# Patient Record
Sex: FEMALE | Race: WHITE | NOT HISPANIC OR LATINO | Employment: UNEMPLOYED | URBAN - METROPOLITAN AREA
[De-identification: names, ages, dates, MRNs, and addresses within clinical notes are randomized per-mention and may not be internally consistent; named-entity substitution may affect disease eponyms.]

---

## 2016-09-30 LAB
EXTERNAL CHLAMYDIA SCREEN: POSITIVE
EXTERNAL GONORRHEA SCREEN: NEGATIVE

## 2017-01-06 ENCOUNTER — GENERIC CONVERSION - ENCOUNTER (OUTPATIENT)
Dept: OTHER | Facility: OTHER | Age: 22
End: 2017-01-06

## 2017-01-18 ENCOUNTER — ALLSCRIPTS OFFICE VISIT (OUTPATIENT)
Dept: OTHER | Facility: OTHER | Age: 22
End: 2017-01-18

## 2017-01-18 LAB
GLUCOSE (HISTORICAL): NORMAL
PROT UR STRIP-MCNC: NORMAL MG/DL

## 2017-01-30 ENCOUNTER — ALLSCRIPTS OFFICE VISIT (OUTPATIENT)
Dept: PERINATAL CARE | Facility: CLINIC | Age: 22
End: 2017-01-30
Payer: COMMERCIAL

## 2017-01-30 ENCOUNTER — GENERIC CONVERSION - ENCOUNTER (OUTPATIENT)
Dept: OTHER | Facility: OTHER | Age: 22
End: 2017-01-30

## 2017-01-30 PROCEDURE — 76811 OB US DETAILED SNGL FETUS: CPT | Performed by: OBSTETRICS & GYNECOLOGY

## 2017-01-31 ENCOUNTER — GENERIC CONVERSION - ENCOUNTER (OUTPATIENT)
Dept: OTHER | Facility: OTHER | Age: 22
End: 2017-01-31

## 2017-02-01 ENCOUNTER — ALLSCRIPTS OFFICE VISIT (OUTPATIENT)
Dept: OTHER | Facility: OTHER | Age: 22
End: 2017-02-01

## 2017-02-01 LAB
GLUCOSE (HISTORICAL): NORMAL
PROT UR STRIP-MCNC: NORMAL MG/DL

## 2017-02-08 LAB — EXTERNAL SYPHILIS RPR SCREEN: NORMAL

## 2017-02-09 ENCOUNTER — LAB CONVERSION - ENCOUNTER (OUTPATIENT)
Dept: OTHER | Facility: OTHER | Age: 22
End: 2017-02-09

## 2017-02-09 LAB
BASOPHILS # BLD AUTO: 0.3 %
BASOPHILS # BLD AUTO: 24 CELLS/UL (ref 0–200)
DEPRECATED RDW RBC AUTO: 13.1 % (ref 11–15)
EOSINOPHIL # BLD AUTO: 0.9 %
EOSINOPHIL # BLD AUTO: 72 CELLS/UL (ref 15–500)
GLUCOSE 1 HR 50 GM GLUC CHALLENGE-PREG PTS (HISTORICAL): 110 MG/DL
HCT VFR BLD AUTO: 33.3 % (ref 35–45)
HGB BLD-MCNC: 10.9 G/DL (ref 11.7–15.5)
LYMPHOCYTES # BLD AUTO: 2032 CELLS/UL (ref 850–3900)
LYMPHOCYTES # BLD AUTO: 25.4 %
MCH RBC QN AUTO: 29.8 PG (ref 27–33)
MCHC RBC AUTO-ENTMCNC: 32.8 G/DL (ref 32–36)
MCV RBC AUTO: 91 FL (ref 80–100)
MONOCYTES # BLD AUTO: 456 CELLS/UL (ref 200–950)
MONOCYTES (HISTORICAL): 5.7 %
NEUTROPHILS # BLD AUTO: 5416 CELLS/UL (ref 1500–7800)
NEUTROPHILS # BLD AUTO: 67.7 %
PLATELET # BLD AUTO: 206 THOUSAND/UL (ref 140–400)
PMV BLD AUTO: 7.4 FL (ref 7.5–12.5)
RBC # BLD AUTO: 3.66 MILLION/UL (ref 3.8–5.1)
RPR SCREEN (HISTORICAL): NORMAL
WBC # BLD AUTO: 8 THOUSAND/UL (ref 3.8–10.8)

## 2017-02-22 ENCOUNTER — GENERIC CONVERSION - ENCOUNTER (OUTPATIENT)
Dept: OTHER | Facility: OTHER | Age: 22
End: 2017-02-22

## 2017-02-22 ENCOUNTER — ALLSCRIPTS OFFICE VISIT (OUTPATIENT)
Dept: OTHER | Facility: OTHER | Age: 22
End: 2017-02-22

## 2017-03-06 ENCOUNTER — GENERIC CONVERSION - ENCOUNTER (OUTPATIENT)
Dept: OTHER | Facility: OTHER | Age: 22
End: 2017-03-06

## 2017-03-06 ENCOUNTER — ALLSCRIPTS OFFICE VISIT (OUTPATIENT)
Dept: PERINATAL CARE | Facility: CLINIC | Age: 22
End: 2017-03-06
Payer: COMMERCIAL

## 2017-03-06 PROCEDURE — 76816 OB US FOLLOW-UP PER FETUS: CPT | Performed by: OBSTETRICS & GYNECOLOGY

## 2017-03-10 ENCOUNTER — GENERIC CONVERSION - ENCOUNTER (OUTPATIENT)
Dept: OTHER | Facility: OTHER | Age: 22
End: 2017-03-10

## 2017-03-10 ENCOUNTER — ALLSCRIPTS OFFICE VISIT (OUTPATIENT)
Dept: OTHER | Facility: OTHER | Age: 22
End: 2017-03-10

## 2017-03-22 ENCOUNTER — GENERIC CONVERSION - ENCOUNTER (OUTPATIENT)
Dept: OTHER | Facility: OTHER | Age: 22
End: 2017-03-22

## 2017-03-22 ENCOUNTER — ALLSCRIPTS OFFICE VISIT (OUTPATIENT)
Dept: OTHER | Facility: OTHER | Age: 22
End: 2017-03-22

## 2017-03-30 ENCOUNTER — GENERIC CONVERSION - ENCOUNTER (OUTPATIENT)
Dept: OTHER | Facility: OTHER | Age: 22
End: 2017-03-30

## 2017-03-30 LAB — EXTERNAL GROUP B STREP ANTIGEN: NEGATIVE

## 2017-04-03 ENCOUNTER — GENERIC CONVERSION - ENCOUNTER (OUTPATIENT)
Dept: OTHER | Facility: OTHER | Age: 22
End: 2017-04-03

## 2017-04-03 ENCOUNTER — ALLSCRIPTS OFFICE VISIT (OUTPATIENT)
Dept: PERINATAL CARE | Facility: CLINIC | Age: 22
End: 2017-04-03
Payer: COMMERCIAL

## 2017-04-03 PROCEDURE — 76816 OB US FOLLOW-UP PER FETUS: CPT | Performed by: OBSTETRICS & GYNECOLOGY

## 2017-04-07 ENCOUNTER — HOSPITAL ENCOUNTER (OUTPATIENT)
Facility: HOSPITAL | Age: 22
Discharge: HOME | End: 2017-04-08
Attending: OBSTETRICS & GYNECOLOGY | Admitting: OBSTETRICS & GYNECOLOGY
Payer: COMMERCIAL

## 2017-04-07 ENCOUNTER — GENERIC CONVERSION - ENCOUNTER (OUTPATIENT)
Dept: OTHER | Facility: OTHER | Age: 22
End: 2017-04-07

## 2017-04-08 VITALS
HEIGHT: 64 IN | RESPIRATION RATE: 16 BRPM | HEART RATE: 84 BPM | OXYGEN SATURATION: 97 % | BODY MASS INDEX: 21 KG/M2 | TEMPERATURE: 97.8 F | DIASTOLIC BLOOD PRESSURE: 64 MMHG | WEIGHT: 123 LBS | SYSTOLIC BLOOD PRESSURE: 106 MMHG

## 2017-04-08 PROCEDURE — 99215 OFFICE O/P EST HI 40 MIN: CPT

## 2017-04-12 ENCOUNTER — GENERIC CONVERSION - ENCOUNTER (OUTPATIENT)
Dept: OTHER | Facility: OTHER | Age: 22
End: 2017-04-12

## 2017-04-15 ENCOUNTER — HOSPITAL ENCOUNTER (OUTPATIENT)
Facility: HOSPITAL | Age: 22
Discharge: HOME | End: 2017-04-15
Attending: OBSTETRICS & GYNECOLOGY | Admitting: OBSTETRICS & GYNECOLOGY
Payer: COMMERCIAL

## 2017-04-15 VITALS — RESPIRATION RATE: 18 BRPM | DIASTOLIC BLOOD PRESSURE: 71 MMHG | HEART RATE: 92 BPM | SYSTOLIC BLOOD PRESSURE: 114 MMHG

## 2017-04-15 PROCEDURE — 99213 OFFICE O/P EST LOW 20 MIN: CPT

## 2017-04-19 ENCOUNTER — GENERIC CONVERSION - ENCOUNTER (OUTPATIENT)
Dept: OTHER | Facility: OTHER | Age: 22
End: 2017-04-19

## 2017-04-28 ENCOUNTER — GENERIC CONVERSION - ENCOUNTER (OUTPATIENT)
Dept: OTHER | Facility: OTHER | Age: 22
End: 2017-04-28

## 2017-05-02 ENCOUNTER — GENERIC CONVERSION - ENCOUNTER (OUTPATIENT)
Dept: OTHER | Facility: OTHER | Age: 22
End: 2017-05-02

## 2017-05-04 ENCOUNTER — HOSPITAL ENCOUNTER (INPATIENT)
Facility: HOSPITAL | Age: 22
LOS: 2 days | Discharge: HOME | End: 2017-05-06
Attending: OBSTETRICS & GYNECOLOGY | Admitting: OBSTETRICS & GYNECOLOGY
Payer: COMMERCIAL

## 2017-05-04 ENCOUNTER — ANESTHESIA (INPATIENT)
Dept: LABOR AND DELIVERY | Facility: HOSPITAL | Age: 22
End: 2017-05-04
Payer: COMMERCIAL

## 2017-05-04 ENCOUNTER — ANESTHESIA EVENT (INPATIENT)
Dept: LABOR AND DELIVERY | Facility: HOSPITAL | Age: 22
End: 2017-05-04
Payer: COMMERCIAL

## 2017-05-04 DIAGNOSIS — Z3A.40 40 WEEKS GESTATION OF PREGNANCY: Primary | ICD-10-CM

## 2017-05-04 LAB
ABO GROUP BLD: NORMAL
AMPHETAMINES SERPL QL SCN: NEGATIVE
BARBITURATES UR QL: NEGATIVE
BASE EXCESS BLDCOA CALC-SCNC: -5.7 MMOL/L (ref 3–11)
BASE EXCESS BLDCOV CALC-SCNC: -7 MMOL/L (ref 1–9)
BASOPHILS # BLD AUTO: 0.01 THOUSANDS/ΜL (ref 0–0.1)
BASOPHILS NFR BLD AUTO: 0 % (ref 0–1)
BENZODIAZ UR QL: NEGATIVE
BLD GP AB SCN SERPL QL: NEGATIVE
COCAINE UR QL: NEGATIVE
EOSINOPHIL # BLD AUTO: 0.02 THOUSAND/ΜL (ref 0–0.61)
EOSINOPHIL NFR BLD AUTO: 0 % (ref 0–6)
ERYTHROCYTE [DISTWIDTH] IN BLOOD BY AUTOMATED COUNT: 15.6 % (ref 11.6–15.1)
HCO3 BLDCOA-SCNC: 23.3 MMOL/L (ref 17.3–27.3)
HCO3 BLDCOV-SCNC: 18 MMOL/L (ref 12.2–28.6)
HCT VFR BLD AUTO: 31.3 % (ref 34.8–46.1)
HGB BLD-MCNC: 10.2 G/DL (ref 11.5–15.4)
LYMPHOCYTES # BLD AUTO: 1.97 THOUSANDS/ΜL (ref 0.6–4.47)
LYMPHOCYTES NFR BLD AUTO: 14 % (ref 14–44)
MCH RBC QN AUTO: 27.1 PG (ref 26.8–34.3)
MCHC RBC AUTO-ENTMCNC: 32.6 G/DL (ref 31.4–37.4)
MCV RBC AUTO: 83 FL (ref 82–98)
METHADONE UR QL: NEGATIVE
MONOCYTES # BLD AUTO: 0.97 THOUSAND/ΜL (ref 0.17–1.22)
MONOCYTES NFR BLD AUTO: 7 % (ref 4–12)
NEUTROPHILS # BLD AUTO: 10.97 THOUSANDS/ΜL (ref 1.85–7.62)
NEUTS SEG NFR BLD AUTO: 79 % (ref 43–75)
NRBC BLD AUTO-RTO: 0 /100 WBCS
O2 CT VFR BLDCOA CALC: 5 ML/DL
OPIATES UR QL SCN: NEGATIVE
OXYHGB MFR BLDCOA: 22.2 %
OXYHGB MFR BLDCOV: 67.7 %
PCO2 BLDCOA: 59.7 MM[HG] (ref 30–60)
PCO2 BLDCOV: 35.1 MM HG (ref 27–43)
PCP UR QL: NEGATIVE
PH BLDCOA: 7.21 [PH] (ref 7.23–7.43)
PH BLDCOV: 7.33 [PH] (ref 7.19–7.49)
PLATELET # BLD AUTO: 213 THOUSANDS/UL (ref 149–390)
PMV BLD AUTO: 9.3 FL (ref 8.9–12.7)
PO2 BLDCOA: 14.6 MM HG (ref 5–25)
PO2 BLDCOV: 30.3 MM HG (ref 15–45)
RBC # BLD AUTO: 3.76 MILLION/UL (ref 3.81–5.12)
RH BLD: POSITIVE
SAO2 % BLDCOV: 16.2 ML/DL
SPECIMEN EXPIRATION DATE: NORMAL
THC UR QL: NEGATIVE
WBC # BLD AUTO: 13.99 THOUSAND/UL (ref 4.31–10.16)

## 2017-05-04 PROCEDURE — 86901 BLOOD TYPING SEROLOGIC RH(D): CPT | Performed by: OBSTETRICS & GYNECOLOGY

## 2017-05-04 PROCEDURE — 86900 BLOOD TYPING SEROLOGIC ABO: CPT | Performed by: OBSTETRICS & GYNECOLOGY

## 2017-05-04 PROCEDURE — 10907ZC DRAINAGE OF AMNIOTIC FLUID, THERAPEUTIC FROM PRODUCTS OF CONCEPTION, VIA NATURAL OR ARTIFICIAL OPENING: ICD-10-PCS | Performed by: OBSTETRICS & GYNECOLOGY

## 2017-05-04 PROCEDURE — 99214 OFFICE O/P EST MOD 30 MIN: CPT

## 2017-05-04 PROCEDURE — 80307 DRUG TEST PRSMV CHEM ANLYZR: CPT | Performed by: OBSTETRICS & GYNECOLOGY

## 2017-05-04 PROCEDURE — 4A1HXCZ MONITORING OF PRODUCTS OF CONCEPTION, CARDIAC RATE, EXTERNAL APPROACH: ICD-10-PCS | Performed by: OBSTETRICS & GYNECOLOGY

## 2017-05-04 PROCEDURE — 82805 BLOOD GASES W/O2 SATURATION: CPT | Performed by: OBSTETRICS & GYNECOLOGY

## 2017-05-04 PROCEDURE — 85025 COMPLETE CBC W/AUTO DIFF WBC: CPT | Performed by: OBSTETRICS & GYNECOLOGY

## 2017-05-04 PROCEDURE — 86592 SYPHILIS TEST NON-TREP QUAL: CPT | Performed by: OBSTETRICS & GYNECOLOGY

## 2017-05-04 PROCEDURE — 86850 RBC ANTIBODY SCREEN: CPT | Performed by: OBSTETRICS & GYNECOLOGY

## 2017-05-04 RX ORDER — BUPRENORPHINE AND NALOXONE 2; .5 MG/1; MG/1
1 FILM, SOLUBLE BUCCAL; SUBLINGUAL DAILY
Status: DISCONTINUED | OUTPATIENT
Start: 2017-05-05 | End: 2017-05-04

## 2017-05-04 RX ORDER — LIDOCAINE HYDROCHLORIDE AND EPINEPHRINE 15; 5 MG/ML; UG/ML
INJECTION, SOLUTION EPIDURAL AS NEEDED
Status: DISCONTINUED | OUTPATIENT
Start: 2017-05-04 | End: 2017-05-05 | Stop reason: SURG

## 2017-05-04 RX ORDER — OXYTOCIN/RINGER'S LACTATE 30/500 ML
PLASTIC BAG, INJECTION (ML) INTRAVENOUS
Status: COMPLETED
Start: 2017-05-04 | End: 2017-05-04

## 2017-05-04 RX ORDER — ROPIVACAINE HYDROCHLORIDE 2 MG/ML
INJECTION, SOLUTION EPIDURAL; INFILTRATION; PERINEURAL AS NEEDED
Status: DISCONTINUED | OUTPATIENT
Start: 2017-05-04 | End: 2017-05-05 | Stop reason: SURG

## 2017-05-04 RX ORDER — ONDANSETRON 2 MG/ML
4 INJECTION INTRAMUSCULAR; INTRAVENOUS ONCE
Status: COMPLETED | OUTPATIENT
Start: 2017-05-04 | End: 2017-05-04

## 2017-05-04 RX ORDER — BUPRENORPHINE HYDROCHLORIDE 8 MG/1
4 TABLET SUBLINGUAL DAILY
Status: DISCONTINUED | OUTPATIENT
Start: 2017-05-05 | End: 2017-05-06 | Stop reason: HOSPADM

## 2017-05-04 RX ORDER — QUETIAPINE FUMARATE 25 MG/1
25 TABLET, FILM COATED ORAL
Status: DISCONTINUED | OUTPATIENT
Start: 2017-05-04 | End: 2017-05-06 | Stop reason: HOSPADM

## 2017-05-04 RX ORDER — SODIUM CHLORIDE, SODIUM LACTATE, POTASSIUM CHLORIDE, CALCIUM CHLORIDE 600; 310; 30; 20 MG/100ML; MG/100ML; MG/100ML; MG/100ML
125 INJECTION, SOLUTION INTRAVENOUS CONTINUOUS
Status: DISCONTINUED | OUTPATIENT
Start: 2017-05-04 | End: 2017-05-05

## 2017-05-04 RX ORDER — ONDANSETRON 2 MG/ML
INJECTION INTRAMUSCULAR; INTRAVENOUS
Status: COMPLETED
Start: 2017-05-04 | End: 2017-05-04

## 2017-05-04 RX ADMIN — Medication 250 MILLI-UNITS/MIN: at 23:20

## 2017-05-04 RX ADMIN — LIDOCAINE HYDROCHLORIDE AND EPINEPHRINE 3 ML: 15; 5 INJECTION, SOLUTION EPIDURAL at 15:23

## 2017-05-04 RX ADMIN — ROPIVACAINE HYDROCHLORIDE 10 ML: 2 INJECTION, SOLUTION EPIDURAL; INFILTRATION at 15:27

## 2017-05-04 RX ADMIN — ONDANSETRON 4 MG: 2 INJECTION INTRAMUSCULAR; INTRAVENOUS at 19:24

## 2017-05-04 RX ADMIN — SODIUM CHLORIDE, SODIUM LACTATE, POTASSIUM CHLORIDE, AND CALCIUM CHLORIDE 125 ML/HR: .6; .31; .03; .02 INJECTION, SOLUTION INTRAVENOUS at 15:21

## 2017-05-04 RX ADMIN — SODIUM CHLORIDE, SODIUM LACTATE, POTASSIUM CHLORIDE, AND CALCIUM CHLORIDE 125 ML/HR: .6; .31; .03; .02 INJECTION, SOLUTION INTRAVENOUS at 14:27

## 2017-05-04 RX ADMIN — ROPIVACAINE HYDROCHLORIDE: 2 INJECTION, SOLUTION EPIDURAL; INFILTRATION at 15:28

## 2017-05-05 LAB
BASOPHILS # BLD AUTO: 0.01 THOUSANDS/ΜL (ref 0–0.1)
BASOPHILS NFR BLD AUTO: 0 % (ref 0–1)
EOSINOPHIL # BLD AUTO: 0 THOUSAND/ΜL (ref 0–0.61)
EOSINOPHIL NFR BLD AUTO: 0 % (ref 0–6)
ERYTHROCYTE [DISTWIDTH] IN BLOOD BY AUTOMATED COUNT: 15.8 % (ref 11.6–15.1)
HCT VFR BLD AUTO: 26.6 % (ref 34.8–46.1)
HGB BLD-MCNC: 8.6 G/DL (ref 11.5–15.4)
LYMPHOCYTES # BLD AUTO: 1.51 THOUSANDS/ΜL (ref 0.6–4.47)
LYMPHOCYTES NFR BLD AUTO: 8 % (ref 14–44)
MCH RBC QN AUTO: 27 PG (ref 26.8–34.3)
MCHC RBC AUTO-ENTMCNC: 32.3 G/DL (ref 31.4–37.4)
MCV RBC AUTO: 83 FL (ref 82–98)
MONOCYTES # BLD AUTO: 2.05 THOUSAND/ΜL (ref 0.17–1.22)
MONOCYTES NFR BLD AUTO: 11 % (ref 4–12)
NEUTROPHILS # BLD AUTO: 14.61 THOUSANDS/ΜL (ref 1.85–7.62)
NEUTS SEG NFR BLD AUTO: 81 % (ref 43–75)
NRBC BLD AUTO-RTO: 0 /100 WBCS
PLATELET # BLD AUTO: 176 THOUSANDS/UL (ref 149–390)
PMV BLD AUTO: 9.5 FL (ref 8.9–12.7)
RBC # BLD AUTO: 3.19 MILLION/UL (ref 3.81–5.12)
RPR SER QL: NORMAL
WBC # BLD AUTO: 18.23 THOUSAND/UL (ref 4.31–10.16)

## 2017-05-05 PROCEDURE — 85025 COMPLETE CBC W/AUTO DIFF WBC: CPT | Performed by: OBSTETRICS & GYNECOLOGY

## 2017-05-05 RX ORDER — ONDANSETRON 2 MG/ML
4 INJECTION INTRAMUSCULAR; INTRAVENOUS EVERY 8 HOURS PRN
Status: DISCONTINUED | OUTPATIENT
Start: 2017-05-05 | End: 2017-05-06 | Stop reason: HOSPADM

## 2017-05-05 RX ORDER — DOCUSATE SODIUM 100 MG/1
100 CAPSULE, LIQUID FILLED ORAL 2 TIMES DAILY
Status: DISCONTINUED | OUTPATIENT
Start: 2017-05-05 | End: 2017-05-06 | Stop reason: HOSPADM

## 2017-05-05 RX ORDER — CALCIUM CARBONATE 200(500)MG
1000 TABLET,CHEWABLE ORAL DAILY PRN
Status: DISCONTINUED | OUTPATIENT
Start: 2017-05-05 | End: 2017-05-06 | Stop reason: HOSPADM

## 2017-05-05 RX ORDER — OXYCODONE HYDROCHLORIDE AND ACETAMINOPHEN 5; 325 MG/1; MG/1
1 TABLET ORAL EVERY 4 HOURS PRN
Status: DISCONTINUED | OUTPATIENT
Start: 2017-05-05 | End: 2017-05-06 | Stop reason: HOSPADM

## 2017-05-05 RX ORDER — DIPHENHYDRAMINE HCL 25 MG
25 TABLET ORAL EVERY 6 HOURS PRN
Status: DISCONTINUED | OUTPATIENT
Start: 2017-05-05 | End: 2017-05-06 | Stop reason: HOSPADM

## 2017-05-05 RX ORDER — OXYTOCIN/RINGER'S LACTATE 30/500 ML
250 PLASTIC BAG, INJECTION (ML) INTRAVENOUS CONTINUOUS
Status: ACTIVE | OUTPATIENT
Start: 2017-05-05 | End: 2017-05-05

## 2017-05-05 RX ORDER — IBUPROFEN 600 MG/1
600 TABLET ORAL EVERY 6 HOURS PRN
Status: DISCONTINUED | OUTPATIENT
Start: 2017-05-05 | End: 2017-05-06 | Stop reason: HOSPADM

## 2017-05-05 RX ORDER — SIMETHICONE 80 MG
80 TABLET,CHEWABLE ORAL 4 TIMES DAILY PRN
Status: DISCONTINUED | OUTPATIENT
Start: 2017-05-05 | End: 2017-05-06 | Stop reason: HOSPADM

## 2017-05-05 RX ORDER — ACETAMINOPHEN 325 MG/1
650 TABLET ORAL EVERY 6 HOURS PRN
Status: DISCONTINUED | OUTPATIENT
Start: 2017-05-05 | End: 2017-05-06 | Stop reason: HOSPADM

## 2017-05-05 RX ORDER — DIAPER,BRIEF,INFANT-TODD,DISP
1 EACH MISCELLANEOUS AS NEEDED
Status: DISCONTINUED | OUTPATIENT
Start: 2017-05-05 | End: 2017-05-06 | Stop reason: HOSPADM

## 2017-05-05 RX ORDER — OXYCODONE HYDROCHLORIDE AND ACETAMINOPHEN 5; 325 MG/1; MG/1
2 TABLET ORAL EVERY 4 HOURS PRN
Status: DISCONTINUED | OUTPATIENT
Start: 2017-05-05 | End: 2017-05-06 | Stop reason: HOSPADM

## 2017-05-05 RX ADMIN — DOCUSATE SODIUM 100 MG: 100 CAPSULE, LIQUID FILLED ORAL at 08:52

## 2017-05-05 RX ADMIN — DOCUSATE SODIUM 100 MG: 100 CAPSULE, LIQUID FILLED ORAL at 16:37

## 2017-05-05 RX ADMIN — IBUPROFEN 600 MG: 600 TABLET, FILM COATED ORAL at 16:36

## 2017-05-05 RX ADMIN — BUPRENORPHINE HYDROCHLORIDE 4 MG: 8 TABLET SUBLINGUAL at 08:52

## 2017-05-05 RX ADMIN — IBUPROFEN 600 MG: 600 TABLET, FILM COATED ORAL at 10:45

## 2017-05-05 RX ADMIN — QUETIAPINE FUMARATE 25 MG: 25 TABLET, FILM COATED ORAL at 01:05

## 2017-05-06 VITALS
WEIGHT: 125 LBS | OXYGEN SATURATION: 100 % | HEART RATE: 79 BPM | HEIGHT: 64 IN | TEMPERATURE: 97.8 F | DIASTOLIC BLOOD PRESSURE: 68 MMHG | BODY MASS INDEX: 21.34 KG/M2 | RESPIRATION RATE: 20 BRPM | SYSTOLIC BLOOD PRESSURE: 102 MMHG

## 2017-05-06 RX ORDER — DOCUSATE SODIUM 100 MG/1
100 CAPSULE, LIQUID FILLED ORAL 2 TIMES DAILY
Qty: 60 CAPSULE | Refills: 0
Start: 2017-05-06 | End: 2017-06-05

## 2017-05-06 RX ORDER — DIAPER,BRIEF,INFANT-TODD,DISP
1 EACH MISCELLANEOUS AS NEEDED
Qty: 30 G | Refills: 0
Start: 2017-05-06 | End: 2017-06-05

## 2017-05-06 RX ORDER — IBUPROFEN 600 MG/1
600 TABLET ORAL EVERY 6 HOURS PRN
Qty: 30 TABLET | Refills: 0
Start: 2017-05-06 | End: 2017-06-05

## 2017-05-06 RX ORDER — ACETAMINOPHEN 325 MG/1
TABLET ORAL
Qty: 30 TABLET | Refills: 0
Start: 2017-05-06

## 2017-05-06 RX ADMIN — DOCUSATE SODIUM 100 MG: 100 CAPSULE, LIQUID FILLED ORAL at 09:03

## 2017-05-06 RX ADMIN — QUETIAPINE FUMARATE 25 MG: 25 TABLET, FILM COATED ORAL at 01:06

## 2017-05-06 RX ADMIN — BUPRENORPHINE HYDROCHLORIDE 4 MG: 8 TABLET SUBLINGUAL at 09:03

## 2017-05-06 RX ADMIN — IBUPROFEN 600 MG: 600 TABLET, FILM COATED ORAL at 01:06

## 2017-05-09 ENCOUNTER — TELEPHONE (OUTPATIENT)
Dept: LABOR AND DELIVERY | Facility: HOSPITAL | Age: 22
End: 2017-05-09

## 2017-05-15 LAB — PLACENTA IN STORAGE: NORMAL

## 2017-05-24 ENCOUNTER — ALLSCRIPTS OFFICE VISIT (OUTPATIENT)
Dept: OTHER | Facility: OTHER | Age: 22
End: 2017-05-24

## 2017-06-12 ENCOUNTER — GENERIC CONVERSION - ENCOUNTER (OUTPATIENT)
Dept: OTHER | Facility: OTHER | Age: 22
End: 2017-06-12

## 2017-09-11 ENCOUNTER — ALLSCRIPTS OFFICE VISIT (OUTPATIENT)
Dept: OTHER | Facility: OTHER | Age: 22
End: 2017-09-11

## 2017-10-26 NOTE — PROGRESS NOTES
Assessment  1  Contraception (V25 9) (Z30 9)    Plan  Contraception    · Zovia 35E (28) 1-35 MG-MCG Oral Tablet; TAKE 1 TABLET DAILY   Rx By: Jana Brar; Dispense: 84 Days ; #:84 Tablet; Refill: 2;For: Contraception; AIDA = N; Verified Transmission to [facility] Last Updated By: System, [employer]; 2017 11:52:35 AM    Discussion/Summary  Goals and Barriers: The patient has the current Goals: Goals met  The patent has the current Barriers: No barriers  Patient's Capacity to Self-Care: Patient is able to Self-Care  Chief Complaint  Chief Complaint Free Text Note Form: OC follow-up      History of Present Illness  HPI: This is a 71-year-old white female who presents today for birth control pill follow-up  She is doing well  Her cycles are becoming more regular  She is very good at taking the pill the same time a day  She is not sexually active  We'll follow the baby is no longer involved  Birth control pills authorized  She will return my office in 3 months  She'll continue to watch her cycles and see if there is an abnormal pattern developing  Active Problems  1  Chlamydial infection (079 98) (A74 9)   2  Contraception (V25 9) (Z30 9)   3  Endometriosis (617 9) (N80 9)   4  Episodic mood disorder (296 90) (F39)   5  Generalized anxiety disorder (300 02) (F41 1)   6  History of heroin use (V11 8) (Z86 59)    Past Medical History  1  History of Age At First Period 16 Years Old (Menarche)   2  History of Encounter for drug rehabilitation (V57 89) (Z71 51)   3  Endometriosis (617 9) (N80 9)   4  History of Heroin use (305 50) (F11 90)    Surgical History  1  History of Laparoscopy (Diagnostic)    Family History  Mother    1  No pertinent family history  Maternal Grandmother    2  Family history of    3  Family history of malignant neoplasm (V16 9) (Z80 9)  Maternal Grandfather    4  Family history of myocardial infarction (V17 3) (Z82 49)   5   Family history of skin cancer (V16 8) (Z80 8)  Family History    6  Family history of Heart Disease (V17 49)   7  Family history of Urinary Tract Infection    Social History   · Denied: History of Alcohol Use (History)   · Denied: History of Being A Social Drinker   · Caffeine Use   · Denied: History of Daily Coffee Consumption (___ Cups/Day)   · History of heroin use (V11 8) (Z86 59)    Current Meds   1  Subutex SUBL; DISSOLVE TABLET  per pt 8mg daily; Therapy: (Recorded:12Apr2017) to Recorded   2  Zovia 1/35E (28) 1-35 MG-MCG Oral Tablet; TAKE 1 TABLET DAILY; Therapy: 55SGZ2317 to (Evaluate:35Sbc4482)  Requested for: 12Jun2017; Last   Rx:12Jun2017 Ordered    Allergies  1  No Known Drug Allergies  2  No Known Environmental Allergies   3   No Known Food Allergies    Vitals  Vital Signs    Recorded: 55UAC4130 93:52MW   Systolic 90   Diastolic 62   Height 5 ft 3 5 in   Weight 94 lb 6 08 oz   BMI Calculated 16 46   BSA Calculated 1 41   LMP 11-Sep-2017     Signatures   Electronically signed by : DAVID Steele ; Sep 11 2017 11:53AM EST                       (Author)

## 2017-10-30 ENCOUNTER — ANESTHESIA (OUTPATIENT)
Dept: PERIOP | Facility: HOSPITAL | Age: 22
End: 2017-10-30

## 2017-10-30 ENCOUNTER — ANESTHESIA EVENT (OUTPATIENT)
Dept: PERIOP | Facility: HOSPITAL | Age: 22
End: 2017-10-30

## 2017-12-11 ENCOUNTER — ALLSCRIPTS OFFICE VISIT (OUTPATIENT)
Dept: OTHER | Facility: OTHER | Age: 22
End: 2017-12-11

## 2017-12-12 NOTE — PROGRESS NOTES
Assessment    1  Encounter for routine gynecological examination () (Z99 555)    Discussion/Summary    The patient was informed of a stable gyn examination  A Pap smear was performed  She is now interested in Depo-Provera, the to be a good method for her  Brochure given shortness make her decision  She should return my office in 1 year  She will continue to follow her drug treatment plan  The patient has the current Goals: To consider Depo-Provera for contraception  There are no barriers  Patient is able to Self-Care  Chief Complaint  Annual visit      History of Present Illness  HPI: This is a 36-year-old white female she is  1 para 1 1 vaginal delivery approximately 7 months ago  Her current method of contraception was birth control pill  She is now having problems with memory she is considering other options  She is interested in the Depo-Provera  She is on Suboxone for treatment of her addiction she is doing well  She is not sexually relation to the present time  She is to doing well since her delivery  Her infant was also thriving and doing well  She denies any other major gynecological  GI complaint  There are no new major family illnesses report  She denies any problem depression or anxiety  GYN HM, Adult Female [facility]:   General Health: The patient's health since the last visit is described as good  She has regular dental visits  -- She denies vision problems  -- She denies hearing loss  Lifestyle:  She consumes a diverse and healthy diet  -- She does not have any weight concerns  -- She does not exercise regularly  -- She uses tobacco  The patient is a former cigarette smoker  -- She denies alcohol use  -- She uses illicit drugs  She is in a drug treatment program   Reproductive health: the patient is premenopausal--   she reports no menstrual problems  -- she uses contraception  For contraception, she uses oral contraception pills  -- she is not sexually active     Screening:      Review of Systems   Constitutional: No fever, no chills, feels well, no tiredness, no recent weight gain or loss  ENT: no ear ache, no loss of hearing, no nosebleeds or nasal discharge, no sore throat or hoarseness  Cardiovascular: no complaints of slow or fast heart rate, no chest pain, no palpitations, no leg claudication or lower extremity edema  Respiratory: no complaints of shortness of breath, no wheezing, no dyspnea on exertion, no orthopnea or PND  Breasts: no complaints of breast pain, breast lump or nipple discharge  Gastrointestinal: no complaints of abdominal pain, no constipation, no nausea or diarrhea, no vomiting, no bloody stools  Genitourinary: no complaints of dysuria, no incontinence, no pelvic pain, no dysmenorrhea, no vaginal discharge or abnormal vaginal bleeding  Musculoskeletal: no complaints of arthralgia, no myalgia, no joint swelling or stiffness, no limb pain or swelling  Integumentary: no complaints of skin rash or lesion, no itching or dry skin, no skin wounds  Neurological: no complaints of headache, no confusion, no numbness or tingling, no dizziness or fainting  Over the past 2 weeks, how often have you been bothered by the following problems? 1 ) Little interest or pleasure in doing things? Not at all   2 ) Feeling down, depressed or hopeless? Not at all   3 ) Trouble falling asleep or sleeping too much? Not at all   4 ) Feeling tired or having little energy? Not at all   5 ) Poor appetite or overeating? Not at all   6 ) Feeling bad about yourself, or that you are a failure, or have let yourself or your family down? Not at all   7 ) Trouble concentrating on things, such as reading a newspaper or watching television? Not at all   8 ) Moving or speaking so slowly that other people could have noticed, or the opposite, moving or speaking faster than usual? Not at all    How difficult have these problems made it for you to do your work, take care of things at home, or get along with people? Not at all  Score       OB History  Pregnancy History (Brief):  Prior pregnancies: : 1  Para: 1 (full-term)  Delivery type: 1 vaginal       Active Problems  1  Chlamydial infection (079 98) (A74 9)   2  Contraception (V25 9) (Z30 9)   3  Encounter for routine gynecological examination (V72 31) (Z01 419)   4  Endometriosis (617 9) (N80 9)   5  Episodic mood disorder (296 90) (F39)   6  Generalized anxiety disorder (300 02) (F41 1)   7  History of heroin use (V11 8) (Z86 59)    Past Medical History     · History of Age At First Period 16 Years Old (Menarche)   · History of Drug use affecting pregnancy in second trimester (648 43,305 90) (O99 322)   · History of Encounter for drug rehabilitation (V57 89) (Z71 51)   · Endometriosis (617 9) (N80 9)   · History of Heroin use (305 50) (F11 90)   · History of pregnancy (V13 29)   · History of Pregnancy complicated by subutex maintenance, antepartum(648 33,304 00,V58 69) (O99 320,F11 20,Z79 891)    Surgical History     · History of Laparoscopy (Diagnostic)    Family History  Mother    · No pertinent family history  Maternal Grandmother    · Family history of    · Family history of malignant neoplasm (V16 9) (Z80 9)  Maternal Grandfather    · Family history of myocardial infarction (V17 3) (Z82 49)   · Family history of skin cancer (V16 8) (Z80 8)  Family History    · Family history of Heart Disease (V17 49)   · Family history of Urinary Tract Infection    Social History     · Denied: History of Alcohol Use (History)   · Denied: History of Being A Social Drinker   · Caffeine Use   · Denied: History of Daily Coffee Consumption (___ Cups/Day)   · Former smoker (V15 82) (L85 364)   · History of heroin use (V11 8) (Z86 59)    Current Meds   1  Subutex SUBL; DISSOLVE TABLET  per pt 8mg daily; Therapy: (Recorded:22Vbc6649) to Recorded   2  Zovia 1/35E (28) 1-35 MG-MCG Oral Tablet; TAKE 1 TABLET DAILY;  Therapy: 00ACO0954 to (Evaluate:96Tyv8844) Requested for: 54Smn3792; Last Rx:06Xot9294 Ordered    Allergies  1  No Known Drug Allergies  2  No Known Environmental Allergies   3  No Known Food Allergies    Vitals   Recorded: 90TRT6838 83:64OR   Systolic 164   Diastolic 70   Height 5 ft 3 5 in   Weight 101 lb 4 00 oz   BMI Calculated 17 65   BSA Calculated 1 45   LMP 30-Oct-2017       Physical Exam   Constitutional  General appearance: No acute distress, well appearing and well nourished  Neck  Neck: Normal, supple, trachea midline, no masses  Thyroid: Normal, no thyromegaly  Pulmonary  Respiratory effort: No increased work of breathing or signs of respiratory distress  Auscultation of lungs: Clear to auscultation  Cardiovascular  Auscultation of heart: Normal rate and rhythm, normal S1 and S2, no murmurs  Peripheral vascular exam: Normal pulses Throughout  Genitourinary  External genitalia: Normal and no lesions appreciated  Vagina: Normal, no lesions or dryness appreciated  Urethra: Normal    Urethral meatus: Normal    Bladder: Normal, soft, non-tender and no prolapse or masses appreciated  Cervix: Normal, no palpable masses  Uterus: Normal, non-tender, not enlarged, and no palpable masses  Adnexa/parametria: Normal, non-tender and no fullness or masses appreciated  Chest  Breasts: Normal and no dimpling or skin changes noted  Abdomen  Abdomen: Normal, non-tender, and no organomegaly noted  Liver and spleen: No hepatomegaly or splenomegaly  Examination for hernias: No hernias appreciated  Lymphatic  Palpation of lymph nodes in neck, axillae, groin and/or other locations: No lymphadenopathy or masses noted  Skin  Skin and subcutaneous tissue: Normal skin turgor and no rashes     Palpation of skin and subcutaneous tissue: Normal    Psychiatric  Orientation to person, place, and time: Normal    Mood and affect: Normal        Signatures   Electronically signed by : DAVID Macias ; Dec 11 2017 10:41AM EST (Author)

## 2017-12-15 ENCOUNTER — LAB CONVERSION - ENCOUNTER (OUTPATIENT)
Dept: OTHER | Facility: OTHER | Age: 22
End: 2017-12-15

## 2017-12-15 LAB
ADDITIONAL INFORMATION (HISTORICAL): NORMAL
ADEQUACY: (HISTORICAL): NORMAL
COMMENT (HISTORICAL): NORMAL
CYTOTECHNOLOGIST: (HISTORICAL): NORMAL
INTERPRETATION (HISTORICAL): NORMAL
LMP (HISTORICAL): NORMAL
PREV. BX: (HISTORICAL): NORMAL
PREV. PAP (HISTORICAL): NORMAL
SOURCE (HISTORICAL): NORMAL

## 2018-01-10 NOTE — PROGRESS NOTES
MAR 6 2017         RE: Bonifacio Lot                                  To: [facility]   MR#: [id number]                                   [address] [place] [address]   : [address] [place] [address] Road: [id number]                             Fax: [phone]   (Exam #: [id number])      The LMP of this 22year old,  G1, P0-0-0-0 patient was 2016, her   working SCOTT is MAY 1 2017 and the current gestational age is 26 weeks 0   days by [address] [place] [address]  A sonographic examination was performed on MAR   6 2017 using real time equipment  The ultrasound examination was performed   using abdominal technique  The patient has a BMI of 20 4  Her blood   pressure today was 97/68  Earliest ultrasound found in her record: 10/14/2016   11w4d  2017 SCOTT      Cardiac motion was observed at 136 bpm       INDICATIONS      history of drug dependence   Subutex use   missed anatomy follow up      Exam Types      Level I      RESULTS      Fetus # 1 of 1   Vertex presentation   Fetal growth appeared normal   Placenta Location = Anterior   No placenta previa   Placenta Grade = II      MEASUREMENTS (* Included In Average GA)      AC              29 8 cm        33 weeks 6 days* (78%)   BPD              7 6 cm        30 weeks 3 days* (10%)   HC              28 8 cm        31 weeks 2 days* (20%)   Femur            6 2 cm        32 weeks 0 days* (49%)      Cerebellum       4 3 cm        34 weeks 4 days      HC/AC           0 97   FL/AC           0 21   FL/BPD          0 82   EFW (Ac/Fl/Hc)  2062 grams - 4 lbs 9 oz                 (56%)      THE AVERAGE GESTATIONAL AGE is 31 weeks 6 days +/- 18 days        AMNIOTIC FLUID      Q1: 1 5      Q2: 5 6      Q3:          Q4: 2 1   SANAZ Total = 9 2 cm   Amniotic Fluid: Normal      ANATOMY DETAILS      Visualized Appearing Sonographically Normal:   HEAD: (Calvarium, BPD Level, Cavum, Lateral Ventricles, Cerebellum);      FACE/NECK: (Profile, Nose/Lips); HEART: (Four Chamber View, Proximal   Left Outflow, Proximal Right Outflow, 3VV, Cardiac Axis, Cardiac   Position);    STOMACH, RIGHT KIDNEY, LEFT KIDNEY, BLADDER, EXTREMS: (Rt   Hand, Lt Foot, Rt Foot);    GENITALIA, PLACENTA, PCI      Suboptimally Visualized:   FACE/NECK: (Palate)      ANATOMY COMMENTS            The prior fetal anatomic survey was limited the area of the 4ch heart,   gender, pci, palate, right hand, feet, profile, nb, n/l  Views of the 4ch   heart, gender, pci, right hand, feet, n/l, and profile/nasal bone  were   seen today as sonographically normal within the inherent limitations of   fetal ultrasound; however, views of the palate were suboptimal without   obvious abnormalities  IMPRESSION      Burk IUP   31 weeks and 6 days by this ultrasound  (SCOTT=MAY 2 2017)   32 weeks and 0 days by [facility] Tri Sono  (SCOTT=MAY 1 2017)   Vertex presentation   Fetal growth appeared normal   Regular fetal heart rate of 136 bpm   Anterior placenta   No placenta previa      GENERAL COMMENT      On exam today the patient appears well, in no acute distress, and denies   any complaints  Her abdomen is non-tender  Elda has been utilizing 12   mg of Subutex daily through a Dr  in [place]  She states that she has been   free from heroin use since coming out of rehabilitation earlier in the   pregnancy  The fetal anatomic survey is now complete except for suboptimal   visualization of the fetal palate  There is no sonographic evidence of   fetal abnormalities at this time  The remainder of the survey was   completed previously  There has been appropriate interval fetal growth  Good fetal movement and tone are seen  The amniotic fluid volume appears   normal   The placenta is anterior and it appears sonographically normal     The patient was informed of today's findings and all of her questions were   answered    The limitations of ultrasound were reviewed with the patient,   which she accepts  Precautions and fetal kick counts were reviewed  Antonio Lopez has a NICU consultation scheduled today immediately after the   ultrasound to discuss  abstinence syndrome  We discussed   follow-up in detail and I recommend Elda return in 4 weeks for a fetal   growth evaluation  Please note, in addition to the time spent discussing the results of the   ultrasound, approximately 10 minutes were spent in consultation with the   patient and coordination of care, with greater than 50% of the time spent   in direct face-to-face counseling  Thank you very much for allowing us to participate in the care of this   very nice patient  Should you have any questions, please do not hesitate   to contact our office  YOANA Castillo M D     Electronically signed 17 14:32

## 2018-01-10 NOTE — RESULT NOTES
Verified Results  (Q) OBSTETRIC PANEL [id number] 02:29PM Ana M Rogers     Test Name Result Flag Reference   WHITE BLOOD CELL COUNT 7 8 Thousand/uL  3 8-10 8   RED BLOOD CELL COUNT 4 26 Million/uL  3 80-5 10   HEMOGLOBIN 12 5 g/dL  11 7-15 5   HEMATOCRIT 38 5 %  35 0-45 0   MCV 90 5 fL  80 0-100 0   MCH 29 3 pg  27 0-33 0   MCHC 32 3 g/dL  32 0-36 0   RDW 15 0 %  11 0-15 0   PLATELET COUNT 370 Thousand/uL  140-400   MPV 8 1 fL  7 5-11 5   ABSOLUTE NEUTROPHILS 4360 cells/uL  4316-2441   ABSOLUTE LYMPHOCYTES 2839 cells/uL  850-3900   ABSOLUTE MONOCYTES 507 cells/uL  200-950   ABSOLUTE EOSINOPHILS 62 cells/uL     ABSOLUTE BASOPHILS 31 cells/uL  0-200   NEUTROPHILS 55 9 %     LYMPHOCYTES 36 4 %     MONOCYTES 6 5 %     EOSINOPHILS 0 8 %     BASOPHILS 0 4 %     RUBELLA ANTIBODY (IGG) 4 18     Value            Interpretation           -----            --------------             < or = 0 90      Not consistent with Immunity           0 91-1 09        Equivocal           > or = 1 10      Consistent with Immunity      The presence of rubella IgG antibody suggests   immunization or past or current infection with  rubella virus  HEPATITIS B SURFACE$ANTIGEN NON-REACTIVE  NON-REACTIVE   ANTIBODY SCREEN, RBC$W/REFL ID, TITER AND AG      NO ANTIBODIES DETECTED   Reference range                  No antibodies detected     This assay is a screening test for the detection of   red blood cell antibodies  The test is not to be used   for pretransfusion screening or for the medical   management of an alloimmunized pregnancy     ABO GROUP B     RH TYPE RH(D) POSITIVE     RPR (DX) W/REFL TITER AND$CONFIRMATORY TESTING NON-REACTIVE  NON-REACTIVE

## 2018-01-11 NOTE — PROGRESS NOTES
APR 3 2017         RE: Soco Bingham                                  To: [facility]   MR#: [id number]                                   [address]   : [address]: [id number]                             Fax: [phone]   (Exam #: [id number])      The LMP of this 22year old,  G1, P0-0-0-0 patient was 2016, her   working SCOTT is MAY 1 2017 and the current gestational age is 42 weeks 0   days by [address] [place] [address]  A sonographic examination was performed on APR   3 2017 using real time equipment  The ultrasound examination was performed   using abdominal technique  The patient has a BMI of 21 3  Her blood   pressure today was 103/66  Earliest ultrasound found in her record: 10/14/2016   11w4d  2017 SCOTT      Cardiac motion was observed at 132 bpm       INDICATIONS      history of drug dependence   Subutex use   missed anatomy follow up   fetal growth      Exam Types      Level I      RESULTS      Fetus # 1 of 1   Vertex presentation   Fetal growth appeared normal   Placenta Location = Anterior   No placenta previa   Placenta Grade = II      MEASUREMENTS (* Included In Average GA)      AC              33 4 cm        37 weeks 4 days* (77%)   BPD              8 3 cm        33 weeks 3 days* (6%)   HC              31 3 cm        34 weeks 4 days* (16%)   Femur            7 1 cm        36 weeks 0 days* (59%)      Cerebellum       4 9 cm        36 weeks 3 days      HC/AC           0 94   FL/AC           0 21   FL/BPD          0 86   Ceph Index      0 73   EFW (Ac/Fl/Hc)  2973 grams - 6 lbs 9 oz                 (56%)      THE AVERAGE GESTATIONAL AGE is 35 weeks 3 days +/- 21 days        AMNIOTIC FLUID      Q1: 3 9      Q2: 2 6      Q3: 3 2      Q4: 3 2   SANAZ Total = 12 9 cm   Amniotic Fluid: Normal      ANATOMY DETAILS      Visualized Appearing Sonographically Normal:   HEAD: (Calvarium, BPD Level, Lateral Ventricles, Choroid Plexus,   Cerebellum, Cisterna Magna);    FACE/NECK: (Palate);    TH  CAV  : (Lungs,   Diaphragm); HEART: (Four Chamber View, Proximal Left Outflow, Proximal   Right Outflow, 3VV, 3 Vessel Trachea, Cardiac Axis, Cardiac Position);      STOMACH, RIGHT KIDNEY, LEFT KIDNEY, BLADDER, PLACENTA      ANATOMY COMMENTS      The prior fetal anatomic survey was limited the area of the palate  These   anatomic views were seen today as sonographically normal within the   inherent limitations of fetal ultrasound  IMPRESSION      Burk IUP   35 weeks and 3 days by this ultrasound  (SCOTT=MAY 5 2017)   36 weeks and 0 days by 1st Tri Sono  (SCOTT=MAY 1 2017)   Vertex presentation   Fetal growth appeared normal   Regular fetal heart rate of 132 bpm   Anterior placenta   No placenta previa      GENERAL COMMENT      The patient was informed of the findings and counseled about the   limitations of the exam in detecting all forms of fetal congenital   abnormalities  She denies any vaginal bleeding or uterine cramping/contractions  She does   feel fetal movement  Exam shows she is comfortable and her abdomen is non tender  Follow up recommended:   1  She reports she is weaning off subutex upon instruction of Dr Cheyanne Duran from [place]  phone [phone]  She states she was on 12 mg daily   and is now down to 8mg in 1 week and will go down to 4 mg next week and   then off in 2 weeks  This seems fast  She was started on Subutex this past   October  She is anxious about weaning off and would rather wait till after   pregnancy is over to wean off of her medication  I agree with her   decision  If she is uncomfortable she may have withdraw signs that   precipitate her use of illicit drugs again  Weaning off too fast may also   increase the theoretic risk for stillbirth  2  She completed her NICU tour   She is aware that weaning off of her   Subutex now will not remove the requirement of a  observation of   5 days for signs of narcotic withdrawal             The face to face time, in addition to time spent discussing ultrasound   results, was approximately 15 minutes, greater than 50% of which was spent   during counseling and coordination of care  YOANA Mckay , DAVID Mendez     Maternal-Fetal Medicine   Electronically signed 04/04/17 20:21

## 2018-01-11 NOTE — MISCELLANEOUS
Message  No record pt completed part 2 of Stepwise testing  Active Problems    1  Chlamydial infection (079 98) (A74 9)   2  Drug use affecting pregnancy in first trimester (648 43,305 90) (O99 321)   3  Encounter for routine gynecological examination (V72 31) (Z01 419)   4  Endometriosis (617 9) (N80 9)   5  Episodic mood disorder (296 90) (F39)   6  Generalized anxiety disorder (300 02) (F41 1)   7  History of heroin use (V11 8) (Z86 59)   8  Low weight gain during pregnancy in first trimester (646 83) (O26 11)   9  History of Oral contraceptive prescribed (V25 01) (Z30 011)   10  Pregnancy (V22 2) (Z33 1)   11  Smokes tobacco daily (305 1) (Z72 0)   12  Underweight (783 22) (R63 6)    Current Meds   1  Ondansetron HCl - 4 MG Oral Tablet (Zofran); TAKE 1 TABLET Every 6 hours PRN   nausea; Therapy: 77Pge9159 to (Evaluate:02Qef7520)  Requested for: 91Fws1697; Last   Rx:83Bat5704 Ordered   2  Prenatal TABS; Therapy: (Recorded:51Aik2810) to Recorded   3  Sertraline HCl - 100 MG Oral Tablet; take one and 1/2 in the morning; Therapy: 41SZV2977 to (); Last Rx:73Qwm5983 Ordered    Allergies    1  No Known Drug Allergies    2  No Known Environmental Allergies   3   No Known Food Allergies    Signatures   Electronically signed by : Ruby Mcnamara RN; Jan 6 2017  2:38PM EST                       (Author)

## 2018-01-12 NOTE — RESULT NOTES
Verified Results  (Q) URINALYSIS MICROSCOPIC 50Dap0107 02:29PM Isha Flight     Test Name Result Flag Reference   WBC 0-5 /HPF  < OR = 5   RBC NONE SEEN /HPF  < OR = 2   SQUAMOUS EPITHELIAL CELLS 10-20 /HPF A < OR = 5   BACTERIA FEW /HPF A NONE SEEN   AMORPHOUS SEDIMENT MANY /HPF A NONE OR FEW   HYALINE CAST NONE SEEN /LPF  NONE SEEN

## 2018-01-12 NOTE — PROGRESS NOTES
2017         RE: Patrick Hickey                                  To: [facility]   MR#: [id number]                                   [address]   : [address] [place] [address] Drive,, [address]: [id number]                             Fax: [phone]   (Exam #: [id number])      The LMP of this 22year old,  G1, P0-0-0-0 patient was 2016, her   working SCOTT is MAY 1 2017 and the current gestational age is 32 weeks 0   days by [address] [place] [address]  A sonographic examination was performed on 2017 using real time equipment  The ultrasound examination was   performed using abdominal technique  The patient has a BMI of 19 5  Her   blood pressure today was 98/59  Earliest ultrasound found in her record: 10/14/2016   11w4d  2017 SCOTT      Elda has no complaints today  She reports regular fetal movement and   denies problems related to vaginal bleeding or  labor  She recently   was discharged from a drug rehabilitation center in [place], [place]   where she was evaluated and treated for heroin abuse  She is currently   treated with 8 mg of Subutex each morning and 4 mg at night and feels well   on that dosing regimen        Cardiac motion was observed at 135 bpm       INDICATIONS      history of drug dependence   Subutex use      Exam Types      LEVEL II   Transvaginal      RESULTS      Fetus # 1 of 1   Transverse presentation   Fetal growth appeared normal   Placenta Location = Anterior   No placenta previa   Placenta Grade = I      MEASUREMENTS (* Included In Average GA)      AC              23 2 cm        27 weeks 4 days* (54%)   BPD              6 3 cm        25 weeks 3 days* (9%)   HC              24 9 cm        26 weeks 6 days* (33%)   Femur            5 3 cm        28 weeks 2 days* (67%)      Humerus          4 8 cm        28 weeks 0 days  (64%)      Cerebellum       3 4 cm        30 weeks 1 day   Biorbit          4 3 cm 27 weeks 1 day      HC/AC           1 07   FL/AC           0 23   FL/BPD          0 85   EFW (Ac/Fl/Hc)  1115 grams - 2 lbs 7 oz                 (58%)      THE AVERAGE GESTATIONAL AGE is 27 weeks 0 days +/- 14 days  AMNIOTIC FLUID      Q1: 3 0      Q2: 1 7      Q3: 4 1      Q4: 4 1   SANAZ Total = 12 9 cm   Amniotic Fluid: Normal      ANATOMY      Head                                    Normal   Face/Neck                               See Details   Th  Cav  Normal   Heart                                   See Details   Abd  Cav  Normal   Stomach                                 Normal   Right Kidney                            Normal   Left Kidney                             Normal   Bladder                                 Normal   Abd  Wall                               Normal   Spine                                   Normal   Extrems                                 See Details   Genitalia                               Not Visualized   Placenta                                Normal   Umbl  Cord                              Normal   Uterus                                  Normal   PCI                                     Not Visualized      ANATOMY DETAILS      Visualized Appearing Sonographically Normal:   HEAD: (Calvarium, BPD Level, Cavum, Lateral Ventricles, Choroid Plexus,   Cerebellum, Cisterna Magna);    FACE/NECK: (Neck, Orbits);    TH  CAV  :   (Lungs, Diaphragm); HEART: (Proximal Left Outflow, Proximal Right   Outflow, 3VV, 3 Vessel Trachea, Short Axis of Greater Vessels, Ductal   Arch, Aortic Arch, Interventricular Septum, Interatrial Septum, IVC, SVC,   Cardiac Axis, Cardiac Position);    ABD  CAV : (Liver);    STOMACH, RIGHT   KIDNEY, LEFT KIDNEY, BLADDER, ABD   WALL, SPINE: (Cervical Spine, Thoracic   Spine, Lumbar Spine, Sacrum);    EXTREMS: (Lt Humerus, Rt Humerus, Lt   Forearm, Rt Forearm, Lt Hand, Lt Femur, Rt Femur, Lt Low Leg); PLACENTA, UMBL  CORD, UTERUS      Not Visualized:   FACE/NECK: (Profile, Nose/Lips, Palate, Face); HEART: (Four Chamber   View);    EXTREMS: (Rt Hand, Rt Low Leg, Lt Foot, Rt Foot);    GENITALIA,   PCI      ADNEXA      The left ovary was not visualized  The right ovary was not visualized  IMPRESSION      Burk IUP   27 weeks and 0 days by this ultrasound  (SCOTT=MAY 1 2017)   27 weeks and 0 days by 1st Tri Sono  (SCOTT=MAY 1 2017)   Transverse presentation   Fetal growth appeared normal   Regular fetal heart rate of 135 bpm   Anterior placenta   No placenta previa      GENERAL COMMENT      No fetal structural abnormality or ultrasound marker for aneuploidy is   identified on the Level II ultrasound study today  Multiple anatomic   targets are not imaged well secondary to the constraints related to   unfavorable fetal position  Fetal growth and amniotic fluid volume are   normal   The placenta is normal in appearance  Today's ultrasound findings and suggested follow-up were discussed in   detail with Elda  We discussed that prenatal ultrasound cannot rule out   all congenital abnormalities  Fiordaliza Landaverde will return to the [facility]    at 32 weeks gestation to assess interval growth and evaluate anatomic   targets not imaged well today  We have scheduled an appointment for Fiordaliza Landaverde   to meet with Neonatology to discuss matters related to evaluation and   management of her baby, given an increased risk for  abstinence   syndrome  The face to face time, in addition to time spent discussing ultrasound   results, was approximately 15 minutes, greater than 50% of which was spent   during counseling and coordination of care  YOANA Bradley M D     Maternal-Fetal Medicine   Electronically signed 17 09:22

## 2018-01-13 VITALS
DIASTOLIC BLOOD PRESSURE: 66 MMHG | HEIGHT: 64 IN | SYSTOLIC BLOOD PRESSURE: 103 MMHG | BODY MASS INDEX: 20.52 KG/M2 | WEIGHT: 120.2 LBS

## 2018-01-13 VITALS
BODY MASS INDEX: 18.88 KG/M2 | HEIGHT: 64 IN | WEIGHT: 110.6 LBS | DIASTOLIC BLOOD PRESSURE: 59 MMHG | SYSTOLIC BLOOD PRESSURE: 98 MMHG

## 2018-01-13 VITALS
BODY MASS INDEX: 16.11 KG/M2 | SYSTOLIC BLOOD PRESSURE: 90 MMHG | WEIGHT: 94.38 LBS | HEIGHT: 64 IN | DIASTOLIC BLOOD PRESSURE: 62 MMHG

## 2018-01-13 VITALS
DIASTOLIC BLOOD PRESSURE: 59 MMHG | SYSTOLIC BLOOD PRESSURE: 93 MMHG | WEIGHT: 118.4 LBS | HEIGHT: 64 IN | BODY MASS INDEX: 20.22 KG/M2

## 2018-01-14 VITALS
BODY MASS INDEX: 19.74 KG/M2 | HEIGHT: 64 IN | WEIGHT: 115.6 LBS | SYSTOLIC BLOOD PRESSURE: 97 MMHG | DIASTOLIC BLOOD PRESSURE: 68 MMHG

## 2018-01-14 NOTE — MISCELLANEOUS
Message  PERLA consult with NICU scheduled for 3/6 at 230 pm following 130 pm ultrasound  Message left on answering machine stating this  Active Problems    1  Chlamydial infection (079 98) (A74 9)   2  Drug use affecting pregnancy in first trimester (648 43,305 90) (O99 321)   3  Drug use affecting pregnancy in second trimester (648 43,305 90) (O36 033)   4  Encounter for routine gynecological examination (V72 31) (Z01 419)   5  Endometriosis (617 9) (N80 9)   6  Episodic mood disorder (296 90) (F39)   7  Generalized anxiety disorder (300 02) (F41 1)   8  History of heroin use (V11 8) (Z86 59)   9  Low weight gain during pregnancy in first trimester (646 83) (O26 11)   10  History of Oral contraceptive prescribed (V25 01) (Z30 011)   11  Pregnancy (V22 2) (Z33 1)   12  Pregnancy complicated by subutex maintenance, antepartum (648 33,304 00,V58 69)    (O99 320,F11 20,Z79 891)   13  Smokes tobacco daily (305 1) (Z72 0)   14  Underweight (783 22) (R63 6)    Current Meds   1  Prenatal TABS; Therapy: (Recorded:42Poh0392) to Recorded   2  SEROquel TABS (QUEtiapine Fumarate); Therapy: (Recorded:18Jan2017) to Recorded   3  Subutex SUBL (Buprenorphine HCl); DISSOLVE TABLET  per pt 12 mg daily; Therapy: (Recorded:30Jan2017) to Recorded    Allergies    1  No Known Drug Allergies    2  No Known Environmental Allergies   3   No Known Food Allergies    Signatures   Electronically signed by : Gerry Huerta OM; Jan 31 2017  3:16PM EST                       (Author)

## 2018-01-17 NOTE — MISCELLANEOUS
Message  Left message on pt cell phone with Stepwise part 1 results-wnl  Part 2 instruction given  TRf mailed pt to contact pnc if questions  Active Problems    1  Chlamydial infection (079 98) (A74 9)   2  Drug use affecting pregnancy in first trimester (648 43,305 90) (O99 321)   3  Encounter for routine gynecological examination (V72 31) (Z01 419)   4  Endometriosis (617 9) (N80 9)   5  Episodic mood disorder (296 90) (F39)   6  Generalized anxiety disorder (300 02) (F41 1)   7  History of heroin use (V11 8) (Z86 59)   8  Low weight gain during pregnancy in first trimester (646 83) (O26 11)   9  History of Oral contraceptive prescribed (V25 01) (Z30 011)   10  Pregnancy (V22 2) (Z33 1)   11  Smokes tobacco daily (305 1) (Z72 0)   12  Underweight (783 22) (R63 6)    Current Meds   1  Ondansetron HCl - 4 MG Oral Tablet (Zofran); TAKE 1 TABLET Every 6 hours PRN   nausea; Therapy: 91Dtd2898 to (Evaluate:70Aje1111)  Requested for: 92Mjm3449; Last   Rx:72Obh0953 Ordered   2  Prenatal TABS; Therapy: (Recorded:24Ygq9219) to Recorded   3  Sertraline HCl - 100 MG Oral Tablet; take one and 1/2 in the morning; Therapy: 86JGE1223 to ((47) 2522-3200); Last Rx:05Dyz1719 Ordered    Allergies    1  No Known Drug Allergies    2  No Known Environmental Allergies   3   No Known Food Allergies    Signatures   Electronically signed by : Nadir Arguelles RN; Oct 24 2016  4:01PM EST                       (Author)

## 2018-01-17 NOTE — RESULT NOTES
Message   Please call patient with reassuring results  Thank you  Verified Results  (Q) STEPWISE, PART 1 [id number] 09:38AM Cornel Cason   REPORT COMMENT:  FASTING:NO     Test Name Result Flag Reference   INTERPRETATION SEE NOTE     This patient's risk does not exceed the first trimester  cut-off for Down syndrome or trisomy 18  The integrated  screen calculation is awaiting the second trimester sample  NT WAS USED IN THE RISK CALCULATIONS  Thank you for submitting this patient's Part 1 specimen  These first trimester values will be incorporated with the  second trimester values as part of the integrated testing  process  Please submit the Part 2 specimen between   11/08/2016-01/02/2017 (15 0 and 22 9 weeks gestation) with   11/08/2016-11/21/2016 (15 0 - 16 9 weeks gestation) being  optimal  When submitting Part 2, please include the  following Specimen # from Part 1:  [id number]   AGE RISK DOWN SYNDROME 1:840     [place] DOWN SYNDROME RISK <1:5000  <1:50   RISK FOR TRISOMY 18 <1:5000  <1:100   CALCULATED GESTATIONAL$AGE 12 0     Crown rump length (CRL) was used to calculate gestational  age  SCOTT, if provided, was not used for gestational age  dating  CRISTOPHER-A 475 3 ng/mL     This test was performed using a kit that has not been  cleared or approved by the FDA  The analytical performance  characteristics of this test have been determined by [facility]  This test  should not be used for diagnosis without confirmation by  other medically established means  CRISTOPHER-A MOM 0 57     HCG 72 3 IU/mL     HCG MOM 0 62     NT MOM 0 97     The maternal serum screening results indicate a lower risk  of trisomy 21 in this pregnancy  The nasal bone was assessed  via ultrasound and was present  The combined risk is  therefore likely to be less than the calculated risk  Other  findings later in the pregnancy may change the risk    Nasal bone assessment is best accomplished through a fetal  ultrasound performed between 11 weeks 0 days through 13  weeks 6 days  In assessing the risk for aneuploidy, the  evaluation of the maternal serum markers plus the nuchal  thickness measurement is calculated first  Any potential  change to the patient's risk for aneuploidy depends on the  nuchal thickness, crown-rump length, and the ethnic origin,  and therefore the values generated by the algorithm itself  will not change  Additional information about the assessment  of the fetal nasal bone may be found on the [facility] website at  http://www  fetalmediHalt Medical com/fmf/training-certification/cert  xhwwrsqc-vz-qkeku  tence/11-13-week-scan/assessment-of-the-nasal-bone/  Please note that the Sequential Integrated maternal serum  screen for Down syndrome risk assessment was designed by Dr lOeg Sosa ([address], et al J Med Screen 2003 v10 p56-104)  to provide a high detection rate and low false positive rate  when a cutoff of 1:50 is used to identify high risk  pregnancies during the first trimester  Use of any other  cutoff for determination of risk in the first trimester will  result in a higher false positive rate for the two-part  screen  All patients whose risk is lower than 1:50 should  have a second sample submitted to complete the screen  This is a screening test, not a diagnostic test      This risk assessment is based on demographic data provided  by the ordering physician  Please notify the laboratory  promptly if any data are incorrect  If you have questions concerning this report: For clinical consultation, call [phone]; For technical questions, call [phone] ext [phone]; For recalculations, fax to [phone]  For additional information, please refer to  http://trend.ly/faq/FAQ85  (This link is provided for informational/educational  purposes only )   REFERRING PHYSICIAN NAME Alma Astorga PHONE [phone]     REFERRING PHYSICIAN RUI [phone]     DATE OF BIRTH 1995     COLLECTION DATE 10/18/2016     MATERNAL WEIGHT 99 lbs     EST'D DATE OF DELIVERY 05/01/2017     SCOTT DETERMINED BY LMP     MOTHER'S ETHNIC ORIGIN      NUMBER OF FETUSES 1     INSULIN DEPEND DIABETIC NO     REPEAT SPECIMEN NOT GIVEN     HX OF NEURAL TUBE DEFECTS NO     PREV PREG DOWN SYND NO     DONOR EGG NO     DONOR EGG; EGG RETRIEVAL NOT GIVEN     ULTRASOUND DATE 10/14/2016     ULTRASONOGRAPHER'S NAME MARVIN CASTANEDA     NTQR ULTRASONOGRAPHER ID# [id number]     NTQR LOCATION ID# NOT GIVEN     NTQR READING PHYS ID# [id number]     [facility] ULTRASONOGRAPHER ID# NOT GIVEN     CROWN RUMP LENGTH 50 mm     NUCHAL TRANSLUCENCY 1 2 mm     Nasal Bone PRESENT     IF TWINS NOT GIVEN     TWIN B CRL NOT GIVEN mm     TWIN B NT NOT GIVEN mm     Twin B Nasal Bone NOT GIVEN

## 2018-01-17 NOTE — PROGRESS NOTES
OCT 14 2016         RE: Mary Farmer                                  To: [facility]   MR#: [id number]                                   [address]   : [address]: [phone]                             Fax: [phone]   (Exam #: [id number])      The LMP of this 22year old,  G1, P0-0-0-0 patient was 2016, her   working SCOTT is MAY 1 2017 and the current gestational age is 5 weeks 4   days by [address] [place] [address]  A sonographic examination was performed on OCT   14 2016 using real time equipment  The ultrasound examination was   performed using abdominal technique  The patient has a BMI of 17 5  Her   blood pressure today was 92/70  Earliest ultrasound found in her record: 10/14/2016   11w4d  2017 SCOTT   Multiple longitudinal and transverse sections revealed a zheng   intrauterine pregnancy with the fetus in variable presentation  The   placenta is anterior in implantation, grade 0 in appearance  Cardiac motion was observed at 159 bpm       INDICATIONS      first trimester genetic screening   heroin abuse   history of drug dependence   history of depression   low maternal BMI   maternal anxiety   smoker      Exam Types      Level I   sequential screen      RESULTS      Fetus # 1 of 1   Variable presentation      MEASUREMENTS (* Included In Average GA)      CRL              5 0 cm        11 weeks 4 days*   Nuchal Trans    1 20 mm      THE AVERAGE GESTATIONAL AGE is 11 weeks 4 days +/- 7 days  UTERINE ARTERIES                                  S/D   PI    RI    NOTCH       Left Uterine Artery        4 81  1 83  0 79       Right Uterine Artery       1 92  0 70  0 48      ANATOMY COMMENTS      Anatomic detail is limited at this gestational age  The yolk sac was not   noted  The fetal cranium appeared normal in shape and the nuchal   translucency was normal in size (1 2mm)    The nasal bone appears to be present  The intracranial anatomy was unremarkable  Anatomy of the fetal   thorax appeared within normal limits  The cardiac rhythm was regular  Within the abdomen, stomach was visualized and the abdominal wall appeared   intact  A three vessel cord appears to be present  Active movement of the   fetal body & extremities was seen  There is no suspicion of a   subchorionic bleed  The placental cord insertion was not seen well  The   uterine artery Dopplers are normal for this gestational age  There is no   suspicion of a uterine myoma  Free fluid is not seen in the posterior   cul-de-sac  ADNEXA      The left ovary was not visualized  The right ovary was not visualized  AMNIOTIC FLUID         Largest Vertical Pocket = 3 6 cm   Amniotic Fluid: Normal      IMPRESSION      Burk IUP   11 weeks and 4 days by this ultrasound  (SCOTT=MAY 1 2017)   11 weeks and 4 days by 1st Tri Sono  (SCOTT=MAY 1 2017)   Variable presentation   Regular fetal heart rate of 159 bpm   Anterior placenta      Gil Myers      Dear Dr Kishore Valentine,      Thank very much for requesting any consultations very nice patient for the   indication of sequential screening and heroin use as well as tobacco use   and low maternal BMI  Since the patient's first pregnancy  This early   pregnancy has been complicated by continued opiate abuse utilizing heroin  The patient has been in and out of rehabilitation 7 times in her young   adult life and has continued to struggle with heroin use  Her father   accompanies her to the appointment today  The patient informs me that she   last used heroin by injection 3 days ago  She states that she has no   withdrawal symptoms at this time however she was questioning whether or   not she could utilize various medications for drugs of abuse such as   buprenorphine, methadone, or even naltrexone  The patient denies the use of alcohol    She currently smokes cigarettes   and again last used heroin 3 days ago  She was recently discharged from   rehabilitation  Her medications currently include Zofran as needed for   nausea and Zoloft  She was recently treated for chlamydia  The patient's   family history is significant for a distant relative with a congenital   heart defect not a first-degree relative of the fetus  The review of   systems is otherwise negative at this time  The patient states she   appears well although she does look a little uncomfortable and I   repeatedly questioned her about any symptoms related to opiate withdrawal   or opiate dependence  The patient's pupils appear to be somewhat dilated   and I do have concerns that she is having symptoms that she is not   admitted to with regard to opiate withdrawal       Please note, the patient states that her last menstrual period was known   however her cycles were very irregular and therefore  recommend changing   the patient's SCOTT to reflect ultrasound dating  I had a long discussion with the patient and her father regarding heroin   use during pregnancy  Unfortunately, opiate abuse is becoming a regional   and nationwide epidemic and we see it affecting pregnancy almost on a   daily basis  The 2 main stays of treatment during pregnancy include   detoxification with avoidance of opiate abuse or replacement therapy  Studies have shown that the risk of relapse during pregnancy is   significantly higher than outside of pregnancy  This patient is at   significant risk for relapse as she has relapsed approximately 7 times in   the past   She most recently used just 3 days ago  She has struggled with   being in and out of rehabilitation and is at very high risk for return to   opiate abuse lifestyle which increases her risk for more   sexually-transmitted diseases including hepatitis and HIV since the   patient is an injection drug user  Avoidance of opiates is ideal although   it is known that the risk of relapse is high  The patient states that she   has no symptoms of withdrawal and therefore continued avoidance certainly   acceptable and is ideal   The other option is replacement therapy with   either buprenorphine in the form of Suboxone or methadone maintenance   therapy  Both of these medications are compatible with use during   pregnancy and have actually demonstrated better outcomes then   detoxification due to the less chance of relapse  Replacement therapy   seeks to stabilize the patient's so she has no symptoms of withdrawal and   gets her away from the drug lifestyle and the inherent risks that that   lifestyle incurs  In order to begin either Suboxone or methadone   maintenance therapy, the patient has to be withdrawing somewhat from   opiates  Although I am not certain this is the case, the patient informs   me that she has no symptoms of withdrawal   I did inform her father and   her that replacement therapy is a viable and actually recommended option   for opiate abuse disorder in pregnancy  I provided her with some   information on these medications and encouraged both of them to seek help   if the patient has another relapse  The patient is currently seeing a   drug addiction counselor or at least has information about a drug addict   addiction counselor and I encouraged that as the best such therapy for   this patient at this time  The patient appears to be malnourished, she is very underweight, and that   places the pregnancy at increased risk for adverse outcomes regardless of   drug use  It appears that the patient probably needs to get her opiate   weight disorder in better control and I am sure she will gain more weight  The patient should attempt to gain 35-40 pounds during the pregnancy  I   informed her that we have a nutritionist was available to help should the   patient have any questions or concerns about proper nutrition during   pregnancy        The patient's tobacco use while not ideal, is probably a lower priority on   the patient's health status however if the patient does continue to work   hard to get healthier, smoking cessation is obviously recommended  We discussed the options for genetic screening, including but not limited   to first trimester screening, second trimester screening, combined first   and second trimester screening, noninvasive prenatal testing (NIPT) for   patients at high risk and diagnostic screening through the use of CVS and   amniocentesis  We discussed the risks and benefits of each approach   including the sensitivities and false positive rates as well as the   difference between a screening test and a diagnostic test   At the   conclusion of our discussion the patient elected Stepwise Sequential   Screening to delineate her risk for fetal aneuploidy  She was given a   requisition to go to [facility] to have the first trimester bloodwork drawn  The first trimester portion of the screening results, encompassing age,   nuchal translucency, and biochemistry should be available within one week   of testing and will be reported from [facility]   The second stage of   sequential screening should be completed between the 15th and 21st week of   pregnancy (ideally between 16-18 weeks)  Recommend an anatomy ultrasound be scheduled for 20 weeks gestation  Please note, in addition to the time spent discussing the results of the   ultrasound, I spent approximately 30 minutes of face-to-face time with the   patient, greater than 50% of which was spent in counseling and the   coordination of care for this patient  Thank you very much for allowing us to participate in the care of this   very nice patient  Should you have any questions, please do not hesitate   to contact our office  YOANA Phelps M D     Electronically signed 10/16/16 07:48

## 2018-01-22 VITALS
WEIGHT: 121 LBS | DIASTOLIC BLOOD PRESSURE: 60 MMHG | SYSTOLIC BLOOD PRESSURE: 90 MMHG | BODY MASS INDEX: 20.66 KG/M2 | HEIGHT: 64 IN

## 2018-01-22 VITALS
SYSTOLIC BLOOD PRESSURE: 90 MMHG | WEIGHT: 101 LBS | DIASTOLIC BLOOD PRESSURE: 66 MMHG | HEIGHT: 64 IN | BODY MASS INDEX: 17.24 KG/M2

## 2018-01-22 VITALS
DIASTOLIC BLOOD PRESSURE: 66 MMHG | HEIGHT: 64 IN | SYSTOLIC BLOOD PRESSURE: 100 MMHG | BODY MASS INDEX: 21.34 KG/M2 | WEIGHT: 125 LBS

## 2018-01-22 VITALS
WEIGHT: 113 LBS | SYSTOLIC BLOOD PRESSURE: 94 MMHG | HEIGHT: 64 IN | BODY MASS INDEX: 19.29 KG/M2 | DIASTOLIC BLOOD PRESSURE: 60 MMHG

## 2018-01-22 VITALS
SYSTOLIC BLOOD PRESSURE: 80 MMHG | BODY MASS INDEX: 20.14 KG/M2 | WEIGHT: 118 LBS | HEIGHT: 64 IN | DIASTOLIC BLOOD PRESSURE: 60 MMHG

## 2018-01-22 VITALS
DIASTOLIC BLOOD PRESSURE: 62 MMHG | HEIGHT: 64 IN | WEIGHT: 115.5 LBS | BODY MASS INDEX: 19.72 KG/M2 | SYSTOLIC BLOOD PRESSURE: 86 MMHG

## 2018-01-22 VITALS
DIASTOLIC BLOOD PRESSURE: 70 MMHG | SYSTOLIC BLOOD PRESSURE: 100 MMHG | BODY MASS INDEX: 21.34 KG/M2 | WEIGHT: 125 LBS | HEIGHT: 64 IN

## 2018-01-22 VITALS
BODY MASS INDEX: 20.92 KG/M2 | WEIGHT: 122.5 LBS | SYSTOLIC BLOOD PRESSURE: 100 MMHG | DIASTOLIC BLOOD PRESSURE: 68 MMHG | HEIGHT: 64 IN

## 2018-01-22 VITALS
BODY MASS INDEX: 17.29 KG/M2 | SYSTOLIC BLOOD PRESSURE: 110 MMHG | HEIGHT: 64 IN | DIASTOLIC BLOOD PRESSURE: 70 MMHG | WEIGHT: 101.25 LBS

## 2018-01-22 VITALS
BODY MASS INDEX: 21.17 KG/M2 | DIASTOLIC BLOOD PRESSURE: 66 MMHG | SYSTOLIC BLOOD PRESSURE: 98 MMHG | WEIGHT: 124 LBS | HEIGHT: 64 IN

## 2018-01-22 VITALS
WEIGHT: 123 LBS | DIASTOLIC BLOOD PRESSURE: 62 MMHG | BODY MASS INDEX: 21 KG/M2 | SYSTOLIC BLOOD PRESSURE: 90 MMHG | HEIGHT: 64 IN

## 2018-01-22 VITALS
WEIGHT: 97.13 LBS | HEIGHT: 64 IN | BODY MASS INDEX: 16.58 KG/M2 | DIASTOLIC BLOOD PRESSURE: 70 MMHG | SYSTOLIC BLOOD PRESSURE: 98 MMHG

## 2018-01-22 VITALS
HEIGHT: 64 IN | WEIGHT: 112 LBS | SYSTOLIC BLOOD PRESSURE: 92 MMHG | BODY MASS INDEX: 19.12 KG/M2 | DIASTOLIC BLOOD PRESSURE: 50 MMHG

## 2018-01-22 VITALS
BODY MASS INDEX: 19.12 KG/M2 | SYSTOLIC BLOOD PRESSURE: 88 MMHG | DIASTOLIC BLOOD PRESSURE: 58 MMHG | HEIGHT: 64 IN | WEIGHT: 112 LBS

## 2018-03-07 NOTE — PROGRESS NOTES
[facility] 2nd Trimester:   Second Trimester Education provided:   benefits of breastfeeding, importance of exclusive breastfeeding, early initiation of breastfeeding, exclusive breastfeeding for the first 6 months, non-pharmacologic pain relief methods for labor, rooming-in on 24 hour basis and 28 week packet given  Mother has not registered for prenatal class  Thought has not been given to selecting a pediatrician  The mother has not discussed personal preferences with her provider     Prenatal education provided by: Florence Kaufman      Signatures   Electronically signed by : Danny Clayton MA; Feb 22 2017  2:53PM EST                       (Author)

## 2018-03-07 NOTE — PROGRESS NOTES
Education  natue Education 3rd Trimester: Third Trimester Education provided:   benefits of breastfeeding, importance of exclusive breastfeeding, early initiation of breastfeeding, exclusive breastfeeding for the first 6 months, frequent feedings for optimal milk production, feeding on demand/baby-led feedings, effective positioning and attachment, importance of breastfeeding after 6 months following introduction of other foods, non-pharmacologic pain relief methods for labor, importance of early skin-to-skin contact and 28 week packet given  rooming-in on 24 hour basis   The patient is planning on breastfeeding  The patient is planning on exclusively breastfeeding until the baby is 10months of age  Mother has not registered for prenatal class  Thought has not been given to selecting a pediatrician  The mother has discussed personal preferences with her provider  Contraindication to breastfeeding identified: N/A  Individualized education given: yes     Prenatal education provided by: Nury Preciado 93   Electronically signed by : Frank Pulliam, ; Mar 10 2017 10:41AM EST                       (Author)

## 2021-04-13 DIAGNOSIS — Z23 ENCOUNTER FOR IMMUNIZATION: ICD-10-CM

## 2023-03-21 ENCOUNTER — HOSPITAL ENCOUNTER (EMERGENCY)
Facility: HOSPITAL | Age: 28
Discharge: HOME | End: 2023-03-21
Attending: EMERGENCY MEDICINE | Admitting: EMERGENCY MEDICINE

## 2023-03-21 VITALS
SYSTOLIC BLOOD PRESSURE: 96 MMHG | RESPIRATION RATE: 18 BRPM | TEMPERATURE: 97.8 F | DIASTOLIC BLOOD PRESSURE: 64 MMHG | HEART RATE: 82 BPM | OXYGEN SATURATION: 100 %

## 2023-03-21 DIAGNOSIS — F11.90 METHADONE USE: ICD-10-CM

## 2023-03-21 DIAGNOSIS — R53.83 FATIGUE: Primary | ICD-10-CM

## 2023-03-21 LAB
ATRIAL RATE: 85 BPM
P AXIS: 57 DEGREES
PR INTERVAL: 130 MS
QRS AXIS: 69 DEGREES
QRSD INTERVAL: 90 MS
QT INTERVAL: 340 MS
QTC INTERVAL: 404 MS
T WAVE AXIS: 42 DEGREES
VENTRICULAR RATE: 85 BPM
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
## 2023-03-21 NOTE — DISCHARGE INSTRUCTIONS
You have been evaluated in the Emergency Department today for fatigue  Your evaluation, including ECG, suggests that your symptoms are due to a non emergent cause  Please follow up with your primary care physician within two days  Return to the Emergency Department if you experience worsening symptoms or any other concerning symptoms  Thank you for choosing us for your care

## 2023-06-30 ENCOUNTER — APPOINTMENT (INPATIENT)
Dept: CT IMAGING | Facility: HOSPITAL | Age: 28
DRG: 770 | End: 2023-06-30
Payer: COMMERCIAL

## 2023-06-30 ENCOUNTER — HOSPITAL ENCOUNTER (INPATIENT)
Facility: HOSPITAL | Age: 28
LOS: 3 days | Discharge: LEFT BEFORE DISCHARGE | DRG: 770 | End: 2023-07-03
Attending: EMERGENCY MEDICINE | Admitting: EMERGENCY MEDICINE
Payer: COMMERCIAL

## 2023-06-30 DIAGNOSIS — F11.90 METHADONE USE: ICD-10-CM

## 2023-06-30 DIAGNOSIS — F13.20 SEVERE BENZODIAZEPINE USE DISORDER (HCC): ICD-10-CM

## 2023-06-30 DIAGNOSIS — F13.10 BENZODIAZEPINE ABUSE (HCC): Primary | ICD-10-CM

## 2023-06-30 PROBLEM — L03.114 CELLULITIS OF LEFT UPPER EXTREMITY: Status: ACTIVE | Noted: 2023-06-30

## 2023-06-30 PROBLEM — D72.829 LEUKOCYTOSIS: Status: ACTIVE | Noted: 2023-06-30

## 2023-06-30 PROBLEM — R94.31 PROLONGED Q-T INTERVAL ON ECG: Status: ACTIVE | Noted: 2023-06-30

## 2023-06-30 PROBLEM — F13.939 BENZODIAZEPINE WITHDRAWAL (HCC): Status: ACTIVE | Noted: 2023-06-30

## 2023-06-30 PROBLEM — F11.20 OPIOID USE DISORDER, SEVERE, DEPENDENCE (HCC): Status: ACTIVE | Noted: 2023-06-30

## 2023-06-30 PROBLEM — L03.90 CELLULITIS: Status: ACTIVE | Noted: 2023-06-30

## 2023-06-30 LAB
ALBUMIN SERPL BCP-MCNC: 4.3 G/DL (ref 3.5–5)
ALP SERPL-CCNC: 103 U/L (ref 34–104)
ALT SERPL W P-5'-P-CCNC: 12 U/L (ref 7–52)
AMPHETAMINES SERPL QL SCN: NEGATIVE
ANION GAP SERPL CALCULATED.3IONS-SCNC: 12 MMOL/L
APAP SERPL-MCNC: <10 UG/ML (ref 10–20)
AST SERPL W P-5'-P-CCNC: 17 U/L (ref 13–39)
ATRIAL RATE: 85 BPM
BARBITURATES UR QL: NEGATIVE
BASOPHILS # BLD AUTO: 0.03 THOUSANDS/ÂΜL (ref 0–0.1)
BASOPHILS NFR BLD AUTO: 0 % (ref 0–1)
BENZODIAZ UR QL: NEGATIVE
BILIRUB SERPL-MCNC: 0.3 MG/DL (ref 0.2–1)
BUN SERPL-MCNC: 8 MG/DL (ref 5–25)
CALCIUM SERPL-MCNC: 10.3 MG/DL (ref 8.4–10.2)
CHLORIDE SERPL-SCNC: 100 MMOL/L (ref 96–108)
CO2 SERPL-SCNC: 29 MMOL/L (ref 21–32)
COCAINE UR QL: NEGATIVE
CREAT SERPL-MCNC: 0.67 MG/DL (ref 0.6–1.3)
EOSINOPHIL # BLD AUTO: 0.02 THOUSAND/ÂΜL (ref 0–0.61)
EOSINOPHIL NFR BLD AUTO: 0 % (ref 0–6)
ERYTHROCYTE [DISTWIDTH] IN BLOOD BY AUTOMATED COUNT: 14 % (ref 11.6–15.1)
ETHANOL SERPL-MCNC: <10 MG/DL
EXT PREGNANCY TEST URINE: NEGATIVE
EXT. CONTROL: NORMAL
GFR SERPL CREATININE-BSD FRML MDRD: 121 ML/MIN/1.73SQ M
GLUCOSE SERPL-MCNC: 88 MG/DL (ref 65–140)
HCT VFR BLD AUTO: 35.5 % (ref 34.8–46.1)
HGB BLD-MCNC: 11.2 G/DL (ref 11.5–15.4)
IMM GRANULOCYTES # BLD AUTO: 0.06 THOUSAND/UL (ref 0–0.2)
IMM GRANULOCYTES NFR BLD AUTO: 0 % (ref 0–2)
LYMPHOCYTES # BLD AUTO: 2.45 THOUSANDS/ÂΜL (ref 0.6–4.47)
LYMPHOCYTES NFR BLD AUTO: 15 % (ref 14–44)
MAGNESIUM SERPL-MCNC: 1.7 MG/DL (ref 1.9–2.7)
MCH RBC QN AUTO: 28 PG (ref 26.8–34.3)
MCHC RBC AUTO-ENTMCNC: 31.5 G/DL (ref 31.4–37.4)
MCV RBC AUTO: 89 FL (ref 82–98)
METHADONE UR QL: POSITIVE
MONOCYTES # BLD AUTO: 0.67 THOUSAND/ÂΜL (ref 0.17–1.22)
MONOCYTES NFR BLD AUTO: 4 % (ref 4–12)
NEUTROPHILS # BLD AUTO: 12.74 THOUSANDS/ÂΜL (ref 1.85–7.62)
NEUTS SEG NFR BLD AUTO: 81 % (ref 43–75)
NRBC BLD AUTO-RTO: 0 /100 WBCS
OPIATES UR QL SCN: NEGATIVE
OXYCODONE+OXYMORPHONE UR QL SCN: NEGATIVE
P AXIS: 52 DEGREES
PCP UR QL: NEGATIVE
PLATELET # BLD AUTO: 392 THOUSANDS/UL (ref 149–390)
PMV BLD AUTO: 8.7 FL (ref 8.9–12.7)
POTASSIUM SERPL-SCNC: 4 MMOL/L (ref 3.5–5.3)
PR INTERVAL: 98 MS
PROT SERPL-MCNC: 7.9 G/DL (ref 6.4–8.4)
QRS AXIS: 32 DEGREES
QRSD INTERVAL: 80 MS
QT INTERVAL: 394 MS
QTC INTERVAL: 468 MS
RBC # BLD AUTO: 4 MILLION/UL (ref 3.81–5.12)
SALICYLATES SERPL-MCNC: <5 MG/DL (ref 3–20)
SODIUM SERPL-SCNC: 141 MMOL/L (ref 135–147)
T WAVE AXIS: 30 DEGREES
THC UR QL: NEGATIVE
VENTRICULAR RATE: 85 BPM
WBC # BLD AUTO: 15.97 THOUSAND/UL (ref 4.31–10.16)

## 2023-06-30 PROCEDURE — 96360 HYDRATION IV INFUSION INIT: CPT

## 2023-06-30 PROCEDURE — 73200 CT UPPER EXTREMITY W/O DYE: CPT

## 2023-06-30 PROCEDURE — HZ2ZZZZ DETOXIFICATION SERVICES FOR SUBSTANCE ABUSE TREATMENT: ICD-10-PCS | Performed by: EMERGENCY MEDICINE

## 2023-06-30 PROCEDURE — 93010 ELECTROCARDIOGRAM REPORT: CPT | Performed by: INTERNAL MEDICINE

## 2023-06-30 PROCEDURE — G1004 CDSM NDSC: HCPCS

## 2023-06-30 PROCEDURE — 93005 ELECTROCARDIOGRAM TRACING: CPT

## 2023-06-30 PROCEDURE — 81025 URINE PREGNANCY TEST: CPT

## 2023-06-30 PROCEDURE — 99223 1ST HOSP IP/OBS HIGH 75: CPT

## 2023-06-30 PROCEDURE — 82077 ASSAY SPEC XCP UR&BREATH IA: CPT

## 2023-06-30 PROCEDURE — 80307 DRUG TEST PRSMV CHEM ANLYZR: CPT

## 2023-06-30 PROCEDURE — 80143 DRUG ASSAY ACETAMINOPHEN: CPT

## 2023-06-30 PROCEDURE — 80179 DRUG ASSAY SALICYLATE: CPT

## 2023-06-30 PROCEDURE — 85025 COMPLETE CBC W/AUTO DIFF WBC: CPT

## 2023-06-30 PROCEDURE — 99284 EMERGENCY DEPT VISIT MOD MDM: CPT

## 2023-06-30 PROCEDURE — 36415 COLL VENOUS BLD VENIPUNCTURE: CPT

## 2023-06-30 PROCEDURE — 80053 COMPREHEN METABOLIC PANEL: CPT

## 2023-06-30 PROCEDURE — 83735 ASSAY OF MAGNESIUM: CPT

## 2023-06-30 RX ORDER — SULFAMETHOXAZOLE AND TRIMETHOPRIM 800; 160 MG/1; MG/1
1 TABLET ORAL EVERY 12 HOURS SCHEDULED
Status: DISCONTINUED | OUTPATIENT
Start: 2023-06-30 | End: 2023-07-02

## 2023-06-30 RX ORDER — ONDANSETRON 2 MG/ML
4 INJECTION INTRAMUSCULAR; INTRAVENOUS EVERY 6 HOURS PRN
Status: DISCONTINUED | OUTPATIENT
Start: 2023-06-30 | End: 2023-07-04 | Stop reason: HOSPADM

## 2023-06-30 RX ORDER — ENOXAPARIN SODIUM 100 MG/ML
40 INJECTION SUBCUTANEOUS DAILY
Status: DISCONTINUED | OUTPATIENT
Start: 2023-07-01 | End: 2023-07-04 | Stop reason: HOSPADM

## 2023-06-30 RX ORDER — PHENOBARBITAL SODIUM 130 MG/ML
260 INJECTION INTRAMUSCULAR ONCE
Status: COMPLETED | OUTPATIENT
Start: 2023-06-30 | End: 2023-06-30

## 2023-06-30 RX ORDER — QUETIAPINE FUMARATE 50 MG/1
50 TABLET, FILM COATED ORAL
COMMUNITY

## 2023-06-30 RX ORDER — CEPHALEXIN 500 MG/1
500 CAPSULE ORAL EVERY 12 HOURS SCHEDULED
Status: DISCONTINUED | OUTPATIENT
Start: 2023-06-30 | End: 2023-07-02

## 2023-06-30 RX ORDER — METHADONE HYDROCHLORIDE 10 MG/ML
119 CONCENTRATE ORAL DAILY
Status: DISCONTINUED | OUTPATIENT
Start: 2023-07-01 | End: 2023-07-01

## 2023-06-30 RX ORDER — METHADONE HYDROCHLORIDE 10 MG/5ML
119 SOLUTION ORAL EVERY 6 HOURS PRN
COMMUNITY

## 2023-06-30 RX ORDER — ACETAMINOPHEN 325 MG/1
650 TABLET ORAL EVERY 6 HOURS PRN
Status: DISCONTINUED | OUTPATIENT
Start: 2023-06-30 | End: 2023-07-04 | Stop reason: HOSPADM

## 2023-06-30 RX ORDER — GABAPENTIN 300 MG/1
300 CAPSULE ORAL EVERY 8 HOURS PRN
Status: DISCONTINUED | OUTPATIENT
Start: 2023-06-30 | End: 2023-07-04 | Stop reason: HOSPADM

## 2023-06-30 RX ORDER — PHENOBARBITAL SODIUM 130 MG/ML
130 INJECTION INTRAMUSCULAR ONCE
Status: DISCONTINUED | OUTPATIENT
Start: 2023-06-30 | End: 2023-06-30

## 2023-06-30 RX ADMIN — PHENOBARBITAL SODIUM 260 MG: 130 INJECTION INTRAMUSCULAR; INTRAVENOUS at 22:12

## 2023-06-30 RX ADMIN — CEPHALEXIN 500 MG: 500 CAPSULE ORAL at 20:03

## 2023-06-30 RX ADMIN — PHENOBARBITAL SODIUM 260 MG: 130 INJECTION INTRAMUSCULAR at 17:50

## 2023-06-30 RX ADMIN — ONDANSETRON 4 MG: 2 INJECTION INTRAMUSCULAR; INTRAVENOUS at 23:28

## 2023-06-30 RX ADMIN — GABAPENTIN 300 MG: 300 CAPSULE ORAL at 20:03

## 2023-06-30 RX ADMIN — SODIUM CHLORIDE 1000 ML: 0.9 INJECTION, SOLUTION INTRAVENOUS at 14:13

## 2023-06-30 RX ADMIN — ONDANSETRON 4 MG: 2 INJECTION INTRAMUSCULAR; INTRAVENOUS at 17:50

## 2023-06-30 RX ADMIN — SULFAMETHOXAZOLE AND TRIMETHOPRIM 1 TABLET: 800; 160 TABLET ORAL at 20:03

## 2023-06-30 RX ADMIN — ACETAMINOPHEN 650 MG: 325 TABLET ORAL at 20:10

## 2023-06-30 NOTE — ASSESSMENT & PLAN NOTE
· Track marks present on bilateral arms with 1cm lesions present with eschar and multiple purulent excoriations with surrounding erythema. + induration, no fluctuance. · Patient reports that she continues to injection primarily into LUE.   · Obtain CT LUE to further evaluate  · Will initiate PO Keflex and Bactrim

## 2023-06-30 NOTE — ASSESSMENT & PLAN NOTE
· History of leukocytosis on previous laboratory studies  · Patient remains afebrile, vital signs stable  · Suspected cellulitis bilateral UE -start PO abx  · Continue to monitor

## 2023-06-30 NOTE — ED NOTES
Gave the patient the option if she wanted to send her wallet and cards to security the Patient said "no I will keep everything with me".       Barrington Be  06/30/23 [id number]

## 2023-06-30 NOTE — ASSESSMENT & PLAN NOTE
· Patient reports a history of polysubstance abuse with history of withdrawal seizures. She reports that she is currently using Fentanyl daily in addition to receiving Methadone. Patient is concerned that her fentanyl is mixed with Xylazine and benzodiazepines as she reports to testing positive for benzodiazepines at her methadone clinic. · Reports that she is experiencing sweats, tremors, anxiety, and nausea. · Suspect that patient is primarily experiencing more anxiety related to her Xylazine use.  Regardless will [facility] for blaire-agonist withdrawal.   · Telemetry monitoring

## 2023-06-30 NOTE — H&P
HISTORY & PHYSICAL EXAM  DEPARTMENT OF MEDICAL TOXICOLOGY  LEVEL 4 MEDICAL DETOX UNIT  Senthil Orr 33 y.o. female MRN: [id number]  Unit/Bed#:  DETOX 511-01 Encounter: [id number]      Reason for Admission/Principal Problem: Benzodiazepine withdrawal   Admitting Provider: Yaritza De Oliveira PA-C  Attending Provider: Susan Brady DO   6/30/2023 12:18 PM        * Benzodiazepine withdrawal [facility])  Assessment & Plan  · Patient reports a history of polysubstance abuse with history of withdrawal seizures. She reports that she is currently using Fentanyl daily in addition to receiving Methadone. Patient is concerned that her fentanyl is mixed with Xylazine and benzodiazepines as she reports to testing positive for benzodiazepines at her methadone clinic. · Reports that she is experiencing sweats, tremors, anxiety, and nausea. · Suspect that patient is primarily experiencing more anxiety related to her Xylazine use. Regardless [facility] blaire-agonist [facility].   · Telemetry monitoring     Opioid use disorder, severe, dependence ([address])  Assessment & Plan  · Patient with a history of chronic fentanyl use  · Uses 1-2 bags of fentanyl daily intravenously last use 2 am on 6/30/23  · Currently follows with [address] [place] [address] [address] for Methadone. Receives daily dose of 119 mg.    · Patient reports that she has been testing positive for benzodiazepines at methadone clinic. · Management of opioid withdrawal under MAT protocol as above  · Will Screen for hepatitis/HIV with h/o IVDU  · Case management consulted for assistance with aftercare resources - pt expresses interest in continued outpatient MAT after d/c      Cellulitis of left upper extremity  Assessment & Plan  · Track marks present on bilateral arms with 1cm lesions present with eschar and multiple purulent excoriations with surrounding erythema. + induration, no fluctuance.    · Patient reports that she continues to injection primarily into LUE. · Obtain CT LUE to further evaluate  · Will initiate PO Keflex and Bactrim     Leukocytosis  Assessment & Plan  · History of leukocytosis on previous laboratory studies  · Patient remains afebrile, vital signs stable  · Suspected cellulitis bilateral UE -start PO abx  · Continue to monitor    Prolonged Q-T interval on ECG  Assessment & Plan  · Qtc 468 ms upon admission  · Currently on methadone   · Will continue to monitor              VTE Prophylaxis: Enoxaparin (Lovenox)  / sequential compression device   Code Status: Full code per patient       Anticipated Length of Stay:  Patient will be admitted on an Inpatient basis with an anticipated length of stay of  2  midnights. Justification for Hospital Stay: benzodiazepine withdrawal     For any questions or concerns, please Tiger Text the advanced practitioner in the role of [facility]-DETOX-AP On Call      This patient qualifies for Level IV medically managed intensive inpatient services under the criteria set by the American Society of Addiction Medicine, including dimensions 1-3. The patient is in withdrawal (or is intoxicated with high risk of withdrawal), with severe and unstable medical and/or psychiatric (dual diagnosis) problems, requiring requires 24-hour medical and nursing care and the full resources of a licensed hospital.          [facility] patient to medical detox unit and continue supportive care and stabilization of acute ethanol withdrawal per medical toxicology/detox treatment pathway. Monitor ethanol withdrawal severity via the Severity of Ethanol Withdrawal Scale (SEWS) Q4 hours and then hourly if/when SEWS > 6. Treat withdrawal per pathway and reassess Q30-60 minutes.            Mild SEWS Score 1-6  Administer medications* (IV or PO; PO preferred):  • If initial SEWS score: diazepam 10mg PO/IV x 1 AND phenobarbital 65 mg PO/IV x 1  • If repeat SEWS score 1-6: phenobarbital 65 mg PO/IV q1 hour x 5 doses maximum   Reassessment:   • SEWS q1 hour after each dose until SEWS 0 x 2 hours  • VS q1 hours (until SEWS 0, then q4 hours)  • Notify provider for bedside evaluation if 5-dose maximum is reached, RASS of -3 to -5, or SEWS score escalates to moderate or severe. Moderate SEWS Score 7-12  Administer medications* (IV):  • If initial SEWS score: diazepam 10mg IV x 1 AND phenobarbital 260 mg IV x 1  • If repeat SEWS score 7-12 or score escalated from mild: phenobarbital 130 mg IV q30 minutes x 5 doses maximum   Reassessment:  • SEWS q30 minutes after each dose until SEWS < 7 (then hourly until SEWS 0 x 2 hours)  • VS q30 minutes until SEWS < 7 (then hourly until SEWS 0, then q4 hours)  • Notify provider for bedside evaluation if 5-dose maximum is reached, RASS of -3 to -5, or SEWS score escalates to severe. Severe SEWS Score ? 13  Administer medications* (IV):  • If initial SEWS score: Diazepam 10 mg IV x 1 AND phenobarbital 650 mg IV piggyback x 1 over 15-30 minutes  • If repeat SEWS score ? 13 or score escalated from mild or moderate: phenobarbital 130 mg IV q30 minutes x 5 doses maximum   Reassessment:  • SEWS q30 minutes after each dose until SEWS < 7 (then hourly until SEWS 0 x 2 hours)   • VS q30 minutes until SEWS < 7 (then hourly until SEWS 0, then q4 hours)  • Notify provider for bedside evaluation if 5-dose maximum is reached or RASS of -3 to -5   *Hold medications and notify provider if CNS depression, respirations < 10/min, or RASS of -3 to -5.          Medications to be administered adjunctively if more than 2 grams of phenobarbital is needed for stabilization of withdrawal; require attending approval.   • Dexmedetomidine infusion 0.1-1mcg/kg/hr IV infusion, titratable to reduced agitation (Goal: RASS -2)  • Ketamine   o Acute agitated delirium: 1-2 mg/kg IV or 4-5 mg/kg IM  o Refractory withdrawal: 0.1-1mg/kg/hr IV infusion, titratable to reduced agitation (Goal: RASS -2)    Further evaluation, screening and treatment:  Evaluate complete metabolic panel, transaminases, INR, and lipase. Assess hepatic ultrasound for any sign of alcoholic liver disease or cirrhosis, and ultimately refer for further hepatic evaluation and care as/if indicated. Additional medications for ethanol associated malnutrition: Thiamine 100 mg IV daily, increase to 500 mg TID for signs/symptoms of Wernicke's Encephalopathy or Wernicke Korsakoff Syndrome   Folic acid 1 mg IV daily   Multivitamin PO daily      Will offer first monthly injection of Naltrexone 380 mg IM, once patient is stabilized, as it has been shown to assist in decreasing cravings for ethanol. Evaluate and treat for coexisting substance use, such as opioids and nicotine. Discuss risk factors for infectious disease, such as history of intravenous drug abuse, and offer hepatitis and HIV screening if indicated. Case management consultation to assist with coordination of subsequent treatment after discharge. Hx and PE limited by: none     HPI: Dinh Healy is a 33y.o. year old female who presents with acute suspected benzodiazepine withdrawal. Patient has a past medical history significant for opioid use disorder currently on methadone maintenance, anxiety, depression, and tobacco use disorder. Patient reports that she has been following daily with Hakeem Baires who referred patient to [facility] medical detox unit. Patient reports that she has been testing positive for benzodiazepines at the clinic and believes that the fentanyl that she has been injecting in addition to taking her daily methadone has been laced with Xylazine and benzodiazepines. Reports prior history of withdrawal seizures and states that she is experiencing withdrawal symptoms of anxiety, sweats, and tremors. Patient does endorse that she uses 1-2 bags of fentanyl daily and injects into bilateral upper extremities, primarily her left forearm. SEWS Total Score: 11 (6/30/2023  5:27 PM)    Methadone & Buprenorphine History  History of prior treatment for opioid dependence? yes  Currently on Methadone Maintenance? yes  History of prior treatment with Suboxone? no  Currently taking Suboxone? No-  History of using Suboxone without having a prescription? no  History of IVDA? yes  Co-existing substance use? yes- benzodiazepines     Review of PDMP: yes     Social History     Substance and Sexual Activity   Alcohol Use No     Social History     Substance and Sexual Activity   Drug Use Yes   • Types: Heroin, Benzodiazepines    Comment: hx of heroin; currently reports benzo use     Social History     Tobacco Use   Smoking Status Former   • Types: Cigarettes   Smokeless Tobacco Never       Review of Systems   Constitutional: Negative for chills and diaphoresis. Cardiovascular: Negative for chest pain. Gastrointestinal: Negative for constipation, diarrhea, nausea and vomiting. Skin: Positive for wound. Neurological: Positive for tremors and headaches. Psychiatric/Behavioral: Negative for suicidal ideas. The patient is nervous/anxious.         Historical Information   Past Medical History:   Diagnosis Date   • Anxiety    • Chlamydia    • Depression    • Endometriosis    • IV drug user     IV heroin use, rehab 7 times    • Panic attacks    • Psychiatric disorder     anxiety   • PTSD (post-traumatic stress disorder)    • Varicella     childhood   • Visual impairment      Past Surgical History:   Procedure Laterality Date   • LAPAROTOMY       Family History   Problem Relation Age of Onset   • Depression Mother    • Drug abuse Brother    • Drug abuse Maternal Aunt    • Drug abuse Paternal Uncle    • Cancer Maternal Grandmother    • Cancer Paternal Grandfather      Social History   Marital Status: Single   Occupation: Unemployed  Patient Pre-hospital Living Situation: Lives with mother and child- child is in her mother's custody   Patient Pre-hospital Level of Mobility: fully independent  Patient Pre-hospital Diet Restrictions: none     No Known Allergies    Prior to Admission medications    Medication Sig Start Date End Date Taking?  Authorizing Provider   methadone (DOLOPHINE) 10 MG/5ML solution Take 119 mg by mouth every 6 (six) hours as needed Take by mouth daily   Yes Historical Provider, MD   QUEtiapine (SEROquel) 50 mg tablet Take 50 mg by mouth daily at bedtime    Historical Provider, MD   sertraline (ZOLOFT) 50 mg tablet Take 50 mg by mouth daily    Historical Provider, MD   acetaminophen (TYLENOL) 325 mg tablet 1 tab every 4-6hr as needed for pain 5/6/17 6/30/23  Rodolfo Coatse, DO   benzocaine-menthol-lanolin-aloe (DERMOPLAST) 20-0.5 % topical spray Apply 1 application topically 4 (four) times a day as needed for irritation for up to 30 days 5/6/17 6/30/23  Rodolfo Coatse, DO   buprenorphine (SUBUTEX) 2 mg Place 4 mg under the tongue every morning    6/30/23  Historical Provider, MD   docusate sodium (COLACE) 100 mg capsule Take 1 capsule by mouth 2 (two) times a day for 30 days 5/6/17 6/30/23  Rodolfo Coatse, DO   hydrocortisone 1 % cream Apply 1 application topically as needed for irritation or rash for up to 30 days 5/6/17 6/30/23  Rodolfo Coatse, DO   ibuprofen (MOTRIN) 600 mg tablet Take 1 tablet by mouth every 6 (six) hours as needed for mild pain for up to 30 days 5/6/17 6/30/23  Ledyard Ken Coatse, DO   Prenatal Vit-Fe Fumarate-FA (PRENATAL VITAMIN PO) Take 1 tablet by mouth daily  6/30/23  Historical Provider, MD   QUEtiapine (SEROquel) 200 mg tablet Take 25 mg by mouth daily at bedtime    6/30/23  Historical Provider, MD   witch hazel-glycerin (TUCKS) topical pad Apply 1 pad topically as needed for irritation or hemorrhoids for up to 30 days 5/6/17 6/30/23  Rodolfo Chang, DO       Current Facility-Administered Medications   Medication Dose Route Frequency   • acetaminophen (TYLENOL) tablet 650 mg  650 mg Oral Q6H PRN • cephalexin (KEFLEX) capsule 500 mg  500 mg Oral Q12H [address]   • [START ON 7/1/2023] enoxaparin (LOVENOX) subcutaneous injection 40 mg  40 mg Subcutaneous Daily   • gabapentin (NEURONTIN) capsule 300 mg  300 mg Oral Q8H PRN   • [START ON 7/1/2023] methadone (DOLOPHINE) oral concentrated solution 119 mg  119 mg Oral Daily   • [START ON 7/1/2023] multivitamin-minerals (CENTRUM) tablet 1 tablet  1 tablet Oral Daily   • ondansetron (ZOFRAN) injection 4 mg  4 mg Intravenous Q6H PRN   • sulfamethoxazole-trimethoprim (BACTRIM DS) 800-160 mg per tablet 1 tablet  1 tablet Oral Q12H [address]       Continuous Infusions:          Objective     No intake or output data in the 24 hours ending 06/30/23 1759    Invasive Devices:   Peripheral IV 06/30/23 Distal;Right;Upper;Ventral (anterior) Arm (Active)   Site Assessment WDL; Clean;Dry; Intact 06/30/23 1413   Dressing Type Transparent;Securing device 06/30/23 1413   Line Status Blood return noted 06/30/23 1413   Dressing Status Clean;Dry; Intact 06/30/23 1413       Vitals   Vitals:    06/30/23 1223 06/30/23 1557 06/30/23 1650 06/30/23 1755   BP: 112/84 112/74 118/74    TempSrc: Oral  Temporal    Pulse: (!) 113 92 97 97   Resp: 20 20 20    Patient Position - Orthostatic VS: Sitting Lying Sitting    Temp: 98.2 °F (36.8 °C)  98.5 °F (36.9 °C)        Physical Exam  Cardiovascular:      Rate and Rhythm: Normal rate and regular rhythm. Heart sounds: No murmur heard. Pulmonary:      Breath sounds: Normal breath sounds. Skin:         Neurological:      Mental Status: She is oriented to person, place, and time. Psychiatric:         Attention and Perception: She does not perceive auditory hallucinations. Mood and Affect: Mood is anxious. Mood is not depressed. Thought Content: Thought content does not include suicidal ideation. Data:    EKG, Pathology, and Other Studies: I have personally reviewed pertinent reports.     EKG: NSR, prolonged QTC     Lab Results:  CBC ETOH     Lab Results   Component Value Date    WBC 15.97 (H) 06/30/2023    WBC 8.0 02/08/2017    RBC 4.00 06/30/2023    RBC 3.66 (L) 02/08/2017    HGB 11.2 (L) 06/30/2023    HGB 10.9 (L) 02/08/2017    HCT 35.5 06/30/2023    HCT 33.3 (L) 02/08/2017    MCV 89 06/30/2023    MCV 91.0 02/08/2017    MCH 28.0 06/30/2023    MCH 29.8 02/08/2017    MCHC 31.5 06/30/2023    MCHC 32.8 02/08/2017    RDW 14.0 06/30/2023    RDW 13.1 02/08/2017     (H) 06/30/2023     02/08/2017    MPV 8.7 (L) 06/30/2023    MPV 7.4 (L) 02/08/2017      No results found for: "LACTICACID"   CMP UA         Component Value Date/Time    K 4.0 06/30/2023 1412     06/30/2023 1412    CO2 29 06/30/2023 1412    BUN 8 06/30/2023 1412    CREATININE 0.67 06/30/2023 1412         Component Value Date/Time    CALCIUM 10.3 (H) 06/30/2023 1412    ALKPHOS 103 06/30/2023 1412    AST 17 06/30/2023 1412    ALT 12 06/30/2023 1412      Lab Results   Component Value Date    CLARITYU Clear 11/30/2016    COLORU Yellow 11/30/2016    SPECGRAV >=1.030 11/30/2016    PHUR 6.0 11/30/2016    GLUCOSEU Negative 11/30/2016    KETONESU >=80 (3+) (A) 11/30/2016    BLOODU Trace-Intact (A) 11/30/2016    PROTEIN UA Negative 11/30/2016    NITRITE Negative 11/30/2016    BILIRUBINUR Negative 11/30/2016    UROBILINOGEN 0.2 11/30/2016    LEUKOCYTESUR Negative 11/30/2016    WBCUA 4-10 (A) 11/30/2016    RBCUA 0-1 (A) 11/30/2016    HYALINE NONE SEEN 09/14/2016    HYALINE NONE SEEN 09/14/2016    BACTERIA Moderate (A) 11/30/2016    BACTERIA FEW (A) 09/14/2016    BACTERIA FEW (A) 09/14/2016    EPIS Occasional 11/30/2016        Liver Function Test: ASA     Lab Results   Component Value Date    TBILI 0.30 06/30/2023    ALKPHOS 103 06/30/2023    AST 17 06/30/2023    ALT 12 06/30/2023    TP 7.9 06/30/2023    ALB 4.3 06/30/2023      Lab Results   Component Value Date    SALICYLATE <5 22/91/2436      Troponin APAP     No results found for: "TROPONINI"   Lab Results   Component Value Date ACTMNPHEN <10 (L) 06/30/2023      VBG HCG     No results found for: "PHVEN", "RQU9MJC", "PO2VEN", "DLZ7XWA", "Wilner Newton", "H4KBWSHAB", "I3GAQRT"   No results found for: "HCGQUANT"   ABG Urine Drug Screen     No results found for: "PHART", "RAM3WIK", "PO2ART", "EOY8BQM", "BEART", "K2FTHWQLM", "O2HGB", "SOURC", "RAYMOND", "VTAC", "Jose Rafael Krill", "INSPIREDAIR", "PEEP"   Lab Results   Component Value Date    AMPMETHUR Negative 06/30/2023    BARBTUR Negative 06/30/2023    BDZUR Negative 06/30/2023    COCAINEUR Negative 06/30/2023    METHADONEUR Positive (A) 06/30/2023    OPIATEUR Negative 06/30/2023    PCPUR Negative 06/30/2023    THCUR Negative 06/30/2023    OXYCODONEUR Negative 06/30/2023      Lactate INR     No results found for: "LACTICACID"   No results found for: "INR"   PTT Protime     No results found for: "PTT"     No results found for: "PROTIME"           Imaging Studies: I have personally reviewed pertinent reports. Counseling / Coordination of Care  Total floor / unit time spent today 45 minutes. Greater than 50% of total time was spent with the patient and / or family counseling and / or coordination of care. ** Please Note: This note has been constructed using a voice recognition system.  **

## 2023-06-30 NOTE — ASSESSMENT & PLAN NOTE
· Patient with a history of chronic fentanyl use  · Uses 1-2 bags of fentanyl daily intravenously last use 2 am on 6/30/23  · Currently follows with [address] [place] [address] for Methadone. Receives daily dose of 119 mg.    · Patient reports that she has been testing positive for benzodiazepines at methadone clinic.    · Management of opioid withdrawal under MAT protocol as above  · Will Screen for hepatitis/HIV with h/o IVDU  · Case management consulted for assistance with aftercare resources - pt expresses interest in continued outpatient MAT after d/c

## 2023-06-30 NOTE — ED PROVIDER NOTES
History  Chief Complaint   Patient presents with   • Detox Evaluation     Arrives requesting detox eval for benzo use. States she has been injecting benzos IV; last use around 2am. States she is also on methadone which she wishes to continue but needs detox from the benzos. Patient is a 21-year-old female coming in requesting detox from benzos. Patient reports that she is currently on methadone, 119 mg, is compliant with that, but is still using opiates, IV at this time. Reports she has been testing positive for benzos in her urine at her methadone clinic, and she was recommended come to the emergency department today for detox from benzodiazepines. Patient reports that she does get withdrawal symptoms, becoming shaky, nauseous, foggy headed, and a headache if she does not use enough per day. Patient has no other complaints at this time. Does not want to get off methadone at this time. Last use 11 hours prior to arrival      Detox Evaluation  Similar prior episodes: yes    Suspected agents:  Heroin  Associated symptoms: no abdominal pain, no headaches, no loss of consciousness, no nausea, no seizures, no shortness of breath and no vomiting        Prior to Admission Medications   Prescriptions Last Dose Informant Patient Reported? Taking?    QUEtiapine (SEROquel) 200 mg tablet   Yes No   Sig: Take 25 mg by mouth daily at bedtime        Facility-Administered Medications: None       Past Medical History:   Diagnosis Date   • Anxiety    • Chlamydia    • Depression    • Endometriosis    • IV drug user     IV heroin use, rehab 7 times    • Panic attacks    • Psychiatric disorder     anxiety   • PTSD (post-traumatic stress disorder)    • Varicella     childhood   • Visual impairment        Past Surgical History:   Procedure Laterality Date   • LAPAROTOMY         Family History   Problem Relation Age of Onset   • Depression Mother    • Drug abuse Brother    • Drug abuse Maternal Aunt    • Drug abuse Paternal Uncle • Cancer Maternal Grandmother    • Cancer Paternal Grandfather      I have reviewed and agree with the history as documented. E-Cigarette/Vaping     E-Cigarette/Vaping Substances     Social History     Tobacco Use   • Smoking status: Former     Types: Cigarettes   • Smokeless tobacco: Never   Substance Use Topics   • Alcohol use: No   • Drug use: Yes     Types: Heroin, Benzodiazepines     Comment: hx of heroin; currently reports benzo use       Review of Systems   Constitutional: Negative for chills, fatigue and fever. Respiratory: Negative for chest tightness and shortness of breath. Gastrointestinal: Negative for abdominal pain, nausea and vomiting. Neurological: Negative for dizziness, seizures, loss of consciousness and headaches. Physical Exam  Physical Exam  Vitals reviewed. Constitutional:       Appearance: Normal appearance. She is normal weight. HENT:      Head: Normocephalic and atraumatic. Right Ear: External ear normal.      Left Ear: External ear normal.      Nose: Nose normal.   Eyes:      Conjunctiva/sclera: Conjunctivae normal.   Cardiovascular:      Rate and Rhythm: Tachycardia present. Pulmonary:      Effort: Pulmonary effort is normal.   Musculoskeletal:         General: Normal range of motion. Cervical back: Normal range of motion. Skin:     General: Skin is warm and dry. Neurological:      Mental Status: She is alert.          Vital Signs  ED Triage Vitals [06/30/23 1223]   Temperature Pulse Respirations Blood Pressure SpO2   98.2 °F (36.8 °C) (!) 113 20 112/84 95 %      Temp Source Heart Rate Source Patient Position - Orthostatic VS BP Location FiO2 (%)   Oral Monitor Sitting Right arm --      Pain Score       --           Vitals:    06/30/23 1223   BP: 112/84   Pulse: (!) 113   Patient Position - Orthostatic VS: Sitting         Visual Acuity  Visual Acuity    Flowsheet Row Most Recent Value   L Pupil Size (mm) 3   R Pupil Size (mm) 3          ED Medications  Medications   sodium chloride 0.9 % bolus 1,000 mL (0 mL Intravenous Stopped 6/30/23 1525)       Diagnostic Studies  Results Reviewed     Procedure Component Value Units Date/Time    Magnesium [[id number]]  (Abnormal) Collected: 06/30/23 1412    Lab Status: Final result Specimen: Blood from Arm, Right Updated: 06/30/23 1456     Magnesium 1.7 mg/dL     Comprehensive metabolic panel [[id number]]  (Abnormal) Collected: 06/30/23 1412    Lab Status: Final result Specimen: Blood from Arm, Right Updated: 06/30/23 1456     Sodium 141 mmol/L      Potassium 4.0 mmol/L      Chloride 100 mmol/L      CO2 29 mmol/L      ANION GAP 12 mmol/L      BUN 8 mg/dL      Creatinine 0.67 mg/dL      Glucose 88 mg/dL      Calcium 10.3 mg/dL      AST 17 U/L      ALT 12 U/L      Alkaline Phosphatase 103 U/L      Total Protein 7.9 g/dL      Albumin 4.3 g/dL      Total Bilirubin 0.30 mg/dL      eGFR 121 ml/min/1.73sq m     Narrative:      [place] guidelines for Chronic Kidney Disease (CKD):   •  Stage 1 with normal or high GFR (GFR > 90 mL/min/1.73 square meters)  •  Stage 2 Mild CKD (GFR = 60-89 mL/min/1.73 square meters)  •  Stage 3A Moderate CKD (GFR = 45-59 mL/min/1.73 square meters)  •  Stage 3B Moderate CKD (GFR = 30-44 mL/min/1.73 square meters)  •  Stage 4 Severe CKD (GFR = 15-29 mL/min/1.73 square meters)  •  Stage 5 End Stage CKD (GFR <15 mL/min/1.73 square meters)  Note: GFR calculation is accurate only with a steady state creatinine    Salicylate level [[id number]]  (Normal) Collected: 06/30/23 1412    Lab Status: Final result Specimen: Blood from Arm, Right Updated: 90/58/35 9851     Salicylate Lvl <5 mg/dL     Acetaminophen level-If concentration is detectable, please discuss with medical  on call.  [[id number]]  (Abnormal) Collected: 06/30/23 1412    Lab Status: Final result Specimen: Blood from Arm, Right Updated: 06/30/23 1456     Acetaminophen Level <10 ug/mL     Ethanol [[id number]] (Normal) Collected: 06/30/23 1412    Lab Status: Final result Specimen: Blood from Arm, Right Updated: 06/30/23 1453     Ethanol Lvl <10 mg/dL     CBC and differential [[id number]]  (Abnormal) Collected: 06/30/23 1412    Lab Status: Final result Specimen: Blood from Arm, Right Updated: 06/30/23 1437     WBC 15.97 Thousand/uL      RBC 4.00 Million/uL      Hemoglobin 11.2 g/dL      Hematocrit 35.5 %      MCV 89 fL      MCH 28.0 pg      MCHC 31.5 g/dL      RDW 14.0 %      MPV 8.7 fL      Platelets 958 Thousands/uL      nRBC 0 /100 WBCs      Neutrophils Relative 81 %      Immat GRANS % 0 %      Lymphocytes Relative 15 %      Monocytes Relative 4 %      Eosinophils Relative 0 %      Basophils Relative 0 %      Neutrophils Absolute 12.74 Thousands/µL      Immature Grans Absolute 0.06 Thousand/uL      Lymphocytes Absolute 2.45 Thousands/µL      Monocytes Absolute 0.67 Thousand/µL      Eosinophils Absolute 0.02 Thousand/µL      Basophils Absolute 0.03 Thousands/µL     Rapid drug screen, urine [[id number]]  (Abnormal) Collected: 06/30/23 1255    Lab Status: Final result Specimen: Urine, Clean Catch Updated: 06/30/23 1331     Amph/Meth UR Negative     Barbiturate Ur Negative     Benzodiazepine Urine Negative     Cocaine Urine Negative     Methadone Urine Positive     Opiate Urine Negative     PCP Ur Negative     THC Urine Negative     Oxycodone Urine Negative    Narrative:      Presumptive report. If requested, specimen will be sent to reference lab for confirmation. FOR MEDICAL PURPOSES ONLY. IF CONFIRMATION NEEDED PLEASE CONTACT THE LAB WITHIN 5 DAYS.     Drug Screen Cutoff Levels:  AMPHETAMINE/METHAMPHETAMINES  1000 ng/mL  BARBITURATES     200 ng/mL  BENZODIAZEPINES     200 ng/mL  COCAINE      300 ng/mL  METHADONE      300 ng/mL  OPIATES      300 ng/mL  PHENCYCLIDINE     25 ng/mL  THC       50 ng/mL  OXYCODONE      100 ng/mL    POCT pregnancy, urine [[id number]]  (Normal) Resulted: 06/30/23 1259    Lab Status: Final result Updated: 06/30/23 1259     EXT Preg Test, Ur Negative     Control Valid                 No orders to display              Procedures  Procedures         ED Course  ED Course as of 06/30/23 1553   Fri Jun 30, 2023   1331 METHADONE URINE(!): Positive   1438 WBC(!): 15.97  History of elevated leukocytosis                                SBIRT 20yo+    Flowsheet Row Most Recent Value   Initial Alcohol Screen: US AUDIT-C     1. How often do you have a drink containing alcohol? 0 Filed at: 06/30/2023 1226   2. How many drinks containing alcohol do you have on a typical day you are drinking? 0 Filed at: 06/30/2023 1226   3b. FEMALE Any Age, or MALE 65+: How often do you have 4 or more drinks on one occassion? 0 Filed at: 06/30/2023 1226   Audit-C Score 0 Filed at: 06/30/2023 1226   LUDA: How many times in the past year have you. .. Used an illegal drug or used a prescription medication for non-medical reasons? Daily or Almost Daily Filed at: 06/30/2023 1226   DAST-10: In the past 12 months. ..    1. Have you used drugs other than those required for medical reasons? 1 Filed at: 06/30/2023 1226   2. Do you use more than one drug at a time? 0 Filed at: 06/30/2023 1226   3. Have you had medical problems as a result of your drug use (e.g., memory loss, hepatitis, convulsions, bleeding, etc.)? 1 Filed at: 06/30/2023 1226   4. Have you had "blackouts" or "flashbacks" as a result of drug use? YesNo 0 Filed at: 06/30/2023 1226   5. Do you ever feel bad or guilty about your drug use? 1 Filed at: 06/30/2023 1226   6. Does your spouse (or parent) ever complain about your involvement with drugs? 1 Filed at: 06/30/2023 1226   7. Have you neglected your family because of your use of drugs? 1 Filed at: 06/30/2023 1226   8. Have you engaged in illegal activities in order to obtain drugs? 1 Filed at: 06/30/2023 1226   9. Have you ever experienced withdrawal symptoms (felt sick) when you stopped taking drugs?  1 Filed at: 06/30/2023 2612 10. Are you always able to stop using drugs when you want to? 1 Filed at: 06/30/2023 1226   DAST-10 Score 8 Filed at: 06/30/2023 1226                    Medical Decision Making  Patient is a 67-year-old female coming to emergency department for evaluation of IV benzo abuse. Patient is in no acute distress at this time, was ultimately admitted to detox for further treatment    Amount and/or Complexity of Data Reviewed  Labs: ordered. Decision-making details documented in ED Course. Risk  Decision regarding hospitalization. Disposition  Final diagnoses:   Benzodiazepine abuse ([address])   Methadone use     Time reflects when diagnosis was documented in both MDM as applicable and the Disposition within this note     Time User Action Codes Description Comment    6/30/2023  3:16 PM Dali Grew Add [F13.10] Benzodiazepine abuse ([address])     6/30/2023  3:16 PM Dali Grew Add [F11.90] Methadone use       ED Disposition     ED Disposition   Admit    Condition   Stable    Date/Time   Fri Jun 30, 2023  3:15 PM    Comment   Case was discussed with Shivani Donis PA-C and the patient's admission status was agreed to be Admission Status: inpatient status to the service of Dr. Sydnee Brand . Follow-up Information    None         Patient's Medications   Discharge Prescriptions    No medications on file       No discharge procedures on file.     PDMP Review     None          ED Provider  Electronically Signed by           Goldie Almeida PA-C  06/30/23 7586

## 2023-07-01 LAB
ALBUMIN SERPL BCP-MCNC: 4 G/DL (ref 3.5–5)
ALP SERPL-CCNC: 96 U/L (ref 34–104)
ALT SERPL W P-5'-P-CCNC: 7 U/L (ref 7–52)
ANION GAP SERPL CALCULATED.3IONS-SCNC: 15 MMOL/L
AST SERPL W P-5'-P-CCNC: 16 U/L (ref 13–39)
BILIRUB SERPL-MCNC: 0.36 MG/DL (ref 0.2–1)
BUN SERPL-MCNC: 7 MG/DL (ref 5–25)
CALCIUM SERPL-MCNC: 9.6 MG/DL (ref 8.4–10.2)
CHLORIDE SERPL-SCNC: 100 MMOL/L (ref 96–108)
CO2 SERPL-SCNC: 22 MMOL/L (ref 21–32)
CREAT SERPL-MCNC: 0.55 MG/DL (ref 0.6–1.3)
ERYTHROCYTE [DISTWIDTH] IN BLOOD BY AUTOMATED COUNT: 14.2 % (ref 11.6–15.1)
GFR SERPL CREATININE-BSD FRML MDRD: 129 ML/MIN/1.73SQ M
GLUCOSE SERPL-MCNC: 85 MG/DL (ref 65–140)
HCT VFR BLD AUTO: 37.7 % (ref 34.8–46.1)
HGB BLD-MCNC: 11.7 G/DL (ref 11.5–15.4)
MAGNESIUM SERPL-MCNC: 1.9 MG/DL (ref 1.9–2.7)
MCH RBC QN AUTO: 27.9 PG (ref 26.8–34.3)
MCHC RBC AUTO-ENTMCNC: 31 G/DL (ref 31.4–37.4)
MCV RBC AUTO: 90 FL (ref 82–98)
PLATELET # BLD AUTO: 350 THOUSANDS/UL (ref 149–390)
PMV BLD AUTO: 9.4 FL (ref 8.9–12.7)
POTASSIUM SERPL-SCNC: 3.9 MMOL/L (ref 3.5–5.3)
PROT SERPL-MCNC: 8 G/DL (ref 6.4–8.4)
RBC # BLD AUTO: 4.2 MILLION/UL (ref 3.81–5.12)
SODIUM SERPL-SCNC: 137 MMOL/L (ref 135–147)
WBC # BLD AUTO: 14.81 THOUSAND/UL (ref 4.31–10.16)

## 2023-07-01 PROCEDURE — 80053 COMPREHEN METABOLIC PANEL: CPT

## 2023-07-01 PROCEDURE — 99233 SBSQ HOSP IP/OBS HIGH 50: CPT | Performed by: EMERGENCY MEDICINE

## 2023-07-01 PROCEDURE — 85027 COMPLETE CBC AUTOMATED: CPT

## 2023-07-01 PROCEDURE — 83735 ASSAY OF MAGNESIUM: CPT

## 2023-07-01 RX ORDER — QUETIAPINE FUMARATE 50 MG/1
50 TABLET, FILM COATED ORAL
Status: DISCONTINUED | OUTPATIENT
Start: 2023-07-01 | End: 2023-07-04 | Stop reason: HOSPADM

## 2023-07-01 RX ORDER — PHENOBARBITAL SODIUM 130 MG/ML
130 INJECTION INTRAMUSCULAR ONCE
Status: COMPLETED | OUTPATIENT
Start: 2023-07-01 | End: 2023-07-01

## 2023-07-01 RX ORDER — PHENOBARBITAL 64.8 MG/1
64.8 TABLET ORAL ONCE
Status: COMPLETED | OUTPATIENT
Start: 2023-07-01 | End: 2023-07-01

## 2023-07-01 RX ORDER — PHENOBARBITAL SODIUM 130 MG/ML
260 INJECTION INTRAMUSCULAR ONCE
Status: COMPLETED | OUTPATIENT
Start: 2023-07-01 | End: 2023-07-01

## 2023-07-01 RX ORDER — METHADONE HYDROCHLORIDE 10 MG/ML
119 CONCENTRATE ORAL EVERY 24 HOURS
Status: DISCONTINUED | OUTPATIENT
Start: 2023-07-01 | End: 2023-07-03

## 2023-07-01 RX ORDER — HYDROXYZINE HYDROCHLORIDE 25 MG/1
25 TABLET, FILM COATED ORAL EVERY 6 HOURS PRN
Status: DISCONTINUED | OUTPATIENT
Start: 2023-07-01 | End: 2023-07-04 | Stop reason: HOSPADM

## 2023-07-01 RX ADMIN — PHENOBARBITAL SODIUM 130 MG: 130 INJECTION INTRAMUSCULAR at 11:32

## 2023-07-01 RX ADMIN — ONDANSETRON 4 MG: 2 INJECTION INTRAMUSCULAR; INTRAVENOUS at 08:15

## 2023-07-01 RX ADMIN — MULTIPLE VITAMINS W/ MINERALS TAB 1 TABLET: TAB ORAL at 08:19

## 2023-07-01 RX ADMIN — PHENOBARBITAL SODIUM 260 MG: 130 INJECTION INTRAMUSCULAR at 02:08

## 2023-07-01 RX ADMIN — METHADONE HYDROCHLORIDE 119 MG: 10 CONCENTRATE ORAL at 08:14

## 2023-07-01 RX ADMIN — SULFAMETHOXAZOLE AND TRIMETHOPRIM 1 TABLET: 800; 160 TABLET ORAL at 20:00

## 2023-07-01 RX ADMIN — CEPHALEXIN 500 MG: 500 CAPSULE ORAL at 08:19

## 2023-07-01 RX ADMIN — HYDROXYZINE HYDROCHLORIDE 25 MG: 25 TABLET ORAL at 19:57

## 2023-07-01 RX ADMIN — PHENOBARBITAL SODIUM 130 MG: 130 INJECTION INTRAMUSCULAR; INTRAVENOUS at 08:15

## 2023-07-01 RX ADMIN — SULFAMETHOXAZOLE AND TRIMETHOPRIM 1 TABLET: 800; 160 TABLET ORAL at 08:20

## 2023-07-01 RX ADMIN — GABAPENTIN 300 MG: 300 CAPSULE ORAL at 11:31

## 2023-07-01 RX ADMIN — SERTRALINE HYDROCHLORIDE 50 MG: 50 TABLET ORAL at 08:19

## 2023-07-01 RX ADMIN — ACETAMINOPHEN 650 MG: 325 TABLET ORAL at 19:56

## 2023-07-01 RX ADMIN — PHENOBARBITAL 64.8 MG: 64.8 TABLET ORAL at 04:11

## 2023-07-01 RX ADMIN — PHENOBARBITAL 64.8 MG: 64.8 TABLET ORAL at 15:14

## 2023-07-01 RX ADMIN — ONDANSETRON 4 MG: 2 INJECTION INTRAMUSCULAR; INTRAVENOUS at 15:11

## 2023-07-01 RX ADMIN — CEPHALEXIN 500 MG: 500 CAPSULE ORAL at 20:00

## 2023-07-01 RX ADMIN — PHENOBARBITAL SODIUM 130 MG: 130 INJECTION INTRAMUSCULAR; INTRAVENOUS at 22:20

## 2023-07-01 RX ADMIN — QUETIAPINE FUMARATE 50 MG: 50 TABLET ORAL at 22:20

## 2023-07-01 RX ADMIN — PHENOBARBITAL SODIUM 130 MG: 130 INJECTION INTRAMUSCULAR; INTRAVENOUS at 18:17

## 2023-07-01 NOTE — ASSESSMENT & PLAN NOTE
· Patient with a history of chronic fentanyl use  · Uses 1-2 bags of fentanyl daily intravenously last use 2 am on 6/30/23  · Currently follows with [address] [place] [address] for Methadone. Receives daily dose of 119 mg.    · Patient reports that she has been testing positive for benzodiazepines at methadone clinic.    · Management of opioid withdrawal under MAT protocol as above  · Will Screen for hepatitis/HIV with h/o IVDU  · Case management consulted for assistance with aftercare resources - pt expresses interest in continued outpatient MAT after d/c

## 2023-07-01 NOTE — ASSESSMENT & PLAN NOTE
· Patient reports a history of polysubstance abuse with history of withdrawal seizures. She reports that she is currently using Fentanyl daily in addition to receiving Methadone. Patient is concerned that her fentanyl is mixed with Xylazine and benzodiazepines as she reports to testing positive for benzodiazepines at her methadone clinic. · Reports that she is experiencing poor appetite, sleeps legs, sweats, tremors, anxiety, and nausea. · Suspect that patient is primarily experiencing more anxiety related to her Xylazine use.  Regardless will [facility] for blaire-agonist withdrawal.   · Telemetry monitoring

## 2023-07-01 NOTE — ASSESSMENT & PLAN NOTE
· Track marks present on bilateral arms with 1cm lesions present with eschar and multiple purulent excoriations with surrounding erythema. + induration, no fluctuance. · Patient reports that she continues to injection primarily into LUE. · CT LUE: Few subcutaneous edema in forearm consistent with cellulitis, without subcutaneous abscess. No evidence of intramuscular abscess, possible low likelihood of infectious myositis due to gas-forming organism.   · Continue PO Keflex and Bactrim

## 2023-07-01 NOTE — PLAN OF CARE
Problem: SUBSTANCE USE/ABUSE  Goal: By discharge, will develop insight into their chemical dependency and sustain motivation to continue in recovery  Description: INTERVENTIONS:  - Attends all daily group sessions and scheduled AA groups  - Actively practices coping skills through participation in the therapeutic community and adherence to program rules  - Reviews and completes assignments from individual treatment plan  - Assist patient development of understanding of their personal cycle of addiction and relapse triggers  Outcome: Progressing     Problem: SUBSTANCE USE/ABUSE  Goal: By discharge, patient will have ongoing treatment plan addressing chemical dependency  Description: INTERVENTIONS:  - Assist patient with resources and/or appointments for ongoing recovery based living  Outcome: Progressing

## 2023-07-01 NOTE — PROGRESS NOTES
PROGRESS NOTE  DEPARTMENT OF MEDICAL TOXICOLOGY  LEVEL 4 MEDICAL DETOX UNIT  Ayana Turner 33 y.o. female MRN: [id number]  Unit/Bed#: 5T [facility] 511-01 Encounter: [id number]      Reason for Admission/Principal Problem: benzodiazepine withdrawal  Rounding Provider: Paula Beltran MD  Attending Provider: Elizabeth Gabriel MD   6/30/2023 12:18 PM           Leukocytosis  Assessment & Plan  · History of leukocytosis on previous laboratory studies  · Patient remains afebrile, vital signs stable  · Suspected cellulitis bilateral UE -start PO abx  · Continue to monitor    Prolonged Q-T interval on ECG  Assessment & Plan  · Qtc 468 ms upon admission  · Currently on methadone   · Will continue to monitor     Cellulitis of left upper extremity  Assessment & Plan  · Track marks present on bilateral arms with 1cm lesions present with eschar and multiple purulent excoriations with surrounding erythema. + induration, no fluctuance. · Patient reports that she continues to injection primarily into LUE. · CT LUE: Few subcutaneous edema in forearm consistent with cellulitis, without subcutaneous abscess. No evidence of intramuscular abscess, possible low likelihood of infectious myositis due to gas-forming organism. · Continue PO Keflex and Bactrim     Opioid use disorder, severe, dependence ([address])  Assessment & Plan  · Patient with a history of chronic fentanyl use  · Uses 1-2 bags of fentanyl daily intravenously last use 2 am on 6/30/23  · Currently follows with [address] [place] [address] [facility] for Methadone. Receives daily dose of 119 mg.    · Patient reports that she has been testing positive for benzodiazepines at methadone clinic.    · Management of opioid withdrawal under MAT protocol as above  · Will Screen for hepatitis/HIV with h/o IVDU  · Case management consulted for assistance with aftercare resources - pt expresses interest in continued outpatient MAT after d/c      * Benzodiazepine withdrawal ([address])  Assessment & Plan  · Patient reports a history of polysubstance abuse with history of withdrawal seizures. She reports that she is currently using Fentanyl daily in addition to receiving Methadone. Patient is concerned that her fentanyl is mixed with Xylazine and benzodiazepines as she reports to testing positive for benzodiazepines at her methadone clinic. · Reports that she is experiencing poor appetite, sleeps legs, sweats, tremors, anxiety, and nausea. · Suspect that patient is primarily experiencing more anxiety related to her Xylazine use. Regardless will monitor her on [facility] protocol for blaire-agonist withdrawal.   · Telemetry monitoring             VTE Pharmacologic Prophylaxis:   Pharmacologic: Enoxaparin (Lovenox)  Mechanical VTE Prophylaxis in Place: no    Code Status: Level 1 - Full Code    Patient Centered Rounds: I have performed bedside rounds with nursing staff today. Discussions with Specialists or Other Care Team Provider: no     Education and Discussions with Family / Patient: no    Time Spent for Care: 20 minutes. More than 50% of total time spent on counseling and coordination of care as described above. Current Length of Stay: 1 day(s)    Current Patient Status: Inpatient     Certification Statement: The patient will continue to require additional inpatient hospital stay due to benzo withdrawal, cellulitis     Discharge Plan: Home with outpatient resources after medical stabilization, anticipated within 48-72 hours         Subjective: This morning Elda complains of nausea, restlessness, leg pains, sweatiness, poor appetite, palpitations. Denies lightheadedness, visual changes, auditory hallucinations, chest pain, shortness of breath, vomiting, abdominal pain, urinary symptoms, changes in stool, rash. Unable to tolerate breakfast this morning.     Objective:     Clinical Opiate Withdrawal Scale  Pulse: 97  Resting Pulse Rate: Measured After Patient is Sitting or Lying for One Minute: Pulse rate 101-120  GI Upset: Over Last Half Hour: Nausea or loose stool  Sweating: Over Past Half Hour Not Accounted for by Room Temperature of Patient Activity: Flushed or observable moistness on face  Tremor: Observation of Outstretched Hands: Slight tremor observable  Restlessness: Observation During Assessment: Able to sit still  Yawning: Observation During Assessment: No yawning  Pupil Size: Pupils possibly larger than normal for room light  Anxiety and Irritability: None  Bone or Joint Aches: If Patient was Having Pain Previously, Only the Additional Component Attributed to Opiate Withdrawal is Scored: Patient reports severe diffuse aching of joints/muscle  Gooseflesh Skin: Skin is smooth  Runny Nose or Tearing: Not Accounted for by Cold Symptoms or Allergies: Not present  Clinical Opiate Withdrawal Scale Total Score: 11    SEWS Total Score: 8 (2023 11:27 AM)        Last 24 Hours Medication List:   Current Facility-Administered Medications   Medication Dose Route Frequency Provider Last Rate   • acetaminophen  650 mg Oral Q6H PRN PurnimaZEB Kapoor-RONNIE     • cephalexin  500 mg Oral Q12H [address] [facility] Purnima CROW Onofre     • enoxaparin  40 mg Subcutaneous Daily  CROW Onofre     • gabapentin  300 mg Oral Q8H PRN Purnima CROW Onofre     • methadone  119 mg Oral Q24H FRANK Ulrich     • multivitamin-minerals  1 tablet Oral Daily Williemae Slate RebeccasideCROW     • ondansetron  4 mg Intravenous Q6H PRN Purnima ZEB Onofre-RONNIE     • QUEtiapine  50 mg Oral HS Nimco Song PA-C     • sertraline  50 mg Oral Daily Nimco Ole CROW Song     • sulfamethoxazole-trimethoprim  1 tablet Oral Q12H [address] [facility] FlyCROW augustine           Vitals:   Temp (24hrs), Av.8 °F (37.1 °C), Min:98.5 °F (36.9 °C), Max:99 °F (37.2 °C)    Temp:  [98.5 °F (36.9 °C)-99 °F (37.2 °C)] 99 °F (37.2 °C)  HR:  [] 97  Resp:  [16-20] 16  BP: (104-130)/(67-83) 130/82  SpO2:  [97 %-100 %] 100 %  Body mass index is 16.24 kg/m². Input and Output Summary (last 24 hours):No intake or output data in the 24 hours ending 07/01/23 1257    Physical Exam:   Physical Exam  Vitals and nursing note reviewed. Constitutional:       General: She is in acute distress (anxious, comfortable, holding emesis bag). Appearance: Normal appearance. She is well-developed. She is ill-appearing. She is not toxic-appearing or diaphoretic. HENT:      Head: Normocephalic and atraumatic. Right Ear: External ear normal.      Left Ear: External ear normal.      Nose: Congestion present. Mouth/Throat:      Mouth: Mucous membranes are moist.   Eyes:      General:         Right eye: No discharge. Left eye: No discharge. Extraocular Movements: Extraocular movements intact. Conjunctiva/sclera: Conjunctivae normal.      Pupils: Pupils are equal, round, and reactive to light. Comments: Pulls dilated 5 to 6 mm, equal and reactive to light   Cardiovascular:      Rate and Rhythm: Regular rhythm. Tachycardia present. Heart sounds: Normal heart sounds. No murmur heard. No friction rub. No gallop. Pulmonary:      Effort: Pulmonary effort is normal. No respiratory distress. Breath sounds: Normal breath sounds. No stridor. No wheezing or rales. Chest:      Chest wall: No tenderness. Abdominal:      General: Bowel sounds are normal. There is no distension. Palpations: Abdomen is soft. There is no mass. Tenderness: There is abdominal tenderness (Generalized). There is no guarding or rebound. Hernia: No hernia is present. Musculoskeletal:         General: No tenderness or deformity. Normal range of motion. Cervical back: Normal range of motion and neck supple. Right lower leg: No edema. Left lower leg: No edema. Skin:     General: Skin is warm and dry. Capillary Refill: Capillary refill takes less than 2 seconds.       Comments: excoriations with surrounding erythema bilateral arms - injection sites, no purulence   Neurological:      Mental Status: She is alert and oriented to person, place, and time. Psychiatric:         Mood and Affect: Mood is anxious. Additional Data:     Labs:   Results from last 7 days   Lab Units 07/01/23  0550 06/30/23  1412   WBC Thousand/uL 14.81* 15.97*   HEMOGLOBIN g/dL 11.7 11.2*   HEMATOCRIT % 37.7 35.5   PLATELETS Thousands/uL 350 392*   NEUTROS PCT %  --  81*   LYMPHS PCT %  --  15   MONOS PCT %  --  4   EOS PCT %  --  0      Results from last 7 days   Lab Units 07/01/23  0550   SODIUM mmol/L 137   POTASSIUM mmol/L 3.9   CHLORIDE mmol/L 100   CO2 mmol/L 22   BUN mg/dL 7   CREATININE mg/dL 0.55*   ANION GAP mmol/L 15   CALCIUM mg/dL 9.6   ALBUMIN g/dL 4.0   TOTAL BILIRUBIN mg/dL 0.36   ALK PHOS U/L 96   ALT U/L 7   AST U/L 16   GLUCOSE RANDOM mg/dL 85                              * I Have Reviewed All Lab Data Listed Above. * Additional Pertinent Lab Tests Reviewed: [facility] Admission Reviewed      Imaging Studies: I have personally reviewed pertinent reports. Recent Cultures (last 7 days): Today, Patient Was Seen By: Laveda Boas, MD    ** Please Note: Dictation voice to text software may have been used in the creation of this document.  **

## 2023-07-01 NOTE — UTILIZATION REVIEW
Initial Clinical Review    Pt presented to [facility] for its Level IV medically managed intensive inpatient detox unit. Admission: Date/Time/Statement:   Admission Orders (From admission, onward)     Ordered        06/30/23 1536  INPATIENT ADMISSION  Once                      Orders Placed This Encounter   Procedures   • INPATIENT ADMISSION     Standing Status:   Standing     Number of Occurrences:   1     Order Specific Question:   Level of Care     Answer:   Med Surg [16]     Order Specific Question:   Estimated length of stay     Answer:   More than 2 Midnights     Order Specific Question:   Certification     Answer:   I certify that inpatient services are medically necessary for this patient for a duration of greater than two midnights. See H&P and MD Progress Notes for additional information about the patient's course of treatment. ED Arrival Information     Expected   -    Arrival   6/30/2023 11:59    Acuity   Emergent            Means of arrival   Walk-In    Escorted by   [facility]    Admission type   Emergency            Arrival complaint   detox           Chief Complaint   Patient presents with   • Detox Evaluation     Arrives requesting detox eval for benzo use. States she has been injecting benzos IV; last use around 2am. States she is also on methadone which she wishes to continue but needs detox from the benzos. Initial Presentation: 33 y.o. female who presented to medical detox. Inpatient admission for evaluation and treatment of acute polysubstance benzodaizepine and  fentanyl  withdrawal. Presented w/ request for detox from fentanyl daily in addition to receiving methadone  Uses fentanyl 1-2 bags daily, fentanyl is mixed with xylazine and benzodiazepines. She reports testing positive for benzodiazepine at Methadone clinic.  last use 6/30 @ 0200.  On exam, + tremors, nervous/anxious mood, + headache,  track marks present bilateral arms with 1cm lesions with eschar and multiple purulent excoriations with surrounding erythema, no fluctuance. . UDS positive for methadone COWS 11 Plan: Start oral antibiotics for arm cellulitis, Monitor SEWS for blaire-agonist withdrawal, w/ symptomatic supportive care,  IVF, telemetry, continuous pulse ox, Trend labs, replete electrolytes as needed. Continue PTA meds, Check CT L arm       Date: 7/1 Day 2: Pt reports feeling nausea, restlessness, leg pain, sweatiness, poor appetite. palpitations, Unable to tolerate breakfast  On exam, tachycardia, generalized abdominal tenderness, pupils dilated 5-6 mm, ill appearing, + nasal congestion, excoriations, surrounding w/erythema bilateral arms injection sites  holding emesis bag COWS 11, SEWS 8 . Plan: Continue  PO antibiotics for cellulitis both arms continue SEWS monitoring w/ symptomatic supportive care, telemetry, continuous pulse ox, Trend labs, replete electrolytes as needed. Continue PTA meds.  Continue to monitor QT     ED Triage Vitals   Temperature Pulse Respirations Blood Pressure SpO2   06/30/23 1223 06/30/23 1223 06/30/23 1223 06/30/23 1223 06/30/23 1223   98.2 °F (36.8 °C) (!) 113 20 112/84 95 %      Temp Source Heart Rate Source Patient Position - Orthostatic VS BP Location FiO2 (%)   06/30/23 1223 06/30/23 1223 06/30/23 1223 06/30/23 1223 --   Oral Monitor Sitting Right arm       Pain Score       06/30/23 1757       7          Wt Readings from Last 1 Encounters:   06/30/23 42.9 kg (94 lb 9.6 oz)     Additional Vital Signs:      Date/Time Temp Pulse Resp BP MAP (mmHg) SpO2 O2 Device Patient Position - Orthostatic VS   07/01/23 1106 -- 97 16 130/82 -- 100 % None (Room air) Lying   07/01/23 0732 99 °F (37.2 °C) 107 Abnormal  16 126/77 -- 99 % None (Room air) Lying   07/01/23 0522 98.6 °F (37 °C) 96 18 115/83 93 -- -- Lying   07/01/23 0358 -- 114 Abnormal  -- -- -- -- -- --   07/01/23 0157 -- 118 Abnormal  18 119/78 -- -- -- --   06/30/23 9989 -- -- -- 120/73 88 -- -- Lying 06/30/23 2025 99 °F (37.2 °C) 85 18 104/67 -- 98 % None (Room air) Lying   06/30/23 1755 -- 97 -- -- -- -- -- --   06/30/23 1650 98.5 °F (36.9 °C) 97 20 118/74 88 100 % None (Room air) Sitting   06/30/23 1557 -- 92 20 112/74 -- 97 % None (Room air) Lying            SEWS    Row Name 07/01/23 1127 07/01/23 0753 07/01/23 0500 07/01/23 0358 07/01/23 0154   Severity of Ethanol Withdrawal Scale (SEWS)   ANXIETY: Do you feel that something bad is about to happen to you right now? 3  -LB 3  -LB 0  -LS 0  -LS 3  -LS   NAUSEA and DRY HEAVES or VOMITING? 3  -LB 3  -LB 0  -LS 0  -LS 0  -LS   SWEATING: (includes moist palms, sweating now)? Score 0 or 2 0  -LB 0  -LB 0  -LS 0  -LS 0  -LS   TREMOR: with arms extended eyes closed? 2  -LB 2  -LB 0  -LS 0  -LS 2  -LS   AGITATION: Fidgety, restless, pacing? 0  -LB 0  -LB 0  -LS 3  -LS 3  -LS   DISORIENTATION: 0  -LB 0  -LB 0  -LS 0  -LS 0  -LS   HALLUCINATIONS: 0  -LB 0  -LB 0  -LS 0  -LS 0  -LS   VITAL SIGNS: ANY (Pulse >192, Diastolic BP >44, Temp >72.0) 0  -LB 0  -LB 0  -LS 3  -LS 3  -LS   SEWS Total Score 8  -LB 8  -LB 0  -LS 6  -LS 11  -LS   Anglin Agitation Sedation Scale (RASS)   Anglin Agitation Sedation Scale (RASS) 0  -LB 0  -LB 0  -LS 0  -LS 0  -LS   Row Name 06/30/23 2245 06/30/23 2151 06/30/23 1727     Severity of Ethanol Withdrawal Scale (SEWS)   ANXIETY: Do you feel that something bad is about to happen to you right now? 0  -LS 0  -LS 3  -BETH     NAUSEA and DRY HEAVES or VOMITING? 0  -LS 3  -LS 3  -BETH     SWEATING: (includes moist palms, sweating now)? Score 0 or 2 0  -LS 0  -LS 0  -BETH     TREMOR: with arms extended eyes closed? 0  -LS 2  -LS 2  -BETH     AGITATION: Fidgety, restless, pacing?  0  -LS 3  -LS 3  -BETH     DISORIENTATION: 0  -LS 0  -LS 0  -BETH     HALLUCINATIONS: 0  -LS 0  -LS 0  -BETH     VITAL SIGNS: ANY (Pulse >688, Diastolic BP >23, Temp >18.2) 0  -LS 0  -LS 0  -BETH     SEWS Total Score 0  -LS 8  -LS 11  -BETH     Anglin Agitation Sedation Scale (RASS) Anglin Agitation Sedation Scale (RASS) 0  -LS 0  -LS 0  -BETH             Pertinent Labs/Diagnostic Test Results:     EKG 6/30    Sinus rhythm with short IL  Otherwise normal ECG  When compared with ECG of 21-MAR-2023 13:27,  QT has lengthened      CT upper extremity wo contrast left   Final Result by Sidra Hines MD (06/30 2015)      Diffuse subcutaneous edema in the forearm consistent with cellulitis, without subcutaneous abscess. There is no evidence of intramuscular abscess, although there is a tiny jenae of intramuscular air in the dorsal mid forearm (series 3 image 56.) This single jenae is probably introduced externally, but less likely could indicate infectious myositis due    to gas-forming organism.          Workstation performed: [id number]               Results from last 7 days   Lab Units 07/01/23  0550 06/30/23  1412   WBC Thousand/uL 14.81* 15.97*   HEMOGLOBIN g/dL 11.7 11.2*   HEMATOCRIT % 37.7 35.5   PLATELETS Thousands/uL 350 392*   NEUTROS ABS Thousands/µL  --  12.74*         Results from last 7 days   Lab Units 07/01/23  0550 06/30/23  1412   SODIUM mmol/L 137 141   POTASSIUM mmol/L 3.9 4.0   CHLORIDE mmol/L 100 100   CO2 mmol/L 22 29   ANION GAP mmol/L 15 12   BUN mg/dL 7 8   CREATININE mg/dL 0.55* 0.67   EGFR ml/min/1.73sq m 129 121   CALCIUM mg/dL 9.6 10.3*   MAGNESIUM mg/dL 1.9 1.7*     Results from last 7 days   Lab Units 07/01/23  0550 06/30/23  1412   AST U/L 16 17   ALT U/L 7 12   ALK PHOS U/L 96 103   TOTAL PROTEIN g/dL 8.0 7.9   ALBUMIN g/dL 4.0 4.3   TOTAL BILIRUBIN mg/dL 0.36 0.30         Results from last 7 days   Lab Units 07/01/23  0550 06/30/23  1412   GLUCOSE RANDOM mg/dL 85 88             Results from last 7 days   Lab Units 06/30/23  1255   AMPH/METH  Negative   BARBITURATE UR  Negative   BENZODIAZEPINE UR  Negative   COCAINE UR  Negative   METHADONE URINE  Positive*   OPIATE UR  Negative   PCP UR  Negative   THC UR  Negative     Results from last 7 days   Lab Units 06/30/23  1412   ETHANOL LVL mg/dL <10   ACETAMINOPHEN LVL ug/mL <61*   SALICYLATE LVL mg/dL <5                                   ED Treatment:   Medication Administration from 06/30/2023 1159 to 06/30/2023 1650       Date/Time Order Dose Route Action Comments     06/30/2023 1413 EDT sodium chloride 0.9 % bolus 1,000 mL 1,000 mL Intravenous New Bag --        Past Medical History:   Diagnosis Date   • Anxiety    • Chlamydia    • Depression    • Endometriosis    • IV drug user     IV heroin use, rehab 7 times    • Panic attacks    • Psychiatric disorder     anxiety   • PTSD (post-traumatic stress disorder)    • Varicella     childhood   • Visual impairment      Present on Admission:  **None**      Admitting Diagnosis: Drug abuse ([facility]) [F19.10]  Methadone use [F11.90]  Benzodiazepine abuse ([facility]) [F13.10]  Severe benzodiazepine use disorder ([facility]) [F13.20]  Age/Sex: 33 y.o. female  Admission Orders:   REG  Diet. SCDs. SEWS  monitoring. Telemetry & Continuous Pulse Ox. Scheduled Medications:  cephalexin, 500 mg, Oral, Q12H JERI  enoxaparin, 40 mg, Subcutaneous, Daily  methadone, 119 mg, Oral, Q24H  multivitamin-minerals, 1 tablet, Oral, Daily  QUEtiapine, 50 mg, Oral, HS  sertraline, 50 mg, Oral, Daily  sulfamethoxazole-trimethoprim, 1 tablet, Oral, Q12H JERI      Continuous IV Infusions:     PRN Meds:  acetaminophen, 650 mg, Oral, Q6H PRN  gabapentin, 300 mg, Oral, Q8H PRN  PO x 2   hydrOXYzine HCL, 25 mg, Oral, Q6H PRN  ondansetron, 4 mg, Intravenous, Q6H PRN IV x 3        Phenobarbital 130 mg IV x 2   Phenobarbital 260 mg IV x 3   Phenobarbital 64.8 mg IV x 1       IP CONSULT TO CASE MANAGEMENT    Network Utilization Review Department  ATTENTION: Please call with any questions or concerns to [phone] and carefully listen to the prompts so that you are directed to the right person.  All voicemails are confidential.  Seville McClure all requests for admission clinical reviews, approved or denied determinations and any other requests to dedicated fax number below belonging to the campus where the patient is receiving treatment.  List of dedicated fax numbers for the Facilities:  [place] [facility] (Administrative/Medical Necessity) [phone] [address] (Maternity/NICU/Pediatrics) [phone]   [address] [place] [address] [phone]   [facility] [address] [facility] [phone]   [facility] [address] [place] [address] [phone]   [address] [place] [address] Nn [phone]
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
## 2023-07-02 LAB
ALBUMIN SERPL BCP-MCNC: 4.3 G/DL (ref 3.5–5)
ALP SERPL-CCNC: 93 U/L (ref 34–104)
ALT SERPL W P-5'-P-CCNC: 10 U/L (ref 7–52)
ANION GAP SERPL CALCULATED.3IONS-SCNC: 12 MMOL/L
AST SERPL W P-5'-P-CCNC: 17 U/L (ref 13–39)
ATRIAL RATE: 90 BPM
BILIRUB SERPL-MCNC: 0.29 MG/DL (ref 0.2–1)
BUN SERPL-MCNC: 6 MG/DL (ref 5–25)
CALCIUM SERPL-MCNC: 9.9 MG/DL (ref 8.4–10.2)
CHLORIDE SERPL-SCNC: 99 MMOL/L (ref 96–108)
CO2 SERPL-SCNC: 27 MMOL/L (ref 21–32)
CREAT SERPL-MCNC: 0.67 MG/DL (ref 0.6–1.3)
ERYTHROCYTE [DISTWIDTH] IN BLOOD BY AUTOMATED COUNT: 14.4 % (ref 11.6–15.1)
GFR SERPL CREATININE-BSD FRML MDRD: 121 ML/MIN/1.73SQ M
GLUCOSE SERPL-MCNC: 93 MG/DL (ref 65–140)
HCT VFR BLD AUTO: 37.4 % (ref 34.8–46.1)
HGB BLD-MCNC: 12.2 G/DL (ref 11.5–15.4)
MAGNESIUM SERPL-MCNC: 1.9 MG/DL (ref 1.9–2.7)
MCH RBC QN AUTO: 28.8 PG (ref 26.8–34.3)
MCHC RBC AUTO-ENTMCNC: 32.6 G/DL (ref 31.4–37.4)
MCV RBC AUTO: 88 FL (ref 82–98)
P AXIS: 80 DEGREES
PLATELET # BLD AUTO: 380 THOUSANDS/UL (ref 149–390)
PMV BLD AUTO: 9.4 FL (ref 8.9–12.7)
POTASSIUM SERPL-SCNC: 3.9 MMOL/L (ref 3.5–5.3)
PR INTERVAL: 98 MS
PROCALCITONIN SERPL-MCNC: <0.05 NG/ML
PROT SERPL-MCNC: 8.2 G/DL (ref 6.4–8.4)
QRS AXIS: 33 DEGREES
QRSD INTERVAL: 78 MS
QT INTERVAL: 336 MS
QTC INTERVAL: 411 MS
RBC # BLD AUTO: 4.23 MILLION/UL (ref 3.81–5.12)
SODIUM SERPL-SCNC: 138 MMOL/L (ref 135–147)
T WAVE AXIS: 53 DEGREES
VENTRICULAR RATE: 90 BPM
WBC # BLD AUTO: 14.61 THOUSAND/UL (ref 4.31–10.16)

## 2023-07-02 PROCEDURE — 85027 COMPLETE CBC AUTOMATED: CPT | Performed by: NURSE PRACTITIONER

## 2023-07-02 PROCEDURE — NC001 PR NO CHARGE

## 2023-07-02 PROCEDURE — 99233 SBSQ HOSP IP/OBS HIGH 50: CPT | Performed by: EMERGENCY MEDICINE

## 2023-07-02 PROCEDURE — 80053 COMPREHEN METABOLIC PANEL: CPT | Performed by: NURSE PRACTITIONER

## 2023-07-02 PROCEDURE — 93010 ELECTROCARDIOGRAM REPORT: CPT | Performed by: STUDENT IN AN ORGANIZED HEALTH CARE EDUCATION/TRAINING PROGRAM

## 2023-07-02 PROCEDURE — 93005 ELECTROCARDIOGRAM TRACING: CPT

## 2023-07-02 PROCEDURE — 84145 PROCALCITONIN (PCT): CPT

## 2023-07-02 PROCEDURE — 83735 ASSAY OF MAGNESIUM: CPT | Performed by: NURSE PRACTITIONER

## 2023-07-02 RX ORDER — PHENOBARBITAL SODIUM 130 MG/ML
130 INJECTION INTRAMUSCULAR ONCE
Status: COMPLETED | OUTPATIENT
Start: 2023-07-02 | End: 2023-07-02

## 2023-07-02 RX ORDER — SODIUM CHLORIDE, SODIUM LACTATE, POTASSIUM CHLORIDE, CALCIUM CHLORIDE 600; 310; 30; 20 MG/100ML; MG/100ML; MG/100ML; MG/100ML
1000 INJECTION, SOLUTION INTRAVENOUS ONCE
Status: COMPLETED | OUTPATIENT
Start: 2023-07-02 | End: 2023-07-02

## 2023-07-02 RX ORDER — CEFEPIME HYDROCHLORIDE 2 G/50ML
2000 INJECTION, SOLUTION INTRAVENOUS EVERY 12 HOURS
Status: DISCONTINUED | OUTPATIENT
Start: 2023-07-02 | End: 2023-07-04 | Stop reason: HOSPADM

## 2023-07-02 RX ORDER — PHENOBARBITAL 64.8 MG/1
64.8 TABLET ORAL ONCE
Status: COMPLETED | OUTPATIENT
Start: 2023-07-02 | End: 2023-07-02

## 2023-07-02 RX ORDER — METOCLOPRAMIDE HYDROCHLORIDE 5 MG/ML
10 INJECTION INTRAMUSCULAR; INTRAVENOUS ONCE
Status: COMPLETED | OUTPATIENT
Start: 2023-07-02 | End: 2023-07-02

## 2023-07-02 RX ADMIN — SULFAMETHOXAZOLE AND TRIMETHOPRIM 1 TABLET: 800; 160 TABLET ORAL at 08:25

## 2023-07-02 RX ADMIN — PHENOBARBITAL SODIUM 130 MG: 130 INJECTION INTRAMUSCULAR; INTRAVENOUS at 03:54

## 2023-07-02 RX ADMIN — PHENOBARBITAL 64.8 MG: 64.8 TABLET ORAL at 05:53

## 2023-07-02 RX ADMIN — QUETIAPINE FUMARATE 50 MG: 50 TABLET ORAL at 21:16

## 2023-07-02 RX ADMIN — GABAPENTIN 300 MG: 300 CAPSULE ORAL at 05:54

## 2023-07-02 RX ADMIN — CEPHALEXIN 500 MG: 500 CAPSULE ORAL at 08:24

## 2023-07-02 RX ADMIN — VANCOMYCIN HYDROCHLORIDE 750 MG: 750 INJECTION, SOLUTION INTRAVENOUS at 13:16

## 2023-07-02 RX ADMIN — PHENOBARBITAL 64.8 MG: 64.8 TABLET ORAL at 20:43

## 2023-07-02 RX ADMIN — SERTRALINE HYDROCHLORIDE 50 MG: 50 TABLET ORAL at 08:24

## 2023-07-02 RX ADMIN — PHENOBARBITAL SODIUM 130 MG: 130 INJECTION INTRAMUSCULAR; INTRAVENOUS at 13:12

## 2023-07-02 RX ADMIN — PHENOBARBITAL SODIUM 130 MG: 130 INJECTION INTRAMUSCULAR; INTRAVENOUS at 15:46

## 2023-07-02 RX ADMIN — CEFEPIME HYDROCHLORIDE 2000 MG: 2 INJECTION, SOLUTION INTRAVENOUS at 12:42

## 2023-07-02 RX ADMIN — HYDROXYZINE HYDROCHLORIDE 25 MG: 25 TABLET ORAL at 03:54

## 2023-07-02 RX ADMIN — CEFEPIME HYDROCHLORIDE 2000 MG: 2 INJECTION, SOLUTION INTRAVENOUS at 23:24

## 2023-07-02 RX ADMIN — METHADONE HYDROCHLORIDE 119 MG: 10 CONCENTRATE ORAL at 08:23

## 2023-07-02 RX ADMIN — SODIUM CHLORIDE, SODIUM LACTATE, POTASSIUM CHLORIDE, AND CALCIUM CHLORIDE 1000 ML: .6; .31; .03; .02 INJECTION, SOLUTION INTRAVENOUS at 21:50

## 2023-07-02 RX ADMIN — ONDANSETRON 4 MG: 2 INJECTION INTRAMUSCULAR; INTRAVENOUS at 01:41

## 2023-07-02 RX ADMIN — SODIUM CHLORIDE, SODIUM LACTATE, POTASSIUM CHLORIDE, AND CALCIUM CHLORIDE 1000 ML/HR: .6; .31; .03; .02 INJECTION, SOLUTION INTRAVENOUS at 12:37

## 2023-07-02 RX ADMIN — METOCLOPRAMIDE 10 MG: 5 INJECTION, SOLUTION INTRAMUSCULAR; INTRAVENOUS at 12:34

## 2023-07-02 RX ADMIN — MULTIPLE VITAMINS W/ MINERALS TAB 1 TABLET: TAB ORAL at 08:24

## 2023-07-02 RX ADMIN — PHENOBARBITAL 64.8 MG: 64.8 TABLET ORAL at 02:54

## 2023-07-02 RX ADMIN — ONDANSETRON 4 MG: 2 INJECTION INTRAMUSCULAR; INTRAVENOUS at 07:42

## 2023-07-02 NOTE — PROGRESS NOTES
PROGRESS NOTE  DEPARTMENT OF MEDICAL TOXICOLOGY  LEVEL 4 MEDICAL DETOX UNIT  Jayde Frye 33 y.o. female MRN: [id number]  Unit/Bed#: 5T DETOX 511-01 Encounter: [id number]      Reason for Admission/Principal Problem: benzodiazepine withdrawal  Rounding Provider: Faith Coates MD  Attending Provider: Gabriela Glass MD   6/30/2023 12:18 PM           Leukocytosis  Assessment & Plan  · History of leukocytosis on previous laboratory studies  · Patient remains afebrile but has been receiving acetaminophen daily, continues to have tachycardia  · Suspected cellulitis bilateral UE -continue IV antibiotics  · Continue to monitor    Prolonged Q-T interval on ECG  Assessment & Plan  · Qtc 468 ms upon admission  · Currently on methadone   · Will continue to monitor     Cellulitis of left upper extremity  Assessment & Plan  · Track marks present on bilateral arms with 1cm lesions present with eschar and multiple purulent excoriations with surrounding erythema. + induration, no fluctuance. · Patient reports that she continues to injection primarily into LUE. · CT LUE: Few subcutaneous edema in forearm consistent with cellulitis, without subcutaneous abscess. No evidence of intramuscular abscess, possible low likelihood of infectious myositis due to gas-forming organism. · White count continues to be elevated, patient remains tachycardic. Sites appear erythematous but unchanged from yesterday. Patient appears tired/ill. IV antibiotics were started for sepsis coverage. Opioid use disorder, severe, dependence ([address])  Assessment & Plan  · Patient with a history of chronic fentanyl use  · Uses 1-2 bags of fentanyl daily intravenously last use 2 am on 6/30/23  · Currently follows with [address] [place] [address] for Methadone. Receives daily dose of 119 mg.    · Patient reports that she has been testing positive for benzodiazepines at methadone clinic.    · Management of opioid withdrawal under MAT protocol as above  · Will Screen for hepatitis/HIV with h/o IVDU  · Case management consulted for assistance with aftercare resources - pt expresses interest in continued outpatient MAT after d/c      * Benzodiazepine withdrawal ([address])  Assessment & Plan  · Patient reports a history of polysubstance abuse with history of withdrawal seizures. She reports that she is currently using Fentanyl daily in addition to receiving Methadone. Patient is concerned that her fentanyl is mixed with Xylazine and benzodiazepines as she reports to testing positive for benzodiazepines at her methadone clinic. · Reports that she is experiencing poor appetite, sleeps legs, sweats, tremors, anxiety, and nausea. · Suspect that patient is primarily experiencing more anxiety related to her Xylazine use. Regardless will monitor her on SEWS protocol for blaire-agonist withdrawal.   · Telemetry monitoring             VTE Pharmacologic Prophylaxis:   Pharmacologic: Enoxaparin (Lovenox)  Mechanical VTE Prophylaxis in Place: no    Code Status: Level 1 - Full Code    Patient Centered Rounds: I have performed bedside rounds with nursing staff today. Discussions with Specialists or Other Care Team Provider: no     Education and Discussions with Family / Patient: no    Time Spent for Care: 30 minutes. More than 50% of total time spent on counseling and coordination of care as described above. Current Length of Stay: 2 day(s)    Current Patient Status: Inpatient     Certification Statement: The patient will continue to require additional inpatient hospital stay due to BLAIRE agonist withdrawal, cellulitis, sepsis     Discharge Plan: Patient following medical stabilization      Subjective:   Elda complains of nausea this morning though overall states that she feels somewhat improved. Denies headache, chest pain, shortness of breath, vomiting, abdominal pain, changes in stool.     Objective:     Clinical Opiate Withdrawal Scale  Pulse: (!) 114  Resting Pulse Rate: Measured After Patient is Sitting or Lying for One Minute: Pulse rate 101-120  GI Upset: Over Last Half Hour: Nausea or loose stool  Sweating: Over Past Half Hour Not Accounted for by Room Temperature of Patient Activity: Flushed or observable moistness on face  Tremor: Observation of Outstretched Hands: Slight tremor observable  Restlessness: Observation During Assessment: Able to sit still  Yawning: Observation During Assessment: No yawning  Pupil Size: Pupils possibly larger than normal for room light  Anxiety and Irritability: None  Bone or Joint Aches: If Patient was Having Pain Previously, Only the Additional Component Attributed to Opiate Withdrawal is Scored: Patient reports severe diffuse aching of joints/muscle  Gooseflesh Skin: Skin is smooth  Runny Nose or Tearing: Not Accounted for by Cold Symptoms or Allergies: Not present  Clinical Opiate Withdrawal Scale Total Score: 11    SEWS Total Score: 0 (2023 10:57 AM)        Last 24 Hours Medication List:   Current Facility-Administered Medications   Medication Dose Route Frequency Provider Last Rate   • acetaminophen  650 mg Oral Q6H PRN Brennan Gutierrez PA-C     • cefepime  2,000 mg Intravenous Q12H Marion Webb MD     • enoxaparin  40 mg Subcutaneous Daily Brennan Gutierrez PA-C     • gabapentin  300 mg Oral Q8H PRN Brennan Gutierrez PA-C     • hydrOXYzine HCL  25 mg Oral Q6H PRN Marion Webb MD     • lactated ringers  1,000 mL/hr Intravenous Once Marion Webb MD     • methadone  119 mg Oral Q24H FRANK Johnson     • metoclopramide  10 mg Intravenous Once Marion Webb MD     • multivitamin-minerals  1 tablet Oral Daily Brennan Gutierrez PA-C     • ondansetron  4 mg Intravenous Q6H PRN Brennan Gutierrez PA-C     • QUEtiapine  50 mg Oral HS Nimco Paz PA-C     • sertraline  50 mg Oral Daily Nicmo Paz PA-C     • vancomycin  20 mg/kg Intravenous Q12H Marion Webb MD           Vitals:   Temp (24hrs), Av.4 °F (36.9 °C), Min:98.1 °F (36.7 °C), Max:99 °F (37.2 °C)    Temp:  [98.1 °F (36.7 °C)-99 °F (37.2 °C)] 98.3 °F (36.8 °C)  HR:  [] 114  Resp:  [16-18] 17  BP: (115-131)/(72-87) 121/79  SpO2:  [96 %] 96 %  Body mass index is 16.24 kg/m². Input and Output Summary (last 24 hours):No intake or output data in the 24 hours ending 07/02/23 1206    Physical Exam:   Physical Exam  Vitals and nursing note reviewed. Constitutional:       General: She is not in acute distress. Appearance: She is well-developed. She is ill-appearing. She is not diaphoretic. Comments: Patient appears frail, thin while holding an emesis bag   HENT:      Head: Normocephalic and atraumatic. Right Ear: External ear normal.      Left Ear: External ear normal.      Nose: Nose normal.      Mouth/Throat:      Mouth: Mucous membranes are moist.   Eyes:      General:         Right eye: No discharge. Left eye: No discharge. Extraocular Movements: Extraocular movements intact. Conjunctiva/sclera: Conjunctivae normal.      Pupils: Pupils are equal, round, and reactive to light. Comments: Pupils dilated bilaterally   Cardiovascular:      Rate and Rhythm: Regular rhythm. Tachycardia present. Heart sounds: Normal heart sounds. No murmur heard. No friction rub. No gallop. Pulmonary:      Effort: Pulmonary effort is normal. No respiratory distress. Breath sounds: Normal breath sounds. No stridor. No wheezing or rales. Chest:      Chest wall: No tenderness. Abdominal:      General: Bowel sounds are normal. There is no distension. Palpations: Abdomen is soft. There is no mass. Tenderness: There is no abdominal tenderness. There is no guarding or rebound. Hernia: No hernia is present. Musculoskeletal:         General: No tenderness or deformity. Normal range of motion. Cervical back: Normal range of motion and neck supple. Right lower leg: No edema. Left lower leg: No edema. Comments: Excoriations over both upper extremities from a IV drug use with some areas of erythema and slight tenderness, no purulent drainage at this time, no swelling   Skin:     General: Skin is warm and dry. Capillary Refill: Capillary refill takes less than 2 seconds. Comments: Clammy   Neurological:      Mental Status: She is alert and oriented to person, place, and time. Motor: No tremor. Psychiatric:         Mood and Affect: Mood normal.         Additional Data:     Labs:   Results from last 7 days   Lab Units 07/02/23  0754 07/01/23  0550 06/30/23  1412   WBC Thousand/uL 14.61*   < > 15.97*   HEMOGLOBIN g/dL 12.2   < > 11.2*   HEMATOCRIT % 37.4   < > 35.5   PLATELETS Thousands/uL 380   < > 392*   NEUTROS PCT %  --   --  81*   LYMPHS PCT %  --   --  15   MONOS PCT %  --   --  4   EOS PCT %  --   --  0    < > = values in this interval not displayed. Results from last 7 days   Lab Units 07/02/23  0705   SODIUM mmol/L 138   POTASSIUM mmol/L 3.9   CHLORIDE mmol/L 99   CO2 mmol/L 27   BUN mg/dL 6   CREATININE mg/dL 0.67   ANION GAP mmol/L 12   CALCIUM mg/dL 9.9   ALBUMIN g/dL 4.3   TOTAL BILIRUBIN mg/dL 0.29   ALK PHOS U/L 93   ALT U/L 10   AST U/L 17   GLUCOSE RANDOM mg/dL 93                              * I Have Reviewed All Lab Data Listed Above. * Additional Pertinent Lab Tests Reviewed: [facility] Admission Reviewed      Imaging Studies: I have personally reviewed pertinent reports. Recent Cultures (last 7 days): Today, Patient Was Seen By: Alberto Morelos MD    ** Please Note: Dictation voice to text software may have been used in the creation of this document.  **

## 2023-07-02 NOTE — ASSESSMENT & PLAN NOTE
· History of leukocytosis on previous laboratory studies  · Patient remains afebrile but has been receiving acetaminophen daily, continues to have tachycardia  · Suspected cellulitis bilateral UE -continue IV antibiotics  · Continue to monitor

## 2023-07-02 NOTE — ASSESSMENT & PLAN NOTE
· Patient reports a history of polysubstance abuse with history of withdrawal seizures. She reports that she is currently using Fentanyl daily in addition to receiving Methadone. Patient is concerned that her fentanyl is mixed with Xylazine and benzodiazepines as she reports to testing positive for benzodiazepines at her methadone clinic. · Reports that she is experiencing poor appetite, sleeps legs, sweats, tremors, anxiety, and nausea. · Suspect that patient is primarily experiencing more anxiety related to her Xylazine use.  Regardless will monitor her on [facility] protocol for blaire-agonist withdrawal.   · Telemetry monitoring

## 2023-07-02 NOTE — ASSESSMENT & PLAN NOTE
· Track marks present on bilateral arms with 1cm lesions present with eschar and multiple purulent excoriations with surrounding erythema. + induration, no fluctuance. · Patient reports that she continues to injection primarily into LUE. · CT LUE: Few subcutaneous edema in forearm consistent with cellulitis, without subcutaneous abscess. No evidence of intramuscular abscess, possible low likelihood of infectious myositis due to gas-forming organism. · White count continues to be elevated, patient remains tachycardic. Sites appear erythematous but unchanged from yesterday. Patient appears tired/ill. IV antibiotics were started for sepsis coverage.

## 2023-07-02 NOTE — PROCEDURES
EKG: sinus arrhythmia with short ME interval, no acute ST segment/T wave changes, normal axis, ME 98 ms, QRS 78 ms, QTc 411 ms. No QTc prolongation present on this EKG.

## 2023-07-02 NOTE — ASSESSMENT & PLAN NOTE
· Patient with a history of chronic fentanyl use  · Uses 1-2 bags of fentanyl daily intravenously last use 2 am on 6/30/23  · Currently follows with [address] [place] [address] for Methadone. Receives daily dose of 119 mg.    · Patient reports that she has been testing positive for benzodiazepines at methadone clinic.    · Management of opioid withdrawal under MAT protocol as above  · Will Screen for hepatitis/HIV with h/o IVDU  · Case management consulted for assistance with aftercare resources - pt expresses interest in continued outpatient MAT after d/c

## 2023-07-03 ENCOUNTER — APPOINTMENT (INPATIENT)
Dept: CT IMAGING | Facility: HOSPITAL | Age: 28
DRG: 770 | End: 2023-07-03
Payer: COMMERCIAL

## 2023-07-03 ENCOUNTER — APPOINTMENT (INPATIENT)
Dept: NON INVASIVE DIAGNOSTICS | Facility: HOSPITAL | Age: 28
DRG: 770 | End: 2023-07-03
Payer: COMMERCIAL

## 2023-07-03 VITALS
HEART RATE: 104 BPM | DIASTOLIC BLOOD PRESSURE: 80 MMHG | HEIGHT: 64 IN | WEIGHT: 94.6 LBS | RESPIRATION RATE: 16 BRPM | OXYGEN SATURATION: 98 % | SYSTOLIC BLOOD PRESSURE: 121 MMHG | TEMPERATURE: 98.5 F | BODY MASS INDEX: 16.15 KG/M2

## 2023-07-03 PROBLEM — M79.662 BILATERAL CALF PAIN: Status: ACTIVE | Noted: 2023-07-03

## 2023-07-03 PROBLEM — M79.661 BILATERAL CALF PAIN: Status: ACTIVE | Noted: 2023-07-03

## 2023-07-03 PROBLEM — R41.0 CONFUSION: Status: ACTIVE | Noted: 2023-07-03

## 2023-07-03 LAB
ANION GAP SERPL CALCULATED.3IONS-SCNC: 10 MMOL/L
BUN SERPL-MCNC: 6 MG/DL (ref 5–25)
CALCIUM SERPL-MCNC: 9.6 MG/DL (ref 8.4–10.2)
CHLORIDE SERPL-SCNC: 101 MMOL/L (ref 96–108)
CO2 SERPL-SCNC: 27 MMOL/L (ref 21–32)
CREAT SERPL-MCNC: 0.68 MG/DL (ref 0.6–1.3)
D DIMER PPP FEU-MCNC: 1.04 UG/ML FEU
ERYTHROCYTE [DISTWIDTH] IN BLOOD BY AUTOMATED COUNT: 14.6 % (ref 11.6–15.1)
GFR SERPL CREATININE-BSD FRML MDRD: 121 ML/MIN/1.73SQ M
GLUCOSE SERPL-MCNC: 81 MG/DL (ref 65–140)
HAV IGM SER QL: NORMAL
HBV CORE IGM SER QL: NORMAL
HBV SURFACE AG SER QL: NORMAL
HCT VFR BLD AUTO: 40.1 % (ref 34.8–46.1)
HCV AB SER QL: NORMAL
HGB BLD-MCNC: 12.6 G/DL (ref 11.5–15.4)
HIV 1+2 AB+HIV1 P24 AG SERPL QL IA: NORMAL
HIV1 P24 AG SER QL: NORMAL
LACTATE SERPL-SCNC: 0.8 MMOL/L (ref 0.5–2)
MAGNESIUM SERPL-MCNC: 2.1 MG/DL (ref 1.9–2.7)
MCH RBC QN AUTO: 28.1 PG (ref 26.8–34.3)
MCHC RBC AUTO-ENTMCNC: 31.4 G/DL (ref 31.4–37.4)
MCV RBC AUTO: 89 FL (ref 82–98)
PLATELET # BLD AUTO: 423 THOUSANDS/UL (ref 149–390)
PMV BLD AUTO: 8.7 FL (ref 8.9–12.7)
POTASSIUM SERPL-SCNC: 4.3 MMOL/L (ref 3.5–5.3)
RBC # BLD AUTO: 4.49 MILLION/UL (ref 3.81–5.12)
SODIUM SERPL-SCNC: 138 MMOL/L (ref 135–147)
WBC # BLD AUTO: 11.59 THOUSAND/UL (ref 4.31–10.16)

## 2023-07-03 PROCEDURE — 80048 BASIC METABOLIC PNL TOTAL CA: CPT

## 2023-07-03 PROCEDURE — 83735 ASSAY OF MAGNESIUM: CPT

## 2023-07-03 PROCEDURE — 83605 ASSAY OF LACTIC ACID: CPT

## 2023-07-03 PROCEDURE — 99239 HOSP IP/OBS DSCHRG MGMT >30: CPT | Performed by: NURSE PRACTITIONER

## 2023-07-03 PROCEDURE — 70450 CT HEAD/BRAIN W/O DYE: CPT

## 2023-07-03 PROCEDURE — 99233 SBSQ HOSP IP/OBS HIGH 50: CPT | Performed by: EMERGENCY MEDICINE

## 2023-07-03 PROCEDURE — 87806 HIV AG W/HIV1&2 ANTB W/OPTIC: CPT

## 2023-07-03 PROCEDURE — 80074 ACUTE HEPATITIS PANEL: CPT

## 2023-07-03 PROCEDURE — 93970 EXTREMITY STUDY: CPT

## 2023-07-03 PROCEDURE — 85027 COMPLETE CBC AUTOMATED: CPT

## 2023-07-03 PROCEDURE — G1004 CDSM NDSC: HCPCS

## 2023-07-03 PROCEDURE — 85379 FIBRIN DEGRADATION QUANT: CPT

## 2023-07-03 RX ORDER — METHADONE HYDROCHLORIDE 10 MG/ML
119 CONCENTRATE ORAL EVERY 24 HOURS
Status: DISCONTINUED | OUTPATIENT
Start: 2023-07-04 | End: 2023-07-04 | Stop reason: HOSPADM

## 2023-07-03 RX ORDER — PHENOBARBITAL SODIUM 130 MG/ML
260 INJECTION INTRAMUSCULAR ONCE
Status: COMPLETED | OUTPATIENT
Start: 2023-07-03 | End: 2023-07-03

## 2023-07-03 RX ORDER — OLANZAPINE 10 MG/1
10 TABLET, ORALLY DISINTEGRATING ORAL ONCE AS NEEDED
Status: DISCONTINUED | OUTPATIENT
Start: 2023-07-03 | End: 2023-07-04 | Stop reason: HOSPADM

## 2023-07-03 RX ORDER — OLANZAPINE 10 MG/1
10 INJECTION, POWDER, LYOPHILIZED, FOR SOLUTION INTRAMUSCULAR ONCE
Status: DISCONTINUED | OUTPATIENT
Start: 2023-07-03 | End: 2023-07-04 | Stop reason: HOSPADM

## 2023-07-03 RX ORDER — PHENOBARBITAL SODIUM 130 MG/ML
130 INJECTION INTRAMUSCULAR ONCE
Status: COMPLETED | OUTPATIENT
Start: 2023-07-03 | End: 2023-07-03

## 2023-07-03 RX ORDER — SODIUM CHLORIDE 9 MG/ML
50 INJECTION, SOLUTION INTRAVENOUS CONTINUOUS
Status: DISCONTINUED | OUTPATIENT
Start: 2023-07-03 | End: 2023-07-04 | Stop reason: HOSPADM

## 2023-07-03 RX ORDER — DIAZEPAM 5 MG/ML
10 INJECTION, SOLUTION INTRAMUSCULAR; INTRAVENOUS ONCE AS NEEDED
Status: COMPLETED | OUTPATIENT
Start: 2023-07-03 | End: 2023-07-03

## 2023-07-03 RX ORDER — DEXMEDETOMIDINE 100 UG/ML
.1-.7 INJECTION, SOLUTION, CONCENTRATE INTRAVENOUS
Status: DISCONTINUED | OUTPATIENT
Start: 2023-07-03 | End: 2023-07-04 | Stop reason: HOSPADM

## 2023-07-03 RX ADMIN — SODIUM CHLORIDE 50 ML/HR: 0.9 INJECTION, SOLUTION INTRAVENOUS at 09:21

## 2023-07-03 RX ADMIN — PHENOBARBITAL SODIUM 130 MG: 130 INJECTION INTRAMUSCULAR; INTRAVENOUS at 02:56

## 2023-07-03 RX ADMIN — HYDROXYZINE HYDROCHLORIDE 25 MG: 25 TABLET ORAL at 19:20

## 2023-07-03 RX ADMIN — DIAZEPAM 10 MG: 10 INJECTION, SOLUTION INTRAMUSCULAR; INTRAVENOUS at 05:02

## 2023-07-03 RX ADMIN — Medication 0.2 MCG/KG/HR: at 08:44

## 2023-07-03 RX ADMIN — METHADONE HYDROCHLORIDE 119 MG: 10 CONCENTRATE ORAL at 08:10

## 2023-07-03 RX ADMIN — SERTRALINE HYDROCHLORIDE 50 MG: 50 TABLET ORAL at 08:10

## 2023-07-03 RX ADMIN — ONDANSETRON 4 MG: 2 INJECTION INTRAMUSCULAR; INTRAVENOUS at 01:23

## 2023-07-03 RX ADMIN — GABAPENTIN 300 MG: 300 CAPSULE ORAL at 19:16

## 2023-07-03 RX ADMIN — VANCOMYCIN HYDROCHLORIDE 750 MG: 750 INJECTION, SOLUTION INTRAVENOUS at 00:05

## 2023-07-03 RX ADMIN — MULTIPLE VITAMINS W/ MINERALS TAB 1 TABLET: TAB ORAL at 08:10

## 2023-07-03 RX ADMIN — VANCOMYCIN HYDROCHLORIDE 750 MG: 750 INJECTION, SOLUTION INTRAVENOUS at 14:05

## 2023-07-03 RX ADMIN — CEFEPIME HYDROCHLORIDE 2000 MG: 2 INJECTION, SOLUTION INTRAVENOUS at 13:24

## 2023-07-03 RX ADMIN — PHENOBARBITAL SODIUM 260 MG: 130 INJECTION INTRAMUSCULAR; INTRAVENOUS at 04:24

## 2023-07-03 NOTE — ASSESSMENT & PLAN NOTE
· Confusion started overnight  · Patient disoriented to year, kept saying 2013 even after being told it was 2023  · Will obtain CT head  · Will continue to monitor

## 2023-07-03 NOTE — PLAN OF CARE
Problem: SUBSTANCE USE/ABUSE  Goal: By discharge, will develop insight into their chemical dependency and sustain motivation to continue in recovery  Description: INTERVENTIONS:  - Attends all daily group sessions and scheduled AA groups  - Actively practices coping skills through participation in the therapeutic community and adherence to program rules  - Reviews and completes assignments from individual treatment plan  - Assist patient development of understanding of their personal cycle of addiction and relapse triggers  Outcome: Progressing  Goal: By discharge, patient will have ongoing treatment plan addressing chemical dependency  Description: INTERVENTIONS:  - Assist patient with resources and/or appointments for ongoing recovery based living  Outcome: Progressing     Problem: Prexisting or High Potential for Compromised Skin Integrity  Goal: Skin integrity is maintained or improved  Description: INTERVENTIONS:  - Identify patients at risk for skin breakdown  - Assess and monitor skin integrity  - Assess and monitor nutrition and hydration status  - Monitor labs   - Assess for incontinence   - Turn and reposition patient  - Assist with mobility/ambulation  - Relieve pressure over bony prominences  - Avoid friction and shearing  - Provide appropriate hygiene as needed including keeping skin clean and dry  - Evaluate need for skin moisturizer/barrier cream  - Collaborate with interdisciplinary team   - Patient/family teaching  - Consider wound care consult   Outcome: Progressing

## 2023-07-03 NOTE — PROGRESS NOTES
PROGRESS NOTE  DEPARTMENT OF MEDICAL TOXICOLOGY  LEVEL 4 MEDICAL DETOX UNIT  Kathleen Elliott 33 y.o. female MRN: [id number]  Unit/Bed#: [address] [place] [address] 511-01 Encounter: [id number]      Reason for Admission/Principal Problem: benzodiazepine withdrawal  Rounding Provider: Mikhail Will DO  Attending Provider: Greyson Bell MD   6/30/2023 12:18 PM           Confusion  Assessment & Plan  · Confusion started overnight  · Patient disoriented to year, kept saying 2013 even after being told it was 2023  · Will obtain CT head  · Will continue to monitor    Bilateral calf pain  Assessment & Plan  · Bilateral calf and hip pain. No tenderness on examination  · Ordered venous duplex of BLE    Leukocytosis  Assessment & Plan  • History of leukocytosis on previous laboratory studies  • Patient remains afebrile but has been receiving acetaminophen daily, continues to have tachycardia  • Suspected cellulitis bilateral UE -continue IV antibiotics  • Continue to monitor    Prolonged Q-T interval on ECG  Assessment & Plan  • Qtc 468 ms upon admission  • Currently on methadone   • Will continue to monitor     Cellulitis of left upper extremity  Assessment & Plan  • Track marks present on bilateral arms with 1cm lesions present with eschar and multiple purulent excoriations with surrounding erythema. + induration, no fluctuance. • Patient reports that she continues to injection primarily into LUE. • CT LUE: Few subcutaneous edema in forearm consistent with cellulitis, without subcutaneous abscess. No evidence of intramuscular abscess, possible low likelihood of infectious myositis due to gas-forming organism. • White count continues to be elevated, patient remains tachycardic. Sites appear erythematous but unchanged from yesterday. Patient appears tired/ill. IV antibiotics were started for sepsis coverage.     Opioid use disorder, severe, dependence ([address])  Assessment & Plan  • Patient with a history of chronic fentanyl use  • Uses 1-2 bags of fentanyl daily intravenously last use 2 am on 6/30/23  • Currently follows with [facility] for [facility]adone. Receives daily dose of 119 mg at 0600hrs  ? Patient reports that she has been testing positive for benzodiazepines at methadone clinic. • Management of opioid withdrawal under MAT protocol as above  • Will Screen for hepatitis/HIV with h/o IVDU  • Case management consulted for assistance with aftercare resources - pt expresses interest in continued outpatient MAT after d/c    * Benzodiazepine withdrawal ([address])  Assessment & Plan  • Patient reports a history of polysubstance abuse with history of withdrawal seizures. She reports that she is currently using Fentanyl daily in addition to receiving Methadone. Patient is concerned that her fentanyl is mixed with Xylazine and benzodiazepines as she reports to testing positive for benzodiazepines at her methadone clinic. • Reports that she is experiencing poor appetite, sleeps legs, sweats, tremors, anxiety, and nausea. • Suspect that patient is primarily experiencing more anxiety related to her Xylazine use. Regardless will monitor her on SEWS protocol for blaire-agonist withdrawal.   • Telemetry monitoring   • Precedex infusion 7/3 for agitation. Patient requesting to go home however patient is confused and cannot walk. VTE Pharmacologic Prophylaxis:   Pharmacologic: Enoxaparin (Lovenox)  Mechanical VTE Prophylaxis in Place: yes    Code Status: Level 1 - Full Code    Patient Centered Rounds: I have performed bedside rounds with nursing staff today. Discussions with Specialists or Other Care Team Provider: No     Education and Discussions with Family / Patient: No    Time Spent for Care: 20 minutes. More than 50% of total time spent on counseling and coordination of care as described above.     Current Length of Stay: 3 day(s)    Current Patient Status: Inpatient     Certification Statement: The patient will continue to require additional inpatient hospital stay due to Ongoing benzodiazepine and opiate withdrawal.     Discharge Plan: After resolution of COVID-19 withdrawal had opiate withdrawal will be ready for discharge. We will continue to manage. Subjective:   Elda is tearful on my examination. Patient continually asking to go home however when discussing need to stay in the hospital given benzo withdrawal patient is agreeable to stay. When asked why she wants to go home patient states she would like to take care of her dog and cat. Patient states however there is someone who is taking care of him at this time for her while she is here in the hospital.  When assessing patient's orientation patient is disoriented to time. Describing the year as 2013 even after being told that it was 2023. Patient complains of bilateral hip and calf pain. Denies chest pain, shortness of breath, abdominal pain.     Objective:     Clinical Opiate Withdrawal Scale  Pulse: (!) 109  Resting Pulse Rate: Measured After Patient is Sitting or Lying for One Minute: Pulse rate 101-120  GI Upset: Over Last Half Hour: Nausea or loose stool  Sweating: Over Past Half Hour Not Accounted for by Room Temperature of Patient Activity: No report of chills or flushing  Tremor: Observation of Outstretched Hands: Slight tremor observable  Restlessness: Observation During Assessment: Frequent shifting or extraneous movements of legs/arms  Yawning: Observation During Assessment: No yawning  Pupil Size: Pupils moderately dilated  Anxiety and Irritability: Patient obviously irritable or anxious  Bone or Joint Aches: If Patient was Having Pain Previously, Only the Additional Component Attributed to Opiate Withdrawal is Scored: Mild diffuse discomfort  Gooseflesh Skin: Skin is smooth  Runny Nose or Tearing: Not Accounted for by Cold Symptoms or Allergies: Not present  Clinical Opiate Withdrawal Scale Total Score: 14    SEWS Total Score: 3 (7/3/2023 10:41 AM)        Last 24 Hours Medication List:   Current Facility-Administered Medications   Medication Dose Route Frequency Provider Last Rate   • acetaminophen  650 mg Oral Q6H PRN Sandro Jurado PA-C     • cefepime  2,000 mg Intravenous Q12H Elizabeth Gabriel MD 2,000 mg (23 2324)   • dexmedetomidine  0.1-0.7 mcg/kg/hr Intravenous Titrated Sandro Jurado PA-C 0.2 mcg/kg/hr (23 0844)   • enoxaparin  40 mg Subcutaneous Daily Sandro Jurado PA-C     • gabapentin  300 mg Oral Q8H PRN Sandro Jurado PA-C     • hydrOXYzine HCL  25 mg Oral Q6H PRN Elizabeth Gabriel MD     • [START ON 2023] methadone  119 mg Oral Q24H Yahaira Anne DO     • multivitamin-minerals  1 tablet Oral Daily Beto Valarie [place], [place]     • ondansetron  4 mg Intravenous Q6H PRN Sandro Jurado PA-C     • QUEtiapine  50 mg Oral HS Meghan Bethanne Ormond, PA-C     • sertraline  50 mg Oral Daily Meghan Bethanne Ormond, PA-C     • sodium chloride  50 mL/hr Intravenous Continuous Yahaira Anne DO 50 mL/hr (23 3551)   • vancomycin  20 mg/kg Intravenous Q12H Elizabeth Gabriel  mg (23 0005)         Vitals:   Temp (24hrs), Av °F (36.7 °C), Min:97.3 °F (36.3 °C), Max:98.5 °F (36.9 °C)    Temp:  [97.3 °F (36.3 °C)-98.5 °F (36.9 °C)] 97.3 °F (36.3 °C)  HR:  [109-132] 109  Resp:  [14-18] 15  BP: (102-137)/(61-95) 108/68  SpO2:  [92 %-100 %] 96 %  Body mass index is 16.24 kg/m². Input and Output Summary (last 24 hours): Intake/Output Summary (Last 24 hours) at 7/3/2023 1155  Last data filed at 2023 2150  Gross per 24 hour   Intake 950 ml   Output --   Net 950 ml       Physical Exam:   Physical Exam  Vitals and nursing note reviewed. Constitutional:       General: She is in acute distress (Crying). Appearance: She is well-developed and underweight. HENT:      Head: Normocephalic and atraumatic.    Eyes:      Conjunctiva/sclera: Conjunctivae normal.      Pupils: Pupils are equal, round, and reactive to light. Right eye: Pupil is sluggish. Left eye: Pupil is sluggish. Cardiovascular:      Rate and Rhythm: Regular rhythm. Tachycardia present. Pulses: Normal pulses. Heart sounds: No murmur heard. Pulmonary:      Effort: Pulmonary effort is normal. No respiratory distress. Breath sounds: Normal breath sounds. Abdominal:      Palpations: Abdomen is soft. Tenderness: There is no abdominal tenderness. Musculoskeletal:         General: No swelling. Cervical back: Neck supple. Right lower leg: No edema. Left lower leg: No edema. Comments: No tenderness to bilateral lower extremities. Skin:     General: Skin is warm and dry. Capillary Refill: Capillary refill takes less than 2 seconds. Neurological:      General: No focal deficit present. Mental Status: She is alert and easily aroused. She is disoriented and confused. GCS: GCS eye subscore is 3. GCS verbal subscore is 4. GCS motor subscore is 6. Comments: Appears globally weak   Psychiatric:         Mood and Affect: Mood is depressed. Additional Data:     Labs:   Results from last 7 days   Lab Units 07/02/23  0754 07/01/23  0550 06/30/23  1412   WBC Thousand/uL 14.61*   < > 15.97*   HEMOGLOBIN g/dL 12.2   < > 11.2*   HEMATOCRIT % 37.4   < > 35.5   PLATELETS Thousands/uL 380   < > 392*   NEUTROS PCT %  --   --  81*   LYMPHS PCT %  --   --  15   MONOS PCT %  --   --  4   EOS PCT %  --   --  0    < > = values in this interval not displayed.       Results from last 7 days   Lab Units 07/02/23  0705   SODIUM mmol/L 138   POTASSIUM mmol/L 3.9   CHLORIDE mmol/L 99   CO2 mmol/L 27   BUN mg/dL 6   CREATININE mg/dL 0.67   ANION GAP mmol/L 12   CALCIUM mg/dL 9.9   ALBUMIN g/dL 4.3   TOTAL BILIRUBIN mg/dL 0.29   ALK PHOS U/L 93   ALT U/L 10   AST U/L 17   GLUCOSE RANDOM mg/dL 93                     Results from last 7 days   Lab Units 07/02/23  0705   PROCALCITONIN ng/ml <0.05          * I Have Reviewed All Lab Data Listed Above. * Additional Pertinent Lab Tests Reviewed: [address] Admission Reviewed      Imaging Studies: I have personally reviewed pertinent reports. Recent Cultures (last 7 days): Today, Patient Was Seen By: Jovani Rockwell DO    ** Please Note: Dictation voice to text software may have been used in the creation of this document.  **

## 2023-07-03 NOTE — ASSESSMENT & PLAN NOTE
• Patient with a history of chronic fentanyl use  • Uses 1-2 bags of fentanyl daily intravenously last use 2 am on 6/30/23  • [facility]. Receives daily dose of 119 mg at 0600hrs  ? Patient reports that she has been testing positive for benzodiazepines at methadone clinic.    • Management of opioid withdrawal under MAT protocol as above  • Will Screen for hepatitis/HIV with h/o IVDU  • Case management consulted for assistance with aftercare resources - pt expresses interest in continued outpatient MAT after d/c

## 2023-07-03 NOTE — ASSESSMENT & PLAN NOTE
• Patient reports a history of polysubstance abuse with history of withdrawal seizures. She reports that she is currently using Fentanyl daily in addition to receiving Methadone. Patient is concerned that her fentanyl is mixed with Xylazine and benzodiazepines as she reports to testing positive for benzodiazepines at her methadone clinic. • Reports that she is experiencing poor appetite, sleeps legs, sweats, tremors, anxiety, and nausea. • Suspect that patient is primarily experiencing more anxiety related to her Xylazine use. Regardless will [facility] for blaire-agonist withdrawal.   • Telemetry monitoring   • Precedex infusion 7/3 for agitation. Patient requesting to go home however patient is confused and cannot walk.

## 2023-07-03 NOTE — DISCHARGE SUMMARY
[facility]  Discharge- Fifi Weston 1995, 33 y.o. female MRN: [id number]  Unit/Bed#: 5T DETOX 300-58 Encounter: [id number]  Primary Care Provider: Kee Matthews DO   Date and time admitted to hospital: 6/30/2023 12:18 PM  [address] [place] [address] LEVEL 4  Department of Medical Toxicology  Reason for Admission/Principal Problem: benzodiazepine withdrawal, benzodiazepine use disorder   Admitting provider: FRANK Garcia  No att. providers found   6/30/2023 12:18 PM       Discharging Physician / Practitioner: Carl Ford MD   PCP: Kee Matthews DO  Admission Date:   Admission Orders (From admission, onward)       Ordered        06/30/23 1536  INPATIENT ADMISSION  Once                          Discharge Date: 07/03/23    Medical Problems       Resolved Problems  Date Reviewed: 7/3/2023   None       Consultations During Hospital Stay:  Case management     Procedures Performed:   None    Significant Findings / Test Results: Tachycardia, persistent  Leukocytosis - downtrending  Mild QTc prolongation - resolved  CT LUE 6/30/23: "Diffuse subcutaneous edema in the forearm consistent with cellulitis, without subcutaneous abscess. There is no evidence of intramuscular abscess, although there is a tiny jenae of intramuscular air in the dorsal mid forearm (series 3 image 56.) This single jenae is probably introduced externally, but less likely could indicate infectious myositis due   to gas-forming organism."    Incidental Findings:   None     Test Results Pending at Discharge (will require follow up):    None     Outpatient Tests / Follow Up Requested:  Recommend f/u with PCP within 1-2 weeks of discharge   Continue OP f/u with [facility] for ongoing MAT    Complications:  None    Reason for Admission: benzodiazepine withdrawal, benzodiazepine use disorder    Hospital Course:     Fifi Weston is a 33 y.o. female patient who originally presented to the hospital on 6/30/2023 due to benzodiazepine withdrawal. Patient initially presented to the [facility] ED requesting detoxification from benzodiazepines. Patient was admitted to the [facility] medical detox unit under [facility] protocol for medically assisted benzodiazepine withdrawal and received a total of approximately 2185 mg phenobarbital, and she received her last dose of phenobarbital on 7/3 at 0424. Patient was also treated for cellulitis of BUEs initially with PO Keflex and Bactrim, which was switched to IV vancomycin and cefepime on 7/2 due to persistent tachycardia and clinical deterioration. Patient's methadone maintenance dosing for OUD was confirmed with her outpatient provider and continued during admission. On 7/3/23 patient exhibited acute agitation and delirium, for which dexmedetomidine infusion was started. Patient tolerated infusion. That evening patient requested discharge against medical device. It was explained leaving the unit was not recommended due to side effects of infusion including disorientation, lack of coordination, and injury secondary to fall. She verbalized understanding and left, coordinating her own transportation. Case management was previously consulted for assistance with rehab resources. Please see above list of diagnoses and related plan for additional information. Condition at Discharge: fair     Discharge Day Visit / Exam:     Discussion with Family: patient     Discharge instructions/Information to patient and family:   See after visit summary for information provided to patient and family. Provisions for Follow-Up Care:  See after visit summary for information related to follow-up care and any pertinent home health orders. Disposition:     Home    For Discharges to [facility]:   Not Applicable to this Patient - Not Applicable to this Patient    Planned Readmission: NA     Discharge Statement:  I spent 36 minutes discharging the patient.  This time was spent on the day of discharge. I had direct contact with the patient on the day of discharge. Greater than 50% of the total time was spent examining patient, answering all patient questions, arranging and discussing plan of care with patient as well as directly providing post-discharge instructions. Additional time then spent on discharge activities. Discharge Medications:  See after visit summary for reconciled discharge medications provided to patient and family.       ** Please Note: This note has been constructed using a voice recognition system **

## 2023-07-03 NOTE — ASSESSMENT & PLAN NOTE
• Track marks present on bilateral arms with 1cm lesions present with eschar and multiple purulent excoriations with surrounding erythema. + induration, no fluctuance. • Patient reports that she continues to injection primarily into LUE. • CT LUE: Few subcutaneous edema in forearm consistent with cellulitis, without subcutaneous abscess. No evidence of intramuscular abscess, possible low likelihood of infectious myositis due to gas-forming organism. • White count continues to be elevated, patient remains tachycardic. Sites appear erythematous but unchanged from yesterday. Patient appears tired/ill. IV antibiotics were started for sepsis coverage.

## 2023-07-03 NOTE — MALNUTRITION/BMI
This medical record reflects one or more clinical indicators suggestive of malnutrition and/or morbid obesity. Malnutrition Findings:   Adult Malnutrition type: Chronic illness  Adult Degree of Malnutrition: Malnutrition of moderate degree  Malnutrition Characteristics: Fat loss, Muscle loss                  360 Statement: Moderate malnutrition r/t severe opioid dependence/inadequate intake as evidenced bymild  fat/muscle depletion of orbital/temple areas, BMI 16.2. Treated with Regular diet    BMI Findings:  Adult BMI Classifications: Underweight < 18.5        Body mass index is 16.24 kg/m². See Nutrition note dated 7/3/23 for additional details. Completed nutrition assessment is viewable in the nutrition documentation.

## 2023-07-03 NOTE — UTILIZATION REVIEW
NOTIFICATION OF INPATIENT ADMISSION   AUTHORIZATION REQUEST   SERVICING FACILITY:   [address] [place] [address]  Tax ID: [id number]  NPI: [id number] ATTENDING PROVIDER:  Attending Name and NPI#: Isadora Vivar Md [[id number]]  Address: [address] [place] [address]  Phone: [phone]     ADMISSION INFORMATION:  [facility]   Place of Service Code: 21  Inpatient Admission Date/Time: 6/30/23  3:20 PM  Discharge Date/Time: No discharge date for patient encounter. Admitting Diagnosis Code/Description:  Drug abuse ([facility]) [F19.10]  Methadone use [F11.90]  Benzodiazepine abuse ([facility]) [F13.10]  Severe benzodiazepine use disorder ([facility]) [F13.20]     UTILIZATION REVIEW CONTACT:  Te Villarreal Utilization   Network Utilization Review Department  Phone: [phone]  Fax [phone]  Email: [email or web address]  Contact for approvals/pending authorizations, clinical reviews, and discharge. PHYSICIAN ADVISORY SERVICES:  Medical Necessity Denial & [employer] Review  Phone: [phone]  Fax: [phone]  Email: [email or web address] Monalisa Durant RN  Registered Nurse  Specialty:  Perioperative  Utilization Review     Signed  Date of Service:  7/1/2023  1:21 PM     Signed        Expand All Collapse All    Initial Clinical Review     Pt presented to [facility] for its Level IV medically managed intensive inpatient detox unit.     Admission: Date/Time/Statement:       Admission Orders (From admission, onward)       Ordered         06/30/23 1536   INPATIENT ADMISSION  Once                               Orders Placed This Encounter   Procedures   • INPATIENT ADMISSION       Standing Status:   Standing       Number of Occurrences:   1       Order Specific Question:   Level of Care       Answer:   Med Surg [16]       Order Specific Question:   Estimated length of stay       Answer:   More than 2 Midnights       Order Specific Question:   Certification       Answer:   I certify that inpatient services are medically necessary for this patient for a duration of greater than two midnights. See H&P and MD Progress Notes for additional information about the patient's course of treatment.               ED Arrival Information               Expected   -    Arrival   6/30/2023 11:59    Acuity   Emergent              Means of arrival   Walk-In    [facility]    Admission type   Emergency              Arrival complaint   detox                  Chief Complaint   Patient presents with   • Detox Evaluation       Arrives requesting detox eval for benzo use. States she has been injecting benzos IV; last use around 2am. States she is also on methadone which she wishes to continue but needs detox from the benzos.          Initial Presentation: 33 y.o. female who presented to medical detox. Inpatient admission for evaluation and treatment of acute polysubstance benzodaizepine and  fentanyl  withdrawal. Presented w/ request for detox from fentanyl daily in addition to receiving methadone  Uses fentanyl 1-2 bags daily, fentanyl is mixed with xylazine and benzodiazepines. She reports testing positive for benzodiazepine at Methadone clinic.  last use 6/30 @ 0200. On exam, + tremors, nervous/anxious mood, + headache,  track marks present bilateral arms with 1cm lesions with eschar and multiple purulent excoriations with surrounding erythema, no fluctuance. . UDS positive for methadone COWS 11 Plan: Start oral antibiotics for arm cellulitis, Monitor SEWS for blaire-agonist withdrawal, w/ symptomatic supportive care,  IVF, telemetry, continuous pulse ox, Trend labs, replete electrolytes as needed. Continue PTA meds, Check CT L arm         Date: 7/1 Day 2: Pt reports feeling nausea, restlessness, leg pain, sweatiness, poor appetite. palpitations, Unable to tolerate breakfast On exam, tachycardia, generalized abdominal tenderness, pupils dilated 5-6 mm, ill appearing, + nasal congestion, excoriations, surrounding w/erythema bilateral arms injection sites  holding emesis bag COWS 11, SEWS 8 . Plan: Continue  PO antibiotics for cellulitis both arms continue SEWS monitoring w/ symptomatic supportive care, telemetry, continuous pulse ox, Trend labs, replete electrolytes as needed. Continue PTA meds.  Continue to monitor QT             ED Triage Vitals   Temperature Pulse Respirations Blood Pressure SpO2   06/30/23 1223 06/30/23 1223 06/30/23 1223 06/30/23 1223 06/30/23 1223   98.2 °F (36.8 °C) (!) 113 20 112/84 95 %       Temp Source Heart Rate Source Patient Position - Orthostatic VS BP Location FiO2 (%)   06/30/23 1223 06/30/23 1223 06/30/23 1223 06/30/23 1223 --   Oral Monitor Sitting Right arm         Pain Score           06/30/23 1757           7                  Wt Readings from Last 1 Encounters:   06/30/23 42.9 kg (94 lb 9.6 oz)      Additional Vital Signs:      Date/Time Temp Pulse Resp BP MAP (mmHg) SpO2 O2 Device Patient Position - Orthostatic VS   07/01/23 1106 -- 97 16 130/82 -- 100 % None (Room air) Lying   07/01/23 0732 99 °F (37.2 °C) 107 Abnormal  16 126/77 -- 99 % None (Room air) Lying   07/01/23 0522 98.6 °F (37 °C) 96 18 115/83 93 -- -- Lying   07/01/23 0358 -- 114 Abnormal  -- -- -- -- -- --   07/01/23 0157 -- 118 Abnormal  18 119/78 -- -- -- --   06/30/23 2150 -- -- -- 120/73 88 -- -- Lying   06/30/23 2025 99 °F (37.2 °C) 85 18 104/67 -- 98 % None (Room air) Lying   06/30/23 1755 -- 97 -- -- -- -- -- --   06/30/23 1650 98.5 °F (36.9 °C) 97 20 118/74 88 100 % None (Room air) Sitting   06/30/23 1557 -- 92 20 112/74 -- 97 % None (Room air) Lying                SEWS     Row Name 07/01/23 1127 07/01/23 0753 07/01/23 0500 07/01/23 0358 07/01/23 0154   Severity of Ethanol Withdrawal Scale (SEWS)   ANXIETY: Do you feel that something bad is about to happen to you right now?  3  -LB 3  -LB 0  -LS 0  -LS 3  -LS   NAUSEA and DRY HEAVES or VOMITING? 3  -LB 3  -LB 0  -LS 0  -LS 0  -LS   SWEATING: (includes moist palms, sweating now)? Score 0 or 2 0  -LB 0  -LB 0  -LS 0  -LS 0  -LS   TREMOR: with arms extended eyes closed? 2  -LB 2  -LB 0  -LS 0  -LS 2  -LS   AGITATION: Fidgety, restless, pacing? 0  -LB 0  -LB 0  -LS 3  -LS 3  -LS   DISORIENTATION: 0  -LB 0  -LB 0  -LS 0  -LS 0  -LS   HALLUCINATIONS: 0  -LB 0  -LB 0  -LS 0  -LS 0  -LS   VITAL SIGNS: ANY (Pulse >074, Diastolic BP >69, Temp >90.4) 0  -LB 0  -LB 0  -LS 3  -LS 3  -LS   SEWS Total Score 8  -LB 8  -LB 0  -LS 6  -LS 11  -LS   [place]in Agitation Sedation Scale (RASS)   [place]glin Agitation Sedation Scale (RASS) 0  -LB 0  -LB 0  -LS 0  -LS 0  -LS   Row Name 06/30/23 2245 06/30/23 2151 06/30/23 1727       Severity of Ethanol Withdrawal Scale (SEWS)   ANXIETY: Do you feel that something bad is about to happen to you right now? 0  -LS 0  -LS 3  -BETH       NAUSEA and DRY HEAVES or VOMITING? 0  -LS 3  -LS 3  -BETH       SWEATING: (includes moist palms, sweating now)? Score 0 or 2 0  -LS 0  -LS 0  -BETH       TREMOR: with arms extended eyes closed? 0  -LS 2  -LS 2  -BETH       AGITATION: Fidgety, restless, pacing?  0  -LS 3  -LS 3  -BETH       DISORIENTATION: 0  -LS 0  -LS 0  -BETH       HALLUCINATIONS: 0  -LS 0  -LS 0  -BETH       VITAL SIGNS: ANY (Pulse >162, Diastolic BP >75, Temp >10.7) 0  -LS 0  -LS 0  -BETH       SEWS Total Score 0  -LS 8  -LS 11  -BETH       [place]in Agitation Sedation Scale (RASS)   [place]in Agitation Sedation Scale (RASS) 0  -LS 0  -LS 0  -BETH                   Pertinent Labs/Diagnostic Test Results:      EKG 6/30    Sinus rhythm with short AZ  Otherwise normal ECG  When compared with ECG of 21-MAR-2023 13:27,  QT has lengthened        CT upper extremity wo contrast left   Final Result by Kg Dickens MD (06/30 2015)       Diffuse subcutaneous edema in the forearm consistent with cellulitis, without subcutaneous abscess.       There is no evidence of intramuscular abscess, although there is a tiny jenae of intramuscular air in the dorsal mid forearm (series 3 image 56.) This single jenae is probably introduced externally, but less likely could indicate infectious myositis due    to gas-forming organism.           Workstation performed: [id number]                        Results from last 7 days   Lab Units 07/01/23  0550 06/30/23  1412   WBC Thousand/uL 14.81* 15.97*   HEMOGLOBIN g/dL 11.7 11.2*   HEMATOCRIT % 37.7 35.5   PLATELETS Thousands/uL 350 392*   NEUTROS ABS Thousands/µL  --  12.74*                Results from last 7 days   Lab Units 07/01/23  0550 06/30/23  1412   SODIUM mmol/L 137 141   POTASSIUM mmol/L 3.9 4.0   CHLORIDE mmol/L 100 100   CO2 mmol/L 22 29   ANION GAP mmol/L 15 12   BUN mg/dL 7 8   CREATININE mg/dL 0.55* 0.67   EGFR ml/min/1.73sq m 129 121   CALCIUM mg/dL 9.6 10.3*   MAGNESIUM mg/dL 1.9 1.7*            Results from last 7 days   Lab Units 07/01/23  0550 06/30/23  1412   AST U/L 16 17   ALT U/L 7 12   ALK PHOS U/L 96 103   TOTAL PROTEIN g/dL 8.0 7.9   ALBUMIN g/dL 4.0 4.3   TOTAL BILIRUBIN mg/dL 0.36 0.30                Results from last 7 days   Lab Units 07/01/23  0550 06/30/23  1412   GLUCOSE RANDOM mg/dL 85 88                  Results from last 7 days   Lab Units 06/30/23  1255   AMPH/METH   Negative   BARBITURATE UR   Negative   BENZODIAZEPINE UR   Negative   COCAINE UR   Negative   METHADONE URINE   Positive*   OPIATE UR   Negative   PCP UR   Negative   THC UR   Negative           Results from last 7 days   Lab Units 06/30/23  1412   ETHANOL LVL mg/dL <10   ACETAMINOPHEN LVL ug/mL <13*   SALICYLATE LVL mg/dL <5                                     ED Treatment:              Medication Administration from 06/30/2023 1159 to 06/30/2023 1650        Date/Time Order Dose Route Action Comments       06/30/2023 1413 EDT sodium chloride 0.9 % bolus 1,000 mL 1,000 mL Intravenous New Bag --          Medical History Past Medical History:   Diagnosis Date   • Anxiety     • Chlamydia     • Depression     • Endometriosis     • IV drug user       IV heroin use, rehab 7 times    • Panic attacks     • Psychiatric disorder       anxiety   • PTSD (post-traumatic stress disorder)     • Varicella       childhood   • Visual impairment           Present on Admission:  **None**        Admitting Diagnosis: Drug abuse ([facility]) [F19.10]  Methadone use [F11.90]  Benzodiazepine abuse ([facility]) [F13.10]  Severe benzodiazepine use disorder ([facility]) [F13.20]  Age/Sex: 33 y.o. female  Admission Orders:   REG  Diet. SCDs. SEWS  monitoring. Telemetry & Continuous Pulse Ox.     Scheduled Medications:  cephalexin, 500 mg, Oral, Q12H JERI  enoxaparin, 40 mg, Subcutaneous, Daily  methadone, 119 mg, Oral, Q24H  multivitamin-minerals, 1 tablet, Oral, Daily  QUEtiapine, 50 mg, Oral, HS  sertraline, 50 mg, Oral, Daily  sulfamethoxazole-trimethoprim, 1 tablet, Oral, Q12H [address]        Continuous IV Infusions:  PRN Meds:  acetaminophen, 650 mg, Oral, Q6H PRN  gabapentin, 300 mg, Oral, Q8H PRN  PO x 2   hydrOXYzine HCL, 25 mg, Oral, Q6H PRN  ondansetron, 4 mg, Intravenous, Q6H PRN IV x 3          Phenobarbital 130 mg IV x 2   Phenobarbital 260 mg IV x 3   Phenobarbital 64.8 mg IV x 1         IP CONSULT TO CASE MANAGEMENT     Network Utilization Review Department  ATTENTION: Please call with any questions or concerns to [phone] and carefully listen to the prompts so that you are directed to the right person. All voicemails are confidential.  Alesia Horse all requests for admission clinical reviews, approved or denied determinations and any other requests to dedicated fax number below belonging to the campus where the patient is receiving treatment.  List of dedicated fax numbers for the Facilities:  FACILITY NAME UR FAX NUMBER   ADMISSION DENIALS (Administrative/Medical Necessity) [address] (Maternity/NICU/Pediatrics) [phone]   Rubio Rodríguez Anny [address] [phone]   [facility] [phone] [address] [facility] [address] [place] [address] [address] [place], [place] [address] [phone]

## 2023-07-04 ENCOUNTER — HOSPITAL ENCOUNTER (EMERGENCY)
Facility: HOSPITAL | Age: 28
Discharge: HOME | End: 2023-07-04
Attending: EMERGENCY MEDICINE
Payer: COMMERCIAL

## 2023-07-04 VITALS
SYSTOLIC BLOOD PRESSURE: 110 MMHG | HEART RATE: 86 BPM | RESPIRATION RATE: 16 BRPM | HEIGHT: 64 IN | TEMPERATURE: 97.7 F | DIASTOLIC BLOOD PRESSURE: 72 MMHG | OXYGEN SATURATION: 98 % | WEIGHT: 95.9 LBS | BODY MASS INDEX: 16.37 KG/M2

## 2023-07-04 DIAGNOSIS — F19.90 SUBSTANCE USE: Primary | ICD-10-CM

## 2023-07-04 PROCEDURE — 99283 EMERGENCY DEPT VISIT LOW MDM: CPT

## 2023-07-04 PROCEDURE — 99284 EMERGENCY DEPT VISIT MOD MDM: CPT | Performed by: EMERGENCY MEDICINE

## 2023-07-04 NOTE — PLAN OF CARE
Problem: Knowledge Deficit  Goal: Patient/family/caregiver demonstrates understanding of disease process, treatment plan, medications, and discharge instructions  Description: Complete learning assessment and assess knowledge base.   Interventions:  - Provide teaching at level of understanding  - Provide teaching via preferred learning methods  Outcome: Progressing     Problem: SUBSTANCE USE/ABUSE  Goal: By discharge, will develop insight into their chemical dependency and sustain motivation to continue in recovery  Description: INTERVENTIONS:  - Attends all daily group sessions and scheduled AA groups  - Actively practices coping skills through participation in the therapeutic community and adherence to program rules  - Reviews and completes assignments from individual treatment plan  - Assist patient development of understanding of their personal cycle of addiction and relapse triggers  Outcome: Progressing  Goal: By discharge, patient will have ongoing treatment plan addressing chemical dependency  Description: INTERVENTIONS:  - Assist patient with resources and/or appointments for ongoing recovery based living  Outcome: Progressing     Problem: DISCHARGE PLANNING  Goal: Discharge to home or other facility with appropriate resources  Description: INTERVENTIONS:  - Identify barriers to discharge w/patient and caregiver  - Arrange for needed discharge resources and transportation as appropriate  - Identify discharge learning needs (meds, wound care, etc.)  - Arrange for interpretive services to assist at discharge as needed  - Refer to Case Management Department for coordinating discharge planning if the patient needs post-hospital services based on physician/advanced practitioner order or complex needs related to functional status, cognitive ability, or social support system  Outcome: Progressing

## 2023-07-04 NOTE — DISCHARGE INSTRUCTIONS
-Please follow up with [facility] tomorrow for reevaluation and medical clearance for methadone.   -Please return to the emergency department if you begin to develop severe abdominal pain, nausea, vomiting, fevers, chills, seizures, faintness, or continued or worsening symptoms.

## 2023-07-04 NOTE — CASE MANAGEMENT
Cm informed pt AMA. Pt signed no supportive GWEN's however, nursing note indicates pt was retrieved from hospital by parent.

## 2023-07-04 NOTE — NURSING NOTE
Pt. Shannon Joanna many times "she just wants to go home" while on Precedex and NaCl fluids. Pt. stated she wants to leave aqainst medical advice (AMA). Dr. Julia Hoffman informed of pt.'s decision. Pt. is AOx4 and able to stand and walk slowly. Pt.'s plan is to make an appt. with her PCP and talk to her methadone clinic therapist in regards to increasing her methadone to help her resist the urges to use heroin on the street. This RN explained how important it was to complete treatment here at [facility]. RN explained to both Pt's.parents, via pt's cell phone, regarding how important it is for Pt. to complete treatment. Pt's father picked up pt., she was escorted out by JOSE MARIA Chacon with no incident.

## 2023-07-04 NOTE — ED ATTENDING ATTESTATION
7/4/2023  INeil MD, saw and evaluated the patient. I have discussed the patient with the resident/non-physician practitioner and agree with the resident's/non-physician practitioner's findings, Plan of Care, and MDM as documented in the resident's/non-physician practitioner's note, except where noted. All available labs and Radiology studies were reviewed. I was present for key portions of any procedure(s) performed by the resident/non-physician practitioner and I was immediately available to provide assistance. At this point I agree with the current assessment done in the Emergency Department. I have conducted an independent evaluation of this patient a history and physical is as follows:    34 yo female with h/o anxiety, depression, active IVDA, PTSD,   Past Medical History:   Diagnosis Date   • Anxiety    • Chlamydia    • Depression    • Endometriosis    • IV drug user     IV heroin use, rehab 7 times    • Panic attacks    • Psychiatric disorder     anxiety   • PTSD (post-traumatic stress disorder)    • Varicella     childhood   • Visual impairment          ED Course  ED Course as of 07/04/23 1506   Tue Jul 04, 2023   0900 Outpatient dose of methadone documented in chart, per chart medical toxicology team documented that this was confirmed with outpatient team -   Methadone HCl 119 mg Oral Every 6 hours PRN, Take by mouth daily      [address]   1139 Lengthy discussion regarding admission and symptaomtic treatment, pt adamant that she wants to leave, declines prescription for symptomatic treatment, I confirmed that she has a Narcan pen at home, she understands that she can return at any time should she want further treatment/eval and will f/u with clinic tomorrow.            Critical Care Time  Procedures

## 2023-07-04 NOTE — ED PROVIDER NOTES
History  Chief Complaint   Patient presents with   • Medical Problem     Was at [facility] heart and d/c'd last night. Went to her methadone today, but dt her not being seen for a few days there and not having a doc there today they said to come to ER for her dose. • Hand Injury     Right hand swollen and red. She said its been like that since she got blood done and the IV being there for 3 days. 81-year-old female with history of polysubstance use presents for methadone. Patient has had [facility] for detox of benzo use but left AMA after 3 to 4 days of therapy due to "bad dreams."  Patient attempted to go to her pain management clinic today for dosing of her methadone but the facility was closed today. Was previously being seen and treated with IV antibiotics for previous cellulitic skin infection of the arms. Patient was in room with grandmother who states that she had gone to see her this morning and noted paraphernalia around her. Patient advised grandmother that paraphernalia was not hers and was left over paraphernalia. Patient denies any substance use at this time. Denies fevers, chills, headaches, cough, chest pains, shortness of breath, abdominal pain, nausea, vomiting, diarrhea, dysuria, frequency, worsening skin infection, tremors. Prior to Admission Medications   Prescriptions Last Dose Informant Patient Reported? Taking?    QUEtiapine (SEROquel) 50 mg tablet   Yes No   Sig: Take 50 mg by mouth daily at bedtime   methadone (DOLOPHINE) 10 MG/5ML solution   Yes No   Sig: Take 119 mg by mouth every 6 (six) hours as needed Take by mouth daily   sertraline (ZOLOFT) 50 mg tablet   Yes No   Sig: Take 50 mg by mouth daily      Facility-Administered Medications: None       Past Medical History:   Diagnosis Date   • Anxiety    • Chlamydia    • Depression    • Endometriosis    • IV drug user     IV heroin use, rehab 7 times    • Panic attacks    • Psychiatric disorder     anxiety   • PTSD (post-traumatic stress disorder)    • Varicella     childhood   • Visual impairment        Past Surgical History:   Procedure Laterality Date   • LAPAROTOMY         Family History   Problem Relation Age of Onset   • Depression Mother    • Drug abuse Brother    • Drug abuse Maternal Aunt    • Drug abuse Paternal Uncle    • Cancer Maternal Grandmother    • Cancer Paternal Grandfather      I have reviewed and agree with the history as documented. E-Cigarette/Vaping     E-Cigarette/Vaping Substances     Social History     Tobacco Use   • Smoking status: Former     Types: Cigarettes   • Smokeless tobacco: Never   Substance Use Topics   • Alcohol use: Never   • Drug use: Yes     Types: Heroin, Benzodiazepines     Comment: hx of heroin; currently reports benzo use        Review of Systems   All other systems reviewed and are negative. Physical Exam  ED Triage Vitals   Temperature Pulse Respirations Blood Pressure SpO2   07/04/23 0833 07/04/23 0833 07/04/23 0833 07/04/23 0834 07/04/23 0833   97.7 °F (36.5 °C) 86 16 110/72 98 %      Temp Source Heart Rate Source Patient Position - Orthostatic VS BP Location FiO2 (%)   07/04/23 0833 07/04/23 0833 07/04/23 0833 07/04/23 0833 --   Oral Monitor Lying Left arm       Pain Score       07/04/23 0833       No Pain             Orthostatic Vital Signs  Vitals:    07/04/23 0833 07/04/23 0834   BP:  110/72   Pulse: 86    Patient Position - Orthostatic VS: Lying        Physical Exam  Vitals and nursing note reviewed. Constitutional:       General: She is not in acute distress. Appearance: Normal appearance. She is normal weight. She is not ill-appearing, toxic-appearing or diaphoretic. HENT:      Head: Normocephalic and atraumatic. Right Ear: External ear normal.      Left Ear: External ear normal.      Nose: Nose normal.      Mouth/Throat:      Lips: Pink. No lesions. Mouth: Mucous membranes are moist.      Dentition: Abnormal dentition.       Pharynx: Oropharynx is clear. Eyes:      General: Lids are normal. No scleral icterus. Right eye: No discharge. Left eye: No discharge. Extraocular Movements: Extraocular movements intact. Conjunctiva/sclera: Conjunctivae normal.      Pupils: Pupils are equal, round, and reactive to light. Cardiovascular:      Rate and Rhythm: Normal rate and regular rhythm. Pulses: Normal pulses. Heart sounds: Normal heart sounds. No murmur heard. No friction rub. No gallop. Pulmonary:      Effort: Pulmonary effort is normal. No respiratory distress. Breath sounds: Normal breath sounds. No stridor. No wheezing, rhonchi or rales. Chest:      Chest wall: No tenderness. Abdominal:      General: There is no distension. Palpations: Abdomen is soft. There is no mass. Tenderness: There is no abdominal tenderness. There is no right CVA tenderness, left CVA tenderness, guarding or rebound. Hernia: No hernia is present. Musculoskeletal:         General: No tenderness. Cervical back: Neck supple. Skin:     General: Skin is warm and dry. Capillary Refill: Capillary refill takes less than 2 seconds. Coloration: Skin is not cyanotic, jaundiced or pale. Findings: No bruising, erythema, lesion, petechiae or rash. Comments: Tense leathery skin of upper extremities bilaterally with thick scarring. Dark healing eschar on posterior surface of right forearm. Neurological:      General: No focal deficit present. Mental Status: She is alert and oriented to person, place, and time. Mental status is at baseline. GCS: GCS eye subscore is 4. GCS verbal subscore is 5. GCS motor subscore is 6. Gait: Gait is intact.  Gait normal.         ED Medications  Medications - No data to display    Diagnostic Studies  Results Reviewed     None                 No orders to display         Procedures  Procedures      ED Course  ED Course as of 07/04/23 1913 Tue Jul 04, 2023 6204 Attempted to call [facility] w/o response   1016 Spoke w/ grandmother and states has number to [facility] in care. Will attempt patient number. [place] w/ [facility] after hours number ([phone], and was advised that patient had missed multiple days of treatment w/ last treatment on 6/30. She would require re-evaluation from on scene doctor at treatment center and likely would have to restart methadone treatment at 30mg rather than previous 119mg. She was also advised to go to the emergency department because they had been informed that she was found w/ a needle in her arm w/ unknown substance use. SBIRT 22yo+    Flowsheet Row Most Recent Value   Initial Alcohol Screen: US AUDIT-C     1. How often do you have a drink containing alcohol? 0 Filed at: 07/04/2023 0835   2. How many drinks containing alcohol do you have on a typical day you are drinking? 0 Filed at: 07/04/2023 0835   3a. Male UNDER 65: How often do you have five or more drinks on one occasion? 0 Filed at: 07/04/2023 0835   3b. FEMALE Any Age, or MALE 65+: How often do you have 4 or more drinks on one occassion? 0 Filed at: 07/04/2023 0835   Audit-C Score 0 Filed at: 07/04/2023 2100   LUDA: How many times in the past year have you. .. Used an illegal drug or used a prescription medication for non-medical reasons? Never  [Methadone] Filed at: 07/04/2023 7253                Medical Decision Making  31-year-old female presents for methadone. Patient states that she AMA from [facility] detox program where she was receiving treatment for benzos. Patient states that she was having bad dreams and left after 3 to 4 days.   Was previously being seen by [facility] for methadone treatment and was receiving 119 mg of methadone but had not been evaluated since June 30 and was advised that she would require repeat evaluation to receive methadone. They advised her to seek medical attention at the emergency department for which the patient thought was for medical evaluation prior to treatment. On speaking with comprehensive treatment team they stated that there was concerned that grandmother noted needle and patient's arm when she went to go evaluate her in the morning. Unknown substance use at that time advised her to seek medical care at the emergency department due to unknown substance use. Facility also stated that they do not accept outside medical evaluations and would require in-house medical evaluation which would be available tomorrow. Nursing staff also noted that she may be required to start over. Differential includes but not limited to withdrawal, polypharmacy, substance use  Discussed with patient the risk and benefits of giving narcotic and the likelihood of possible use outside of the emergency department and that it would be unsafe at this time to give her methadone. Discussed the need for continued treatment and detox at [facility] detox center which patient states that she did not wish to continue and would prefer to detox at home. Discussed risk and benefits at home detox and treatment. Patient states that she had access to Narcan in case of emergency. Patient was discharged home to self-care with strict return precautions for signs and symptoms suggestive of withdrawal and overdose. Advised that doors are always open and she may return for treatment or detox as needed. Advised to follow-up with [facility] comprehensive treatment center for repeat evaluation tomorrow. Patient understanding and agreement with plan.           Disposition  Final diagnoses:   Substance use     Time reflects when diagnosis was documented in both MDM as applicable and the Disposition within this note     Time User Action Codes Description Comment    7/4/2023 11:24 AM Martha Zafar Add [F19.90] Substance use       ED Disposition ED Disposition   Discharge    Condition   Stable    Date/Time   Tue Jul 4, 2023 11:40 AM    Comment   Eliot Swift discharge to home/self care. Follow-up Information     Follow up With Specialties Details Why Contact Info Additional Information    Georgia Pardo DO Family Medicine Schedule an appointment as soon as possible for a visit in 2 days  [address] [facility] [address] Emergency Department Emergency Medicine Go to  If symptoms worsen [address] Emergency Department, [place], [place], [address]          Discharge Medication List as of 7/4/2023 11:41 AM      CONTINUE these medications which have NOT CHANGED    Details   methadone (DOLOPHINE) 10 MG/5ML solution Take 119 mg by mouth every 6 (six) hours as needed Take by mouth daily, Historical Med      QUEtiapine (SEROquel) 50 mg tablet Take 50 mg by mouth daily at bedtime, Historical Med      sertraline (ZOLOFT) 50 mg tablet Take 50 mg by mouth daily, Historical Med           No discharge procedures on file. PDMP Review     None           ED Provider  Attending physically available and evaluated Eliot Swift. I managed the patient along with the ED Attending.     Electronically Signed by         Jimi Lovelace MD  07/04/23 2790

## 2023-07-05 PROCEDURE — 93970 EXTREMITY STUDY: CPT | Performed by: SURGERY

## 2023-07-05 NOTE — UTILIZATION REVIEW
NOTIFICATION OF ADMISSION DISCHARGE   This is a Notification of Discharge from [facility]. Please be advised that this patient has been discharge from our facility. Below you will find the admission and discharge date and time including the patient’s disposition. UTILIZATION REVIEW CONTACT:  Greer Nolasco MA  Utilization   Network Utilization Review Department  Phone: [phone] x carefully listen to the prompts. All voicemails are confidential.  Email: [email or web address]     ADMISSION INFORMATION  PRESENTATION DATE: 6/30/2023 12:18 PM  OBERVATION ADMISSION DATE:   INPATIENT ADMISSION DATE: 6/30/23  3:20 PM   DISCHARGE DATE: 7/3/2023  9:45 PM   DISPOSITION:Home/Self Care    IMPORTANT INFORMATION:  Send all requests for admission clinical reviews, approved or denied determinations and any other requests to dedicated fax number below belonging to the campus where the patient is receiving treatment.  List of dedicated fax numbers:  [place] DENIALS (Administrative/Medical Necessity) [phone] [address] [facility] (Maternity/NICU/Pediatrics) [phone]   [facility] [phone]   [facility] [phone] 16[facility] [phone]   [address] [facility] [phone]   [facility] [phone]   [address] [place] [address] [facility] [phone]   [address] [facility] [phone]   [address] [facility] [phone]   [address] [facility] [address] [facility] [phone]

## 2023-07-20 ENCOUNTER — APPOINTMENT (EMERGENCY)
Dept: RADIOLOGY | Facility: HOSPITAL | Age: 28
DRG: 773 | End: 2023-07-20
Payer: COMMERCIAL

## 2023-07-20 ENCOUNTER — APPOINTMENT (EMERGENCY)
Dept: CT IMAGING | Facility: HOSPITAL | Age: 28
DRG: 773 | End: 2023-07-20
Payer: COMMERCIAL

## 2023-07-20 ENCOUNTER — HOSPITAL ENCOUNTER (INPATIENT)
Facility: HOSPITAL | Age: 28
LOS: 3 days | Discharge: HOME | DRG: 773 | End: 2023-07-23
Attending: INTERNAL MEDICINE | Admitting: EMERGENCY MEDICINE
Payer: COMMERCIAL

## 2023-07-20 DIAGNOSIS — L03.113 CELLULITIS OF RIGHT ARM: ICD-10-CM

## 2023-07-20 DIAGNOSIS — L03.113 CELLULITIS OF RIGHT UPPER EXTREMITY: ICD-10-CM

## 2023-07-20 DIAGNOSIS — E46 PROTEIN-CALORIE MALNUTRITION (HCC): ICD-10-CM

## 2023-07-20 DIAGNOSIS — F19.10 POLYSUBSTANCE ABUSE (HCC): ICD-10-CM

## 2023-07-20 DIAGNOSIS — F11.20 OPIOID USE DISORDER, SEVERE, DEPENDENCE (HCC): Primary | ICD-10-CM

## 2023-07-20 PROBLEM — R74.01 TRANSAMINITIS: Status: ACTIVE | Noted: 2023-07-20

## 2023-07-20 PROBLEM — E83.42 HYPOMAGNESEMIA: Status: ACTIVE | Noted: 2023-07-20

## 2023-07-20 LAB
ALBUMIN SERPL BCP-MCNC: 4.2 G/DL (ref 3.5–5)
ALP SERPL-CCNC: 129 U/L (ref 34–104)
ALT SERPL W P-5'-P-CCNC: 96 U/L (ref 7–52)
AMPHETAMINES SERPL QL SCN: NEGATIVE
ANION GAP SERPL CALCULATED.3IONS-SCNC: 10 MMOL/L
AST SERPL W P-5'-P-CCNC: 103 U/L (ref 13–39)
BARBITURATES UR QL: POSITIVE
BASOPHILS # BLD AUTO: 0.04 THOUSANDS/ÂΜL (ref 0–0.1)
BASOPHILS NFR BLD AUTO: 0 % (ref 0–1)
BENZODIAZ UR QL: POSITIVE
BILIRUB SERPL-MCNC: 0.23 MG/DL (ref 0.2–1)
BILIRUB UR QL STRIP: NEGATIVE
BUN SERPL-MCNC: 8 MG/DL (ref 5–25)
CALCIUM SERPL-MCNC: 9.6 MG/DL (ref 8.4–10.2)
CHLORIDE SERPL-SCNC: 101 MMOL/L (ref 96–108)
CLARITY UR: ABNORMAL
CO2 SERPL-SCNC: 28 MMOL/L (ref 21–32)
COCAINE UR QL: NEGATIVE
COLOR UR: ABNORMAL
CREAT SERPL-MCNC: 0.55 MG/DL (ref 0.6–1.3)
EOSINOPHIL # BLD AUTO: 0.04 THOUSAND/ÂΜL (ref 0–0.61)
EOSINOPHIL NFR BLD AUTO: 0 % (ref 0–6)
ERYTHROCYTE [DISTWIDTH] IN BLOOD BY AUTOMATED COUNT: 14.1 % (ref 11.6–15.1)
ETHANOL EXG-MCNC: 0 MG/DL
EXT PREGNANCY TEST URINE: NEGATIVE
EXT. CONTROL: NORMAL
FLUAV RNA RESP QL NAA+PROBE: NEGATIVE
FLUBV RNA RESP QL NAA+PROBE: NEGATIVE
GFR SERPL CREATININE-BSD FRML MDRD: 128 ML/MIN/1.73SQ M
GLUCOSE SERPL-MCNC: 118 MG/DL (ref 65–140)
GLUCOSE UR STRIP-MCNC: NEGATIVE MG/DL
HCT VFR BLD AUTO: 37.7 % (ref 34.8–46.1)
HGB BLD-MCNC: 11.9 G/DL (ref 11.5–15.4)
HGB UR QL STRIP.AUTO: NEGATIVE
IMM GRANULOCYTES # BLD AUTO: 0.05 THOUSAND/UL (ref 0–0.2)
IMM GRANULOCYTES NFR BLD AUTO: 0 % (ref 0–2)
KETONES UR STRIP-MCNC: NEGATIVE MG/DL
LACTATE SERPL-SCNC: 1.3 MMOL/L (ref 0.5–2)
LEUKOCYTE ESTERASE UR QL STRIP: 25
LYMPHOCYTES # BLD AUTO: 2.49 THOUSANDS/ÂΜL (ref 0.6–4.47)
LYMPHOCYTES NFR BLD AUTO: 15 % (ref 14–44)
MAGNESIUM SERPL-MCNC: 1.8 MG/DL (ref 1.9–2.7)
MCH RBC QN AUTO: 28.5 PG (ref 26.8–34.3)
MCHC RBC AUTO-ENTMCNC: 31.6 G/DL (ref 31.4–37.4)
MCV RBC AUTO: 90 FL (ref 82–98)
METHADONE UR QL: POSITIVE
MONOCYTES # BLD AUTO: 0.92 THOUSAND/ÂΜL (ref 0.17–1.22)
MONOCYTES NFR BLD AUTO: 6 % (ref 4–12)
NEUTROPHILS # BLD AUTO: 13.08 THOUSANDS/ÂΜL (ref 1.85–7.62)
NEUTS SEG NFR BLD AUTO: 79 % (ref 43–75)
NITRITE UR QL STRIP: NEGATIVE
NRBC BLD AUTO-RTO: 0 /100 WBCS
OPIATES UR QL SCN: NEGATIVE
OXYCODONE+OXYMORPHONE UR QL SCN: NEGATIVE
PCP UR QL: NEGATIVE
PH UR STRIP.AUTO: 7 [PH]
PLATELET # BLD AUTO: 357 THOUSANDS/UL (ref 149–390)
PMV BLD AUTO: 8.5 FL (ref 8.9–12.7)
POTASSIUM SERPL-SCNC: 4.6 MMOL/L (ref 3.5–5.3)
PROT SERPL-MCNC: 7.7 G/DL (ref 6.4–8.4)
PROT UR STRIP-MCNC: NEGATIVE MG/DL
RBC # BLD AUTO: 4.17 MILLION/UL (ref 3.81–5.12)
RSV RNA RESP QL NAA+PROBE: NEGATIVE
SARS-COV-2 RNA RESP QL NAA+PROBE: NEGATIVE
SODIUM SERPL-SCNC: 139 MMOL/L (ref 135–147)
SP GR UR STRIP.AUTO: 1.01 (ref 1–1.04)
THC UR QL: NEGATIVE
UROBILINOGEN UA: NEGATIVE MG/DL
WBC # BLD AUTO: 16.62 THOUSAND/UL (ref 4.31–10.16)

## 2023-07-20 PROCEDURE — 0241U HB NFCT DS VIR RESP RNA 4 TRGT: CPT

## 2023-07-20 PROCEDURE — 96365 THER/PROPH/DIAG IV INF INIT: CPT

## 2023-07-20 PROCEDURE — 85025 COMPLETE CBC W/AUTO DIFF WBC: CPT

## 2023-07-20 PROCEDURE — 73090 X-RAY EXAM OF FOREARM: CPT

## 2023-07-20 PROCEDURE — 93005 ELECTROCARDIOGRAM TRACING: CPT

## 2023-07-20 PROCEDURE — 83605 ASSAY OF LACTIC ACID: CPT

## 2023-07-20 PROCEDURE — 81003 URINALYSIS AUTO W/O SCOPE: CPT

## 2023-07-20 PROCEDURE — 36415 COLL VENOUS BLD VENIPUNCTURE: CPT

## 2023-07-20 PROCEDURE — 99284 EMERGENCY DEPT VISIT MOD MDM: CPT

## 2023-07-20 PROCEDURE — 80053 COMPREHEN METABOLIC PANEL: CPT

## 2023-07-20 PROCEDURE — 81001 URINALYSIS AUTO W/SCOPE: CPT

## 2023-07-20 PROCEDURE — 81025 URINE PREGNANCY TEST: CPT

## 2023-07-20 PROCEDURE — 99285 EMERGENCY DEPT VISIT HI MDM: CPT

## 2023-07-20 PROCEDURE — 96375 TX/PRO/DX INJ NEW DRUG ADDON: CPT

## 2023-07-20 PROCEDURE — 73200 CT UPPER EXTREMITY W/O DYE: CPT

## 2023-07-20 PROCEDURE — 83735 ASSAY OF MAGNESIUM: CPT

## 2023-07-20 PROCEDURE — G1004 CDSM NDSC: HCPCS

## 2023-07-20 PROCEDURE — 80307 DRUG TEST PRSMV CHEM ANLYZR: CPT

## 2023-07-20 PROCEDURE — 87040 BLOOD CULTURE FOR BACTERIA: CPT

## 2023-07-20 PROCEDURE — 82075 ASSAY OF BREATH ETHANOL: CPT

## 2023-07-20 RX ORDER — CEFEPIME HYDROCHLORIDE 2 G/50ML
2000 INJECTION, SOLUTION INTRAVENOUS EVERY 12 HOURS
Status: DISCONTINUED | OUTPATIENT
Start: 2023-07-21 | End: 2023-07-22

## 2023-07-20 RX ORDER — CLONIDINE HYDROCHLORIDE 0.1 MG/1
0.1 TABLET ORAL EVERY 6 HOURS PRN
Status: DISCONTINUED | OUTPATIENT
Start: 2023-07-20 | End: 2023-07-23 | Stop reason: HOSPADM

## 2023-07-20 RX ORDER — ENOXAPARIN SODIUM 100 MG/ML
40 INJECTION SUBCUTANEOUS DAILY
Status: DISCONTINUED | OUTPATIENT
Start: 2023-07-21 | End: 2023-07-23 | Stop reason: HOSPADM

## 2023-07-20 RX ORDER — VANCOMYCIN HYDROCHLORIDE 1 G/200ML
20 INJECTION, SOLUTION INTRAVENOUS EVERY 12 HOURS
Status: DISCONTINUED | OUTPATIENT
Start: 2023-07-21 | End: 2023-07-22

## 2023-07-20 RX ORDER — ONDANSETRON 2 MG/ML
4 INJECTION INTRAMUSCULAR; INTRAVENOUS EVERY 6 HOURS PRN
Status: DISCONTINUED | OUTPATIENT
Start: 2023-07-20 | End: 2023-07-23 | Stop reason: HOSPADM

## 2023-07-20 RX ORDER — FOLIC ACID 1 MG/1
1 TABLET ORAL DAILY
Status: DISCONTINUED | OUTPATIENT
Start: 2023-07-21 | End: 2023-07-23 | Stop reason: HOSPADM

## 2023-07-20 RX ORDER — SODIUM CHLORIDE 9 MG/ML
75 INJECTION, SOLUTION INTRAVENOUS CONTINUOUS
Status: DISCONTINUED | OUTPATIENT
Start: 2023-07-20 | End: 2023-07-22

## 2023-07-20 RX ORDER — ONDANSETRON 2 MG/ML
4 INJECTION INTRAMUSCULAR; INTRAVENOUS ONCE
Status: COMPLETED | OUTPATIENT
Start: 2023-07-20 | End: 2023-07-20

## 2023-07-20 RX ORDER — TRAZODONE HYDROCHLORIDE 50 MG/1
50 TABLET ORAL
Status: DISCONTINUED | OUTPATIENT
Start: 2023-07-20 | End: 2023-07-21

## 2023-07-20 RX ORDER — LANOLIN ALCOHOL/MO/W.PET/CERES
100 CREAM (GRAM) TOPICAL DAILY
Status: DISCONTINUED | OUTPATIENT
Start: 2023-07-21 | End: 2023-07-23 | Stop reason: HOSPADM

## 2023-07-20 RX ORDER — MAGNESIUM SULFATE HEPTAHYDRATE 40 MG/ML
2 INJECTION, SOLUTION INTRAVENOUS ONCE
Status: COMPLETED | OUTPATIENT
Start: 2023-07-21 | End: 2023-07-21

## 2023-07-20 RX ORDER — ACETAMINOPHEN 325 MG/1
650 TABLET ORAL EVERY 6 HOURS PRN
Status: DISCONTINUED | OUTPATIENT
Start: 2023-07-20 | End: 2023-07-23 | Stop reason: HOSPADM

## 2023-07-20 RX ORDER — GABAPENTIN 300 MG/1
300 CAPSULE ORAL EVERY 8 HOURS PRN
Status: DISCONTINUED | OUTPATIENT
Start: 2023-07-20 | End: 2023-07-23 | Stop reason: HOSPADM

## 2023-07-20 RX ADMIN — SODIUM CHLORIDE 1000 ML: 0.9 INJECTION, SOLUTION INTRAVENOUS at 22:11

## 2023-07-20 RX ADMIN — ONDANSETRON 4 MG: 2 INJECTION INTRAMUSCULAR; INTRAVENOUS at 22:23

## 2023-07-20 RX ADMIN — VANCOMYCIN HYDROCHLORIDE 750 MG: 750 INJECTION, SOLUTION INTRAVENOUS at 22:13

## 2023-07-21 PROBLEM — E44.1 MILD PROTEIN-CALORIE MALNUTRITION (HCC): Status: ACTIVE | Noted: 2023-07-21

## 2023-07-21 PROBLEM — F32.A DEPRESSION: Status: ACTIVE | Noted: 2023-07-21

## 2023-07-21 LAB
ATRIAL RATE: 103 BPM
BACTERIA UR QL AUTO: ABNORMAL /HPF
NON-SQ EPI CELLS URNS QL MICRO: ABNORMAL /HPF
P AXIS: 86 DEGREES
PR INTERVAL: 96 MS
QRS AXIS: 22 DEGREES
QRSD INTERVAL: 80 MS
QT INTERVAL: 346 MS
QTC INTERVAL: 453 MS
RBC #/AREA URNS AUTO: ABNORMAL /HPF
T WAVE AXIS: 36 DEGREES
VENTRICULAR RATE: 103 BPM
WBC #/AREA URNS AUTO: ABNORMAL /HPF

## 2023-07-21 PROCEDURE — 93010 ELECTROCARDIOGRAM REPORT: CPT | Performed by: INTERNAL MEDICINE

## 2023-07-21 PROCEDURE — 99223 1ST HOSP IP/OBS HIGH 75: CPT

## 2023-07-21 PROCEDURE — HZ2ZZZZ DETOXIFICATION SERVICES FOR SUBSTANCE ABUSE TREATMENT: ICD-10-PCS | Performed by: EMERGENCY MEDICINE

## 2023-07-21 RX ORDER — CEFEPIME HYDROCHLORIDE 2 G/50ML
INJECTION, SOLUTION INTRAVENOUS
Status: COMPLETED
Start: 2023-07-21 | End: 2023-07-21

## 2023-07-21 RX ORDER — METHADONE HYDROCHLORIDE 10 MG/ML
119 CONCENTRATE ORAL DAILY
Status: DISCONTINUED | OUTPATIENT
Start: 2023-07-21 | End: 2023-07-23 | Stop reason: HOSPADM

## 2023-07-21 RX ORDER — PHENOBARBITAL SODIUM 130 MG/ML
130 INJECTION INTRAMUSCULAR ONCE
Status: COMPLETED | OUTPATIENT
Start: 2023-07-21 | End: 2023-07-21

## 2023-07-21 RX ORDER — PHENOBARBITAL SODIUM 65 MG/ML
65 INJECTION INTRAMUSCULAR ONCE
Status: COMPLETED | OUTPATIENT
Start: 2023-07-21 | End: 2023-07-21

## 2023-07-21 RX ORDER — LANOLIN ALCOHOL/MO/W.PET/CERES
6 CREAM (GRAM) TOPICAL
Status: DISCONTINUED | OUTPATIENT
Start: 2023-07-21 | End: 2023-07-23 | Stop reason: HOSPADM

## 2023-07-21 RX ORDER — QUETIAPINE FUMARATE 50 MG/1
50 TABLET, FILM COATED ORAL
Status: DISCONTINUED | OUTPATIENT
Start: 2023-07-21 | End: 2023-07-23 | Stop reason: HOSPADM

## 2023-07-21 RX ORDER — PHENOBARBITAL SODIUM 130 MG/ML
260 INJECTION INTRAMUSCULAR ONCE
Status: COMPLETED | OUTPATIENT
Start: 2023-07-21 | End: 2023-07-21

## 2023-07-21 RX ADMIN — Medication 6 MG: at 22:04

## 2023-07-21 RX ADMIN — SODIUM CHLORIDE 75 ML/HR: 0.9 INJECTION, SOLUTION INTRAVENOUS at 00:40

## 2023-07-21 RX ADMIN — PHENOBARBITAL SODIUM 130 MG: 130 INJECTION INTRAMUSCULAR at 04:56

## 2023-07-21 RX ADMIN — PHENOBARBITAL SODIUM 130 MG: 130 INJECTION INTRAMUSCULAR at 11:48

## 2023-07-21 RX ADMIN — THIAMINE HCL TAB 100 MG 100 MG: 100 TAB at 08:02

## 2023-07-21 RX ADMIN — VANCOMYCIN HYDROCHLORIDE 1000 MG: 1 INJECTION, SOLUTION INTRAVENOUS at 22:04

## 2023-07-21 RX ADMIN — CEFEPIME HYDROCHLORIDE 2000 MG: 2 INJECTION, SOLUTION INTRAVENOUS at 11:47

## 2023-07-21 RX ADMIN — PHENOBARBITAL SODIUM 130 MG: 130 INJECTION INTRAMUSCULAR at 16:25

## 2023-07-21 RX ADMIN — PHENOBARBITAL SODIUM 65 MG: 65 INJECTION INTRAMUSCULAR; INTRAVENOUS at 17:53

## 2023-07-21 RX ADMIN — VANCOMYCIN HYDROCHLORIDE 1000 MG: 1 INJECTION, SOLUTION INTRAVENOUS at 10:04

## 2023-07-21 RX ADMIN — CEFEPIME HYDROCHLORIDE 2000 MG: 2 INJECTION, SOLUTION INTRAVENOUS at 01:42

## 2023-07-21 RX ADMIN — ONDANSETRON 4 MG: 2 INJECTION INTRAMUSCULAR; INTRAVENOUS at 12:00

## 2023-07-21 RX ADMIN — FOLIC ACID 1 MG: 1 TABLET ORAL at 08:02

## 2023-07-21 RX ADMIN — GABAPENTIN 300 MG: 300 CAPSULE ORAL at 04:56

## 2023-07-21 RX ADMIN — Medication 650 MG: at 00:40

## 2023-07-21 RX ADMIN — QUETIAPINE FUMARATE 50 MG: 50 TABLET ORAL at 01:23

## 2023-07-21 RX ADMIN — ONDANSETRON 4 MG: 2 INJECTION INTRAMUSCULAR; INTRAVENOUS at 04:57

## 2023-07-21 RX ADMIN — MULTIPLE VITAMINS W/ MINERALS TAB 1 TABLET: TAB ORAL at 08:02

## 2023-07-21 RX ADMIN — PHENOBARBITAL SODIUM 130 MG: 130 INJECTION INTRAMUSCULAR at 08:02

## 2023-07-21 RX ADMIN — SERTRALINE HYDROCHLORIDE 50 MG: 50 TABLET ORAL at 08:02

## 2023-07-21 RX ADMIN — ACETAMINOPHEN 650 MG: 325 TABLET ORAL at 08:03

## 2023-07-21 RX ADMIN — MAGNESIUM SULFATE 2 G: 2 INJECTION INTRAVENOUS at 01:23

## 2023-07-21 RX ADMIN — QUETIAPINE FUMARATE 50 MG: 50 TABLET ORAL at 22:04

## 2023-07-21 RX ADMIN — SODIUM CHLORIDE 75 ML/HR: 0.9 INJECTION, SOLUTION INTRAVENOUS at 10:05

## 2023-07-21 RX ADMIN — PHENOBARBITAL SODIUM 260 MG: 130 INJECTION INTRAMUSCULAR at 01:38

## 2023-07-21 RX ADMIN — METHADONE HYDROCHLORIDE 119 MG: 10 CONCENTRATE ORAL at 09:17

## 2023-07-21 NOTE — H&P
HISTORY & PHYSICAL EXAM  DEPARTMENT OF MEDICAL TOXICOLOGY  LEVEL 4 MEDICAL DETOX UNIT  Dinh Healy 33 y.o. female MRN: [id number]  Unit/Bed#:  DETOX 504-01 Encounter: [id number]      Reason for Admission/Principal Problem: Ethanol withdrawal, Ethanol use disorder  Admitting Provider: Kalee Bryson PA-C  Attending Provider: Violet Person*   7/20/2023  8:01 PM        * Benzodiazepine withdrawal [facility])  Assessment & Plan  • Patient reports a history of polysubstance abuse with history of withdrawal seizures. She reports that she is currently using Fentanyl daily in addition to receiving Methadone. Patient is concerned that her fentanyl is mixed with Xylazine and benzodiazepines as she reports to testing positive for benzodiazepines at her methadone clinic. • Reports that she is experiencing diaphoresis, tremors, anxiety, and nausea. • Likely symptoms are primarily more anxiety related to her Xylazine use. Regardless will monitor her on SEWS protocol for blaire-agonist withdrawal.   • Patient currently endorsing anxiety, agitation, tremor, nausea, diaphoresis, and tachycardia   • Initial dose 650 mg phenobarbital   • Symptomatic management   • Telemetry monitoring     Opioid use disorder, severe, dependence (HCC)  Assessment & Plan  • Patient with a history of chronic fentanyl use  • Uses 1-2 bags of fentanyl daily intravenously last use 0500 on 7/20/23  • Currently follows with Hakeem Baires for Methadone. Receives daily dose of 119 mg.    ? Patient reports that she has been testing positive for benzodiazepines at methadone clinic.      • Management of opioid withdrawal with symptomatic care while being managed for benzodiazepine withdrawal   • Will confirm methadone dosage in the AM  • Will Screen for hepatitis/HIV with h/o IVDU  • Case management consulted for assistance with aftercare resources - pt expresses interest in continued outpatient MAT after d/c    Cellulitis of right upper extremity  Assessment & Plan  • Track marks present on bilateral arms with 1cm lesions present with eschar and multiple purulent excoriations with surrounding erythema. + induration, no fluctuance. • Patient reports that she continues to injection primarily into LUE.    • Improvement noted from previous admission but worsening in condition of RUE    • CT RUE pending final read   • Patient was on IV antibiotics during last admission and did not finish regimen  • Worsening of RUE wounds in the past two weeks   • Restart IV antibiotics   • Continue to monitor     Leukocytosis  Assessment & Plan  Recent Labs     07/20/23  2100   WBC 16.62*     • History of leukocytosis on previous laboratory studies- currently more elevated than previous admission  • Patient remains afebrile, vital signs stable  • Suspected cellulitis bilateral UE -started on IV antibiotics in ED, continue for now   • Blood cultures pending  • Continue to monitor    Hypomagnesemia  Assessment & Plan  Recent Labs     07/20/23  2100   MG 1.8*     · Repleated   · Continue to monitor and replete as needed      Transaminitis  Assessment & Plan  Recent Labs     07/20/23  2100   *   ALT 96*   ALKPHOS 129*     · In the setting of IV drug use   · Previous hepatitis panel negative 7/3/23  · Patient denies abdominal pain or tenderness   · Continue to monitor    Depression  Assessment & Plan  · History of depression controlled on Seroquel 50 mg nightly and Zoloft 50 mg daily  · Denies SI/HI  · No continue observation indicated at this time  · Continue home medication  · Recommend continued follow-up with psychiatry outpatient    Protein-calorie malnutrition ([address])  Assessment & Plan  Malnutrition Findings:     ·  BMI 16.84   · In the setting of reported poor PO intake  · Nutrition consulted- recommendations appreciated                   VTE Prophylaxis: Enoxaparin (Lovenox)  / sequential compression device   Code Status: Full code       Anticipated Length of Stay:  Patient will be admitted on an Inpatient basis with an anticipated length of stay of  2-3  midnights. Justification for Hospital Stay: Benzodiazepine withdrawal    For any questions or concerns, please Tiger Text the advanced practitioner in the role of [facility]-DETOX-AP On Call      This patient qualifies for Level IV medically managed intensive inpatient services under the criteria set by the American Society of Addiction Medicine, including dimensions 1-3. The patient is in withdrawal (or is intoxicated with high risk of withdrawal), with severe and unstable medical and/or psychiatric (dual diagnosis) problems, requiring requires 24-hour medical and nursing care and the full resources of a [facility] hospital.          [facility] patient to medical detox unit and continue supportive care and stabilization of acute ethanol withdrawal per medical toxicology/detox treatment pathway. Monitor ethanol withdrawal severity via the Severity of Ethanol Withdrawal Scale (SEWS) Q4 hours and then hourly if/when SEWS > 6. Treat withdrawal per pathway and reassess Q30-60 minutes. Mild SEWS Score 1-6  Administer medications* (IV or PO; PO preferred):  • If initial SEWS score: diazepam 10mg PO/IV x 1 AND phenobarbital 65 mg PO/IV x 1  • If repeat SEWS score 1-6: phenobarbital 65 mg PO/IV q1 hour x 5 doses maximum   Reassessment:   • SEWS q1 hour after each dose until SEWS 0 x 2 hours  • VS q1 hours (until SEWS 0, then q4 hours)  • Notify provider for bedside evaluation if 5-dose maximum is reached, RASS of -3 to -5, or SEWS score escalates to moderate or severe.    Moderate SEWS Score 7-12  Administer medications* (IV):  • If initial SEWS score: diazepam 10mg IV x 1 AND phenobarbital 260 mg IV x 1  • If repeat SEWS score 7-12 or score escalated from mild: phenobarbital 130 mg IV q30 minutes x 5 doses maximum   Reassessment:  • SEWS q30 minutes after each dose until SEWS < 7 (then hourly until SEWS 0 x 2 hours)  • VS q30 minutes until SEWS < 7 (then hourly until SEWS 0, then q4 hours)  • Notify provider for bedside evaluation if 5-dose maximum is reached, RASS of -3 to -5, or SEWS score escalates to severe. Severe SEWS Score ? 13  Administer medications* (IV):  • If initial SEWS score: Diazepam 10 mg IV x 1 AND phenobarbital 650 mg IV piggyback x 1 over 15-30 minutes  • If repeat SEWS score ? 13 or score escalated from mild or moderate: phenobarbital 130 mg IV q30 minutes x 5 doses maximum   Reassessment:  • SEWS q30 minutes after each dose until SEWS < 7 (then hourly until SEWS 0 x 2 hours)   • VS q30 minutes until SEWS < 7 (then hourly until SEWS 0, then q4 hours)  • Notify provider for bedside evaluation if 5-dose maximum is reached or RASS of -3 to -5   *Hold medications and notify provider if CNS depression, respirations < 10/min, or RASS of -3 to -5. Medications to be administered adjunctively if more than 2 grams of phenobarbital is needed for stabilization of withdrawal; require attending approval.   • Dexmedetomidine infusion 0.1-1mcg/kg/hr IV infusion, titratable to reduced agitation (Goal: RASS -2)  • Ketamine   o Acute agitated delirium: 1-2 mg/kg IV or 4-5 mg/kg IM  o Refractory withdrawal: 0.1-1mg/kg/hr IV infusion, titratable to reduced agitation (Goal: RASS -2)    Further evaluation, screening and treatment:  Evaluate complete metabolic panel, transaminases, INR, and lipase. Assess hepatic ultrasound for any sign of alcoholic liver disease or cirrhosis, and ultimately refer for further hepatic evaluation and care as/if indicated. Additional medications for ethanol associated malnutrition:   Thiamine 100 mg IV daily, increase to 500 mg TID for signs/symptoms of Wernicke's Encephalopathy or Wernicke Korsakoff Syndrome   Folic acid 1 mg IV daily   Multivitamin PO daily      Will offer first monthly injection of Naltrexone 380 mg IM, once patient is stabilized, as it has been shown to assist in decreasing cravings for ethanol. Evaluate and treat for coexisting substance use, such as opioids and nicotine. Discuss risk factors for infectious disease, such as history of intravenous drug abuse, and offer hepatitis and HIV screening if indicated. Case management consultation to assist with coordination of subsequent treatment after discharge. Hx and PE limited by: None, patient alert and cooperative     HPI: Kathleen Elliott is a 33y.o. year old female with PMHx of AUD on maintenance methadone, anxiety, depression, and tobacco use, who presented to the [facility] ED due to benzodiazepine withdrawal.  Patient was recently discharged from detox 7/3/2023 for completion of treatment. She reports relapse and fentanyl use which she believes to be laced with benzodiazepines due to positive UDS is at her methadone clinic. Last use of fentanyl reported at 0500 on 7/20 and last methadone at 0600 on 7/20. Patient is requesting detoxification from benzodiazepines but plan like to continue maintenance methadone outpatient. Patient was admitted to [address] [place] [address] detox unit for benzodiazepine detoxification. She was started on SEWS protocol with symptom triggered phenobarbital along with symptomatic management of withdrawal. Patient received an initial dose of 650 mg phenobarbital.  Patient is currently endorsing anxiety, agitation, tremor, diaphoresis, nausea, and tachycardia. She was noted to have bilateral upper extremity wounds due to injecting fentanyl, improved on left arm from previous admission but worse on the right. A sepsis work-up was initiated in the ED and she was started on IV antibiotics which will be continued at this time. Methadone & Buprenorphine History  History of prior treatment for opioid dependence? yes  Currently on Methadone Maintenance?  yes  History of prior treatment with Suboxone? no  Currently taking Suboxone? no  History of using Suboxone without having a prescription? no  History of IVDA? yes  Co-existing substance use? Yes, benzodiazepines       SEWS Total Score: 16 (7/21/2023 12:19 AM)        Review of PDMP: yes     Social History     Substance and Sexual Activity   Alcohol Use Never     Social History     Substance and Sexual Activity   Drug Use Yes   • Types: Heroin, Benzodiazepines    Comment: 8 bags/day states benzos are mixed in     Social History     Tobacco Use   Smoking Status Former   • Types: Cigarettes   Smokeless Tobacco Never       Review of Systems   Constitutional: Positive for chills and diaphoresis. Negative for fever. HENT: Negative for congestion and rhinorrhea. Respiratory: Negative for cough, chest tightness and shortness of breath. Cardiovascular: Negative for chest pain and palpitations. Gastrointestinal: Positive for nausea. Negative for abdominal pain, constipation, diarrhea and vomiting. Genitourinary: Negative for difficulty urinating. Musculoskeletal: Negative for arthralgias, gait problem and myalgias. Neurological: Positive for tremors. Negative for dizziness, seizures and headaches. Psychiatric/Behavioral: Positive for agitation. Negative for hallucinations, self-injury and suicidal ideas. The patient is nervous/anxious.         Historical Information   Past Medical History:   Diagnosis Date   • Anxiety    • Chlamydia    • Depression    • Endometriosis    • IV drug user     IV heroin use, rehab 7 times    • Panic attacks    • Psychiatric disorder     anxiety   • PTSD (post-traumatic stress disorder)    • Varicella     childhood   • Visual impairment      Past Surgical History:   Procedure Laterality Date   • LAPAROTOMY       Family History   Problem Relation Age of Onset   • Depression Mother    • Drug abuse Brother    • Drug abuse Maternal Aunt    • Drug abuse Paternal Uncle    • Cancer Maternal Grandmother    • Cancer Paternal Grandfather      Social History   Marital Status: Single Occupation: Unemployed  Patient Pre-hospital Living Situation: Stable, lives with mother and child. Child is in mother's custody   Patient Pre-hospital Level of Mobility: Independent   Patient Pre-hospital Diet Restrictions: None    No Known Allergies    Prior to Admission medications    Medication Sig Start Date End Date Taking?  Authorizing Provider   methadone (DOLOPHINE) 10 MG/5ML solution Take 119 mg by mouth every 6 (six) hours as needed Take by mouth daily    Historical Provider, MD   QUEtiapine (SEROquel) 50 mg tablet Take 50 mg by mouth daily at bedtime    Historical Provider, MD   sertraline (ZOLOFT) 50 mg tablet Take 50 mg by mouth daily    Historical Provider, MD       Current Facility-Administered Medications   Medication Dose Route Frequency   • acetaminophen (TYLENOL) tablet 650 mg  650 mg Oral Q6H PRN   • cefepime (MAXIPIME) IVPB (premix in dextrose) 2,000 mg 50 mL  2,000 mg Intravenous Q12H   • cloNIDine (CATAPRES) tablet 0.1 mg  0.1 mg Oral Q6H PRN   • enoxaparin (LOVENOX) subcutaneous injection 40 mg  40 mg Subcutaneous Daily   • folic acid (FOLVITE) tablet 1 mg  1 mg Oral Daily   • gabapentin (NEURONTIN) capsule 300 mg  300 mg Oral Q8H PRN   • magnesium sulfate 2 g/50 mL IVPB (premix) 2 g  2 g Intravenous Once   • multivitamin-minerals (CENTRUM) tablet 1 tablet  1 tablet Oral Daily   • ondansetron (ZOFRAN) injection 4 mg  4 mg Intravenous Q6H PRN   • phenobarbital in sodium chloride 0.9% 650 mg  650 mg Intravenous Once   • QUEtiapine (SEROquel) tablet 50 mg  50 mg Oral HS   • sertraline (ZOLOFT) tablet 50 mg  50 mg Oral Daily   • sodium chloride 0.9 % infusion  75 mL/hr Intravenous Continuous   • thiamine tablet 100 mg  100 mg Oral Daily   • traZODone (DESYREL) tablet 50 mg  50 mg Oral HS PRN   • vancomycin (VANCOCIN) IVPB (premix in dextrose) 1,000 mg 200 mL  20 mg/kg Intravenous Q12H       Continuous Infusions:sodium chloride, 75 mL/hr, Last Rate: 75 mL/hr (07/21/23 0040)             Objective No intake or output data in the 24 hours ending 07/21/23 0056    Invasive Devices:   Peripheral IV 07/20/23 Proximal;Right;Ventral (anterior) Forearm (Active)   Site Assessment WDL; Clean;Dry; Intact 07/20/23 2101   Dressing Type Transparent 07/20/23 2101   Line Status Flushed;Saline locked; Blood return noted 07/20/23 2101   Dressing Status Clean;Dry; Intact 07/20/23 2101       Vitals   Vitals:    07/20/23 2014 07/21/23 0026   BP: 118/70 122/77   TempSrc: Oral Temporal   Pulse: (!) 110 (!) 110   Resp: 20 20   Patient Position - Orthostatic VS: Sitting Lying   Temp: 98.4 °F (36.9 °C) 98.4 °F (36.9 °C)       Physical Exam  Constitutional:       General: She is not in acute distress. Appearance: She is ill-appearing and diaphoretic. Eyes:      Comments: Pupils dilated bilaterally   Cardiovascular:      Rate and Rhythm: Regular rhythm. Tachycardia present. Pulses: Normal pulses. Heart sounds: Normal heart sounds. No murmur heard. No gallop. Pulmonary:      Effort: Pulmonary effort is normal. No respiratory distress. Breath sounds: Normal breath sounds. No wheezing or rales. Abdominal:      General: Abdomen is flat. Bowel sounds are normal. There is no distension. Palpations: Abdomen is soft. Tenderness: There is no abdominal tenderness. There is no guarding. Musculoskeletal:         General: Normal range of motion. Skin:     General: Skin is warm. Capillary Refill: Capillary refill takes less than 2 seconds. Comments: Injection sites on bilateral upper extremities  Wounds on right upper extremity with surrounding erythema, reports recent swelling   Neurological:      Mental Status: She is alert and oriented to person, place, and time. Motor: Tremor present. Coordination: Finger-Nose-Finger Test normal.   Psychiatric:         Attention and Perception: Attention and perception normal.         Mood and Affect: Mood is anxious. Affect is tearful. Speech: Speech normal.         Behavior: Behavior is agitated. Behavior is cooperative. Thought Content: Thought content normal.         Data:    EKG, Pathology, and Other Studies: I have personally reviewed pertinent reports. EKG: Sinus tachycardia.  No QT prolongation     Lab Results:  CBC ETOH     Lab Results   Component Value Date    WBC 16.62 (H) 07/20/2023    WBC 8.0 02/08/2017    RBC 4.17 07/20/2023    RBC 3.66 (L) 02/08/2017    HGB 11.9 07/20/2023    HGB 10.9 (L) 02/08/2017    HCT 37.7 07/20/2023    HCT 33.3 (L) 02/08/2017    MCV 90 07/20/2023    MCV 91.0 02/08/2017    MCH 28.5 07/20/2023    MCH 29.8 02/08/2017    MCHC 31.6 07/20/2023    MCHC 32.8 02/08/2017    RDW 14.1 07/20/2023    RDW 13.1 02/08/2017     07/20/2023     02/08/2017    MPV 8.5 (L) 07/20/2023    MPV 7.4 (L) 02/08/2017      Lab Results   Component Value Date    LACTICACID 1.3 07/20/2023      CMP UA         Component Value Date/Time    K 4.6 07/20/2023 2100     07/20/2023 2100    CO2 28 07/20/2023 2100    BUN 8 07/20/2023 2100    CREATININE 0.55 (L) 07/20/2023 2100         Component Value Date/Time    CALCIUM 9.6 07/20/2023 2100    ALKPHOS 129 (H) 07/20/2023 2100     (H) 07/20/2023 2100    ALT 96 (H) 07/20/2023 2100      Lab Results   Component Value Date    CLARITYU Slightly Cloudy (A) 07/20/2023    COLORU Straw 07/20/2023    SPECGRAV 1.015 07/20/2023    PHUR 7.0 07/20/2023    PHUR 6.0 11/30/2016    GLUCOSEU Negative 07/20/2023    KETONESU Negative 07/20/2023    BLOODU Negative 07/20/2023    PROTEIN UA Negative 07/20/2023    NITRITE Negative 07/20/2023    BILIRUBINUR Negative 07/20/2023    UROBILINOGEN Negative 07/20/2023    UROBILINOGEN 0.2 11/30/2016    LEUKOCYTESUR 25.0 (A) 07/20/2023    WBCUA 4-10 (A) 07/20/2023    RBCUA None Seen 07/20/2023    HYALINE NONE SEEN 09/14/2016    HYALINE NONE SEEN 09/14/2016    BACTERIA Moderate (A) 07/20/2023    BACTERIA FEW (A) 09/14/2016    BACTERIA FEW (A) 09/14/2016 EPIS Moderate (A) 07/20/2023        Liver Function Test: ASA     Lab Results   Component Value Date    TBILI 0.23 07/20/2023    ALKPHOS 129 (H) 07/20/2023     (H) 07/20/2023    ALT 96 (H) 07/20/2023    TP 7.7 07/20/2023    ALB 4.2 07/20/2023      Lab Results   Component Value Date    SALICYLATE <5 32/02/4047      Troponin APAP     No results found for: "TROPONINI"   Lab Results   Component Value Date    ACTMNPHEN <10 (L) 06/30/2023      VBG HCG     No results found for: "PHVEN", "NJA1YTY", "PO2VEN", "OUV2DFZ", "[place]", "J3QEVYFMX", "U7ATOXP"   No results found for: "HCGQUANT"   ABG Urine Drug Screen     No results found for: "PHART", "JPP8RTL", "PO2ART", "DJO2OMA", "BEART", "Q0GURSILR", "O2HGB", "SOURC", "RAYMOND", "VTAC", "Adaline Skiff", "INSPIREDAIR", "PEEP"   Lab Results   Component Value Date    AMPMETHUR Negative 07/20/2023    BARBTUR Positive (A) 07/20/2023    BDZUR Positive (A) 07/20/2023    COCAINEUR Negative 07/20/2023    METHADONEUR Positive (A) 07/20/2023    OPIATEUR Negative 07/20/2023    PCPUR Negative 07/20/2023    THCUR Negative 07/20/2023    OXYCODONEUR Negative 07/20/2023      Lactate INR     Lab Results   Component Value Date    LACTICACID 1.3 07/20/2023      No results found for: "INR"   PTT Protime     No results found for: "PTT"     No results found for: "PROTIME"           Imaging Studies: I have personally reviewed pertinent reports. Counseling / Coordination of Care  Total floor / unit time spent today 60 minutes. Greater than 50% of total time was spent with the patient and / or family counseling and / or coordination of care. ** Please Note: This note has been constructed using a voice recognition system.  **

## 2023-07-21 NOTE — ASSESSMENT & PLAN NOTE
· In the setting of IV drug use and infection (cellulitis)  · Previous hepatitis panel negative 7/3/23  · Patient denies abdominal pain or tenderness   · Continue to monitor

## 2023-07-21 NOTE — NURSING NOTE
Pt refused AM labs. RN provided education, pt continues to verbalize refusal.  Dina Bhatt PA-C notified via face-to-face conversation.

## 2023-07-21 NOTE — PROGRESS NOTES
Jodi Rodriguez is a 33 y.o. female who is currently ordered Vancomycin IV with management by the Pharmacy Consult service. Relevant clinical data and objective / subjective history reviewed. Vancomycin Assessment:  Indication and Goal AUC/Trough: Soft tissue (goal -600, trough >10)  Clinical Status:  new  Micro:     Renal Function:  SCr: 0.55 mg/dL  CrCl: 107.9 mL/min  Renal replacement: Not on dialysis  Days of Therapy: 1  Current Dose: 750 mg IV x once   Vancomycin Plan:  New Dosin mg IV q12h   Estimated AUC: 481 mcg*hr/mL  Estimated Trough: 12.7 mcg/mL  Next Level:  with AM labs  Renal Function Monitoring: Daily BMP and [place] will continue to follow closely for s/sx of nephrotoxicity, infusion reactions and appropriateness of therapy. BMP and CBC will be ordered per protocol. We will continue to follow the patient’s culture results and clinical progress daily.     Serafina Lombard, Pharmacist

## 2023-07-21 NOTE — ED PROVIDER NOTES
History  Chief Complaint   Patient presents with   • Detox Evaluation     Pt states they are currently on methadone and injected fentanyl mixed with benzos at 5am today. Pt c/o headache, nausea, runny nose, and feeling hot. Pt is visibly shaking during triage     81-year-old female past medical history of IV drug abuse, psychiatric disorders, presents to emergency department for detox evaluation from benzodiazepines. She reports that she is currently on 119 mg of methadone, last dose was around 6 to 7 AM today. She reports that she injects IV fentanyl and its mixed with benzos and has been doing that for year. She was in detox but signed out and continued using. He last injected 2-3 bags IV around 5 AM.  Denies other drug use or alcohol use. She reports a couple of hours of shaking, nausea, headaches, myalgias. She reports she mainly injects into her forearms. She reports to both sides and scars. She does report new redness to her right forearm she thinks for a couple of days but swelling to right forearm that she thinks started yesterday. Denies numbness, tingling, purulent drainage, weakness, decreased range of motion. Denies SI or HI. Denies fever, chest pain, shortness of breath, palpitations, dysuria, urinary urgency or frequency, hematuria, abdominal pain, vomiting, diarrhea, cough, leg swelling, confusion. History provided by:  Patient  Detox Evaluation  Associated symptoms: headaches and nausea    Associated symptoms: no abdominal pain, no confusion, no shortness of breath, no suicidal ideation, no vomiting and no weakness        Prior to Admission Medications   Prescriptions Last Dose Informant Patient Reported? Taking?    QUEtiapine (SEROquel) 50 mg tablet   Yes No   Sig: Take 50 mg by mouth daily at bedtime   methadone (DOLOPHINE) 10 MG/5ML solution   Yes No   Sig: Take 119 mg by mouth every 6 (six) hours as needed Take by mouth daily   sertraline (ZOLOFT) 50 mg tablet   Yes No   Sig: Take 50 mg by mouth daily      Facility-Administered Medications: None       Past Medical History:   Diagnosis Date   • Anxiety    • Chlamydia    • Depression    • Endometriosis    • IV drug user     IV heroin use, rehab 7 times    • Panic attacks    • Psychiatric disorder     anxiety   • PTSD (post-traumatic stress disorder)    • Varicella     childhood   • Visual impairment        Past Surgical History:   Procedure Laterality Date   • LAPAROTOMY         Family History   Problem Relation Age of Onset   • Depression Mother    • Drug abuse Brother    • Drug abuse Maternal Aunt    • Drug abuse Paternal Uncle    • Cancer Maternal Grandmother    • Cancer Paternal Grandfather      I have reviewed and agree with the history as documented. E-Cigarette/Vaping     E-Cigarette/Vaping Substances     Social History     Tobacco Use   • Smoking status: Former     Types: Cigarettes   • Smokeless tobacco: Never   Substance Use Topics   • Alcohol use: Never   • Drug use: Yes     Types: Heroin, Benzodiazepines     Comment: hx of heroin; currently reports benzo use       Review of Systems   Constitutional: Positive for chills. Negative for fever. HENT: Positive for rhinorrhea. Negative for sore throat. Eyes: Negative for pain and visual disturbance. Respiratory: Negative for cough and shortness of breath. Cardiovascular: Negative for chest pain and leg swelling. Gastrointestinal: Positive for nausea. Negative for abdominal pain, blood in stool, diarrhea and vomiting. Genitourinary: Negative for dysuria, frequency and hematuria. Musculoskeletal: Positive for joint swelling and myalgias. Negative for neck pain and neck stiffness. Skin: Positive for color change and wound. Neurological: Positive for headaches. Negative for dizziness, syncope, speech difficulty, weakness and numbness. Psychiatric/Behavioral: Negative for confusion and suicidal ideas. All other systems reviewed and are negative.       Physical Exam  Physical Exam  Vitals and nursing note reviewed. Constitutional:       General: She is awake. She is not in acute distress. Appearance: Normal appearance. She is ill-appearing. She is not diaphoretic. HENT:      Head: Normocephalic and atraumatic. Mouth/Throat:      Lips: Pink. Mouth: Mucous membranes are moist.   Eyes:      General: Vision grossly intact. Conjunctiva/sclera: Conjunctivae normal.      Comments: Dilated pupils. PERRL. EOMI. Vision grossly intact. Cardiovascular:      Rate and Rhythm: Normal rate and regular rhythm. Heart sounds: Normal heart sounds. No murmur heard. Pulmonary:      Effort: Pulmonary effort is normal. No respiratory distress. Breath sounds: Normal breath sounds. Abdominal:      General: There is no distension. Palpations: Abdomen is soft. Tenderness: There is no abdominal tenderness. Musculoskeletal:      Right lower leg: No edema. Left lower leg: No edema. Skin:     General: Skin is warm and dry. Capillary Refill: Capillary refill takes less than 2 seconds. Findings: Erythema and wound present. Neurological:      Mental Status: She is alert and oriented to person, place, and time. GCS: GCS eye subscore is 4. GCS verbal subscore is 5. GCS motor subscore is 6. Sensory: No sensory deficit. Motor: Tremor present. No weakness. Coordination: Coordination normal.      Gait: Gait normal.   Psychiatric:         Thought Content: Thought content does not include suicidal ideation.          Vital Signs  ED Triage Vitals [07/20/23 2014]   Temperature Pulse Respirations Blood Pressure SpO2   98.4 °F (36.9 °C) (!) 110 20 118/70 97 %      Temp Source Heart Rate Source Patient Position - Orthostatic VS BP Location FiO2 (%)   Oral Monitor Sitting Left arm --      Pain Score       --           Vitals:    07/20/23 2014   BP: 118/70   Pulse: (!) 110   Patient Position - Orthostatic VS: Sitting Visual Acuity      ED Medications  Medications   PHENobarbital 260 mg in sodium chloride 0.9 % 100 mL IVPB (has no administration in time range)   sodium chloride 0.9 % infusion (has no administration in time range)   acetaminophen (TYLENOL) tablet 650 mg (has no administration in time range)   ondansetron (ZOFRAN) injection 4 mg (has no administration in time range)   traZODone (DESYREL) tablet 50 mg (has no administration in time range)   gabapentin (NEURONTIN) capsule 300 mg (has no administration in time range)   cloNIDine (CATAPRES) tablet 0.1 mg (has no administration in time range)   thiamine tablet 100 mg (has no administration in time range)   folic acid (FOLVITE) tablet 1 mg (has no administration in time range)   multivitamin-minerals (CENTRUM) tablet 1 tablet (has no administration in time range)   enoxaparin (LOVENOX) subcutaneous injection 40 mg (has no administration in time range)   sodium chloride 0.9 % bolus 1,000 mL (1,000 mL Intravenous New Bag 7/20/23 2211)   vancomycin (VANCOCIN) IVPB (premix in dextrose) 750 mg 150 mL ( Intravenous Canceled Entry 7/20/23 2233)   ondansetron (ZOFRAN) injection 4 mg (4 mg Intravenous Given 7/20/23 2223)       Diagnostic Studies  Results Reviewed     Procedure Component Value Units Date/Time    FLU/RSV/COVID - if FLU/RSV clinically relevant [[id number]]  (Normal) Collected: 07/20/23 2239    Lab Status: Final result Specimen: Nares from Nose Updated: 07/20/23 2322     SARS-CoV-2 Negative     INFLUENZA A PCR Negative     INFLUENZA B PCR Negative     RSV PCR Negative    Narrative:      FOR PEDIATRIC PATIENTS - copy/paste COVID Guidelines URL to browser: [email or web address]    SARS-CoV-2 assay is a Nucleic Acid Amplification assay intended for the  qualitative detection of nucleic acid from SARS-CoV-2 in nasopharyngeal  swabs. Results are for the presumptive identification of SARS-CoV-2 RNA.     Positive results are indicative of infection with SARS-CoV-2, the virus  causing COVID-19, but do not rule out bacterial infection or co-infection  with other viruses. Laboratories within the [facility] and its  territories are required to report all positive results to the appropriate  public health authorities. Negative results do not preclude SARS-CoV-2  infection and should not be used as the sole basis for treatment or other  patient management decisions. Negative results must be combined with  clinical observations, patient history, and epidemiological information. This test has not been FDA cleared or approved. This test has been authorized by FDA under an Emergency Use Authorization  (EUA). This test is only authorized for the duration of time the  declaration that circumstances exist justifying the authorization of the  emergency use of an in vitro diagnostic tests for detection of SARS-CoV-2  virus and/or diagnosis of COVID-19 infection under section 564(b)(1) of  the Act, 21 U. S.C. 941WVZ-0(G)(9), unless the authorization is terminated  or revoked sooner. The test has been validated but independent review by FDA  and CLIA is pending. Test performed using [employer] Gene[employer]pert: This RT-PCR assay targets N2,  a region unique to SARS-CoV-2. A conserved region in the E-gene was chosen  for pan-Sarbecovirus detection which includes SARS-CoV-2. According to CMS-2020-01-R, this platform meets the definition of high-throughput technology. UA w Reflex to Microscopic w Reflex to Culture [[id number]]     Lab Status: No result Specimen: Urine     Lactic acid, plasma (w/reflex if result > 2.0) [[id number]]  (Normal) Collected: 07/20/23 2155    Lab Status: Final result Specimen: Blood from Line, Venous Updated: 07/20/23 2222     LACTIC ACID 1.3 mmol/L     Narrative:      Result may be elevated if tourniquet was used during collection. Blood culture #1 [[id number]] Collected: 07/20/23 2155    Lab Status:  In process Specimen: Blood from Line, Venous Updated: 07/20/23 2203    Blood culture #2 [[id number]] Collected: 07/20/23 2155    Lab Status: In process Specimen: Blood from Line, Venous Updated: 07/20/23 2203    Rapid drug screen, urine [[id number]]  (Abnormal) Collected: 07/20/23 2100    Lab Status: Final result Specimen: Urine, Other Updated: 07/20/23 2133     Amph/Meth UR Negative     Barbiturate Ur Positive     Benzodiazepine Urine Positive     Cocaine Urine Negative     Methadone Urine Positive     Opiate Urine Negative     PCP Ur Negative     THC Urine Negative     Oxycodone Urine Negative    Narrative:      Presumptive report. If requested, specimen will be sent to reference lab for confirmation. FOR MEDICAL PURPOSES ONLY. IF CONFIRMATION NEEDED PLEASE CONTACT THE LAB WITHIN 5 DAYS.     Drug Screen Cutoff Levels:  AMPHETAMINE/METHAMPHETAMINES  1000 ng/mL  BARBITURATES     200 ng/mL  BENZODIAZEPINES     200 ng/mL  COCAINE      300 ng/mL  METHADONE      300 ng/mL  OPIATES      300 ng/mL  PHENCYCLIDINE     25 ng/mL  THC       50 ng/mL  OXYCODONE      100 ng/mL    Comprehensive metabolic panel [[id number]]  (Abnormal) Collected: 07/20/23 2100    Lab Status: Final result Specimen: Blood from Arm, Right Updated: 07/20/23 2131     Sodium 139 mmol/L      Potassium 4.6 mmol/L      Chloride 101 mmol/L      CO2 28 mmol/L      ANION GAP 10 mmol/L      BUN 8 mg/dL      Creatinine 0.55 mg/dL      Glucose 118 mg/dL      Calcium 9.6 mg/dL       U/L      ALT 96 U/L      Alkaline Phosphatase 129 U/L      Total Protein 7.7 g/dL      Albumin 4.2 g/dL      Total Bilirubin 0.23 mg/dL      eGFR 128 ml/min/1.73sq m     Narrative:      [place] guidelines for Chronic Kidney Disease (CKD):   •  Stage 1 with normal or high GFR (GFR > 90 mL/min/1.73 square meters)  •  Stage 2 Mild CKD (GFR = 60-89 mL/min/1.73 square meters)  •  Stage 3A Moderate CKD (GFR = 45-59 mL/min/1.73 square meters)  •  Stage 3B Moderate CKD (GFR = 30-44 mL/min/1.73 square meters)  •  Stage 4 Severe CKD (GFR = 15-29 mL/min/1.73 square meters)  •  Stage 5 End Stage CKD (GFR <15 mL/min/1.73 square meters)  Note: GFR calculation is accurate only with a steady state creatinine    Magnesium [[id number]]  (Abnormal) Collected: 07/20/23 2100    Lab Status: Final result Specimen: Blood from Arm, Right Updated: 07/20/23 2131     Magnesium 1.8 mg/dL     CBC and differential [[id number]]  (Abnormal) Collected: 07/20/23 2100    Lab Status: Final result Specimen: Blood from Arm, Right Updated: 07/20/23 2113     WBC 16.62 Thousand/uL      RBC 4.17 Million/uL      Hemoglobin 11.9 g/dL      Hematocrit 37.7 %      MCV 90 fL      MCH 28.5 pg      MCHC 31.6 g/dL      RDW 14.1 %      MPV 8.5 fL      Platelets 082 Thousands/uL      nRBC 0 /100 WBCs      Neutrophils Relative 79 %      Immat GRANS % 0 %      Lymphocytes Relative 15 %      Monocytes Relative 6 %      Eosinophils Relative 0 %      Basophils Relative 0 %      Neutrophils Absolute 13.08 Thousands/µL      Immature Grans Absolute 0.05 Thousand/uL      Lymphocytes Absolute 2.49 Thousands/µL      Monocytes Absolute 0.92 Thousand/µL      Eosinophils Absolute 0.04 Thousand/µL      Basophils Absolute 0.04 Thousands/µL     POCT alcohol breath test [[id number]]  (Normal) Resulted: 07/20/23 2110    Lab Status: Final result Updated: 07/20/23 2110     EXTBreath Alcohol 0.000    POCT pregnancy, urine [[id number]]  (Normal) Resulted: 07/20/23 2059    Lab Status: Final result Updated: 07/20/23 2100     EXT Preg Test, Ur Negative     Control Valid                 XR forearm 2 views RIGHT    (Results Pending)   CT upper extremity wo contrast right    (Results Pending)              Procedures  Procedures         ED Course  ED Course as of 07/20/23 2347   Thu Jul 20, 2023 2040 [facility] for after hours number for [facility] where patient gets methadone.  [phone]   2041 Discussed with Detox DMTIRIY Rj Guzman Beckie Mahoney, she will come down and evaluate patient. 2121 Injection sites with wounds of various stages of healing on b/l forearms. No currently open wounds. Mild erythema R forearm. Patient reports she noticed some swelling yesterday. 2135 AST(!): 103   2135 ALT(!): 96   2135 Alkaline Phosphatase(!): 129  Patient denies abdominal pain. Reports nausea for a few hours, no vomiting. Abdomen soft, non-distended without tenderness or peritoneal signs. 2222 Procedure Note: EKG  Date/Time: 07/20/23 9:22 PM   Performed by: Bigg Stover by: Giuliana Hamm  ECG interpreted by me, the ED Provider: yes   The EKG demonstrates:  Rate 103 bpm  Rhythm Sinus tachycardia with short AZ   QTc 453 ms   No ST elevations/depressions     2225 LACTIC ACID: 1.3   2234 Patient refuses additional IV, unable to use IV contrast due to IV being in the same arm. Will get CT non con.    2311 Discussed with Detox DMITRIY Jhon Hutchison, patient to be accepted detox unit pending CT results. She recommends giving one time 260 mg phenobarbital IV    2313 Will be ED hold                             Initial Sepsis Screening     Row Name 07/20/23 2327                Is the patient's history suggestive of a new or worsening infection? Yes (Proceed)  -MANE        Suspected source of infection wound infection;soft tissue  -MANE        Indicate SIRS criteria Tachycardia > 90 bpm;Leukocytosis (WBC > 74723 IJL) OR Leukopenia (WBC <4000 IJL) OR Bandemia (WBC >10% bands)  -MANE        Are two or more of the above signs & symptoms of infection both present and new to the patient? Yes (Proceed)  -MANE        Assess for evidence of organ dysfunction: Are any of the below criteria present within 6 hours of suspected infection and SIRS criteria that are NOT considered to be chronic conditions?  --              User Key  (r) = Recorded By, (t) = Taken By, (c) = Cosigned By    [facility] Name Provider Type    Dara Meier PA-C Physician Assistant Medical Decision Making  Presenting for detox evaluation. Reports fentanyl use, but on methadone and also reports benzo use that is mixed with fentanyl. Patient also reporting new redness and swelling of R forearm with multiple wounds noted. High risk due to IVDA. Recently required course of IV abx for soft tissue infection but signed out AMA. Tachycardic on arrival. Afebrile. Ill-appearing. Erythema, swelling, tenderness, small area of fluctuance noted on exam. Strength, sensation, distal perfusion intact. Compartments soft. No streaking. Patient meeting sepsis criteria due to leukocytosis, tachycardia, will get additional labs, imaging of R forearm. IV vancomycin started due to IV drug abuse. No abdominal tenderness on exam, no jaundice, patient with new LFT elevations. Kidney function within normal limits. Lactic within normal limits. Withdrawal vs sepsis likely 2/2 to wound infection. ECG showing sinus tachycardia with short SC, no acute ischemic changes. Discussed with Detox DMITRIY, patient accepted to Detox unit. All imaging and/or lab testing discussed with patient. Patient and/or family members verbalizes understanding and agrees with plan for admission. Patient is stable for admission.     Portions of the record may have been created with voice recognition software. Occasional wrong word or "sound a like" substitutions may have occurred due to the inherent limitations of voice recognition software. Read the chart carefully and recognize, using context, where substitutions have occurred. Amount and/or Complexity of Data Reviewed  Labs: ordered. Decision-making details documented in ED Course. Radiology: ordered. Risk  Prescription drug management. Decision regarding hospitalization.           Disposition  Final diagnoses:   Polysubstance abuse ([address])   Cellulitis of right arm     Time reflects when diagnosis was documented in both MDM as applicable and the Disposition within this note     Time User Action Codes Description Comment    7/20/2023 11:22 PM Mode Ernandez Add [P85.50] Opioid use disorder, severe, dependence ([address])     7/20/2023 11:26 PM Shai Donaldo Add [F19.10] Polysubstance abuse ([address])     7/20/2023 11:27 PM Shai Donaldo Add [W46.927] Cellulitis of right arm       ED Disposition     ED Disposition   Admit    Condition   Stable    Date/Time   Thu Jul 20, 2023 11:27 PM    Comment   Case was discussed with Detox DMITRIY and the patient's admission status was agreed to be Admission Status: inpatient status to the service of Dr. Parker Sanders . Follow-up Information    None         Patient's Medications   Discharge Prescriptions    No medications on file       No discharge procedures on file.     PDMP Review       Value Time User    PDMP Reviewed  Yes 7/20/2023  8:11 PM Lucas Dempsey PA-C          ED Provider  Electronically Signed by           Lucas Dempsey PA-C  07/20/23 7030

## 2023-07-21 NOTE — QUICK NOTE
Spoke with case management with regard to patients maintenance methadone dosing. [facility] was contacted. They reported daily methadone maintenance dosing of 119mg. Last dose was administered 7/20/2023. Will continue current methadone dosing and encourage continued follow up with [facility].

## 2023-07-21 NOTE — ASSESSMENT & PLAN NOTE
Malnutrition Findings:     ·  BMI 16.84   · In the setting of reported poor PO intake  · Nutrition consulted- recommendations appreciated

## 2023-07-21 NOTE — UTILIZATION REVIEW
NOTIFICATION OF INPATIENT ADMISSION   AUTHORIZATION REQUEST   SERVICING FACILITY:   [address] [place] [address]  Tax ID: [id number]  NPI: [id number] ATTENDING PROVIDER:  Attending Name and NPI#: Derian Ewing Md [[id number]]  Address: [address] [place] [address]  Phone: [phone]     ADMISSION INFORMATION:  [facility]   Place of Service Code: 21  Inpatient Admission Date/Time: 7/20/23 11:22 PM  Discharge Date/Time: No discharge date for patient encounter. Admitting Diagnosis Code/Description:  Protein-calorie malnutrition ([address]) [E46]  Polysubstance abuse ([address]) [F19.10]  Cellulitis of right arm [L03.113]  Opioid use disorder, severe, dependence ([address]) [F11.20]  Admitted to substance misuse detoxification center [Z78.9]     UTILIZATION REVIEW CONTACT:  Te [facility] Utilization   Network Utilization Review Department  Phone: [phone]  Fax [phone]  Email: [email or web address]  Contact for approvals/pending authorizations, clinical reviews, and discharge. PHYSICIAN ADVISORY SERVICES:  Medical Necessity Denial & Xnea-rt-Wrpp Review  Phone: [phone]  Fax: [phone]  Email: [email or web address]

## 2023-07-21 NOTE — ASSESSMENT & PLAN NOTE
• Patient with a history of chronic fentanyl use, is currently on methadone  • Has been using 1-2 bags of fentanyl daily intravenously in addition to the methadone  • Currently follows with [address] [place] [address] for Methadone. Receives daily dose of 119 mg.    • Dose was confirmed with clinic  • Will continue daily methadone dosing while here.   Patient was not interested in transitioning to Suboxone

## 2023-07-21 NOTE — MALNUTRITION/BMI
This medical record reflects one or more clinical indicators suggestive of malnutrition and/or morbid obesity. Malnutrition Findings:   Adult Malnutrition type: Chronic illness  Adult Degree of Malnutrition: Malnutrition of mild degree  Malnutrition Characteristics: Fat loss, Muscle loss, Inadequate energy                  360 Statement: Mild malnutrition r/t inadequate intake due to opioid use disorder as evidenced by unable to eat sufficient energy/protein to maintain a healthy weight, reduced appetite due to Opioid dependence, mild fat/muscle depletion of orbital/temple areas. Treated with Ensure Plus high protein bid    BMI Findings:  Adult BMI Classifications: Underweight < 18.5        Body mass index is 16.84 kg/m². See Nutrition note dated 7/21/23 for additional details. Completed nutrition assessment is viewable in the nutrition documentation.

## 2023-07-21 NOTE — ASSESSMENT & PLAN NOTE
· Area of cellulitis and possible abscess to RUE appears continues to be improving per patient and RN  · No area of fluctuance, no erythema appreciated on exam today  · Transitioned to PO antibiotics   · Continue to monitor for worsening signs of infection. Encouraged patient to avoid any IVDU upon discharge.

## 2023-07-21 NOTE — ED NOTES
Patient transported to [address] [place] [address]  07/20/23 8215 Breastfeeding Mother’s Support Group Information/Back To Sleep Handout/Shaken Baby Prevention Handout/Birth Certificate Instructions/Discharge Medication Information for Patients and Families Pocket Guide/Guide to Postpartum Care/Breastfeeding Log/[facility] Hearing Screen Program/Breastfeeding Guide and Packet

## 2023-07-21 NOTE — ASSESSMENT & PLAN NOTE
· History of depression controlled on Seroquel 50 mg nightly and Zoloft 50 mg daily  · Denies SI/HI  · No continue observation indicated at this time  · Continue home medication  · Recommend continued follow-up with psychiatry outpatient

## 2023-07-21 NOTE — PROGRESS NOTES
Jodi Rodriguez is a 33 y.o. female who is currently ordered Vancomycin IV with management by the Pharmacy Consult service. Relevant clinical data and objective / subjective history reviewed. Vancomycin Assessment:  1. Indication and Goal AUC/Trough: Soft tissue (goal -600, trough >10)  2. Clinical Status: stable  3. Micro: Blood cultures received in lab. Culture in progress. 4. Renal Function:  SCr: 0.55 mg/dL  CrCl: 107.9 mL/min  5. Days of Therapy: 2  6. Current Dose: 1000 mg IV every 12 hours  Vancomycin Plan:  1. Dosing: Continue current vancomycin dose  2. Estimated AUC: 481 mcg*hr/mL  3. Estimated Trough: 12.7 mcg/mL  4. Next Level: Random level 7/22/2023 with morning labs   5. Renal Function Monitoring: Daily BMP and [place] will continue to follow closely for s/sx of nephrotoxicity, infusion reactions and appropriateness of therapy. BMP and CBC will be ordered per protocol. Patient refused morning labs today. We will continue to follow the patient’s culture results and clinical progress daily.     Sheldon Cortez, Pharmacist

## 2023-07-21 NOTE — UTILIZATION REVIEW
Initial Clinical Review    Pt presented to [facility] for its Level IV medically managed intensive inpatient detox unit. Admission: Date/Time/Statement:   Admission Orders (From admission, onward)     Ordered        07/20/23 2322  Inpatient Admission  Once                      Orders Placed This Encounter   Procedures   • Inpatient Admission     Standing Status:   Standing     Number of Occurrences:   1     Order Specific Question:   Level of Care     Answer:   Med Surg [16]     Order Specific Question:   Estimated length of stay     Answer:   More than 2 Midnights     Order Specific Question:   Certification     Answer:   I certify that inpatient services are medically necessary for this patient for a duration of greater than two midnights. See H&P and MD Progress Notes for additional information about the patient's course of treatment. ED Arrival Information     Expected   -    Arrival   7/20/2023 19:57    Acuity   Emergent            Means of arrival   Walk-In    Escorted by   [facility] Toxicology    Admission type   Emergency            Arrival complaint   detox           Chief Complaint   Patient presents with   • Detox Evaluation     Pt states they are currently on methadone and injected fentanyl mixed with benzos at 5am today. Pt c/o headache, nausea, runny nose, and feeling hot. Pt is visibly shaking during triage       Initial Presentation: 33 y.o. female who presented to medical detox. Inpatient admission for evaluation and treatment of polysubstance withdrawal syndrome. Presented w/ need for detox from benzodiazepines and opioids. Reports fentanyl use daily. Believes it to be laced w/ benzodiazepines as had positive UDS at methadone clinic. UDS on ED arrival positive for barbiturates, benzodiazepines, methadone. Last use of fentanyl @ 0500 on 7/20, last methadone @ 0600 on 7/20. Has prior rehab treatment for withdrawal. On exam, b/l UE wounds.  Plan: SEWS monitoring w/ phenobarbital management, PO thiamine/folic acid supplement, IVF, telemetry, continuous pulse ox, continue PTA meds, trend labs, replete electrolytes as needed. IV ABX. Date: 07/21/23       Day 2: Pt reports anxiety, agitation, nausea. On exam, tremors, diaphoresis, tachycardia, b/l UE injection sites. SEWS 16. COWS 10. Plan: continue SEWS monitoring w/ phenobarbital management, COWS monitoring, PO thiamine/folic acid supplement, telemetry, continuous pulse ox, continue PTA meds, trend labs, replete electrolytes as needed, continue IV ABX, follow blood cultures. ED Triage Vitals   Temperature Pulse Respirations Blood Pressure SpO2   07/20/23 2014 07/20/23 2014 07/20/23 2014 07/20/23 2014 07/20/23 2014   98.4 °F (36.9 °C) (!) 110 20 118/70 97 %      Temp Source Heart Rate Source Patient Position - Orthostatic VS BP Location FiO2 (%)   07/20/23 2014 07/20/23 2014 07/20/23 2014 07/20/23 2014 --   Oral Monitor Sitting Left arm       Pain Score       07/21/23 0023       No Pain          Wt Readings from Last 1 Encounters:   07/21/23 44.5 kg (98 lb 1.7 oz)     Vital Signs:   Date/Time Temp Pulse Resp BP MAP (mmHg) SpO2 O2 Device   07/21/23 0700 97.3 °F (36.3 °C) Abnormal  96 17 126/77 93 95 % None (Room air)   07/21/23 0453 -- 122 Abnormal  -- -- -- -- --   07/21/23 0127 -- 129 Abnormal  -- -- -- -- --   07/21/23 0026 98.4 °F (36.9 °C) 110 Abnormal  20 122/77 92 92 % None (Room air)       Severity of Ethanol Withdrawal Scale (SEWS):   Row Name 07/21/23 0754 07/21/23 0453 07/21/23 0127 07/21/23 0019   ANXIETY: Do you feel that something bad is about to happen to you right now? 3  -LL 3  -NR 3  -NR 3  -NR   NAUSEA and DRY HEAVES or VOMITING? 0  -LL 3  -NR 0  -NR 3  -NR   SWEATING: (includes moist palms, sweating now)? Score 0 or 2 2  -LL 2  -NR 2  -NR 2  -NR   TREMOR: with arms extended eyes closed? 2  -LL 2  -NR 2  -NR 2  -NR   AGITATION: Fidgety, restless, pacing?  3  -LL 3  -NR 3  -NR 3  -NR   DISORIENTATION: 0  -LL 0  -NR 0  -NR 0  -NR   HALLUCINATIONS: 0  -LL 0  -NR 0  -NR 0  -NR   VITAL SIGNS: ANY (Pulse >296, Diastolic BP >54, Temp >80.1) 0  -LL 3  -NR 3  -NR 3  -NR   SEWS Total Score 10  -LL 16  -NR 13  -NR 16  -NR     Clinical Opiate Withdrawal Scale (COWS):  Row Name 07/21/23 0026   Resting Pulse Rate: Measured After Patient is Sitting or Lying for One Minute 2  -NR   GI Upset: Over Last Half Hour 1  -NR   Sweating: Over Past Half Hour Not Accounted for by Room Temperature of Patient Activity 1  -NR   Tremor: Observation of Outstretched Hands 2  -NR   Restlessness: Observation During Assessment 3  -NR   Yawning: Observation During Assessment 0  -NR   Pupil Size 0  -NR   Anxiety and Irritability 1  -NR   Bone or Joint Aches: If Patient was Having Pain Previously, Only the Additional Component Attributed to Opiate Withdrawal is Scored 0  -NR   Gooseflesh Skin 0  -NR   Runny Nose or Tearing: Not Accounted for by Cold Symptoms or Allergies 0  -NR   Clinical Opiate Withdrawal Scale Total Score 10  -NR         Pertinent Labs/Diagnostic Test Results:   CT upper extremity wo contrast right   Final Result by Santino Trinidad MD (07/21 0815)   Right forearm and wrist extensive dorsal subcutaneous edema consistent with nonspecific cellulitis including somewhat ill-defined dorsal and ulnar fluid collection measuring 1.4 x 2.6 x 3.6 cm concerning for possible abscess formation. Additional smaller    collections noted. These would be better evaluated by contrast-enhanced MRI if clinically necessary. No soft tissue emphysema or sarah osseous destructive change to indicate osteomyelitis at this time. Concordant virtual radiologic report was provided at 0127 hours on 7/21/2023. The study was marked in EPIC for significant notification.          Workstation performed: [id number]         XR forearm 2 views RIGHT    (Results Pending)     Results from last 7 days   Lab Units 07/20/23  8634   SARS-COV-2  Negative     Results from last 7 days   Lab Units 07/20/23  2100   WBC Thousand/uL 16.62*   HEMOGLOBIN g/dL 11.9   HEMATOCRIT % 37.7   PLATELETS Thousands/uL 357   NEUTROS ABS Thousands/µL 13.08*         Results from last 7 days   Lab Units 07/20/23  2100   SODIUM mmol/L 139   POTASSIUM mmol/L 4.6   CHLORIDE mmol/L 101   CO2 mmol/L 28   ANION GAP mmol/L 10   BUN mg/dL 8   CREATININE mg/dL 0.55*   EGFR ml/min/1.73sq m 128   CALCIUM mg/dL 9.6   MAGNESIUM mg/dL 1.8*     Results from last 7 days   Lab Units 07/20/23  2100   AST U/L 103*   ALT U/L 96*   ALK PHOS U/L 129*   TOTAL PROTEIN g/dL 7.7   ALBUMIN g/dL 4.2   TOTAL BILIRUBIN mg/dL 0.23         Results from last 7 days   Lab Units 07/20/23  2100   GLUCOSE RANDOM mg/dL 118       Results from last 7 days   Lab Units 07/20/23  2155   LACTIC ACID mmol/L 1.3       Results from last 7 days   Lab Units 07/20/23  2100   CLARITY UA  Slightly Cloudy*   COLOR UA  Straw   SPEC GRAV UA  1.015   PH UA  7.0   GLUCOSE UA mg/dl Negative   KETONES UA mg/dl Negative   BLOOD UA  Negative   PROTEIN UA mg/dl Negative   NITRITE UA  Negative   BILIRUBIN UA  Negative   UROBILINOGEN UA mg/dL Negative   LEUKOCYTES UA  25.0*   WBC UA /hpf 4-10*   RBC UA /hpf None Seen   BACTERIA UA /hpf Moderate*   EPITHELIAL CELLS WET PREP /hpf Moderate*     Results from last 7 days   Lab Units 07/20/23  2239   INFLUENZA A PCR  Negative   INFLUENZA B PCR  Negative   RSV PCR  Negative         Results from last 7 days   Lab Units 07/20/23  2100   AMPH/METH  Negative   BARBITURATE UR  Positive*   BENZODIAZEPINE UR  Positive*   COCAINE UR  Negative   METHADONE URINE  Positive*   OPIATE UR  Negative   PCP UR  Negative   THC UR  Negative     Results from last 7 days   Lab Units 07/20/23  2155   BLOOD CULTURE  Received in Microbiology Lab. Culture in Progress. Received in Microbiology Lab. Culture in Progress.          ED Treatment:   Medication Administration from 07/20/2023 1957 to 07/21/2023 0008       Date/Time Order Dose Route Action 07/20/2023 2211 EDT sodium chloride 0.9 % bolus 1,000 mL 1,000 mL Intravenous New Bag     07/20/2023 2213 EDT vancomycin (VANCOCIN) IVPB (premix in dextrose) 750 mg 150 mL 750 mg Intravenous New Bag     07/20/2023 2223 EDT ondansetron (ZOFRAN) injection 4 mg 4 mg Intravenous Given        Past Medical History:   Diagnosis Date   • Anxiety    • Chlamydia    • Depression    • Endometriosis    • IV drug user     IV heroin use, rehab 7 times    • Panic attacks    • Psychiatric disorder     anxiety   • PTSD (post-traumatic stress disorder)    • Varicella     childhood   • Visual impairment      Present on Admission:  • Benzodiazepine withdrawal (HCC)  • Cellulitis of right upper extremity  • Opioid use disorder, severe, dependence (HCC)  • Leukocytosis  • Protein-calorie malnutrition (HCC)  • Transaminitis  • Hypomagnesemia  • Depression      Admitting Diagnosis: Protein-calorie malnutrition ([address]) [E46]  Polysubstance abuse ([address]) [F19.10]  Cellulitis of right arm [L03.113]  Opioid use disorder, severe, dependence ([address]) [F11.20]  Admitted to substance misuse detoxification center [Z78.9]  Age/Sex: 33 y.o. female  Admission Orders:  Regular Diet. SCDs. Fall & Seizure Precautions. SEWS monitoring. Telemetry & Continuous Pulse Ox.     Scheduled Medications:  cefepime, 2,000 mg, Intravenous, Q12H  enoxaparin, 40 mg, Subcutaneous, Daily  folic acid, 1 mg, Oral, Daily  methadone, 119 mg, Oral, Daily  multivitamin-minerals, 1 tablet, Oral, Daily  QUEtiapine, 50 mg, Oral, HS  sertraline, 50 mg, Oral, Daily  thiamine, 100 mg, Oral, Daily  vancomycin, 20 mg/kg, Intravenous, Q12H    Continuous IV Infusions:  sodium chloride, 75 mL/hr, Intravenous, Continuous    PRN Meds:  acetaminophen, 650 mg, Oral, Q6H PRN; 7/21 x1  cloNIDine, 0.1 mg, Oral, Q6H PRN  gabapentin, 300 mg, Oral, Q8H PRN; 7/21 x1  magnesium sulfate, 2 g, Intravenous; 7/21 x1  melatonin, 6 mg, Oral, HS PRN  ondansetron, 4 mg, Intravenous, Q6H PRN; 7/21 x1  phenobarbital, 650 mg, Intravenous; 7/21 x1  phenobarbital, 260 mg, Intravenous; 7/21 x1  phenobarbital, 130 mg, Intravenous; 7/21 x2        IP CONSULT TO CASE MANAGEMENT  IP CONSULT TO PHARMACY  IP CONSULT TO NUTRITION SERVICES    Network Utilization Review Department  ATTENTION: Please call with any questions or concerns to [phone] and carefully listen to the prompts so that you are directed to the right person. All voicemails are confidential.  Hannah Corral all requests for admission clinical reviews, approved or denied determinations and any other requests to dedicated fax number below belonging to the campus where the patient is receiving treatment.  List of dedicated fax numbers for the Facilities:  [place] DENIALS (Administrative/Medical Necessity) [phone] [address] (Maternity/NICU/Pediatrics) [phone]   [address] [place] [address] [phone]   [facility] [phone]   [address] [phone]   [id number] [place] [address] Nn [phone]
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
## 2023-07-21 NOTE — ASSESSMENT & PLAN NOTE
• Presented with leukocytosis in the setting of RUE cellulitis/ abscess  • Unable to obtain AM labs, patient declined further laboratory studies due to difficulty with access. She denies any fever or chills.    • Patient clinically stable with apparent improvement in cellulitis/ abscess  • Continue PO antibiotics

## 2023-07-21 NOTE — DISCHARGE INSTR - OTHER ORDERS
Drug and Alcohol Resources in [facility]    If you have health insurance, including medical assistance, there should be a phone number on your insurance card that you can call to find out how to access services. The card may say, “For [address] or “For Drug and Alcohol Services” or “For Substance Abuse Services” call the number provided. 100 [facility]  [address] [facility] [address]. [phone]. A  is available Monday through Friday from 8:00 am to 5:00 pm to provide you with assistance on accessing services for substance abuse. If you do not have health insurance and are in financial need, this office may also help you get the funding for the services that are necessary. [address] [place] [address] Drug & Alcohol provides funding to support three Recovery Centers in [facility]. These centers offer a safe, sober environment to those in recovery. A variety of programming including 12-Step Meetings, Jeanne Gabricha, Life Skills Workshops, etc. is offered at each location. 164 [facility], [address] [place] [address]  [phone]  www. cleanslatebangor. org [address] on [address]  [facility]  [phone]  cnazqo-mz-yuzx. 301 [facility] 111  [facility], [facility]  [phone]   [address] [place] [address]  [phone]  www. [facility]. [facility]  [address], [address] [place] [address]  [phone]  [facility]. [facility]  Confidential free help, from public health agencies, to find substance use treatment and information. [phone]    Link for Zoom Codes for Virtual 12 step Meetings  Peter.co.uk. aspx  Alcoholics Anonymous  [facility]  [phone]    http://www.LiveClips.Com[employer] Gentlemen/  Narcotics Anonymous  [phone]  [email or web address].Combatant Gentlemen/

## 2023-07-22 VITALS
HEART RATE: 84 BPM | OXYGEN SATURATION: 97 % | RESPIRATION RATE: 16 BRPM | SYSTOLIC BLOOD PRESSURE: 119 MMHG | TEMPERATURE: 98.3 F | WEIGHT: 98.11 LBS | HEIGHT: 64 IN | DIASTOLIC BLOOD PRESSURE: 76 MMHG | BODY MASS INDEX: 16.75 KG/M2

## 2023-07-22 PROBLEM — E44.1 MILD PROTEIN-CALORIE MALNUTRITION (HCC): Status: ACTIVE | Noted: 2023-07-20

## 2023-07-22 PROBLEM — E44.1 MILD PROTEIN-CALORIE MALNUTRITION (HCC): Status: ACTIVE | Noted: 2023-07-22

## 2023-07-22 LAB
ALBUMIN SERPL BCP-MCNC: 3.7 G/DL (ref 3.5–5)
ALP SERPL-CCNC: 112 U/L (ref 34–104)
ALT SERPL W P-5'-P-CCNC: 70 U/L (ref 7–52)
ANION GAP SERPL CALCULATED.3IONS-SCNC: 11 MMOL/L
AST SERPL W P-5'-P-CCNC: 63 U/L (ref 13–39)
BILIRUB SERPL-MCNC: 0.18 MG/DL (ref 0.2–1)
BUN SERPL-MCNC: 4 MG/DL (ref 5–25)
CALCIUM SERPL-MCNC: 8.8 MG/DL (ref 8.4–10.2)
CHLORIDE SERPL-SCNC: 102 MMOL/L (ref 96–108)
CO2 SERPL-SCNC: 25 MMOL/L (ref 21–32)
CREAT SERPL-MCNC: 0.48 MG/DL (ref 0.6–1.3)
GFR SERPL CREATININE-BSD FRML MDRD: 134 ML/MIN/1.73SQ M
GLUCOSE SERPL-MCNC: 77 MG/DL (ref 65–140)
HIV 1+2 AB+HIV1 P24 AG SERPL QL IA: NORMAL
HIV1 P24 AG SER QL: NORMAL
MAGNESIUM SERPL-MCNC: 2.3 MG/DL (ref 1.9–2.7)
POTASSIUM SERPL-SCNC: 4.2 MMOL/L (ref 3.5–5.3)
PROT SERPL-MCNC: 6.8 G/DL (ref 6.4–8.4)
SODIUM SERPL-SCNC: 138 MMOL/L (ref 135–147)
VANCOMYCIN SERPL-MCNC: 14.1 UG/ML (ref 10–20)

## 2023-07-22 PROCEDURE — 80053 COMPREHEN METABOLIC PANEL: CPT | Performed by: EMERGENCY MEDICINE

## 2023-07-22 PROCEDURE — 80202 ASSAY OF VANCOMYCIN: CPT | Performed by: EMERGENCY MEDICINE

## 2023-07-22 PROCEDURE — 83735 ASSAY OF MAGNESIUM: CPT | Performed by: EMERGENCY MEDICINE

## 2023-07-22 PROCEDURE — 87806 HIV AG W/HIV1&2 ANTB W/OPTIC: CPT | Performed by: EMERGENCY MEDICINE

## 2023-07-22 PROCEDURE — 80074 ACUTE HEPATITIS PANEL: CPT | Performed by: EMERGENCY MEDICINE

## 2023-07-22 PROCEDURE — 99233 SBSQ HOSP IP/OBS HIGH 50: CPT | Performed by: EMERGENCY MEDICINE

## 2023-07-22 RX ORDER — CEPHALEXIN 500 MG/1
500 CAPSULE ORAL EVERY 6 HOURS SCHEDULED
Status: DISCONTINUED | OUTPATIENT
Start: 2023-07-22 | End: 2023-07-23 | Stop reason: HOSPADM

## 2023-07-22 RX ORDER — CLONAZEPAM 1 MG/1
1 TABLET ORAL 3 TIMES DAILY PRN
Status: DISCONTINUED | OUTPATIENT
Start: 2023-07-22 | End: 2023-07-23 | Stop reason: HOSPADM

## 2023-07-22 RX ORDER — SULFAMETHOXAZOLE AND TRIMETHOPRIM 800; 160 MG/1; MG/1
2 TABLET ORAL EVERY 12 HOURS SCHEDULED
Status: DISCONTINUED | OUTPATIENT
Start: 2023-07-22 | End: 2023-07-23 | Stop reason: HOSPADM

## 2023-07-22 RX ADMIN — CLONAZEPAM 1 MG: 1 TABLET ORAL at 19:27

## 2023-07-22 RX ADMIN — THIAMINE HCL TAB 100 MG 100 MG: 100 TAB at 08:04

## 2023-07-22 RX ADMIN — QUETIAPINE FUMARATE 50 MG: 50 TABLET ORAL at 23:03

## 2023-07-22 RX ADMIN — CEFEPIME HYDROCHLORIDE 2000 MG: 2 INJECTION, SOLUTION INTRAVENOUS at 11:14

## 2023-07-22 RX ADMIN — FOLIC ACID 1 MG: 1 TABLET ORAL at 08:04

## 2023-07-22 RX ADMIN — MULTIPLE VITAMINS W/ MINERALS TAB 1 TABLET: TAB ORAL at 08:04

## 2023-07-22 RX ADMIN — CEFEPIME HYDROCHLORIDE 2000 MG: 2 INJECTION, SOLUTION INTRAVENOUS at 00:44

## 2023-07-22 RX ADMIN — ONDANSETRON 4 MG: 2 INJECTION INTRAMUSCULAR; INTRAVENOUS at 04:01

## 2023-07-22 RX ADMIN — SULFAMETHOXAZOLE AND TRIMETHOPRIM 2 TABLET: 800; 160 TABLET ORAL at 23:02

## 2023-07-22 RX ADMIN — ACETAMINOPHEN 650 MG: 325 TABLET ORAL at 04:56

## 2023-07-22 RX ADMIN — METHADONE HYDROCHLORIDE 119 MG: 10 CONCENTRATE ORAL at 08:04

## 2023-07-22 RX ADMIN — SERTRALINE HYDROCHLORIDE 50 MG: 50 TABLET ORAL at 08:04

## 2023-07-22 RX ADMIN — CEPHALEXIN 500 MG: 500 CAPSULE ORAL at 23:02

## 2023-07-22 RX ADMIN — GABAPENTIN 300 MG: 300 CAPSULE ORAL at 23:02

## 2023-07-22 RX ADMIN — VANCOMYCIN HYDROCHLORIDE 1000 MG: 1 INJECTION, SOLUTION INTRAVENOUS at 09:37

## 2023-07-22 NOTE — PROGRESS NOTES
Eliseo Katz is a 33 y.o. female who is currently ordered Vancomycin IV with management by the Pharmacy Consult service. Relevant clinical data and objective / subjective history reviewed. Vancomycin Assessment:  1. Indication and Goal AUC/Trough: Soft tissue (goal -600, trough >10)  2. Clinical Status: stable  3. Micro:   No growth 24 hrs  4. Renal Function:  · SCr: 0.48 mg/dL  · CrCl: 148 mL/min  · Renal replacement:   5. Days of Therapy: 3  6. Current Dose: 1000 mg IV every 12 hours  Vancomycin Plan:  1. New Dosin. Estimated AUC: 432 mcg*hr/mL  3. Estimated Trough: 10.9 mcg/mL  4. Next Level:  at 6 am  5. Renal Function Monitoring: Daily BMP and [place] will continue to follow closely for s/sx of nephrotoxicity, infusion reactions and appropriateness of therapy. BMP and CBC will be ordered per protocol. We will continue to follow the patient’s culture results and clinical progress daily.     Garrett Hodge, Pharmacist

## 2023-07-22 NOTE — CASE MANAGEMENT
CM faxed letter stating patient is currently under [facility] care to [facility] pre trial as per the patient's request.  [phone]    The patient is not cleared for d/c today.

## 2023-07-22 NOTE — PROGRESS NOTES
PROGRESS NOTE  DEPARTMENT OF MEDICAL TOXICOLOGY  LEVEL 4 MEDICAL DETOX UNIT  Clive Tucker 33 y.o. female MRN: [id number]  Unit/Bed#: 5T [facility] 504-01 Encounter: [id number]      Reason for Admission/Principal Problem: Cellulitis    Rounding Provider: Carlos Uribe MD  Attending Provider: Carlos Uribe MD   7/20/2023  8:01 PM         * Cellulitis of right upper extremity  Assessment & Plan  · Area of cellulitis and possible abscess to RUE appears to be improving per patient and RN  · No area of fluctuance appreciated on exam today  · Will continue IV antibiotics today  · If blood cultures remain negative at 48 hours tomorrow, will consider transition to PO antibiotics    Leukocytosis  Assessment & Plan  • Presented with leukocytosis in the setting of RUE cellulitis/ abscess  • We were unable to draw CBC today  • Patient clinically stable with apparent improvement in cellulitis/ abscess  • Continuing antibiotics as above  • Will plan to check CBC tomorrow morning    Depression  Assessment & Plan  · History of depression controlled on Seroquel 50 mg nightly and Zoloft 50 mg daily  · Denies SI/HI  · No continue observation indicated at this time  · Continue home medication  · Recommend continued follow-up with psychiatry outpatient    Transaminitis  Assessment & Plan  · In the setting of IV drug use and infection (cellulitis)  · Previous hepatitis panel negative 7/3/23, however we are rechecking this given ongoing IVDU, results pending  · Patient denies abdominal pain or tenderness   · Continue to monitor    Protein-calorie malnutrition ([address])  Assessment & Plan  Malnutrition Findings:     ·  BMI 16.84   · In the setting of reported poor PO intake  · Nutrition consulted- recommendations appreciated          Opioid use disorder, severe, dependence ([address])  Assessment & Plan  • Patient with a history of chronic fentanyl use, is currently on methadone  • Has been using 1-2 bags of fentanyl daily intravenously in addition to the methadone  • Currently follows with [address] [place] [address] for Methadone. Receives daily dose of 119 mg.    • Dose was confirmed with clinic  • Will continue daily methadone dosing while here. Patient was not interested in transitioning to Suboxone          VTE Pharmacologic Prophylaxis:   Pharmacologic: Enoxaparin (Lovenox)  Mechanical VTE Prophylaxis in Place: yes    Code Status: Level 1 - Full Code    Patient Centered Rounds: I have performed bedside rounds with nursing staff today. Discussions with Specialists or Other Care Team Provider: Case Management     Education and Discussions with Family / Patient: Discussed with patient    Time Spent for Care: 30 minutes. More than 50% of total time spent on counseling and coordination of care as described above. Current Length of Stay: 2 day(s)    Current Patient Status: Inpatient     Certification Statement: The patient will continue to require additional inpatient hospital stay due to Cellulitis Discharge Plan: Case management consulted for assistance with disposition when patient medically stable          Subjective:   Patient states she feels she is improving this morning and the area of cellulitis appears quite a bit better than yesterday. Appetite is improving some and she was able to eat last night, but still has less of an appetite in the mornings. Has been able to tolerate dietary supplementation with Ensure.     Objective:     Clinical Opiate Withdrawal Scale  Pulse: 105  Resting Pulse Rate: Measured After Patient is Sitting or Lying for One Minute: Pulse rate 101-120  GI Upset: Over Last Half Hour: Stomach cramps  Sweating: Over Past Half Hour Not Accounted for by Room Temperature of Patient Activity: Subjective report of chills or flushing  Tremor: Observation of Outstretched Hands: Slight tremor observable  Restlessness: Observation During Assessment: Frequent shifting or extraneous movements of legs/arms  Yawning: Observation During Assessment: No yawning  Pupil Size: Pupils pinned or normal size for room light  Anxiety and Irritability: Patient reports increasing irritability or anxiousness  Bone or Joint Aches: If Patient was Having Pain Previously, Only the Additional Component Attributed to Opiate Withdrawal is Scored: Not present  Gooseflesh Skin: Skin is smooth  Runny Nose or Tearing: Not Accounted for by Cold Symptoms or Allergies: Not present  Clinical Opiate Withdrawal Scale Total Score: 10    SEWS Total Score: 0 (2023  3:00 AM)        Last 24 Hours Medication List:   Current Facility-Administered Medications   Medication Dose Route Frequency Provider Last Rate   • acetaminophen  650 mg Oral Q6H PRN Herlinda Herrera PA-C     • cefepime  2,000 mg Intravenous Q12H Herlinda Herrera PA-C 2,000 mg (23 0044)   • clonazePAM  1 mg Oral TID PRN Gregor Tavera MD     • cloNIDine  0.1 mg Oral Q6H PRN Herlinda Herrera PA-C     • enoxaparin  40 mg Subcutaneous Daily Herlinda Herrera PA-C     • folic acid  1 mg Oral Daily Herlinda Herrera PA-C     • gabapentin  300 mg Oral Q8H PRN Herlinda Herrera PA-C     • melatonin  6 mg Oral HS PRN Herlinda Herrera PA-C     • methadone  119 mg Oral Daily FRANK Zuniga     • multivitamin-minerals  1 tablet Oral Daily Herlinda Herrera PA-C     • ondansetron  4 mg Intravenous Q6H PRN Herlinda Herrera PA-C     • QUEtiapine  50 mg Oral HS Herlinda Herrera PA-C     • sertraline  50 mg Oral Daily Herlinda Herrera PA-C     • sodium chloride  75 mL/hr Intravenous Continuous Herlinda Herrera PA-C Stopped (23 0736)   • thiamine  100 mg Oral Daily Herlinda Herrera PA-C     • vancomycin  20 mg/kg Intravenous Q12H Herlinda Herrera PA-C 1,000 mg (23 1363)         Vitals:   Temp (24hrs), Av.8 °F (37.1 °C), Min:98.4 °F (36.9 °C), Max:99.2 °F (37.3 °C)    Temp:  [98.4 °F (36.9 °C)-99.2 °F (37.3 °C)] 99 °F (37.2 °C)  HR:  [] 105  Resp:  [] 18  BP: (115-128)/(70-90) 128/70  SpO2:  [96 %-98 %] 96 %  Body mass index is 16.84 kg/m². Input and Output Summary (last 24 hours): Intake/Output Summary (Last 24 hours) at 7/22/2023 1036  Last data filed at 7/21/2023 1701  Gross per 24 hour   Intake 240 ml   Output --   Net 240 ml       Physical Exam:   Physical Exam  Vitals reviewed. Constitutional:       General: She is not in acute distress. HENT:      Head: Normocephalic. Mouth/Throat:      Mouth: Mucous membranes are moist.   Eyes:      Conjunctiva/sclera: Conjunctivae normal.      Pupils: Pupils are equal, round, and reactive to light. Cardiovascular:      Rate and Rhythm: Normal rate and regular rhythm. Pulmonary:      Effort: Pulmonary effort is normal. No respiratory distress. Abdominal:      General: There is no distension. Tenderness: There is no abdominal tenderness. Musculoskeletal:      Cervical back: Neck supple. Right lower leg: No edema. Left lower leg: No edema. Skin:     General: Skin is warm and dry. Comments: There is an area of erythema, approximately 8 cm X 5 cm to the dorsum of the left forearm with central area of eschar. No palpable fluctuance. No crepitus. Neurological:      General: No focal deficit present. Mental Status: She is alert. Psychiatric:         Mood and Affect: Mood normal.         Behavior: Behavior normal.         Thought Content:  Thought content normal.         Additional Data:     Labs:   Results from last 7 days   Lab Units 07/20/23  2100   WBC Thousand/uL 16.62*   HEMOGLOBIN g/dL 11.9   HEMATOCRIT % 37.7   PLATELETS Thousands/uL 357   NEUTROS PCT % 79*   LYMPHS PCT % 15   MONOS PCT % 6   EOS PCT % 0      Results from last 7 days   Lab Units 07/22/23  0507   SODIUM mmol/L 138   POTASSIUM mmol/L 4.2   CHLORIDE mmol/L 102   CO2 mmol/L 25   BUN mg/dL 4*   CREATININE mg/dL 0.48*   ANION GAP mmol/L 11   CALCIUM mg/dL 8.8   ALBUMIN g/dL 3.7   TOTAL BILIRUBIN mg/dL 0.18*   ALK PHOS U/L 112*   ALT U/L 70*   AST U/L 63* GLUCOSE RANDOM mg/dL 77                     Results from last 7 days   Lab Units 07/20/23  2155   LACTIC ACID mmol/L 1.3          * I Have Reviewed All Lab Data Listed Above. * Additional Pertinent Lab Tests Reviewed: [address] Admission Reviewed      Imaging Studies: I have personally reviewed pertinent reports. Recent Cultures (last 7 days):  Results from last 7 days   Lab Units 07/20/23  2155   BLOOD CULTURE  No Growth at 24 hrs. No Growth at 24 hrs. Today, Patient Was Seen By: Ellis Chang MD    ** Please Note: Dictation voice to text software may have been used in the creation of this document.  **

## 2023-07-23 LAB
HAV IGM SER QL: NORMAL
HBV CORE IGM SER QL: NORMAL
HBV SURFACE AG SER QL: NORMAL
HCV AB SER QL: NORMAL

## 2023-07-23 PROCEDURE — 99239 HOSP IP/OBS DSCHRG MGMT >30: CPT

## 2023-07-23 RX ORDER — SULFAMETHOXAZOLE AND TRIMETHOPRIM 800; 160 MG/1; MG/1
2 TABLET ORAL EVERY 12 HOURS SCHEDULED
Qty: 28 TABLET | Refills: 0 | Status: SHIPPED | OUTPATIENT
Start: 2023-07-23 | End: 2023-07-30

## 2023-07-23 RX ORDER — CEPHALEXIN 500 MG/1
500 CAPSULE ORAL EVERY 12 HOURS SCHEDULED
Qty: 14 CAPSULE | Refills: 0 | Status: SHIPPED | OUTPATIENT
Start: 2023-07-23 | End: 2023-07-30

## 2023-07-23 RX ADMIN — SERTRALINE HYDROCHLORIDE 50 MG: 50 TABLET ORAL at 08:01

## 2023-07-23 RX ADMIN — METHADONE HYDROCHLORIDE 119 MG: 10 CONCENTRATE ORAL at 07:57

## 2023-07-23 RX ADMIN — CEPHALEXIN 500 MG: 500 CAPSULE ORAL at 07:56

## 2023-07-23 RX ADMIN — SULFAMETHOXAZOLE AND TRIMETHOPRIM 2 TABLET: 800; 160 TABLET ORAL at 08:02

## 2023-07-23 NOTE — SOCIAL WORK
CM met with patient for a check-in. Patient was pleasant and calm, she is aware she is discharging today. Patient informed SW that her father should arrive in 20 minutes. She is already set up with aftercare, and had no questions or concerns to present to this CM.

## 2023-07-23 NOTE — DISCHARGE SUMMARY
[address] [facility]  Discharge- James Bookbinder 1995, 33 y.o. female MRN: [id number]  Unit/Bed#: 5T DETOX 504-01 Encounter: [id number]  Primary Care Provider: Pattie You DO   Date and time admitted to hospital: 7/20/2023  8:01 PM  [address] [place] [address], LEVEL 4  Department of Medical Toxicology  Reason for Admission/Principal Problem: benzodiazepine withdrawal, cellulitis of RUE  Admitting provider: CROW Spencer MD   7/20/2023  8:01 PM       Discharging Physician / Practitioner: Barbie Ann PA-C  PCP: Pattie You DO  Admission Date:   Admission Orders (From admission, onward)     Ordered        07/20/23 2322  Inpatient Admission  Once                      Discharge Date: 07/22/23    Medical Problems     Resolved Problems  Date Reviewed: 7/22/2023   None         Depression  Assessment & Plan  · History of depression controlled on Seroquel 50 mg nightly and Zoloft 50 mg daily  · Denies SI/HI  · No continue observation indicated at this time  · Continue home medication  · Recommend continued follow-up with psychiatry outpatient    Transaminitis  Assessment & Plan  · In the setting of IV drug use and infection (cellulitis)  · Previous hepatitis panel negative 7/3/23, however we are rechecking this given ongoing IVDU, results pending  · Patient denies abdominal pain or tenderness   · Continue to monitor    Protein-calorie malnutrition ([address])  Assessment & Plan  Malnutrition Findings:     ·  BMI 16.84   · In the setting of reported poor PO intake  · Nutrition consulted- recommendations appreciated          Leukocytosis  Assessment & Plan  • Presented with leukocytosis in the setting of RUE cellulitis/ abscess  • We were unable to draw CBC today  • Patient clinically stable with apparent improvement in cellulitis/ abscess  • Continuing antibiotics as above  • Will plan to check CBC tomorrow morning    Opioid use disorder, severe, dependence [facility]  Assessment & Plan  • Patient with a history of chronic fentanyl use, is currently on methadone  • Has been using 1-2 bags of fentanyl daily intravenously in addition to the methadone  • Currently follows with [facility] for Methadone. Receives daily dose of 119 mg.    • Dose was confirmed with clinic  • Will continue daily methadone dosing while here. Patient was not interested in transitioning to Suboxone    * Cellulitis of right upper extremity  Assessment & Plan  · Area of cellulitis and possible abscess to RUE appears to be improving per patient and RN  · No area of fluctuance appreciated on exam today  · Will continue IV antibiotics today  · If blood cultures remain negative at 48 hours tomorrow, will consider transition to PO antibiotics        Consultations During Hospital Stay:  · Case management   · Nutrition     Procedures Performed:   · None    Significant Findings / Test Results:   · Leukocytosis   · Blood cultures NGTD  · Mild protein-calorie malnutrition  · Transaminitis - improved  · Rapid-HIV negative  · Acute hepatitis panel pending     Incidental Findings:   · None     Test Results Pending at Discharge (will require follow up): · None      Outpatient Tests / Follow Up Requested:  · Recommend f/u with PCP within 1-2 weeks of discharge   · Continue OP f/u with [facility]    Complications:  none    Reason for Admission: benzodiazepine withdrawal, cellulitis of RUE    Hospital Course:     John Martinez is a 33 y.o. female patient who originally presented to the hospital on 7/20/2023 due to benzodiazepine withdrawal. Patient initially presented to the [facility] ED requesting detoxification from benzodiazepines, as she states that the fentanyl she uses is mixed with xylazine and benzos.  Patient was admitted to the [facility] medical detox unit under [facility] protocol for medically assisted benzodiazepine withdrawal and received a total of 1495 mg phenobarbital without complication. Patient's withdrawal symptoms subsequently resolved, and she has remained without objective evidence of benzodiazepine withdrawal at this time. During this admission, patient was also noted to have cellulitis of the RUE, which improved with IV antibiotics. Blood cultures remained negative and she was transitioned from IV to PO ABX without complication. Patient's methadone maintenance dosing for OUD was confirmed with her outpatient provider and continued during admission. Case management was consulted for assistance with rehab resources, and patient will be discharged home with continued MAT through [facility]. Please see above list of diagnoses and related plan for additional information. Condition at Discharge: stable     Discharge Day Visit / Exam:     Subjective:  Patient seen and examined at bedside. Reports great improvement in her withdrawal symptoms. She states she was unsure if there were benzodiazepines in the fentanyl and mostly believes she was withdrawing from Xylazine. Strongly encouraged cessation of all substances to patient. She will continue with her outpatient MAT through [facility]. Denies any fever, chills, sweats, nausea, vomiting, diarrhea, or abdominal pain. Vitals: Blood Pressure: 119/76 (07/22/23 1922)  Pulse: 84 (07/22/23 1922)  Temperature: 98.3 °F (36.8 °C) (07/22/23 1922)  Temp Source: Temporal (07/22/23 1922)  Respirations: 16 (07/22/23 1922)  Height: 5' 4" (162.6 cm) (07/21/23 0026)  Weight - Scale: 44.5 kg (98 lb 1.7 oz) (07/21/23 0026)  SpO2: 97 % (07/22/23 1922)  Exam:   Physical Exam  Constitutional:       General: She is not in acute distress. Appearance: She is not diaphoretic. Eyes:      General: No scleral icterus. Pupils: Pupils are equal, round, and reactive to light. Cardiovascular:      Rate and Rhythm: Normal rate and regular rhythm. Heart sounds: No murmur heard.   Pulmonary:      Effort: No respiratory distress. Breath sounds: Normal breath sounds. No wheezing. Skin:     Comments: Bilateral track marks present on both UE. On right forearm no erythema or fluctuance present. Neurological:      Mental Status: She is alert and oriented to person, place, and time. Motor: No tremor. Psychiatric:         Mood and Affect: Mood is not anxious or depressed. Discussion with Family: I personally did not discuss this case with the patient's family. I reviewed the discharge plan with the patient and answered all questions to the best of my ability. Discharge instructions/Information to patient and family:   See after visit summary for information provided to patient and family. Provisions for Follow-Up Care:  See after visit summary for information related to follow-up care and any pertinent home health orders. Disposition:     Home    For Discharges to [facility] SNF:   · Not Applicable to this Patient - Not Applicable to this Patient    Planned Readmission: NA     Discharge Statement:  I spent 35 minutes discharging the patient. This time was spent on the day of discharge. I had direct contact with the patient on the day of discharge. Greater than 50% of the total time was spent examining patient, answering all patient questions, arranging and discussing plan of care with patient as well as directly providing post-discharge instructions. Additional time then spent on discharge activities. Discharge Medications:  See after visit summary for reconciled discharge medications provided to patient and family.       ** Please Note: This note has been constructed using a voice recognition system **

## 2023-07-24 NOTE — UTILIZATION REVIEW
NOTIFICATION OF ADMISSION DISCHARGE   This is a Notification of Discharge from [facility]. Please be advised that this patient has been discharge from our facility. Below you will find the admission and discharge date and time including the patient’s disposition. UTILIZATION REVIEW CONTACT:  Heather Car MA  Utilization   Network Utilization Review Department  Phone: [phone] x carefully listen to the prompts. All voicemails are confidential.  Email: [email or web address]     ADMISSION INFORMATION  PRESENTATION DATE: 7/20/2023  8:01 PM  OBERVATION ADMISSION DATE:   INPATIENT ADMISSION DATE: 7/20/23 11:22 PM   DISCHARGE DATE: 7/23/2023  9:37 AM   DISPOSITION:Home/Self Care    IMPORTANT INFORMATION:  Send all requests for admission clinical reviews, approved or denied determinations and any other requests to dedicated fax number below belonging to the campus where the patient is receiving treatment.  List of dedicated fax numbers:  [place] DENIALS (Administrative/Medical Necessity) [phone] [facility] [phone]   [facility] [phone]   [facility] [phone] [address] [facility] [phone]   [address] [place] [address] [phone]   [facility] [phone]   [address] [place] [address] [address] [facility] [phone]   [address] [place] [address] [phone]   [address] [facility] [phone] [address] [facility] [address] [facility] [phone]

## 2023-07-26 LAB
BACTERIA BLD CULT: NORMAL
BACTERIA BLD CULT: NORMAL

## 2023-08-01 ENCOUNTER — HOSPITAL ENCOUNTER (EMERGENCY)
Facility: HOSPITAL | Age: 28
Discharge: HOME | End: 2023-08-01
Attending: EMERGENCY MEDICINE
Payer: COMMERCIAL

## 2023-08-01 VITALS
RESPIRATION RATE: 18 BRPM | SYSTOLIC BLOOD PRESSURE: 86 MMHG | TEMPERATURE: 97.6 F | OXYGEN SATURATION: 100 % | DIASTOLIC BLOOD PRESSURE: 50 MMHG | HEART RATE: 90 BPM

## 2023-08-01 DIAGNOSIS — F11.90 METHADONE USE: Primary | ICD-10-CM

## 2023-08-01 PROCEDURE — 99284 EMERGENCY DEPT VISIT MOD MDM: CPT | Performed by: EMERGENCY MEDICINE

## 2023-08-01 PROCEDURE — 99283 EMERGENCY DEPT VISIT LOW MDM: CPT

## 2023-08-01 RX ORDER — METHADONE HYDROCHLORIDE 10 MG/ML
119 CONCENTRATE ORAL ONCE
Status: COMPLETED | OUTPATIENT
Start: 2023-08-01 | End: 2023-08-01

## 2023-08-01 RX ADMIN — METHADONE HYDROCHLORIDE 119 MG: 10 CONCENTRATE ORAL at 13:47

## 2023-08-01 NOTE — ED PROVIDER NOTES
History  Chief Complaint   Patient presents with   • Medical Problem     Pt requesting today's dose of methadone. Patient missed dosing hours     HPI  Patient is a 20-year-old female with history of substance abuse presenting with request for methadone. States that she missed her methadone clinic appointment and when contacted the clinic was instructed to go to the ED for receiving her methadone dose. Denies any other symptoms. Prior to Admission Medications   Prescriptions Last Dose Informant Patient Reported? Taking? QUEtiapine (SEROquel) 50 mg tablet   Yes No   Sig: Take 50 mg by mouth daily at bedtime   methadone (DOLOPHINE) 10 MG/5ML solution   Yes No   Sig: Take 119 mg by mouth every 6 (six) hours as needed Take by mouth daily   sertraline (ZOLOFT) 50 mg tablet   Yes No   Sig: Take 50 mg by mouth daily      Facility-Administered Medications: None       Past Medical History:   Diagnosis Date   • Anxiety    • Chlamydia    • Depression    • Endometriosis    • IV drug user     IV heroin use, rehab 7 times    • Panic attacks    • Psychiatric disorder     anxiety   • PTSD (post-traumatic stress disorder)    • Varicella     childhood   • Visual impairment        Past Surgical History:   Procedure Laterality Date   • LAPAROTOMY         Family History   Problem Relation Age of Onset   • Depression Mother    • Drug abuse Brother    • Drug abuse Maternal Aunt    • Drug abuse Paternal Uncle    • Cancer Maternal Grandmother    • Cancer Paternal Grandfather      I have reviewed and agree with the history as documented.     E-Cigarette/Vaping     E-Cigarette/Vaping Substances     Social History     Tobacco Use   • Smoking status: Former     Types: Cigarettes   • Smokeless tobacco: Never   Substance Use Topics   • Alcohol use: Never   • Drug use: Not Currently     Types: Heroin, Benzodiazepines     Comment: 8 bags/day states benzos are mixed in       Review of Systems   Constitutional: Negative for chills, diaphoresis, fever and unexpected weight change. HENT: Negative for ear pain and sore throat. Eyes: Negative for visual disturbance. Respiratory: Negative for cough, chest tightness and shortness of breath. Cardiovascular: Negative for chest pain and leg swelling. Gastrointestinal: Negative for abdominal distention, abdominal pain, constipation, diarrhea, nausea and vomiting. Endocrine: Negative. Genitourinary: Negative for difficulty urinating and dysuria. Musculoskeletal: Negative. Skin: Negative. Allergic/Immunologic: Negative. Neurological: Negative. Hematological: Negative. Psychiatric/Behavioral: Negative. All other systems reviewed and are negative. Physical Exam  Physical Exam  Vitals and nursing note reviewed. Constitutional:       General: She is not in acute distress. Appearance: Normal appearance. She is not ill-appearing. HENT:      Head: Normocephalic and atraumatic. Right Ear: External ear normal.      Left Ear: External ear normal.      Nose: Nose normal.      Mouth/Throat:      Mouth: Mucous membranes are moist.      Pharynx: Oropharynx is clear. Eyes:      General: No scleral icterus. Right eye: No discharge. Left eye: No discharge. Extraocular Movements: Extraocular movements intact. Conjunctiva/sclera: Conjunctivae normal.      Pupils: Pupils are equal, round, and reactive to light. Cardiovascular:      Rate and Rhythm: Normal rate and regular rhythm. Pulses: Normal pulses. Heart sounds: Normal heart sounds. Pulmonary:      Effort: Pulmonary effort is normal.      Breath sounds: Normal breath sounds. Abdominal:      General: Abdomen is flat. Bowel sounds are normal. There is no distension. Palpations: Abdomen is soft. Tenderness: There is no abdominal tenderness. There is no guarding or rebound. Musculoskeletal:         General: Normal range of motion.       Cervical back: Normal range of motion and neck supple. Skin:     General: Skin is warm and dry. Capillary Refill: Capillary refill takes less than 2 seconds. Neurological:      General: No focal deficit present. Mental Status: She is alert and oriented to person, place, and time. Mental status is at baseline. Psychiatric:         Mood and Affect: Mood normal.         Behavior: Behavior normal.         Thought Content: Thought content normal.         Judgment: Judgment normal.         Vital Signs  ED Triage Vitals [08/01/23 1218]   Temperature Pulse Respirations Blood Pressure SpO2   97.6 °F (36.4 °C) 90 18 (!) 86/50 100 %      Temp src Heart Rate Source Patient Position - Orthostatic VS BP Location FiO2 (%)   -- -- -- -- --      Pain Score       --           Vitals:    08/01/23 1218   BP: (!) 86/50   Pulse: 90         Visual Acuity      ED Medications  Medications   methadone (DOLOPHINE) oral concentrated solution 119 mg (119 mg Oral Given 8/1/23 1347)       Diagnostic Studies  Results Reviewed     None                 No orders to display              Procedures  Procedures         ED Course                                             Medical Decision Making  33year old female presenting with request for her missing methadone dose   Confirmed with clinic regarding dose and patients   Administered methadone  Patient discharged with return precaution provided     Methadone use: acute illness or injury  Risk  Prescription drug management. Disposition  Final diagnoses:   Methadone use     Time reflects when diagnosis was documented in both MDM as applicable and the Disposition within this note     Time User Action Codes Description Comment    8/1/2023  2:09 PM Kenney Moon Add [F11.90] Methadone use       ED Disposition     ED Disposition   Discharge    Condition   Stable    Date/Time   Tue Aug 1, 2023  2:08 PM    [address] discharge to home/self care.                Follow-up Information     Follow up With Specialties Details Why Contact Info Additional Information    Keyur Goods, DO Family Medicine Schedule an appointment as soon as possible for a visit   [address] [facility] [address] [place]       [address] [facility] Emergency Medicine Go to  If symptoms worsen [address] [facility], [facility] [address], [address]          Discharge Medication List as of 8/1/2023  2:09 PM      CONTINUE these medications which have NOT CHANGED    Details   methadone (DOLOPHINE) 10 MG/5ML solution Take 119 mg by mouth every 6 (six) hours as needed Take by mouth daily, Historical Med      QUEtiapine (SEROquel) 50 mg tablet Take 50 mg by mouth daily at bedtime, Historical Med      sertraline (ZOLOFT) 50 mg tablet Take 50 mg by mouth daily, Historical Med             No discharge procedures on file.     PDMP Review       Value Time User    PDMP Reviewed  Yes 7/23/2023  8:13 AM Musa William PA-C          ED Provider  Electronically Signed by           Mickey Rajput MD  08/02/23 0214

## 2023-08-03 ENCOUNTER — HOSPITAL ENCOUNTER (EMERGENCY)
Facility: HOSPITAL | Age: 28
Discharge: HOME | End: 2023-08-03
Attending: EMERGENCY MEDICINE
Payer: COMMERCIAL

## 2023-08-03 VITALS
DIASTOLIC BLOOD PRESSURE: 86 MMHG | HEART RATE: 110 BPM | SYSTOLIC BLOOD PRESSURE: 141 MMHG | TEMPERATURE: 98.2 F | RESPIRATION RATE: 20 BRPM | OXYGEN SATURATION: 100 %

## 2023-08-03 DIAGNOSIS — F41.0 ANXIETY ATTACK: Primary | ICD-10-CM

## 2023-08-03 LAB
AMPHETAMINES SERPL QL SCN: NEGATIVE
BARBITURATES UR QL: POSITIVE
BENZODIAZ UR QL: POSITIVE
COCAINE UR QL: NEGATIVE
EXT PREGNANCY TEST URINE: NEGATIVE
EXT. CONTROL: NORMAL
METHADONE UR QL: POSITIVE
OPIATES UR QL SCN: NEGATIVE
OXYCODONE+OXYMORPHONE UR QL SCN: NEGATIVE
PCP UR QL: NEGATIVE
THC UR QL: NEGATIVE

## 2023-08-03 PROCEDURE — 93005 ELECTROCARDIOGRAM TRACING: CPT

## 2023-08-03 PROCEDURE — 80307 DRUG TEST PRSMV CHEM ANLYZR: CPT | Performed by: EMERGENCY MEDICINE

## 2023-08-03 PROCEDURE — 81025 URINE PREGNANCY TEST: CPT | Performed by: EMERGENCY MEDICINE

## 2023-08-03 RX ORDER — HYDROXYZINE HYDROCHLORIDE 25 MG/1
25 TABLET, FILM COATED ORAL EVERY 6 HOURS PRN
Qty: 28 TABLET | Refills: 0 | Status: ON HOLD | OUTPATIENT
Start: 2023-08-04 | End: 2023-08-11

## 2023-08-03 RX ORDER — HYDROXYZINE HYDROCHLORIDE 25 MG/1
25 TABLET, FILM COATED ORAL ONCE
Status: COMPLETED | OUTPATIENT
Start: 2023-08-03 | End: 2023-08-03

## 2023-08-03 RX ADMIN — HYDROXYZINE HYDROCHLORIDE 25 MG: 25 TABLET, FILM COATED ORAL at 23:07

## 2023-08-04 LAB
ATRIAL RATE: 81 BPM
P AXIS: 50 DEGREES
PR INTERVAL: 100 MS
QRS AXIS: 92 DEGREES
QRSD INTERVAL: 84 MS
QT INTERVAL: 378 MS
QTC INTERVAL: 439 MS
T WAVE AXIS: 33 DEGREES
VENTRICULAR RATE: 81 BPM

## 2023-08-04 PROCEDURE — 93010 ELECTROCARDIOGRAM REPORT: CPT | Performed by: INTERNAL MEDICINE

## 2023-08-04 NOTE — ED PROVIDER NOTES
History  Chief Complaint   Patient presents with   • Anxiety     Unable to control anxiety lately, denies si/hi     Patient is a 33year old female with worsening anxiety with palpitations, sweating for past 1 week. LMP- 2 weeks ago. No cough, fever, N/V. No SI or HI. Was last seen in this ED on 8/1/23 for methadone use. [facility] website checked on this patient and no Rx found. History provided by:  Patient and parent   used: No    Anxiety  Presenting symptoms: no suicidal thoughts    Associated symptoms: anxiety        Prior to Admission Medications   Prescriptions Last Dose Informant Patient Reported? Taking? QUEtiapine (SEROquel) 50 mg tablet   Yes No   Sig: Take 50 mg by mouth daily at bedtime   methadone (DOLOPHINE) 10 MG/5ML solution   Yes No   Sig: Take 119 mg by mouth every 6 (six) hours as needed Take by mouth daily   sertraline (ZOLOFT) 50 mg tablet   Yes No   Sig: Take 50 mg by mouth daily      Facility-Administered Medications: None       Past Medical History:   Diagnosis Date   • Anxiety    • Chlamydia    • Depression    • Endometriosis    • IV drug user     IV heroin use, rehab 7 times    • Panic attacks    • Psychiatric disorder     anxiety   • PTSD (post-traumatic stress disorder)    • Varicella     childhood   • Visual impairment        Past Surgical History:   Procedure Laterality Date   • LAPAROTOMY         Family History   Problem Relation Age of Onset   • Depression Mother    • Drug abuse Brother    • Drug abuse Maternal Aunt    • Drug abuse Paternal Uncle    • Cancer Maternal Grandmother    • Cancer Paternal Grandfather      I have reviewed and agree with the history as documented.     E-Cigarette/Vaping     E-Cigarette/Vaping Substances     Social History     Tobacco Use   • Smoking status: Former     Types: Cigarettes   • Smokeless tobacco: Never   Substance Use Topics   • Alcohol use: Never   • Drug use: Not Currently     Types: Heroin, Benzodiazepines Comment: 8 bags/day states benzos are mixed in       Review of Systems   Constitutional: Positive for diaphoresis. Negative for fever. Cardiovascular: Positive for palpitations. Gastrointestinal: Negative for nausea and vomiting. Psychiatric/Behavioral: Negative for suicidal ideas. The patient is nervous/anxious. All other systems reviewed and are negative. Physical Exam  Physical Exam  Vitals and nursing note reviewed. Constitutional:       General: She is in acute distress (moderate). HENT:      Head: Normocephalic and atraumatic. Mouth/Throat:      Mouth: Mucous membranes are moist.   Eyes:      General: No scleral icterus. Cardiovascular:      Rate and Rhythm: Regular rhythm. Tachycardia present. Heart sounds: Normal heart sounds. No murmur heard. Pulmonary:      Effort: Pulmonary effort is normal. No respiratory distress. Breath sounds: Normal breath sounds. No stridor. No wheezing, rhonchi or rales. Abdominal:      General: Bowel sounds are normal.      Palpations: Abdomen is soft. Tenderness: There is no abdominal tenderness. Musculoskeletal:         General: No deformity. Cervical back: Normal range of motion and neck supple. Right lower leg: No edema. Left lower leg: No edema. Skin:     General: Skin is warm and dry. Findings: No erythema or rash. Neurological:      General: No focal deficit present. Mental Status: She is alert and oriented to person, place, and time. Gait: Gait normal.   Psychiatric:      Comments: Anxious.           Vital Signs  ED Triage Vitals   Temperature Pulse Respirations Blood Pressure SpO2   08/03/23 2104 08/03/23 2102 08/03/23 2102 08/03/23 2102 08/03/23 2102   98.2 °F (36.8 °C) (!) 110 20 141/86 100 %      Temp Source Heart Rate Source Patient Position - Orthostatic VS BP Location FiO2 (%)   08/03/23 2104 08/03/23 2102 08/03/23 2102 08/03/23 2102 --   Oral Monitor;Right Sitting Right arm       Pain Score       --                  Vitals:    08/03/23 2102   BP: 141/86   Pulse: (!) 110   Patient Position - Orthostatic VS: Sitting         Visual Acuity      ED Medications  Medications   hydrOXYzine HCL (ATARAX) tablet 25 mg (25 mg Oral Given 8/3/23 2307)       Diagnostic Studies  Results Reviewed     Procedure Component Value Units Date/Time    Rapid drug screen, urine [[id number]]  (Abnormal) Collected: 08/03/23 2246    Lab Status: Final result Specimen: Urine, Clean Catch Updated: 08/03/23 2319     Amph/Meth UR Negative     Barbiturate Ur Positive     Benzodiazepine Urine Positive     Cocaine Urine Negative     Methadone Urine Positive     Opiate Urine Negative     PCP Ur Negative     THC Urine Negative     Oxycodone Urine Negative    Narrative:      Presumptive report. If requested, specimen will be sent to reference lab for confirmation. FOR MEDICAL PURPOSES ONLY. IF CONFIRMATION NEEDED PLEASE CONTACT THE LAB WITHIN 5 DAYS.     Drug Screen Cutoff Levels:  AMPHETAMINE/METHAMPHETAMINES  1000 ng/mL  BARBITURATES     200 ng/mL  BENZODIAZEPINES     200 ng/mL  COCAINE      300 ng/mL  METHADONE      300 ng/mL  OPIATES      300 ng/mL  PHENCYCLIDINE     25 ng/mL  THC       50 ng/mL  OXYCODONE      100 ng/mL    POCT pregnancy, urine [[id number]]  (Normal) Resulted: 08/03/23 2249    Lab Status: Final result Updated: 08/03/23 2249     EXT Preg Test, Ur Negative     Control Valid                 No orders to display              Procedures  ECG 12 Lead Documentation Only    Date/Time: 8/3/2023 10:56 PM    Performed by: Agustin Wu MD  Authorized by: Agustin Wu MD    Indications / Diagnosis:  Palpitations  ECG reviewed by me, the ED Provider: yes    Patient location:  ED  Previous ECG:     Previous ECG:  Compared to current    Comparison ECG info:  7/20/23    Similarity:  Changes noted (not s. tachy now)  Rate:     ECG rate:  81    ECG rate assessment: normal    Rhythm:     Rhythm: sinus rhythm    Ectopy: Ectopy: none    QRS:     QRS axis:  Right    QRS intervals:  Normal  Conduction:     Conduction normal: short ME. ST segments:     ST segments:  Normal  T waves:     T waves: normal               ED Course  ED Course as of 08/03/23 2342   Thu Aug 03, 2023   2340 EKG and urine d/w patient and mother with patient's permission. Patient was in detox unit recently. Medical Decision Making  DDx including but not limited to: anxiety, panic attack, substance abuse, depression; doubt suicidal ideation or metabolic abnormality or cardiac etiology or PE. Amount and/or Complexity of Data Reviewed  Labs: ordered. Decision-making details documented in ED Course. ECG/medicine tests: ordered and independent interpretation performed. Decision-making details documented in ED Course. Risk  Prescription drug management. Disposition  Final diagnoses:   Anxiety attack     Time reflects when diagnosis was documented in both MDM as applicable and the Disposition within this note     Time User Action Codes Description Comment    8/3/2023 11:41 PM Claudia Diaz [F41.0] Anxiety attack       ED Disposition     ED Disposition   Discharge    Condition   Stable    Date/Time   Thu Aug 3, 2023 11:41 PM    Comment   [facility] discharge to home/self care. Follow-up Information     Follow up With Specialties Details Why Contact [facility],  Family Medicine Call in 1 day Return sooner if increased anxiety, fever, vomiting, difficulty breathing. Angi Francois            Patient's Medications   Discharge Prescriptions    HYDROXYZINE HCL (ATARAX) 25 MG TABLET    Take 1 tablet (25 mg total) by mouth every 6 (six) hours as needed for anxiety for up to 7 days Do not start before August 4, 2023.        Start Date: 8/4/2023  End Date: 8/11/2023       Order Dose: 25 mg       Quantity: 28 tablet    Refills: 0 No discharge procedures on file.     PDMP Review       Value Time User    PDMP Reviewed  Yes 8/3/2023 10:14 PM Charbel Drew MD          ED Provider  Electronically Signed by           Charbel Drew MD  08/03/23 5971

## 2023-08-10 ENCOUNTER — HOSPITAL ENCOUNTER (INPATIENT)
Facility: HOSPITAL | Age: 28
LOS: 6 days | Discharge: HOME | DRG: 885 | End: 2023-08-17
Attending: EMERGENCY MEDICINE | Admitting: STUDENT IN AN ORGANIZED HEALTH CARE EDUCATION/TRAINING PROGRAM
Payer: COMMERCIAL

## 2023-08-10 DIAGNOSIS — F32.A DEPRESSION: Primary | ICD-10-CM

## 2023-08-10 DIAGNOSIS — F43.10 PTSD (POST-TRAUMATIC STRESS DISORDER): ICD-10-CM

## 2023-08-10 DIAGNOSIS — F11.20 OPIOID USE DISORDER, SEVERE, DEPENDENCE (HCC): ICD-10-CM

## 2023-08-10 DIAGNOSIS — Z00.8 MEDICAL CLEARANCE FOR PSYCHIATRIC ADMISSION: ICD-10-CM

## 2023-08-10 DIAGNOSIS — F41.9 ANXIETY: ICD-10-CM

## 2023-08-10 LAB
AMPHETAMINES SERPL QL SCN: NEGATIVE
BARBITURATES UR QL: POSITIVE
BENZODIAZ UR QL: POSITIVE
COCAINE UR QL: NEGATIVE
ETHANOL EXG-MCNC: 0 MG/DL
METHADONE UR QL: POSITIVE
OPIATES UR QL SCN: NEGATIVE
OXYCODONE+OXYMORPHONE UR QL SCN: NEGATIVE
PCP UR QL: NEGATIVE
THC UR QL: NEGATIVE

## 2023-08-10 PROCEDURE — 99283 EMERGENCY DEPT VISIT LOW MDM: CPT

## 2023-08-10 PROCEDURE — 82075 ASSAY OF BREATH ETHANOL: CPT

## 2023-08-10 PROCEDURE — 80307 DRUG TEST PRSMV CHEM ANLYZR: CPT

## 2023-08-10 RX ORDER — SERTRALINE HYDROCHLORIDE 100 MG/1
100 TABLET, FILM COATED ORAL DAILY
Status: DISCONTINUED | OUTPATIENT
Start: 2023-08-11 | End: 2023-08-17 | Stop reason: HOSPADM

## 2023-08-10 RX ORDER — QUETIAPINE FUMARATE 50 MG/1
50 TABLET, FILM COATED ORAL ONCE
Status: DISCONTINUED | OUTPATIENT
Start: 2023-08-10 | End: 2023-08-10

## 2023-08-10 RX ORDER — QUETIAPINE FUMARATE 50 MG/1
50 TABLET, FILM COATED ORAL
Status: DISCONTINUED | OUTPATIENT
Start: 2023-08-10 | End: 2023-08-14

## 2023-08-10 RX ORDER — LORAZEPAM 0.5 MG/1
0.5 TABLET ORAL ONCE
Status: COMPLETED | OUTPATIENT
Start: 2023-08-10 | End: 2023-08-10

## 2023-08-10 RX ADMIN — QUETIAPINE FUMARATE 50 MG: 50 TABLET ORAL at 22:26

## 2023-08-10 RX ADMIN — LORAZEPAM 0.5 MG: 0.5 TABLET ORAL at 19:01

## 2023-08-10 RX ADMIN — LORAZEPAM 0.5 MG: 0.5 TABLET ORAL at 14:03

## 2023-08-10 NOTE — ED PROVIDER NOTES
History  Chief Complaint   Patient presents with   • Psychiatric Evaluation     Reports her depression and anxiety is bad. States she cant do anything, cant get out of bed. When she stands up her anxiety gets high and her chest gets tight. Reports she is taking her meds. Last time she was in the hospital was when she was 14. Denies SI/HI. Denies hearing or seeing things. Reports she would like to sign herself in     25-year-old female with a past medical history of PTSD, anxiety, and depression presents for psychiatric evaluation. Patient states that for the past few weeks her depression and anxiety has worsened. She states that she is taking all of her home medications as directed. She states that she feels much more down lately and has decreased interest in doing things. She also feels herself getting anxious and getting chest tightness from time to time. She does not have any chest pain or tightness at this time. She has a counselor that she follows with as an outpatient. Patient no longer uses any drugs. She is on methadone. She denies SI, HI, and hallucinations. She states that she would like more resources to help with her symptoms. She states that she was started on Atarax about a week ago, but it does not seem to be helping. Prior to Admission Medications   Prescriptions Last Dose Informant Patient Reported? Taking? QUEtiapine (SEROquel) 50 mg tablet   Yes No   Sig: Take 50 mg by mouth daily at bedtime   hydrOXYzine HCL (ATARAX) 25 mg tablet   No No   Sig: Take 1 tablet (25 mg total) by mouth every 6 (six) hours as needed for anxiety for up to 7 days Do not start before August 4, 2023.    methadone (DOLOPHINE) 10 MG/5ML solution   Yes No   Sig: Take 119 mg by mouth every 6 (six) hours as needed Take by mouth daily   sertraline (ZOLOFT) 50 mg tablet   Yes No   Sig: Take 50 mg by mouth daily      Facility-Administered Medications: None       Past Medical History:   Diagnosis Date   • Anxiety    • Borderline personality disorder ([address])    • Chlamydia    • Depression    • Endometriosis    • IV drug user     IV heroin use, rehab 7 times    • Panic attacks    • Psychiatric disorder     anxiety   • PTSD (post-traumatic stress disorder)    • Varicella     childhood   • Visual impairment        Past Surgical History:   Procedure Laterality Date   • LAPAROTOMY         Family History   Problem Relation Age of Onset   • Depression Mother    • Drug abuse Brother    • Drug abuse Maternal Aunt    • Drug abuse Paternal Uncle    • Cancer Maternal Grandmother    • Cancer Paternal Grandfather      I have reviewed and agree with the history as documented. E-Cigarette/Vaping     E-Cigarette/Vaping Substances     Social History     Tobacco Use   • Smoking status: Former     Types: Cigarettes   • Smokeless tobacco: Never   Substance Use Topics   • Alcohol use: Never   • Drug use: Not Currently     Types: Heroin, Benzodiazepines     Comment: 8 bags/day states benzos are mixed in       Review of Systems   Constitutional: Negative for chills, fatigue and fever. HENT: Negative for congestion, rhinorrhea and sore throat. Eyes: Negative for pain and redness. Respiratory: Negative for cough, chest tightness, shortness of breath and wheezing. Cardiovascular: Negative for chest pain and palpitations. Gastrointestinal: Negative for abdominal pain, diarrhea, nausea and vomiting. Endocrine: Negative. Genitourinary: Negative for difficulty urinating and hematuria. Musculoskeletal: Negative for back pain and myalgias. Skin: Negative for pallor and rash. Allergic/Immunologic: Negative. Neurological: Negative for dizziness, weakness, light-headedness and headaches. Hematological: Negative. Psychiatric/Behavioral: Negative for agitation, hallucinations, self-injury and suicidal ideas. The patient is nervous/anxious. Physical Exam  Physical Exam  Vitals and nursing note reviewed. Constitutional:       General: She is not in acute distress. Appearance: Normal appearance. She is not ill-appearing. HENT:      Head: Normocephalic and atraumatic. Eyes:      Conjunctiva/sclera: Conjunctivae normal.   Cardiovascular:      Rate and Rhythm: Normal rate and regular rhythm. Heart sounds: No murmur heard. Pulmonary:      Effort: Pulmonary effort is normal. No respiratory distress. Breath sounds: Normal breath sounds. Abdominal:      General: Abdomen is flat. Palpations: Abdomen is soft. Musculoskeletal:         General: Normal range of motion. Cervical back: Normal range of motion and neck supple. Skin:     General: Skin is warm and dry. Neurological:      General: No focal deficit present. Mental Status: She is alert and oriented to person, place, and time. Psychiatric:         Attention and Perception: Attention and perception normal.         Mood and Affect: Mood is anxious. Mood is not depressed or elated. Affect is not labile, blunt, flat, angry or tearful. Speech: Speech normal.         Behavior: Behavior normal.         Thought Content:  Thought content normal.         Cognition and Memory: Cognition normal.         Judgment: Judgment normal.         Vital Signs  ED Triage Vitals [08/10/23 1347]   Temperature Pulse Respirations Blood Pressure SpO2   98.7 °F (37.1 °C) 94 20 156/94 100 %      Temp Source Heart Rate Source Patient Position - Orthostatic VS BP Location FiO2 (%)   Tympanic Monitor Sitting Right arm --      Pain Score       No Pain           Vitals:    08/10/23 1347   BP: 156/94   Pulse: 94   Patient Position - Orthostatic VS: Sitting         Visual Acuity      ED Medications  Medications   LORazepam (ATIVAN) tablet 0.5 mg (0.5 mg Oral Given 8/10/23 1403)       Diagnostic Studies  Results Reviewed     Procedure Component Value Units Date/Time    Rapid drug screen, urine [[id number]]  (Abnormal) Collected: 08/10/23 1443    Lab Status: Final result Specimen: Urine, Clean Catch Updated: 08/10/23 1507     Amph/Meth UR Negative     Barbiturate Ur Positive     Benzodiazepine Urine Positive     Cocaine Urine Negative     Methadone Urine Positive     Opiate Urine Negative     PCP Ur Negative     THC Urine Negative     Oxycodone Urine Negative    Narrative:      Presumptive report. If requested, specimen will be sent to reference lab for confirmation. FOR MEDICAL PURPOSES ONLY. IF CONFIRMATION NEEDED PLEASE CONTACT THE LAB WITHIN 5 DAYS. Drug Screen Cutoff Levels:  AMPHETAMINE/METHAMPHETAMINES  1000 ng/mL  BARBITURATES     200 ng/mL  BENZODIAZEPINES     200 ng/mL  COCAINE      300 ng/mL  METHADONE      300 ng/mL  OPIATES      300 ng/mL  PHENCYCLIDINE     25 ng/mL  THC       50 ng/mL  OXYCODONE      100 ng/mL    POCT alcohol breath test [[id number]]  (Normal) Resulted: 08/10/23 1442    Lab Status: Final result Updated: 08/10/23 1442     EXTBreath Alcohol 0.000                 No orders to display              Procedures  Procedures         ED Course                                             Medical Decision Making  52-year-old female presents with increasing anxiety and depression. Patient has no concerning symptoms on history or exam that require further medical workup. Will reach out to crisis to have them come see patient. Will get a point-of-care alcohol and to rule out intoxication. Discussed case with crisis worker. Patient signed a 12. She was accepted to our psychiatric facilities here. Patient remained stable while in the ED. Anxiety: chronic illness or injury with exacerbation, progression, or side effects of treatment  Depression: chronic illness or injury with exacerbation, progression, or side effects of treatment  Amount and/or Complexity of Data Reviewed  Independent Historian:      Details: Reviewed past medical history and medications      Risk  Prescription drug management.   Decision regarding hospitalization. Disposition  Final diagnoses:   Depression   Anxiety     Time reflects when diagnosis was documented in both MDM as applicable and the Disposition within this note     Time User Action Codes Description Comment    8/10/2023  2:33 PM Melvina Talley Add [F32. A] Depression     8/10/2023  2:33 PM Melvina Talley Add [F41.9] Anxiety       ED Disposition     ED Disposition   Transfer to Behavioral Health Condition   --    Date/Time   Thu Aug 10, 2023  2:33 PM    Comment   Sanam Garcia should be transferred out to [facility] and has been medically cleared. MD Documentation    Flowsheet Row Most Recent Value   Sending MD Melvina Talley DO      Follow-up Information    None         Patient's Medications   Discharge Prescriptions    No medications on file       No discharge procedures on file.     PDMP Review       Value Time User    PDMP Reviewed  Yes 8/3/2023 10:14 PM Warren Villarreal MD          ED Provider  Electronically Signed by           Melvina Talley DO  08/10/23 9541

## 2023-08-10 NOTE — LETTER
[address],[facility]  [address] [place] [id number] [phone]  Dept: [phone]    August 16, 2023     Patient: Rachel Sanchez   YOB: 1995   Date of Visit: 8/10/2023       To Whom it May Concern:    Uli Hernandez is under my professional care. She was seen in the hospital from 8/10/2023 to 08/17/23. She  will be discharged home on 8/17/23    If you have any questions or concerns, please don't hesitate to call. Sincerely,          Todd Sánchez.  Lolis Lucas
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
## 2023-08-10 NOTE — ED NOTES
Patient reports she goes to [facility] for Methadone.       Mireille Nguyen RN  08/10/23 3179

## 2023-08-10 NOTE — ED NOTES
Insurance Authorization for Erik Peña call [facility]. Phone Valeri Vasquez to     days approved.   Level of care:   Review on:   Authorization # Facility to call when patient arrives

## 2023-08-11 LAB
ATRIAL RATE: 104 BPM
ATRIAL RATE: 110 BPM
P AXIS: 65 DEGREES
P AXIS: 67 DEGREES
PR INTERVAL: 130 MS
PR INTERVAL: 130 MS
QRS AXIS: 38 DEGREES
QRS AXIS: 49 DEGREES
QRSD INTERVAL: 82 MS
QRSD INTERVAL: 84 MS
QT INTERVAL: 332 MS
QT INTERVAL: 360 MS
QTC INTERVAL: 449 MS
QTC INTERVAL: 473 MS
T WAVE AXIS: 56 DEGREES
T WAVE AXIS: 57 DEGREES
VENTRICULAR RATE: 104 BPM
VENTRICULAR RATE: 110 BPM

## 2023-08-11 PROCEDURE — 93010 ELECTROCARDIOGRAM REPORT: CPT | Performed by: STUDENT IN AN ORGANIZED HEALTH CARE EDUCATION/TRAINING PROGRAM

## 2023-08-11 PROCEDURE — 93005 ELECTROCARDIOGRAM TRACING: CPT

## 2023-08-11 RX ORDER — BENZTROPINE MESYLATE 1 MG/1
1 TABLET ORAL
Status: DISCONTINUED | OUTPATIENT
Start: 2023-08-11 | End: 2023-08-17 | Stop reason: HOSPADM

## 2023-08-11 RX ORDER — OLANZAPINE 10 MG/1
5 INJECTION, POWDER, LYOPHILIZED, FOR SOLUTION INTRAMUSCULAR
Status: DISCONTINUED | OUTPATIENT
Start: 2023-08-11 | End: 2023-08-17 | Stop reason: HOSPADM

## 2023-08-11 RX ORDER — METHADONE HYDROCHLORIDE 10 MG/ML
139 CONCENTRATE ORAL DAILY
Status: DISCONTINUED | OUTPATIENT
Start: 2023-08-11 | End: 2023-08-17 | Stop reason: HOSPADM

## 2023-08-11 RX ORDER — HYDROXYZINE 50 MG/1
100 TABLET, FILM COATED ORAL
Status: DISCONTINUED | OUTPATIENT
Start: 2023-08-11 | End: 2023-08-17 | Stop reason: HOSPADM

## 2023-08-11 RX ORDER — OLANZAPINE 2.5 MG/1
2.5 TABLET ORAL
Status: DISCONTINUED | OUTPATIENT
Start: 2023-08-11 | End: 2023-08-17 | Stop reason: HOSPADM

## 2023-08-11 RX ORDER — ACETAMINOPHEN 325 MG/1
650 TABLET ORAL EVERY 4 HOURS PRN
Status: DISCONTINUED | OUTPATIENT
Start: 2023-08-11 | End: 2023-08-17 | Stop reason: HOSPADM

## 2023-08-11 RX ORDER — OLANZAPINE 10 MG/1
10 TABLET ORAL
Status: DISCONTINUED | OUTPATIENT
Start: 2023-08-11 | End: 2023-08-17 | Stop reason: HOSPADM

## 2023-08-11 RX ORDER — LANOLIN ALCOHOL/MO/W.PET/CERES
3 CREAM (GRAM) TOPICAL
Status: DISCONTINUED | OUTPATIENT
Start: 2023-08-11 | End: 2023-08-17 | Stop reason: HOSPADM

## 2023-08-11 RX ORDER — HYDROXYZINE 50 MG/1
50 TABLET, FILM COATED ORAL
Status: DISCONTINUED | OUTPATIENT
Start: 2023-08-11 | End: 2023-08-17 | Stop reason: HOSPADM

## 2023-08-11 RX ORDER — BENZTROPINE MESYLATE 1 MG/ML
1 INJECTION INTRAMUSCULAR; INTRAVENOUS
Status: DISCONTINUED | OUTPATIENT
Start: 2023-08-11 | End: 2023-08-17 | Stop reason: HOSPADM

## 2023-08-11 RX ORDER — LORAZEPAM 0.5 MG/1
0.5 TABLET ORAL ONCE
Status: COMPLETED | OUTPATIENT
Start: 2023-08-11 | End: 2023-08-11

## 2023-08-11 RX ORDER — LORAZEPAM 2 MG/ML
2 INJECTION INTRAMUSCULAR EVERY 6 HOURS PRN
Status: DISCONTINUED | OUTPATIENT
Start: 2023-08-11 | End: 2023-08-17 | Stop reason: HOSPADM

## 2023-08-11 RX ORDER — OLANZAPINE 5 MG/1
5 TABLET ORAL
Status: DISCONTINUED | OUTPATIENT
Start: 2023-08-11 | End: 2023-08-17 | Stop reason: HOSPADM

## 2023-08-11 RX ORDER — ACETAMINOPHEN 325 MG/1
975 TABLET ORAL EVERY 6 HOURS PRN
Status: DISCONTINUED | OUTPATIENT
Start: 2023-08-11 | End: 2023-08-17 | Stop reason: HOSPADM

## 2023-08-11 RX ORDER — HYDROXYZINE HYDROCHLORIDE 25 MG/1
25 TABLET, FILM COATED ORAL
Status: DISCONTINUED | OUTPATIENT
Start: 2023-08-11 | End: 2023-08-17 | Stop reason: HOSPADM

## 2023-08-11 RX ORDER — ACETAMINOPHEN 325 MG/1
650 TABLET ORAL EVERY 6 HOURS PRN
Status: DISCONTINUED | OUTPATIENT
Start: 2023-08-11 | End: 2023-08-17 | Stop reason: HOSPADM

## 2023-08-11 RX ORDER — OLANZAPINE 10 MG/1
10 INJECTION, POWDER, LYOPHILIZED, FOR SOLUTION INTRAMUSCULAR
Status: DISCONTINUED | OUTPATIENT
Start: 2023-08-11 | End: 2023-08-17 | Stop reason: HOSPADM

## 2023-08-11 RX ORDER — DIPHENHYDRAMINE HYDROCHLORIDE 50 MG/ML
50 INJECTION INTRAMUSCULAR; INTRAVENOUS EVERY 6 HOURS PRN
Status: DISCONTINUED | OUTPATIENT
Start: 2023-08-11 | End: 2023-08-17 | Stop reason: HOSPADM

## 2023-08-11 RX ADMIN — SERTRALINE HYDROCHLORIDE 100 MG: 100 TABLET ORAL at 08:22

## 2023-08-11 RX ADMIN — LORAZEPAM 0.5 MG: 0.5 TABLET ORAL at 06:24

## 2023-08-11 RX ADMIN — QUETIAPINE FUMARATE 50 MG: 50 TABLET ORAL at 21:25

## 2023-08-11 RX ADMIN — METHADONE HYDROCHLORIDE 139 MG: 10 CONCENTRATE ORAL at 08:22

## 2023-08-11 RX ADMIN — OLANZAPINE 5 MG: 5 TABLET, FILM COATED ORAL at 21:32

## 2023-08-11 NOTE — ED NOTES
Crisis worker spoke with Pt at her request due to her being hesitant to be transferred to treatment out of the area. Pt was educated on her referral due to it being a dual program and better suited for her needs. Pt is still hesitant, but recognizes while she is here she should get the treatment she needs. Pt is agreeable to seeing if a bed is available in network today, but understands that if nothing is available/she is denied, then a bed search will be redone. Pt voiced understanding and agreement. 201 sent to Intake for review in AM if a bed becomes available.

## 2023-08-11 NOTE — ED NOTES
Insurance Authorization for admission:   Phone call placed to   Phone number: [phone]. Spoke to [address]. 5 days approved. Level of care: AIP. Review on TBD. Authorization # [id number].

## 2023-08-11 NOTE — ED CARE HANDOFF
Emergency Department Sign Out Note        Sign out and transfer of care from Dr. Kadi Perez. See Separate Emergency Department note. The patient, Jayde Frye, was evaluated by the previous provider for Psych. Workup Completed:  See previous notes    ED Course / Workup Pending (followup): Awaiting transport for placement. No acute events. Continue safety plan. Procedures  MDM        Disposition  Final diagnoses:   Depression   Anxiety     Time reflects when diagnosis was documented in both MDM as applicable and the Disposition within this note     Time User Action Codes Description Comment    8/10/2023  2:33 PM Keven Wray Add [F32. A] Depression     8/10/2023  2:33 PM Keven Wray Add [F41.9] Anxiety       ED Disposition     ED Disposition   Transfer to Behavioral Health Condition   --    Date/Time   Thu Aug 10, 2023  2:33 PM    Comment   Jayde Frye should be transferred out to [facility] and has been medically cleared. MD Documentation    Skip Bath Most Recent Value   Accepting Physician Dr. Jocelyn Mendoza Name, [facility]    (Name & Tel number) [facility]   Sending MD Keven Wray DO      RN Documentation    [address] Name, [facility]    (Name & Tel number) [facility]      Follow-up Information    None       Patient's Medications   Discharge Prescriptions    No medications on file     No discharge procedures on file.        ED Provider  Electronically Signed by     Joyce Carrasquillo DO  08/11/23 0400

## 2023-08-11 NOTE — PLAN OF CARE
Problem: Ineffective Coping  Goal: Participates in unit activities  Description: Interventions:  - Provide therapeutic environment   - Provide required programming   - Redirect inappropriate behaviors   Outcome: Not Progressing     Problem: Depression  Goal: Refrain from harming self  Description: Interventions:  - Monitor patient closely, per order   - Supervise medication ingestion, monitor effects and side effects   Outcome: Progressing  Goal: Refrain from isolation  Description: Interventions:  - Develop a trusting relationship   - Encourage socialization   Outcome: Progressing  Goal: Refrain from self-neglect  Outcome: Not Progressing     Problem: Anxiety  Goal: Anxiety is at manageable level  Description: Interventions:  - Assess and monitor patient's anxiety level. - Monitor for signs and symptoms (heart palpitations, chest pain, shortness of breath, headaches, nausea, feeling jumpy, restlessness, irritable, apprehensive). - Collaborate with interdisciplinary team and initiate plan and interventions as ordered.   - [place] patient to unit/surroundings  - Explain treatment plan  - Encourage participation in care  - Encourage verbalization of concerns/fears  - Identify coping mechanisms  - Assist in developing anxiety-reducing skills  - Administer/offer alternative therapies  - Limit or eliminate stimulants  Outcome: Not Progressing     Problem: SUBSTANCE USE/ABUSE  Goal: Will have no detox symptoms and will verbalize plan for changing substance-related behavior  Description: INTERVENTIONS:  - Monitor physical status and assess for symptoms of withdrawal  - Administer medication as ordered  - Provide emotional support with 1 on 1 interaction with staff  - Encourage recovery focused program/ addiction education  - Assess for verbalization of changing behaviors related to substance abuse  - Initiate consults and referrals as appropriate (Case Management, Spiritual Care, etc.)  Outcome: Not Progressing  Goal: By discharge, will develop insight into their chemical dependency and sustain motivation to continue in recovery  Description: INTERVENTIONS:  - Attends all daily group sessions and scheduled AA groups  - Actively practices coping skills through participation in the therapeutic community and adherence to program rules  - Reviews and completes assignments from individual treatment plan  - Assist patient development of understanding of their personal cycle of addiction and relapse triggers  Outcome: Progressing  Goal: By discharge, patient will have ongoing treatment plan addressing chemical dependency  Description: INTERVENTIONS:  - Assist patient with resources and/or appointments for ongoing recovery based living  Outcome: Progressing

## 2023-08-11 NOTE — NURSING NOTE
Belongings pt came in with, must in [facility] A-2):     [employer] 2  Black jeans shorts  Blue [place] stretchy jeans  Blue nisha jeans  Blue stone washed jeans/rip blue jeans  Grey [employer] Jeans  Grey sweat pants w/ strings 2  Black t- shirt  Grey and blue flannel   Blue and red  flannel   Red, yellow and grey  flannel   Green rugrat shirt  [place] shirt 2   White [place] Belly t-shirt  Black baby yoda shirt  Black belly sweat shirt  [address] shorts black  Grey shorts  Black shorts  Tanks 7  Underwear 8  Socks 6  Moccasins    Pink purse  Make up  Loose change  Hand   Nail glue    Cell phone   blue     Glasses    2 nipple rings  2 ear gages  1 lip ring, 1 nose ring

## 2023-08-11 NOTE — ED NOTES
11:05 [facility] spoke with intake patient is being reviewed at [address] [place] [address].        Michelle Alert MSW

## 2023-08-11 NOTE — NURSING NOTE
Pt female 33years old  12 status brought in from [place] ED at 1550. Primary issue anxiety and depression. Patient has a history of  Substance abuse. Recently was in patient at [facility] for fentanyl and heroin use . Pt attends [facility] for her methadone. Pt arrived to unit visibly anxious and  Guarded. Pt able to answer questions and provide good insight on why she is here. Pt expresses she has had multiple attempts  To gain sobriety and help with anxiety and depression with no success. Pt lives with  Mother and stepfather and daughter whom she feels are a good support system. Denies any attempts of suicide or HI/SI/VH/AH does have a history of self cutting to feel relief. Pt oriented to unit and  q 7 minute checks initiated.

## 2023-08-11 NOTE — ED NOTES
Patient is accepted at [facility]. Patient is accepted by Dr. Stacy Flores per CARMELINA. Transportation is arranged with [facility]. Transportation is scheduled for TBD. Patient may go to the floor at D.

## 2023-08-11 NOTE — ED NOTES
Report received from previous nurse; patient is currently sleeping; respirations are even and non-labored; Q15 min checks continue     Cuca Sainz RN  08/11/23 1615

## 2023-08-11 NOTE — ED NOTES
Patient informed of acceptance to [facility] for inpatient treatment. Patient expressed concern d/t reading reviews about the facility and is requesting to speak with crisis worker.       Haris Lara RN  08/10/23 0064

## 2023-08-11 NOTE — ED NOTES
11:57   Patient is accepted at [address] [place] [address]. Patient is accepted by Dr. Adria Szymanski per Andrea Rivas at Intake . Patient may go to the floor at 14:00.           Burak HAMILTON

## 2023-08-11 NOTE — ED NOTES
Spoke with Concepcion Gonzalez at [address] [place] [address] for dose verification. PT was last dosed on 8/10 at 139mg. Pt is able to return to their treatment program upon discharge.

## 2023-08-12 PROBLEM — Z00.8 MEDICAL CLEARANCE FOR PSYCHIATRIC ADMISSION: Status: ACTIVE | Noted: 2023-08-12

## 2023-08-12 PROBLEM — F43.10 PTSD (POST-TRAUMATIC STRESS DISORDER): Status: ACTIVE | Noted: 2023-08-12

## 2023-08-12 PROBLEM — F19.10 POLYSUBSTANCE ABUSE (HCC): Status: ACTIVE | Noted: 2023-08-12

## 2023-08-12 PROBLEM — F33.2 SEVERE EPISODE OF RECURRENT MAJOR DEPRESSIVE DISORDER, WITHOUT PSYCHOTIC FEATURES (HCC): Status: ACTIVE | Noted: 2023-08-12

## 2023-08-12 PROBLEM — Z00.8 MEDICAL CLEARANCE FOR PSYCHIATRIC ADMISSION: Status: RESOLVED | Noted: 2023-08-12 | Resolved: 2023-08-12

## 2023-08-12 PROBLEM — F41.1 GAD (GENERALIZED ANXIETY DISORDER): Status: ACTIVE | Noted: 2023-08-12

## 2023-08-12 PROCEDURE — 99253 IP/OBS CNSLTJ NEW/EST LOW 45: CPT | Performed by: NURSE PRACTITIONER

## 2023-08-12 RX ORDER — GABAPENTIN 100 MG/1
100 CAPSULE ORAL ONCE
Status: COMPLETED | OUTPATIENT
Start: 2023-08-12 | End: 2023-08-12

## 2023-08-12 RX ORDER — PRAZOSIN HYDROCHLORIDE 1 MG/1
1 CAPSULE ORAL
Status: DISCONTINUED | OUTPATIENT
Start: 2023-08-12 | End: 2023-08-17 | Stop reason: HOSPADM

## 2023-08-12 RX ORDER — GABAPENTIN 100 MG/1
100 CAPSULE ORAL 3 TIMES DAILY
Status: DISCONTINUED | OUTPATIENT
Start: 2023-08-12 | End: 2023-08-15

## 2023-08-12 RX ADMIN — METHADONE HYDROCHLORIDE 139 MG: 10 CONCENTRATE ORAL at 08:24

## 2023-08-12 RX ADMIN — SERTRALINE HYDROCHLORIDE 100 MG: 100 TABLET ORAL at 08:24

## 2023-08-12 RX ADMIN — GABAPENTIN 100 MG: 100 CAPSULE ORAL at 17:16

## 2023-08-12 RX ADMIN — QUETIAPINE FUMARATE 50 MG: 50 TABLET ORAL at 21:31

## 2023-08-12 RX ADMIN — PRAZOSIN HYDROCHLORIDE 1 MG: 1 CAPSULE ORAL at 21:31

## 2023-08-12 RX ADMIN — GABAPENTIN 100 MG: 100 CAPSULE ORAL at 21:31

## 2023-08-12 RX ADMIN — GABAPENTIN 100 MG: 100 CAPSULE ORAL at 12:43

## 2023-08-12 NOTE — TREATMENT PLAN
TREATMENT PLAN REVIEW - [facility] 33 y.o. 1995 female MRN: [id number]    [address] Room / Bed: [place] [address]/[place] [address] Encounter: [id number]          Admit Date/Time:  8/10/2023  1:40 PM    Treatment Team: Attending Provider: Emily Walsh MD; Consulting Physician: Angelita Hu MD; Licensed Practical Nurse: Chidi Borjas LPN; Patient Care Assistant: Emily Denney; Patient Care Technician: Michaelle Habermann;  Patient Care Assistant: Deo Hill    Diagnosis: Principal Problem:    Severe episode of recurrent major depressive disorder, without psychotic features ([address])  Active Problems:    Polysubstance abuse (HCC)    BMI less than 19,adult    BESSY (generalized anxiety disorder)    PTSD (post-traumatic stress disorder)      Patient Strengths/Assets: ability for insight, adapts well, good insight, interpersonal skills, motivated, reasoning ability, special hobby/interest, stable/recent employment, supportive family/friends    Patient Barriers/Limitations: financial instability, low self esteem, substance abuse    Short Term Goals: decrease in depressive symptoms, decrease in anxiety symptoms    Long Term Goals: improvement in depression, improvement in anxiety, adequate self care, adequate sleep, adequate appetite    Progress Towards Goals: starting psychiatric medications as prescribed    Recommended Treatment: medication management, patient medication education, group therapy, milieu therapy, continued Behavioral Health psychiatric evaluation/assessment process, individual therapy    Treatment Frequency: daily medication monitoring, group and milieu therapy daily, monitoring through interdisciplinary rounds, monitoring through weekly patient care conferences    Expected Discharge Date:  8/18/2023    Discharge Plan: discharge to home, referral for outpatient drug and alcohol counseling, referral for outpatient medication management with a psychiatrist, referral for outpatient psychotherapy    Treatment Plan Created/Updated By: Robert Gr DO

## 2023-08-12 NOTE — CONSULTS
[facility]  Consult  Name: Eliseo Katz 33 y.o. female I MRN: [id number]  Unit/Bed#: U 381-01 I Date of Admission: 8/10/2023   Date of Service: 8/12/2023 I Hospital Day: 1    Inpatient consult for Medical Clearance for [facility] patient  Consult performed by: FRANK Toney  Consult ordered by: Melvin Mckeon MD          Assessment/Plan   Medical clearance for psychiatric admission  Assessment & Plan  · Vital signs stable patient seen and examined by myself patient refused her lab draw this morning on 8/12/2023  · Patient medically stable for admit  · SL IM will sign off please call with any questions or concerns    BMI less than 19,adult  Assessment & Plan  · Patient has a BMI of 15.65  · We will order nutrition consult    Polysubstance abuse [facility])  Assessment & Plan  · Patient has a history of polysubstance abuse she has been admitted to detox once in June 2023 another time in July 2023  · Patient states that she is clean and maintained on methadone  · Patient needs drug cessation education        Counseling / Coordination of Care Time: 45 minutes. Greater than 50% of total time spent on patient counseling and coordination of care. Collaboration of Care: Were Recommendations Directly Discussed with Primary Treatment Team? - No     History of Present Illness:    Eliseo Katz is a 33 y.o. female who is originally admitted to the psychiatry service due to increasing anxiety and depression and unable to care for herself. We are consulted for medical clearance for admission to [facility] and treatment of underlying psychiatric illness. Patient has a longstanding history greater than 1 year mental health history with depression anxiety polysubstance abuse she has been to detox x2 in 2023 she has had an inpatient admission back in 2018. She has no significant past medical history.   She presents to [facility] ER with increasing depression and anxiety on 8 9/20/2023    Review of Systems:    Review of Systems   Constitutional: Negative for chills and fever. HENT: Negative for ear pain and sore throat. Eyes: Negative for pain and visual disturbance. Respiratory: Negative for cough and shortness of breath. Cardiovascular: Negative for chest pain and palpitations. Gastrointestinal: Negative for abdominal pain and vomiting. Genitourinary: Negative for dysuria and hematuria. Musculoskeletal: Negative for arthralgias and back pain. Skin: Negative for color change and rash. Neurological: Negative for seizures and syncope. All other systems reviewed and are negative.       Past Medical and Surgical History:     Past Medical History:   Diagnosis Date   • Anxiety    • Borderline personality disorder ([address])    • Chlamydia    • Depression    • Endometriosis    • IV drug user     IV heroin use, rehab 7 times    • Panic attacks    • Psychiatric disorder     anxiety   • PTSD (post-traumatic stress disorder)    • Varicella     childhood   • Visual impairment        Past Surgical History:   Procedure Laterality Date   • LAPAROTOMY         Meds/Allergies:    all medications and allergies reviewed    Allergies: No Known Allergies    Social History:     Marital Status: Single    Substance Use History:   Social History     Substance and Sexual Activity   Alcohol Use Not Currently     Social History     Tobacco Use   Smoking Status Never   • Passive exposure: Never   Smokeless Tobacco Never     Social History     Substance and Sexual Activity   Drug Use Yes   • Types: Heroin, Benzodiazepines, Fentanyl    Comment: 8 bags/day states benzos are mixed in       Family History:    non-contributory    Physical Exam:     Vitals:   Blood Pressure: 117/78 (08/12/23 0700)  Pulse: (!) 116 (08/12/23 0700)  Temperature: 97.7 °F (36.5 °C) (08/12/23 0700)  Temp Source: Temporal (08/12/23 0700)  Respirations: 16 (08/12/23 0700)  Height: 5' 4" (162.6 cm) (08/11/23 1605)  Weight - Scale: 41.4 kg (91 lb 3.2 oz) (08/11/23 1605)  SpO2: 97 % (08/12/23 0700)    Physical Exam  Constitutional:       General: She is not in acute distress. Appearance: Normal appearance. She is not ill-appearing. HENT:      Head: Normocephalic and atraumatic. Nose: Nose normal.      Mouth/Throat:      Mouth: Mucous membranes are moist.   Eyes:      Extraocular Movements: Extraocular movements intact. Cardiovascular:      Rate and Rhythm: Normal rate and regular rhythm. Heart sounds: Normal heart sounds. Pulmonary:      Breath sounds: Normal breath sounds. No wheezing. Abdominal:      General: Abdomen is flat. Bowel sounds are normal.      Palpations: Abdomen is soft. Skin:     General: Skin is warm and dry. Neurological:      Mental Status: She is alert. Additional Data:     Lab Results: I have personally reviewed pertinent reports. No results found for: "HGBA1C"        EKG, Pathology, and Other Studies Reviewed on Admission:   · EKG 8/11/2023 EKG 12-lead reviewed by myself ventricular rate 104 QTc 473 sinus tachycardia otherwise and normal ECG when compared with an EKG back in 8/3/2023 as well as 3/21/2023 there there are no significant new EKG findings patient does not have any ST elevations    ** Please Note: This note has been constructed using a voice recognition system. **    I have spent a total time of 45 minutes on 08/12/23 in caring for this patient including Patient and family education, Importance of tx compliance, Impressions, Counseling / Coordination of care, Documenting in the medical record, Reviewing / ordering tests, medicine, procedures   and Obtaining or reviewing history  .

## 2023-08-12 NOTE — PROGRESS NOTES
08/12/23 1400   Activity/Group Checklist   Group Other (Comment)  (OPEN STUDIO Art Therapy/Social Group)   Attendance Attended   Attendance Duration (min) Greater than 60   Interactions Interacted appropriately   Affect/Mood Appropriate   Goals Achieved Able to listen to others; Able to engage in interactions

## 2023-08-12 NOTE — ASSESSMENT & PLAN NOTE
· Vital signs stable patient seen and examined by myself patient refused her lab draw this morning on 8/12/2023  · Patient medically stable for admit  · SL IM will sign off please call with any questions or concerns

## 2023-08-12 NOTE — PLAN OF CARE
Problem: Depression  Goal: Refrain from harming self  Description: Interventions:  - Monitor patient closely, per order   - Supervise medication ingestion, monitor effects and side effects   Outcome: Progressing  Goal: Refrain from isolation  Description: Interventions:  - Develop a trusting relationship   - Encourage socialization   Outcome: Progressing  Goal: Refrain from self-neglect  Outcome: Progressing     Problem: Anxiety  Goal: Anxiety is at manageable level  Description: Interventions:  - Assess and monitor patient's anxiety level. - Monitor for signs and symptoms (heart palpitations, chest pain, shortness of breath, headaches, nausea, feeling jumpy, restlessness, irritable, apprehensive). - Collaborate with interdisciplinary team and initiate plan and interventions as ordered.   - [place] patient to unit/surroundings  - Explain treatment plan  - Encourage participation in care  - Encourage verbalization of concerns/fears  - Identify coping mechanisms  - Assist in developing anxiety-reducing skills  - Administer/offer alternative therapies  - Limit or eliminate stimulants  Outcome: Progressing     Problem: SUBSTANCE USE/ABUSE  Goal: Will have no detox symptoms and will verbalize plan for changing substance-related behavior  Description: INTERVENTIONS:  - Monitor physical status and assess for symptoms of withdrawal  - Administer medication as ordered  - Provide emotional support with 1 on 1 interaction with staff  - Encourage recovery focused program/ addiction education  - Assess for verbalization of changing behaviors related to substance abuse  - Initiate consults and referrals as appropriate (Case Management, Spiritual Care, etc.)  Outcome: Progressing  Goal: By discharge, will develop insight into their chemical dependency and sustain motivation to continue in recovery  Description: INTERVENTIONS:  - Attends all daily group sessions and scheduled AA groups  - Actively practices coping skills through participation in the therapeutic community and adherence to program rules  - Reviews and completes assignments from individual treatment plan  - Assist patient development of understanding of their personal cycle of addiction and relapse triggers  Outcome: Progressing  Goal: By discharge, patient will have ongoing treatment plan addressing chemical dependency  Description: INTERVENTIONS:  - Assist patient with resources and/or appointments for ongoing recovery based living  Outcome: Progressing

## 2023-08-12 NOTE — ASSESSMENT & PLAN NOTE
· Patient has a history of polysubstance abuse she has been admitted to detox once in June 2023 another time in July 2023  · Patient states that she is clean and maintained on methadone  · Patient needs drug cessation education

## 2023-08-12 NOTE — NURSING NOTE
Pt is alert and visible on the unit, able to make needs known. Quiet and calm this shift, walked the unit amongst self. Denies any pain/discomfort at this time. Denies SI/HI/AVH. Offers c/o anxiety and depression, states, "my anxiety is daniel always there." Morning labs were reattempted this morning with refusal stating, "I really don't like needles at all, I'm sorry I can't do it." Medications administered and tolerated. Meal compliant. Q7 min safety checks continued.
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
## 2023-08-12 NOTE — NURSING NOTE
Patient reported feeling irritable and received Zyprexa. @22:32 patient reported Zyprexa was effective.

## 2023-08-13 RX ADMIN — METHADONE HYDROCHLORIDE 139 MG: 10 CONCENTRATE ORAL at 08:20

## 2023-08-13 RX ADMIN — QUETIAPINE FUMARATE 50 MG: 50 TABLET ORAL at 21:00

## 2023-08-13 RX ADMIN — PRAZOSIN HYDROCHLORIDE 1 MG: 1 CAPSULE ORAL at 21:00

## 2023-08-13 RX ADMIN — GABAPENTIN 100 MG: 100 CAPSULE ORAL at 17:10

## 2023-08-13 RX ADMIN — GABAPENTIN 100 MG: 100 CAPSULE ORAL at 21:00

## 2023-08-13 RX ADMIN — SERTRALINE HYDROCHLORIDE 100 MG: 100 TABLET ORAL at 08:21

## 2023-08-13 RX ADMIN — GABAPENTIN 100 MG: 100 CAPSULE ORAL at 08:21

## 2023-08-13 NOTE — PROGRESS NOTES
Progress Note - Behavioral Health   Diana Francis 33 y.o. female MRN: [id number]  Unit/Bed#: [facility] 381-01 Encounter: [id number]    The patient was seen for continuing care and reviewed with treatment team.    Severe episode of recurrent major depressive disorder, without psychotic features ([address])    Vital signs in last 24 hours:  Temp:  [98.1 °F (36.7 °C)-99.6 °F (37.6 °C)] 98.1 °F (36.7 °C)  HR:  [] 124  Resp:  [16-18] 16  BP: (115-131)/(67-80) 131/80    Mental Status Evaluation:    Appearance disheveled   Behavior guarded   Mood anxious and depressed   Speech Sparse and Soft   Affect constricted   Thought Processes Goal directed and coherent   Thought Content Does not verbalize delusional material   Perceptual Disturbances Denies hallucinations and does not appear to be responding to internal stimuli   Risk Potential Suicidal/Homicidal Ideation - No evidence of suicidal or homicidal ideation and patient does not verbalize suicidal or homicidal ideation  Risk of Violence - No evidence of risk for violence found on assessment  Risk of Self Mutilation - No evidence of risk for self mutilation found on assessment   Associations intact associations   Sensorium oriented to person, place, time/date and situation   Cognition/Memory recent and remote memory grossly intact   Consciousness alert and awake   Attention/Concentration attention span and concentration are age appropriate   Insight fair   Judgement fair   Muscle Strength and Gait/Station normal muscle strength and normal muscle tone, normal gait/station and normal balance   Motor Activity no abnormal movements       Progress Toward Goals: Patient observed sitting on bed in room. Patient appears dysphoric and is cooperative on approach. She reports feeling very anxious this morning though states this has improved after utilizing coping skills walking coloring and reading. Denies depression today. Reports has some difficulty with sleep due to nightmares. States they have not significantly improved after starting prazosin last night. Reports daytime energy is adequate. Reports appetite is adequate but on the lower side. Patient is medication compliant and denies any side effects. No new issues or concerns reported by staff overnight    Recommended Treatment: Continue with pharmacotherapy, group therapy, milieu therapy and occupational therapy.   The patient will be maintained on the following medications:  Current Facility-Administered Medications   Medication Dose Route Frequency Provider Last Rate   • acetaminophen  650 mg Oral Q6H PRN Santi Mauro MD     • acetaminophen  650 mg Oral Q4H PRN Santi Mauro MD     • acetaminophen  975 mg Oral Q6H PRN Santi Mauro MD     • benztropine  1 mg Intramuscular Q4H PRN Max 6/day Santi Mauro MD     • benztropine  1 mg Oral Q4H PRN Max 6/day Santi Mauro MD     • hydrOXYzine HCL  50 mg Oral Q6H PRN Max 4/day Santi Mauro MD      Or   • diphenhydrAMINE  50 mg Intramuscular Q6H PRN Santi Mauro MD     • gabapentin  100 mg Oral TID Hayes Payan DO     • hydrOXYzine HCL  100 mg Oral Q6H PRN Max 4/day Santi Mauro MD      Or   • LORazepam  2 mg Intramuscular Q6H PRN Santi Mauro MD     • hydrOXYzine HCL  25 mg Oral Q6H PRN Max 4/day Santi Mauro MD     • melatonin  3 mg Oral HS PRN Santi Mauro MD     • methadone  139 mg Oral Daily Santi Mauro MD     • OLANZapine  10 mg Oral Q3H PRN Max 3/day Santi Mauro MD      Or   • OLANZapine  10 mg Intramuscular Q3H PRN Max 3/day Santi Mauro MD     • OLANZapine  5 mg Oral Q3H PRN Max 6/day Santi Mauro MD      Or   • OLANZapine  5 mg Intramuscular Q3H PRN Max 6/day Santi Mauro MD     • OLANZapine  2.5 mg Oral Q3H PRN Max 8/day Santi Mauro MD     • prazosin  1 mg Oral HS Hayes Payan DO     • QUEtiapine  50 mg Oral HS Santi Mauro MD     • sertraline  100 mg Oral Daily Santi Mauro MD         Severe episode of recurrent major depressive disorder, without psychotic features ([address])

## 2023-08-13 NOTE — PLAN OF CARE
Problem: Ineffective Coping  Goal: Participates in unit activities  Description: Interventions:  - Provide therapeutic environment   - Provide required programming   - Redirect inappropriate behaviors   Outcome: Progressing     Problem: Depression  Goal: Refrain from harming self  Description: Interventions:  - Monitor patient closely, per order   - Supervise medication ingestion, monitor effects and side effects   Outcome: Progressing  Goal: Refrain from isolation  Description: Interventions:  - Develop a trusting relationship   - Encourage socialization   Outcome: Progressing  Goal: Refrain from self-neglect  Outcome: Progressing     Problem: Anxiety  Goal: Anxiety is at manageable level  Description: Interventions:  - Assess and monitor patient's anxiety level. - Monitor for signs and symptoms (heart palpitations, chest pain, shortness of breath, headaches, nausea, feeling jumpy, restlessness, irritable, apprehensive). - Collaborate with interdisciplinary team and initiate plan and interventions as ordered.   - [place] patient to unit/surroundings  - Explain treatment plan  - Encourage participation in care  - Encourage verbalization of concerns/fears  - Identify coping mechanisms  - Assist in developing anxiety-reducing skills  - Administer/offer alternative therapies  - Limit or eliminate stimulants  Outcome: Progressing     Problem: SUBSTANCE USE/ABUSE  Goal: Will have no detox symptoms and will verbalize plan for changing substance-related behavior  Description: INTERVENTIONS:  - Monitor physical status and assess for symptoms of withdrawal  - Administer medication as ordered  - Provide emotional support with 1 on 1 interaction with staff  - Encourage recovery focused program/ addiction education  - Assess for verbalization of changing behaviors related to substance abuse  - Initiate consults and referrals as appropriate (Case Management, Spiritual Care, etc.)  Outcome: Progressing  Goal: By discharge, will develop insight into their chemical dependency and sustain motivation to continue in recovery  Description: INTERVENTIONS:  - Attends all daily group sessions and scheduled AA groups  - Actively practices coping skills through participation in the therapeutic community and adherence to program rules  - Reviews and completes assignments from individual treatment plan  - Assist patient development of understanding of their personal cycle of addiction and relapse triggers  Outcome: Progressing  Goal: By discharge, patient will have ongoing treatment plan addressing chemical dependency  Description: INTERVENTIONS:  - Assist patient with resources and/or appointments for ongoing recovery based living  Outcome: Progressing 60 y/o female with 60 y/o female with reflux and upper quadrant abdominal pain  had MRCP which showed gallstones patient now presents for laparoscopic possible open cnolecystectomy

## 2023-08-13 NOTE — PROGRESS NOTES
[facility] Group Note     08/13/23 1045   Activity/Group Checklist   Group Life Skills  (Teamwork and Communication)   Attendance Attended   Attendance Duration (min) Greater than 60   Interactions Interacted appropriately   Affect/Mood Appropriate;Calm  (Focused and motivated; Bright at times)   Goals Achieved Identified feelings; Discussed coping strategies; Able to listen to others; Able to engage in interactions; Able to reflect/comment on own behavior;Able to recieve feedback; Able to give feedback to another

## 2023-08-13 NOTE — NURSING NOTE
Patient's mostly isolative to self in her room this shift but come out for needs. Able to make her needs known. Denies all but appears depressed and flat. Patient reports "I'm doing so much better now!". Q7 min safety checks maintained. Compliant with her night meds. Will continue to monitor.

## 2023-08-13 NOTE — NURSING NOTE
Patient denies SI, HI, AHs and VHs. She reports severe anxiety and moderate depression. Patient is medication and meal compliant. She is visible on the unit and social with peers, joining groups. She denies any needs at this time.

## 2023-08-14 PROCEDURE — 99232 SBSQ HOSP IP/OBS MODERATE 35: CPT

## 2023-08-14 RX ORDER — QUETIAPINE FUMARATE 100 MG/1
100 TABLET, FILM COATED ORAL
Status: DISCONTINUED | OUTPATIENT
Start: 2023-08-14 | End: 2023-08-17 | Stop reason: HOSPADM

## 2023-08-14 RX ADMIN — PRAZOSIN HYDROCHLORIDE 1 MG: 1 CAPSULE ORAL at 21:20

## 2023-08-14 RX ADMIN — SERTRALINE HYDROCHLORIDE 100 MG: 100 TABLET ORAL at 08:04

## 2023-08-14 RX ADMIN — QUETIAPINE FUMARATE 100 MG: 100 TABLET ORAL at 21:20

## 2023-08-14 RX ADMIN — GABAPENTIN 100 MG: 100 CAPSULE ORAL at 15:42

## 2023-08-14 RX ADMIN — METHADONE HYDROCHLORIDE 139 MG: 10 CONCENTRATE ORAL at 08:04

## 2023-08-14 RX ADMIN — GABAPENTIN 100 MG: 100 CAPSULE ORAL at 21:20

## 2023-08-14 RX ADMIN — GABAPENTIN 100 MG: 100 CAPSULE ORAL at 08:04

## 2023-08-14 NOTE — NURSING NOTE
Pt approached about lab orders; encouraged to provide samples. Pt refused out of concern of injury during blood draw. Pt was offered med for anxiety during draw and given therapeutic encouragement in safe performance of staff when drawing samples; pt further declined.

## 2023-08-14 NOTE — NURSING NOTE
Pt out of room for vitals and snacks, compliant with meds. Denies SI/HI/AH/VH, expressed anxiety and depression are under control at this time.

## 2023-08-14 NOTE — NURSING NOTE
Pt denies active SI, HI and A/V hallucinations at this present time. Pt is moderately anxious on approach, but states severity is improved since yesterday; declined need for prn med for anxiety. Currently walking around hallways quietly, wearing surgical mask and personal clothing by choice; is approachable; quiet, guarded.

## 2023-08-14 NOTE — DISCHARGE INSTR - APPOINTMENTS
Harjeet Lyle or Carlie, our Ольга and Arabella, will be calling you after your discharge, on the phone number that you provided. They will be available as an additional support, if needed. If you wish to speak with one of them, you may contact Harjeet Lyle at [phone] or Blank Lemon at [phone].

## 2023-08-14 NOTE — PLAN OF CARE
Problem: Depression  Goal: Refrain from harming self  Description: Interventions:  - Monitor patient closely, per order   - Supervise medication ingestion, monitor effects and side effects   Outcome: Progressing  Goal: Refrain from isolation  Description: Interventions:  - Develop a trusting relationship   - Encourage socialization   Outcome: Progressing  Goal: Refrain from self-neglect  Outcome: Progressing     Problem: Anxiety  Goal: Anxiety is at manageable level  Description: Interventions:  - Assess and monitor patient's anxiety level. - Monitor for signs and symptoms (heart palpitations, chest pain, shortness of breath, headaches, nausea, feeling jumpy, restlessness, irritable, apprehensive). - Collaborate with interdisciplinary team and initiate plan and interventions as ordered.   - [place] patient to unit/surroundings  - Explain treatment plan  - Encourage participation in care  - Encourage verbalization of concerns/fears  - Identify coping mechanisms  - Assist in developing anxiety-reducing skills  - Administer/offer alternative therapies  - Limit or eliminate stimulants  Outcome: Progressing     Problem: SUBSTANCE USE/ABUSE  Goal: Will have no detox symptoms and will verbalize plan for changing substance-related behavior  Description: INTERVENTIONS:  - Monitor physical status and assess for symptoms of withdrawal  - Administer medication as ordered  - Provide emotional support with 1 on 1 interaction with staff  - Encourage recovery focused program/ addiction education  - Assess for verbalization of changing behaviors related to substance abuse  - Initiate consults and referrals as appropriate (Case Management, Spiritual Care, etc.)  Outcome: Progressing  Goal: By discharge, will develop insight into their chemical dependency and sustain motivation to continue in recovery  Description: INTERVENTIONS:  - Attends all daily group sessions and scheduled AA groups  - Actively practices coping skills through participation in the therapeutic community and adherence to program rules  - Reviews and completes assignments from individual treatment plan  - Assist patient development of understanding of their personal cycle of addiction and relapse triggers  Outcome: Progressing  Goal: By discharge, patient will have ongoing treatment plan addressing chemical dependency  Description: INTERVENTIONS:  - Assist patient with resources and/or appointments for ongoing recovery based living  Outcome: Progressing

## 2023-08-14 NOTE — PROGRESS NOTES
08/14/23 1123   Team Meeting   Meeting Type Tx Team Meeting   Initial Conference Date 08/14/23   Next Conference Date 09/14/23   Team Members Present   Team Members Present Physician;Nurse;   Physician Team Member [id number] [place] Team Member Sami Busby [facility]    Social Work Team Member Jordon   Patient/Family Present   Patient Present Yes   Patient's Family Present No     Treatment plan reviewed with patient. Patient in agreement with treatment plan.
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
## 2023-08-14 NOTE — TREATMENT TEAM
08/14/23 1100   Activity/Group Checklist   Group   (recovery group)   Attendance Attended   Attendance Duration (min) 46-60   Interactions Interacted appropriately   Affect/Mood Appropriate   Goals Achieved Discussed coping strategies; Discussed self-esteem issues; Able to listen to others; Able to engage in interactions; Able to reflect/comment on own behavior;Able to self-disclose; Able to recieve feedback; Able to give feedback to another     Patient was willing to share her recovery process and appears to have a sincere desire to get healthy

## 2023-08-14 NOTE — DISCHARGE INSTR - OTHER ORDERS
You are being discharged to your home at [address]    If you or someone you know is struggling or in crisis, help is available. Call or text 01 250609 or chat [employer]. You can also reach Crisis Text Line by texting [facility] to 832605. [facility] Crisis Intervention: [address] [facility] Crisis Intervention: 483.987.1029  *National Suicide Prevention Lifeline:  3-274.400.5524  *Peer Support Talk Line (Seven days a week, 1:00 PM - 9:00 PM) Call: 753.145.3026 or Text: [address] on [address] [place] [address] ([facility]) HELPLINE: 879.620.7308/Website: www.hillary.org  *Substance Abuse and [address] [place] [address]([facility]) [facility], which is a confidential, free, 24-hour-a-day, 365-day-a-year, information service for individuals and family members facing mental health and/or substance use disorders. This service provides referrals to local treatment facilities, support groups, and community-based organizations. Callers can also order free publications and other information. Call 7-471.591.7098/Website: www.[facility].gov  *[facility] 2-1-1: This is a toll free, confidential, 24-hour-a-day service which connects you to a community  in your area who can help you find services and resources that are available to you locally and provide critical services that can improve and save lives.   Call: 211  /Website: https://stefanie-abi.net/

## 2023-08-14 NOTE — PROGRESS NOTES
08/14/23 0757   Team Meeting   Meeting Type Daily Rounds   Team Members Present   Team Members Present Physician;Nurse;; Other (Discipline and Name)   Physician Team Member [place]   Nursing Team Member Barbra Ontiveros   Social Work Team Member De   Other (Discipline and Name) Adrián GAMEZ   Patient/Family Present   Patient Present No   Patient's Family Present No     Patient is a 13 for increased anxiety and depression, History of Opoid abuse, currently on methadone, she is guarded. Denies SI,HI. She is cooperative.

## 2023-08-14 NOTE — NURSING NOTE
Patient's visible in the dayroom, socializing with peers playing the [place] game. Able to make her needs known. Denies all this shift. Q 7 safety checks maintained. Compliant with her night meds. Patient offered, given fluids, snacks this shift. No s/s of pain, discomfort, sob, irritation, agitation reported. Will continue to monitor.

## 2023-08-14 NOTE — PROGRESS NOTES
Progress Note - [address] Preeti Bolanos 33 y.o. female MRN: [id number]  Unit/Bed#: [facility] 381-01 Encounter: [id number]    Patient was seen today for continuation of care, records reviewed and patient was discussed with the morning case review team.  Per staff, new patient over the weekend 201 status with increased anxiety and depression. She is cooperative but guarded. Denying SI/HI. Sally Holloway was seen today for psychiatric follow-up. On assessment today, Sally Holloway was seen in the group room. Elda report that she feels her mood has improved since admission. She states she feels more hopeful but continues to endorse depression and anxiety. She reports her anxiety is the worst in the morning and seems to get better throughout the day. She reports she continues with some insomnia but notes that the Prazosin has helped with nightmares. She is agreeable to increasing Seroquel tonight for insomnia and further mood stabilization. Her opioid cravings are currently controlled on current methadone dosing. Sally Holloway denies acute suicidal/self-harm ideation/intent/plan upon direct inquiry at this time. Sally Holloway remains behaviorally appropriate, no agitation or aggression noted on exam or in report. Sally Holloway also denies HI/AH/VH, and does not appear overtly manic. No overt delusions or paranoia are verbalized. Sally Holloway remains adherent to her current psychotropic medication regimen and denies any side effects from medications, as well as none noted on exam.    We will increase Seroquel to 100mg PO at HS for insomnia and mood stabilization. Continue all other medications:  Gabapentin 100mg PO TID for anxiety, Methadone 139mg PO daily for OUD, Prazosin 1mg PO at HS for PTSD, and Zoloft 100mg PO daily for depression/anxiety.      Vitals:  Vitals:    08/14/23 0710   BP: 121/86   Pulse: 96   Resp: 16   Temp: (!) 97.4 °F (36.3 °C)   SpO2: 96%       Laboratory Results:    I have personally reviewed all pertinent laboratory/tests results.   Most Recent Labs:   Lab Results   Component Value Date    WBC 16.62 (H) 07/20/2023    RBC 4.17 07/20/2023    HGB 11.9 07/20/2023    HCT 37.7 07/20/2023     07/20/2023    RDW 14.1 07/20/2023    NEUTROABS 13.08 (H) 07/20/2023    SODIUM 138 07/22/2023    K 4.2 07/22/2023     07/22/2023    CO2 25 07/22/2023    BUN 4 (L) 07/22/2023    CREATININE 0.48 (L) 07/22/2023    GLUC 77 07/22/2023    CALCIUM 8.8 07/22/2023    AST 63 (H) 07/22/2023    ALT 70 (H) 07/22/2023    ALKPHOS 112 (H) 07/22/2023    TP 6.8 07/22/2023    ALB 3.7 07/22/2023    TBILI 0.18 (L) 07/22/2023    HCG 72.3 10/18/2016    RPR Non-Reactive 05/04/2017       Psychiatric Review of Systems:  Behavior over the last 24 hours:  Some slow improvement  Sleep: poor  Appetite: improving  Medication side effects: denies  ROS: no complaints, denies shortness of breath or chest pain and all other systems are negative for acute changes    Mental Status Evaluation:    Appearance:  disheveled, marginal hygiene, looks stated age   Behavior:  pleasant, cooperative, calm   Speech:  normal rate and volume, fluent, clear   Mood:  depressed   Affect:  constricted   Thought Process:  goal directed, linear   Associations: intact associations   Thought Content:  no overt delusions, negative thinking   Perceptual Disturbances: denies auditory or visual hallucinations when asked, does not appear responding to internal stimuli   Risk Potential: Suicidal ideation - None at present, contracts for safety on the unit, would talk to staff if not feeling safe on the unit  Homicidal ideation - None  Potential for aggression - No   Sensorium:  oriented to person, place and situation   Memory:  recent memory intact   Consciousness:  alert and awake   Attention/Concentration: attention span and concentration appear shorter than expected for age   Insight:  partial   Judgment: partial   Gait/Station: normal gait/station   Motor Activity: no abnormal movements     Progress Toward Goals:   Domenico Bangura is progressing towards goals of inpatient psychiatric treatment by continued medication compliance and is attending therapeutic modalities on the milieu. However, the patient continues to require inpatient psychiatric hospitalization for continued medication management and titration to optimize symptom reduction, improve sleep hygiene, and demonstrate adequate self-care. Risk of Harm to Self:   Nursing Suicide Risk Assessment Last 24 hours: C-SSRS Risk (Since Last Contact)  Calculated C-SSRS Risk Score (Since Last Contact): No Risk Indicated    Risk of Harm to Others:  Nursing Homicide Risk Assessment: Violence Risk to Others: Denies within past 6 months    The following interventions are recommended: behavioral checks every 7 minutes, continued hospitalization on locked unit      Assessment/Plan   Principal Problem:    Severe episode of recurrent major depressive disorder, without psychotic features ([address])  Active Problems:    Polysubstance abuse ([address])    BMI less than 19,adult    BESSY (generalized anxiety disorder)    PTSD (post-traumatic stress disorder)      Recommended Treatment: Treatment plan and medication changes discussed and per the attending physician the plan is: 1. Continue with group therapy, milieu therapy and occupational therapy  2. Behavioral Health checks every 7 minutes  3. Continue frequent safety checks and vitals per unit protocol  4. Continue with SLIM medical management as indicated  5. Continue with current medication regimen  6. Will review labs in the a.m. 7.Disposition Planning: Discharge planning and efforts remain ongoing    Behavioral Health Medications: all current active meds have been reviewed and planned medication changes: increase Seroquel to 100mg PO at HS.   Current Facility-Administered Medications   Medication Dose Route Frequency Provider Last Rate   • acetaminophen  650 mg Oral Q6H PRN Miki Eduardo MD     • acetaminophen  650 mg Oral Q4H PRN Lucia Johnson MD     • acetaminophen  975 mg Oral Q6H PRN Lucia Johnson MD     • benztropine  1 mg Intramuscular Q4H PRN Max 6/day Lucia Johnson MD     • benztropine  1 mg Oral Q4H PRN Max 6/day Lucia Johnson MD     • hydrOXYzine HCL  50 mg Oral Q6H PRN Max 4/day Lucia Johnson MD      Or   • diphenhydrAMINE  50 mg Intramuscular Q6H PRN Lucia Johnson MD     • gabapentin  100 mg Oral TID Pooja Stark DO     • hydrOXYzine HCL  100 mg Oral Q6H PRN Max 4/day Lucia Johnson MD      Or   • LORazepam  2 mg Intramuscular Q6H PRN Lucia Johnson MD     • hydrOXYzine HCL  25 mg Oral Q6H PRN Max 4/day Lucia Johnson MD     • melatonin  3 mg Oral HS PRN Lucia Johnson MD     • methadone  139 mg Oral Daily Lucia Johnson MD     • OLANZapine  10 mg Oral Q3H PRN Max 3/day Lucia Johnson MD      Or   • OLANZapine  10 mg Intramuscular Q3H PRN Max 3/day Lucia Johnson MD     • OLANZapine  5 mg Oral Q3H PRN Max 6/day Lucia Johnson MD      Or   • OLANZapine  5 mg Intramuscular Q3H PRN Max 6/day Lucia Johnson MD     • OLANZapine  2.5 mg Oral Q3H PRN Max 8/day Lucia Johnson MD     • prazosin  1 mg Oral HS Bowen Elam DO     • QUEtiapine  100 mg Oral HS FRANK Mackay     • sertraline  100 mg Oral Daily Lucia Johnson MD         Risks / Benefits of Treatment:  Risks, benefits, and possible side effects of medications explained to patient and patient verbalizes understanding and agreement for treatment. Counseling / Coordination of Care:  Patient's progress reviewed with nursing staff. Medications, treatment progress and treatment plan reviewed with patient. Supportive counseling provided to the patient. Total floor/unit time spent today 25 minutes. Greater than 50% of total time was spent with the patient and / or family counseling and / or coordination of care. A description of the counseling / coordination of care: medication education, treatment plan, supportive therapy.

## 2023-08-15 PROCEDURE — 99232 SBSQ HOSP IP/OBS MODERATE 35: CPT

## 2023-08-15 RX ORDER — GABAPENTIN 100 MG/1
100 CAPSULE ORAL 2 TIMES DAILY
Status: CANCELLED | OUTPATIENT
Start: 2023-08-15

## 2023-08-15 RX ORDER — GABAPENTIN 300 MG/1
300 CAPSULE ORAL
Status: DISCONTINUED | OUTPATIENT
Start: 2023-08-15 | End: 2023-08-17 | Stop reason: HOSPADM

## 2023-08-15 RX ORDER — GABAPENTIN 100 MG/1
100 CAPSULE ORAL 2 TIMES DAILY
Status: DISCONTINUED | OUTPATIENT
Start: 2023-08-15 | End: 2023-08-17 | Stop reason: HOSPADM

## 2023-08-15 RX ADMIN — GABAPENTIN 300 MG: 300 CAPSULE ORAL at 21:05

## 2023-08-15 RX ADMIN — GABAPENTIN 100 MG: 100 CAPSULE ORAL at 08:10

## 2023-08-15 RX ADMIN — QUETIAPINE FUMARATE 100 MG: 100 TABLET ORAL at 21:05

## 2023-08-15 RX ADMIN — GABAPENTIN 100 MG: 100 CAPSULE ORAL at 15:37

## 2023-08-15 RX ADMIN — SERTRALINE HYDROCHLORIDE 100 MG: 100 TABLET ORAL at 08:10

## 2023-08-15 RX ADMIN — METHADONE HYDROCHLORIDE 139 MG: 10 CONCENTRATE ORAL at 08:11

## 2023-08-15 RX ADMIN — PRAZOSIN HYDROCHLORIDE 1 MG: 1 CAPSULE ORAL at 21:05

## 2023-08-15 NOTE — PROGRESS NOTES
Progress Note - [id number] Preeti Bolanos 33 y.o. female MRN: [id number]  Unit/Bed#: [facility] 381-01 Encounter: [id number]    Patient was seen today for continuation of care, records reviewed and patient was discussed with the morning case review team. Adam Vega is cooperative and compliant with medication. She is less guarded with some staff. She reports anxiety and improved depression. Adam Vega was seen today for psychiatric follow-up. On assessment today, Adam Vega was seen in her room. Adam Vega reports that her mood is "good". She is cooperative and engaged with the interview with good eye contact. She reports improved sleep last night and denies any nightmares. She reports improved anxiety but continues to endorse higher anxiety early in the morning. She reports that she is hopeful for the future. She is denying any cravings for opioids. She is agreeable to an increase in gabapentin in the evening to improve sleep and early morning anxiety. Adam Vega denies acute suicidal/self-harm ideation/intent/plan upon direct inquiry at this time. Adam Vega remains behaviorally appropriate, no agitation or aggression noted on exam or in report. Adam Vega denies HI/AH/VH, and does not appear overtly manic. No overt delusions or paranoia are verbalized. Adam Vega remains adherent to her current psychotropic medication regimen and denies any side effects from medications, as well as none noted on exam.    We will increase HS dose of gabapentin to 300mg for insomnia and anxiety. We will continue all other medications. Gabapentin 100mg PO BID, Methadone 139mg PO daily for OUD, Prazosin 1mg PO at HS for PTSD, and Zoloft 100mg PO daily for depression/anxiety. Discharge home to family on Thursday if stable. Vitals:  Vitals:    08/15/23 0716   BP: 108/79   Pulse: (!) 119   Resp: 16   Temp: 97.7 °F (36.5 °C)   SpO2: 96%       Laboratory Results:    I have personally reviewed all pertinent laboratory/tests results.   Most Recent Labs:   Lab Results   Component Value Date    WBC 16.62 (H) 07/20/2023    RBC 4.17 07/20/2023    HGB 11.9 07/20/2023    HCT 37.7 07/20/2023     07/20/2023    RDW 14.1 07/20/2023    NEUTROABS 13.08 (H) 07/20/2023    SODIUM 138 07/22/2023    K 4.2 07/22/2023     07/22/2023    CO2 25 07/22/2023    BUN 4 (L) 07/22/2023    CREATININE 0.48 (L) 07/22/2023    GLUC 77 07/22/2023    CALCIUM 8.8 07/22/2023    AST 63 (H) 07/22/2023    ALT 70 (H) 07/22/2023    ALKPHOS 112 (H) 07/22/2023    TP 6.8 07/22/2023    ALB 3.7 07/22/2023    TBILI 0.18 (L) 07/22/2023    HCG 72.3 10/18/2016    RPR Non-Reactive 05/04/2017       Psychiatric Review of Systems:  Behavior over the last 24 hours:  improved. Sleep:  improving  Appetite: improving  Medication side effects: denies  ROS: no complaints, denies shortness of breath or chest pain and all other systems are negative for acute changes    Mental Status Evaluation:    Appearance:  disheveled, marginal hygiene   Behavior:  pleasant, cooperative, calm   Speech:  normal rate and volume, fluent, clear   Mood:  anxious   Affect:  constricted   Thought Process:  goal directed, linear, normal rate of thoughts   Associations: intact associations   Thought Content:  no overt delusions   Perceptual Disturbances: none   Risk Potential: Suicidal ideation - None at present  Homicidal ideation - None  Potential for aggression - No   Sensorium:  oriented to person, place and situation   Memory:  recent memory intact   Consciousness:  alert and awake   Attention/Concentration: attention span and concentration are age appropriate   Insight:  improving   Judgment: improving   Gait/Station: normal gait/station   Motor Activity: no abnormal movements     Progress Toward Goals:   Elda is progressing towards goals of inpatient psychiatric treatment by continued medication compliance and is attending therapeutic modalities on the milieu.  However, the patient continues to require inpatient psychiatric hospitalization for continued medication management and titration to optimize symptom reduction, improve sleep hygiene, and demonstrate adequate self-care. Risk of Harm to Self:   Nursing Suicide Risk Assessment Last 24 hours: C-SSRS Risk (Since Last Contact)  Calculated C-SSRS Risk Score (Since Last Contact): No Risk Indicated    Risk of Harm to Others:  Nursing Homicide Risk Assessment: Violence Risk to Others: Denies within past 6 months    The following interventions are recommended: behavioral checks every 7 minutes, continued hospitalization on locked unit      Assessment/Plan   Principal Problem:    Severe episode of recurrent major depressive disorder, without psychotic features ([address] )  Active Problems:    Polysubstance abuse ([address] )    BMI less than 19,adult    BESSY (generalized anxiety disorder)    PTSD (post-traumatic stress disorder)      Recommended Treatment: Treatment plan and medication changes discussed and per the attending physician the plan is: 1. Continue with group therapy, milieu therapy and occupational therapy  2. Behavioral Health checks every 7 minutes  3. Continue frequent safety checks and vitals per unit protocol  4. Continue with SLIM medical management as indicated  5. Continue with current medication regimen  6. Will review labs in the a.m. 7.Disposition Planning: Discharge planning and efforts remain ongoing    Behavioral Health Medications: all current active meds have been reviewed and planned medication changes: Increase HS dose of gabpentin to 300mg PO .   Current Facility-Administered Medications   Medication Dose Route Frequency Provider Last Rate   • acetaminophen  650 mg Oral Q6H PRN John Pettit MD     • acetaminophen  650 mg Oral Q4H PRN John Pettit MD     • acetaminophen  975 mg Oral Q6H PRN John Pettit MD     • benztropine  1 mg Intramuscular Q4H PRN Max 6/day John Pettit MD     • benztropine  1 mg Oral Q4H PRN Max 6/day John Pettit MD     • hydrOXYzine HCL  50 mg Oral Q6H PRN Max 4/day Amandeep Kirkpatrick MD      Or   • diphenhydrAMINE  50 mg Intramuscular Q6H PRN Amandeep Kirkpatrick MD     • gabapentin  100 mg Oral BID Jeimy Luann Catching, CRNP     • gabapentin  300 mg Oral HS Jeimy Luann Catching, CRNP     • hydrOXYzine HCL  100 mg Oral Q6H PRN Max 4/day Amandeep Kirkpatrick MD      Or   • LORazepam  2 mg Intramuscular Q6H PRN Amandeep Kirkpatrick MD     • hydrOXYzine HCL  25 mg Oral Q6H PRN Max 4/day Amandeep Kirkpatrick MD     • melatonin  3 mg Oral HS PRN Amandeep Kirkpatrick MD     • methadone  139 mg Oral Daily Amandeep Kirkpatrick MD     • OLANZapine  10 mg Oral Q3H PRN Max 3/day Amandeep Kirkpatrick MD      Or   • OLANZapine  10 mg Intramuscular Q3H PRN Max 3/day Amandeep Kirkpatrick MD     • OLANZapine  5 mg Oral Q3H PRN Max 6/day Amandeep Kirkpatrick MD      Or   • OLANZapine  5 mg Intramuscular Q3H PRN Max 6/day Amandeep Kirkpatrick MD     • OLANZapine  2.5 mg Oral Q3H PRN Max 8/day Amandeep Kirkpatrick MD     • prazosin  1 mg Oral HS Altagracia Coronado, DO     • QUEtiapine  100 mg Oral HS FRANK Pineda     • sertraline  100 mg Oral Daily Amandeep Kirkpatrick MD         Risks / Benefits of Treatment:  Risks, benefits, and possible side effects of medications explained to patient and patient verbalizes understanding and agreement for treatment. Counseling / Coordination of Care:  Patient's progress reviewed with nursing staff. Medications, treatment progress and treatment plan reviewed with patient. Supportive counseling provided to the patient. Total floor/unit time spent today 25 minutes. Greater than 50% of total time was spent with the patient and / or family counseling and / or coordination of care. A description of the counseling / coordination of care: medication education, treatment plan, supportive therapy.

## 2023-08-15 NOTE — TREATMENT TEAM
08/15/23 1100   Activity/Group Checklist   Group   (recovery group)   Attendance Attended   Attendance Duration (min) 46-60   Interactions Interacted appropriately   Affect/Mood Appropriate   Goals Achieved Discussed coping strategies; Discussed safety plan;Discussed self-esteem issues; Displayed empathy;Able to listen to others; Able to engage in interactions; Able to reflect/comment on own behavior;Able to self-disclose; Able to recieve feedback; Able to give feedback to another

## 2023-08-15 NOTE — PLAN OF CARE
Problem: Ineffective Coping  Goal: Participates in unit activities  Description: Interventions:  - Provide therapeutic environment   - Provide required programming   - Redirect inappropriate behaviors   Outcome: Progressing     Problem: Depression  Goal: Refrain from harming self  Description: Interventions:  - Monitor patient closely, per order   - Supervise medication ingestion, monitor effects and side effects   Outcome: Progressing  Goal: Refrain from isolation  Description: Interventions:  - Develop a trusting relationship   - Encourage socialization   Outcome: Progressing  Goal: Refrain from self-neglect  Outcome: Progressing

## 2023-08-15 NOTE — NURSING NOTE
Pt denies active SI, HI and A/V hallucinations at this present time. Pleasant on approach, observed waiting in line for VS, reports mild anxiety that is controlled and reports improvement in degree since yesterday's assessment. Socializing well with others, guarded, wearing mask and personal clothing.

## 2023-08-15 NOTE — PROGRESS NOTES
08/15/23 0857   Team Meeting   Meeting Type Daily Rounds   Team Members Present   Team Members Present Physician;;Nurse; Other (Discipline and Name)   Physician Team Member Lisa Zuluaga   Nursing Team Member [address] [place] [address] Work Team Member De   Other (Discipline and Name) Adrián GAMEZ   Patient/Family Present   Patient Present No   Patient's Family Present No     Patient is refusing labs, she is compliant with medications, she is anxious at times. Increased Seroquel. D/C Thursday.

## 2023-08-16 PROCEDURE — 99232 SBSQ HOSP IP/OBS MODERATE 35: CPT

## 2023-08-16 RX ORDER — SERTRALINE HYDROCHLORIDE 100 MG/1
100 TABLET, FILM COATED ORAL DAILY
Qty: 30 TABLET | Refills: 1 | Status: SHIPPED | OUTPATIENT
Start: 2023-08-17 | End: 2023-10-16

## 2023-08-16 RX ORDER — PRAZOSIN HYDROCHLORIDE 1 MG/1
1 CAPSULE ORAL
Qty: 30 CAPSULE | Refills: 1 | Status: SHIPPED | OUTPATIENT
Start: 2023-08-16 | End: 2023-10-15

## 2023-08-16 RX ORDER — GABAPENTIN 300 MG/1
300 CAPSULE ORAL
Qty: 30 CAPSULE | Refills: 1 | Status: SHIPPED | OUTPATIENT
Start: 2023-08-16 | End: 2023-10-15

## 2023-08-16 RX ORDER — QUETIAPINE FUMARATE 100 MG/1
100 TABLET, FILM COATED ORAL
Qty: 30 TABLET | Refills: 1 | Status: SHIPPED | OUTPATIENT
Start: 2023-08-16 | End: 2023-10-15

## 2023-08-16 RX ORDER — GABAPENTIN 100 MG/1
100 CAPSULE ORAL 2 TIMES DAILY
Qty: 60 CAPSULE | Refills: 1 | Status: SHIPPED | OUTPATIENT
Start: 2023-08-16 | End: 2023-10-15

## 2023-08-16 RX ADMIN — PRAZOSIN HYDROCHLORIDE 1 MG: 1 CAPSULE ORAL at 21:16

## 2023-08-16 RX ADMIN — GABAPENTIN 100 MG: 100 CAPSULE ORAL at 15:34

## 2023-08-16 RX ADMIN — METHADONE HYDROCHLORIDE 139 MG: 10 CONCENTRATE ORAL at 08:27

## 2023-08-16 RX ADMIN — QUETIAPINE FUMARATE 100 MG: 100 TABLET ORAL at 21:16

## 2023-08-16 RX ADMIN — GABAPENTIN 100 MG: 100 CAPSULE ORAL at 08:27

## 2023-08-16 RX ADMIN — GABAPENTIN 300 MG: 300 CAPSULE ORAL at 21:16

## 2023-08-16 RX ADMIN — SERTRALINE HYDROCHLORIDE 100 MG: 100 TABLET ORAL at 08:26

## 2023-08-16 NOTE — TREATMENT TEAM
08/16/23 1100   Activity/Group Checklist   Group   (recovery group)   Attendance Attended   Attendance Duration (min) 46-60   Interactions Interacted appropriately   Affect/Mood Appropriate   Goals Achieved Discussed coping strategies; Discussed self-esteem issues; Identified distorted thoughts/beliefs; Displayed empathy;Able to listen to others; Able to engage in interactions; Able to reflect/comment on own behavior;Able to self-disclose; Able to recieve feedback

## 2023-08-16 NOTE — NURSING NOTE
Pt pt calm and cooperative, visible and active on the milieu,  denies all psych symptoms. Pt compliant with medication and meals, observed attending group, and well as socializing with peers. Pt looking forward to tomorrow d/c. No remarkable behaviors observed at this time.

## 2023-08-16 NOTE — PROGRESS NOTES
08/15/23 1400   Activity/Group Checklist   Group Other (Comment)  (Group Art Therapy/Psychodynamic, Creatively Visualizing Your [employer])   Attendance Attended   Attendance Duration (min) Greater than 60   Interactions Interacted appropriately   Affect/Mood Appropriate   Goals Achieved Discussed coping strategies; Able to listen to others; Able to engage in interactions; Able to recieve feedback; Able to give feedback to another  (Able to engage materials and directive; full participation)

## 2023-08-16 NOTE — PLAN OF CARE
Problem: Ineffective Coping  Goal: Participates in unit activities  Description: Interventions:  - Provide therapeutic environment   - Provide required programming   - Redirect inappropriate behaviors   Outcome: Progressing     Problem: Depression  Goal: Refrain from harming self  Description: Interventions:  - Monitor patient closely, per order   - Supervise medication ingestion, monitor effects and side effects   Outcome: Progressing  Goal: Refrain from isolation  Description: Interventions:  - Develop a trusting relationship   - Encourage socialization   Outcome: Progressing  Goal: Refrain from self-neglect  Outcome: Progressing     Problem: Anxiety  Goal: Anxiety is at manageable level  Description: Interventions:  - Assess and monitor patient's anxiety level. - Monitor for signs and symptoms (heart palpitations, chest pain, shortness of breath, headaches, nausea, feeling jumpy, restlessness, irritable, apprehensive). - Collaborate with interdisciplinary team and initiate plan and interventions as ordered.   - [place] patient to unit/surroundings  - Explain treatment plan  - Encourage participation in care  - Encourage verbalization of concerns/fears  - Identify coping mechanisms  - Assist in developing anxiety-reducing skills  - Administer/offer alternative therapies  - Limit or eliminate stimulants  Outcome: Not Progressing     Problem: SUBSTANCE USE/ABUSE  Goal: Will have no detox symptoms and will verbalize plan for changing substance-related behavior  Description: INTERVENTIONS:  - Monitor physical status and assess for symptoms of withdrawal  - Administer medication as ordered  - Provide emotional support with 1 on 1 interaction with staff  - Encourage recovery focused program/ addiction education  - Assess for verbalization of changing behaviors related to substance abuse  - Initiate consults and referrals as appropriate (Case Management, Spiritual Care, etc.)  Outcome: Progressing  Goal: By discharge, will develop insight into their chemical dependency and sustain motivation to continue in recovery  Description: INTERVENTIONS:  - Attends all daily group sessions and scheduled AA groups  - Actively practices coping skills through participation in the therapeutic community and adherence to program rules  - Reviews and completes assignments from individual treatment plan  - Assist patient development of understanding of their personal cycle of addiction and relapse triggers  Outcome: Progressing  Goal: By discharge, patient will have ongoing treatment plan addressing chemical dependency  Description: INTERVENTIONS:  - Assist patient with resources and/or appointments for ongoing recovery based living  Outcome: Progressing     Problem: Nutrition/Hydration-ADULT  Goal: Nutrient/Hydration intake appropriate for improving, restoring or maintaining nutritional needs  Description: Monitor and assess patient's nutrition/hydration status for malnutrition. Collaborate with interdisciplinary team and initiate plan and interventions as ordered. Monitor patient's weight and dietary intake as ordered or per policy. Utilize nutrition screening tool and intervene as necessary. Determine patient's food preferences and provide high-protein, high-caloric foods as appropriate.      INTERVENTIONS:  - Monitor oral intake, urinary output, labs, and treatment plans  - Assess nutrition and hydration status and recommend course of action  - Evaluate amount of meals eaten  - Assist patient with eating if necessary   - Allow adequate time for meals  - Recommend/ encourage appropriate diets, oral nutritional supplements, and vitamin/mineral supplements  - Order, calculate, and assess calorie counts as needed  - Recommend, monitor, and adjust tube feedings and TPN/PPN based on assessed needs  - Assess need for intravenous fluids  - Provide specific nutrition/hydration education as appropriate  - Include patient/family/caregiver in decisions related to nutrition  Outcome: Progressing

## 2023-08-16 NOTE — NURSING NOTE
Pt is visible in the milieu; social with peers. She is calm, cooperative and pleasant during assessment. Denies SI/HI/AVH. No problems observed or reported. Q 7 min checks maintained for safety.

## 2023-08-16 NOTE — SOCIAL WORK
This writer spoke with Emily Saez ([phone] xtn [phone]) with [facility] regarding patient's appointment for methadone dosage. Patient is scheduled for dosage on 8/18 at their clinic. This writer called [facility] and requested a new patient appointment for dual therapy and psych medication management. Currently [facility] does not have a therapist and psychiatrist is not accepting new patient. This writer called [facility] and requested to send referral. This Claudette Presser will discuss with patient.

## 2023-08-16 NOTE — PROGRESS NOTES
08/16/23 0807   Team Meeting   Meeting Type Daily Rounds   Team Members Present   Team Members Present Physician;Nurse;; Other (Discipline and Name)   Physician Team Member Glenda Bumpers   Nursing Team Member [address] [facility], [address] Work Team Member De   Other (Discipline and Name) Adrián GAMEZ   Patient/Family Present   Patient Present No   Patient's Family Present No     Patient is compliant with medications, she is appropriate. She has decreased anxiety and depression. D/C tomorrow.

## 2023-08-16 NOTE — SOCIAL WORK
This writer met with patient and discussed referral to [facility]. Patient is in agreement with referral. This writer discussed discharge tomorrow. Patient requested letter of admission  submitted to PD at [facility]. This writer faxed Admission letter to PD Cathryn Gonzalez ([phone]). This writer spoke with patient's mother and confirmed her discharge and transportation tomorrow. This writer submitted referral packet to [facility].  Patient is scheduled for an intake appointment on 8/21at 2:00pm. Patient signed [facility] for [facility]

## 2023-08-17 VITALS
RESPIRATION RATE: 16 BRPM | BODY MASS INDEX: 15.84 KG/M2 | HEIGHT: 64 IN | HEART RATE: 126 BPM | TEMPERATURE: 97.7 F | DIASTOLIC BLOOD PRESSURE: 75 MMHG | OXYGEN SATURATION: 100 % | SYSTOLIC BLOOD PRESSURE: 99 MMHG | WEIGHT: 92.8 LBS

## 2023-08-17 PROCEDURE — 99238 HOSP IP/OBS DSCHRG MGMT 30/<: CPT | Performed by: PSYCHIATRY & NEUROLOGY

## 2023-08-17 RX ADMIN — SERTRALINE HYDROCHLORIDE 100 MG: 100 TABLET ORAL at 08:15

## 2023-08-17 RX ADMIN — GABAPENTIN 100 MG: 100 CAPSULE ORAL at 08:15

## 2023-08-17 RX ADMIN — METHADONE HYDROCHLORIDE 139 MG: 10 CONCENTRATE ORAL at 08:15

## 2023-08-17 NOTE — PROGRESS NOTES
08/16/23 1400   Activity/Group Checklist   Group Other (Comment)  (Group Art Therapy/Studio-Based)   Attendance Attended   Attendance Duration (min) Greater than 60   Interactions Interacted appropriately   Affect/Mood Appropriate   Goals Achieved Able to listen to others; Able to engage in interactions  (Able to engage materials)
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
## 2023-08-17 NOTE — PROGRESS NOTES
08/17/23 0811   Team Meeting   Meeting Type Daily Rounds   Team Members Present   Team Members Present Physician;Nurse;; Other (Discipline and Name)   Physician Team Member Kirstin Loges   Nursing Team Member [address] [facility], [address] Work Team Member [id number] [facility]   Other (Discipline and Name) Jen GAMEZ   Patient/Family Present   Patient Present No   Patient's Family Present No     Patient is scheduled for discharge today. Patient is compliant with medications. She  attends groups.

## 2023-08-17 NOTE — NURSING NOTE
Patient awoke and completed all morning routines. AVS printed and reviewed with Patient. Patient verbalized understanding of discharge instructions and agreed to be compliant with their medication regimen. Patient discharged to home at 1045. Patient was discharged with all of their belongings at time of discharge.

## 2023-08-17 NOTE — PLAN OF CARE
Problem: Ineffective Coping  Goal: Participates in unit activities  Description: Interventions:  - Provide therapeutic environment   - Provide required programming   - Redirect inappropriate behaviors   Outcome: Progressing     Problem: Depression  Goal: Refrain from harming self  Description: Interventions:  - Monitor patient closely, per order   - Supervise medication ingestion, monitor effects and side effects   Outcome: Progressing  Goal: Refrain from isolation  Description: Interventions:  - Develop a trusting relationship   - Encourage socialization   Outcome: Progressing  Goal: Refrain from self-neglect  Outcome: Progressing     Problem: Anxiety  Goal: Anxiety is at manageable level  Description: Interventions:  - Assess and monitor patient's anxiety level. - Monitor for signs and symptoms (heart palpitations, chest pain, shortness of breath, headaches, nausea, feeling jumpy, restlessness, irritable, apprehensive). - Collaborate with interdisciplinary team and initiate plan and interventions as ordered.   - [place] patient to unit/surroundings  - Explain treatment plan  - Encourage participation in care  - Encourage verbalization of concerns/fears  - Identify coping mechanisms  - Assist in developing anxiety-reducing skills  - Administer/offer alternative therapies  - Limit or eliminate stimulants  Outcome: Progressing     Problem: SUBSTANCE USE/ABUSE  Goal: Will have no detox symptoms and will verbalize plan for changing substance-related behavior  Description: INTERVENTIONS:  - Monitor physical status and assess for symptoms of withdrawal  - Administer medication as ordered  - Provide emotional support with 1 on 1 interaction with staff  - Encourage recovery focused program/ addiction education  - Assess for verbalization of changing behaviors related to substance abuse  - Initiate consults and referrals as appropriate (Case Management, Spiritual Care, etc.)  Outcome: Progressing  Goal: By discharge, will develop insight into their chemical dependency and sustain motivation to continue in recovery  Description: INTERVENTIONS:  - Attends all daily group sessions and scheduled AA groups  - Actively practices coping skills through participation in the therapeutic community and adherence to program rules  - Reviews and completes assignments from individual treatment plan  - Assist patient development of understanding of their personal cycle of addiction and relapse triggers  Outcome: Progressing  Goal: By discharge, patient will have ongoing treatment plan addressing chemical dependency  Description: INTERVENTIONS:  - Assist patient with resources and/or appointments for ongoing recovery based living  Outcome: Progressing     Problem: DISCHARGE PLANNING  Goal: Discharge to home or other facility with appropriate resources  Description: INTERVENTIONS:  - Identify barriers to discharge w/patient and caregiver  - Arrange for needed discharge resources and transportation as appropriate  - Identify discharge learning needs (meds, wound care, etc.)  - Refer to Case Management Department for coordinating discharge planning if the patient needs post-hospital services based on physician/advanced practitioner order or complex needs related to functional status, cognitive ability, or social support system  Outcome: Progressing     Problem: Nutrition/Hydration-ADULT  Goal: Nutrient/Hydration intake appropriate for improving, restoring or maintaining nutritional needs  Description: Monitor and assess patient's nutrition/hydration status for malnutrition. Collaborate with interdisciplinary team and initiate plan and interventions as ordered. Monitor patient's weight and dietary intake as ordered or per policy. Utilize nutrition screening tool and intervene as necessary. Determine patient's food preferences and provide high-protein, high-caloric foods as appropriate.      INTERVENTIONS:  - Monitor oral intake, urinary output, labs, and treatment plans  - Assess nutrition and hydration status and recommend course of action  - Evaluate amount of meals eaten  - Assist patient with eating if necessary   - Allow adequate time for meals  - Recommend/ encourage appropriate diets, oral nutritional supplements, and vitamin/mineral supplements  - Order, calculate, and assess calorie counts as needed  - Recommend, monitor, and adjust tube feedings and TPN/PPN based on assessed needs  - Assess need for intravenous fluids  - Provide specific nutrition/hydration education as appropriate  - Include patient/family/caregiver in decisions related to nutrition  Outcome: Progressing

## 2023-08-17 NOTE — PLAN OF CARE
Problem: Ineffective Coping  Goal: Participates in unit activities  Description: Interventions:  - Provide therapeutic environment   - Provide required programming   - Redirect inappropriate behaviors   Outcome: Completed     Problem: Depression  Goal: Refrain from harming self  Description: Interventions:  - Monitor patient closely, per order   - Supervise medication ingestion, monitor effects and side effects   Outcome: Completed  Goal: Refrain from isolation  Description: Interventions:  - Develop a trusting relationship   - Encourage socialization   Outcome: Completed  Goal: Refrain from self-neglect  Outcome: Completed     Problem: Anxiety  Goal: Anxiety is at manageable level  Description: Interventions:  - Assess and monitor patient's anxiety level. - Monitor for signs and symptoms (heart palpitations, chest pain, shortness of breath, headaches, nausea, feeling jumpy, restlessness, irritable, apprehensive). - Collaborate with interdisciplinary team and initiate plan and interventions as ordered.   - [place] patient to unit/surroundings  - Explain treatment plan  - Encourage participation in care  - Encourage verbalization of concerns/fears  - Identify coping mechanisms  - Assist in developing anxiety-reducing skills  - Administer/offer alternative therapies  - Limit or eliminate stimulants  Outcome: Completed     Problem: SUBSTANCE USE/ABUSE  Goal: Will have no detox symptoms and will verbalize plan for changing substance-related behavior  Description: INTERVENTIONS:  - Monitor physical status and assess for symptoms of withdrawal  - Administer medication as ordered  - Provide emotional support with 1 on 1 interaction with staff  - Encourage recovery focused program/ addiction education  - Assess for verbalization of changing behaviors related to substance abuse  - Initiate consults and referrals as appropriate (Case Management, Spiritual Care, etc.)  Outcome: Completed  Goal: By discharge, will develop insight into their chemical dependency and sustain motivation to continue in recovery  Description: INTERVENTIONS:  - Attends all daily group sessions and scheduled AA groups  - Actively practices coping skills through participation in the therapeutic community and adherence to program rules  - Reviews and completes assignments from individual treatment plan  - Assist patient development of understanding of their personal cycle of addiction and relapse triggers  Outcome: Completed  Goal: By discharge, patient will have ongoing treatment plan addressing chemical dependency  Description: INTERVENTIONS:  - Assist patient with resources and/or appointments for ongoing recovery based living  Outcome: Completed     Problem: DISCHARGE PLANNING  Goal: Discharge to home or other facility with appropriate resources  Description: INTERVENTIONS:  - Identify barriers to discharge w/patient and caregiver  - Arrange for needed discharge resources and transportation as appropriate  - Identify discharge learning needs (meds, wound care, etc.)  - Refer to Case Management Department for coordinating discharge planning if the patient needs post-hospital services based on physician/advanced practitioner order or complex needs related to functional status, cognitive ability, or social support system  Outcome: Completed     Problem: Nutrition/Hydration-ADULT  Goal: Nutrient/Hydration intake appropriate for improving, restoring or maintaining nutritional needs  Description: Monitor and assess patient's nutrition/hydration status for malnutrition. Collaborate with interdisciplinary team and initiate plan and interventions as ordered. Monitor patient's weight and dietary intake as ordered or per policy. Utilize nutrition screening tool and intervene as necessary. Determine patient's food preferences and provide high-protein, high-caloric foods as appropriate.      INTERVENTIONS:  - Monitor oral intake, urinary output, labs, and treatment plans  - Assess nutrition and hydration status and recommend course of action  - Evaluate amount of meals eaten  - Assist patient with eating if necessary   - Allow adequate time for meals  - Recommend/ encourage appropriate diets, oral nutritional supplements, and vitamin/mineral supplements  - Order, calculate, and assess calorie counts as needed  - Recommend, monitor, and adjust tube feedings and TPN/PPN based on assessed needs  - Assess need for intravenous fluids  - Provide specific nutrition/hydration education as appropriate  - Include patient/family/caregiver in decisions related to nutrition  Outcome: Completed

## 2023-08-17 NOTE — CASE MANAGEMENT
Case Management Discharge Planning Note    Patient name Jayne Alan  Location [facility] 381/[facility] 057-86 MRN [id number]  : 1995 Date 2023       Current Admission Date: 8/10/2023  Current Admission Diagnosis:Severe episode of recurrent major depressive disorder, without psychotic features [facility])   Patient Active Problem List    Diagnosis Date Noted   • Polysubstance abuse ([address]) 2023   • BMI less than 19,adult 2023   • Severe episode of recurrent major depressive disorder, without psychotic features ([address]) 2023   • BESSY (generalized anxiety disorder) 2023   • PTSD (post-traumatic stress disorder) 2023   • Depression 2023   • Mild protein-calorie malnutrition ([address]) 2023   • Transaminitis 2023   • Bilateral calf pain 2023   • Confusion 2023   • Opioid use disorder, severe, dependence ([address]) 2023   • Cellulitis of right upper extremity 2023   • Prolonged Q-T interval on ECG 2023   • Leukocytosis 2023   • Spontaneous vaginal delivery 2017   • 40 weeks gestation of pregnancy 2017      LOS (days): 6  Geometric Mean LOS (GMLOS) (days):   Days to GMLOS:     OBJECTIVE:  Risk of Unplanned Readmission Score: 27.53         Current admission status: Inpatient Psych   Preferred Pharmacy:     [address] [address]  Phone: [phone] Fax: [phone]    Primary Care Provider: Pavel Castellanos DO    Primary Insurance:  [address] [place]  Secondary Insurance:     DISCHARGE DETAILS:    Discharge planning discussed with[de-identified] Patient and Mother       Patient is discharged from the unit on this day. Denied SI/HI, psychosis and/or A/V. No questions verbalized. Patient is in agreement with discharge. Patient provided with after care appointment, discharge instructions and medication regime reviewed. Affect is appropriate .  Accompanied to the lobby where pt was met by family member . Patient transported to home by family member . All belongings returned. Contacts  Patient Contacts:  Kylah  Relationship to Patient[de-identified] Family  Contact Method: Phone  Phone Number: [phone]  Reason/Outcome: Emergency Contact, Discharge Planning              Other Referral/Resources/Interventions Provided:  Referrals Provided[de-identified] Therapist, Psychiatrist    Would you like to participate in our [facility] [place] service program?  : No - Declined

## 2023-08-17 NOTE — NURSING NOTE
Patient is visible walking the milieu, socializing with peers. Patient is calm and cooperative upon approach. Patient denies SI/HI/AH/VH. Patient is compliant with meds and meals.

## 2023-08-17 NOTE — NURSING NOTE
Pt pleasant and social on unit with peers walking in castañeda. Denies symptoms, needs and concerns. Compliant with unit routines and care. looks forward to discharge tomorrow. Safety checks continue.

## 2023-09-20 ENCOUNTER — HOSPITAL ENCOUNTER (EMERGENCY)
Facility: HOSPITAL | Age: 28
Discharge: HOME | End: 2023-09-20
Attending: EMERGENCY MEDICINE
Payer: COMMERCIAL

## 2023-09-20 VITALS
SYSTOLIC BLOOD PRESSURE: 133 MMHG | BODY MASS INDEX: 17.9 KG/M2 | DIASTOLIC BLOOD PRESSURE: 82 MMHG | OXYGEN SATURATION: 99 % | HEART RATE: 93 BPM | TEMPERATURE: 97.3 F | WEIGHT: 104.3 LBS | RESPIRATION RATE: 20 BRPM

## 2023-09-20 DIAGNOSIS — F19.10 POLYSUBSTANCE ABUSE (HCC): Primary | ICD-10-CM

## 2023-09-20 PROCEDURE — 99283 EMERGENCY DEPT VISIT LOW MDM: CPT

## 2023-09-20 PROCEDURE — 99284 EMERGENCY DEPT VISIT MOD MDM: CPT | Performed by: EMERGENCY MEDICINE

## 2023-09-20 NOTE — ED CARE HANDOFF
[address] Warm Handoff Outcome Note    Patient name Massimo Velasquez  Location [facility] MRN [id number]  Age: 33 y.o. Plan Type: Warm Handoff                                                                                    Plan Date: 9/20/2023  Service:  ED Warm Handoff      Substance Use History:  Poly sub    Warm Handoff Update:  WHO (HOST) tried to call pt but she was crying and asked to call back in 20 minutes.   Pt then left hospital.    Warm Handoff Outcome: Patient Refused

## 2023-09-20 NOTE — QUICK NOTE
Contacted regarding review for possible admission for detox from fentanyl and benzodiazepines. Per discussion with ED provider Dr. Shan Mcarthur, patient's last use 36 hrs ago, she is on maintenance methadone through [facility], last received dose this morning. Patient was recently admitted to [facility] detox unit 7/20-7/23/2023 for benzodiazepine withdrawal. During that admission and previous admissions, patient reported that the fentanyl she uses is contaminated with tranquilizers and benzodiazepines. Patient was recently admitted to inpatient psychiatry at [facility] 8/10-8/17/2023. Case reviewed with attending Dr. Jazmín Vazquez. in previous admissions, she has stated that the fentanyl she is using is possibly contaminated with benzodiazepines, which is unlikely, the most common adulterant currently is xylazine. With her already being on maintenance methadone, there is no indication at this time for admission to detox unit since she's already on MAT and since her last use was 36 hrs ago. We would recommend HOST referral at this time.

## 2023-09-20 NOTE — ED PROVIDER NOTES
History  Chief Complaint   Patient presents with   • Detox Evaluation     Pt reports injecting 8-12bags a day of fentanyl mixed with benzos, looking for detox. Last use about 36hrs ago. Currently has H/A's, shakey, nausea and body cramps      81-year-old female, presents with withdrawal requesting inpatient detox. Patient states that she has been injecting fentanyl mixed with benzodiazepines, last use about 36 hours ago. Patient states she is currently on methadone, received dose this morning. Patient states that she feels nauseous and shaky, like she is going through withdrawal.  Denies any other drug or alcohol use. History provided by:  Patient   used: No    Detox Evaluation  Associated symptoms: headaches and nausea        Prior to Admission Medications   Prescriptions Last Dose Informant Patient Reported? Taking? QUEtiapine (SEROquel) 100 mg tablet   No No   Sig: Take 1 tablet (100 mg total) by mouth daily at bedtime   gabapentin (NEURONTIN) 100 mg capsule   No No   Sig: Take 1 capsule (100 mg total) by mouth 2 (two) times a day   gabapentin (NEURONTIN) 300 mg capsule   No No   Sig: Take 1 capsule (300 mg total) by mouth daily at bedtime   methadone (DOLOPHINE) 10 MG/5ML solution  Self Yes No   Sig: Take 136 mg by mouth every morning Take by mouth daily   prazosin (MINIPRESS) 1 mg capsule   No No   Sig: Take 1 capsule (1 mg total) by mouth daily at bedtime   sertraline (ZOLOFT) 100 mg tablet   No No   Sig: Take 1 tablet (100 mg total) by mouth daily Do not start before August 17, 2023.       Facility-Administered Medications: None       Past Medical History:   Diagnosis Date   • Anxiety    • Borderline personality disorder ([address])    • Chlamydia    • Depression    • Endometriosis    • IV drug user     IV heroin use, rehab 7 times    • Panic attacks    • Psychiatric disorder     anxiety   • PTSD (post-traumatic stress disorder)    • PTSD (post-traumatic stress disorder) 8/12/2023   • Varicella     childhood   • Visual impairment        Past Surgical History:   Procedure Laterality Date   • LAPAROTOMY         Family History   Problem Relation Age of Onset   • Depression Mother    • Drug abuse Brother    • Drug abuse Maternal Aunt    • Drug abuse Paternal Uncle    • Cancer Maternal Grandmother    • Cancer Paternal Grandfather      I have reviewed and agree with the history as documented. E-Cigarette/Vaping   • E-Cigarette Use Never User      E-Cigarette/Vaping Substances   • Nicotine No    • THC No    • CBD No    • Flavoring No    • Other No    • Unknown No      Social History     Tobacco Use   • Smoking status: Never     Passive exposure: Never   • Smokeless tobacco: Never   Vaping Use   • Vaping Use: Never used   Substance Use Topics   • Alcohol use: Not Currently   • Drug use: Yes     Types: Heroin, Benzodiazepines, Fentanyl     Comment: 8 bags/day states benzos are mixed in       Review of Systems   Constitutional: Negative for fever. Respiratory: Negative. Cardiovascular: Negative. Gastrointestinal: Positive for nausea. Musculoskeletal: Positive for myalgias. Skin: Negative. Neurological: Positive for headaches. Physical Exam  Physical Exam  Vitals and nursing note reviewed. Constitutional:       General: She is not in acute distress. HENT:      Head: Normocephalic and atraumatic. Eyes:      Extraocular Movements: Extraocular movements intact. Pupils: Pupils are equal, round, and reactive to light. Cardiovascular:      Rate and Rhythm: Normal rate. Pulmonary:      Effort: Pulmonary effort is normal.   Musculoskeletal:         General: Normal range of motion. Skin:     General: Skin is warm and dry. Neurological:      General: No focal deficit present. Mental Status: She is alert and oriented to person, place, and time. Motor: No weakness.       Gait: Gait normal.      Comments: No tremors         Vital Signs  ED Triage Vitals [09/20/23 1323] Temperature Pulse Respirations Blood Pressure SpO2   (!) 97.3 °F (36.3 °C) 93 20 133/82 99 %      Temp Source Heart Rate Source Patient Position - Orthostatic VS BP Location FiO2 (%)   Tympanic Monitor Sitting Left arm --      Pain Score       --           Vitals:    09/20/23 1323   BP: 133/82   Pulse: 93   Patient Position - Orthostatic VS: Sitting         Visual Acuity      ED Medications  Medications - No data to display    Diagnostic Studies  Results Reviewed     None                 No orders to display              Procedures  Procedures         ED Course  ED Course as of 09/20/23 1535   Wed Sep 20, 2023   1358 Discussed with on-call detox, patient is already on maintenance methadone, does not need inpatient detox at this time. HOST referral placed. 1534 Patient refused to talk to HOST, eloped from ED. Medical Decision Making  60-year-old female, using fentanyl and benzodiazepines, presents with symptoms of nausea and body aches, requesting detox. Differential diagnosis includes opiate withdrawal, benzodiazepine withdrawal, intoxication among other diagnoses. He is awake and alert and in no distress, vital signs reviewed and unremarkable. Previous medical records reviewed, will contact detox and discussed patient. Continue to monitor and reevaluate in ED. Amount and/or Complexity of Data Reviewed  External Data Reviewed: notes. Details: psych admission 8/10, detox admissions 7/20 and 6/30.   Medical records reviewed          Disposition  Final diagnoses:   Polysubstance abuse ([address])     Time reflects when diagnosis was documented in both MDM as applicable and the Disposition within this note     Time User Action Codes Description Comment    9/20/2023  3:34 PM Patti Dandy Add [F19.10] Polysubstance abuse [facility])       ED Disposition     ED Disposition   Left from Room after Provider Exam    Condition   --    Date/Time   Wed Sep 20, 2023  3:34 PM Comment   --         Follow-up Information    None         Patient's Medications   Discharge Prescriptions    No medications on file       No discharge procedures on file.     PDMP Review       Value Time User    PDMP Reviewed  Yes 8/3/2023 10:14 PM Luisana Sanchez MD          ED Provider  Electronically Signed by           Marcus Garce MD  09/20/23 [phone]

## 2023-09-21 ENCOUNTER — HOSPITAL ENCOUNTER (EMERGENCY)
Facility: HOSPITAL | Age: 28
Discharge: HOME | End: 2023-09-21
Attending: EMERGENCY MEDICINE
Payer: COMMERCIAL

## 2023-09-21 VITALS
BODY MASS INDEX: 18.02 KG/M2 | DIASTOLIC BLOOD PRESSURE: 64 MMHG | TEMPERATURE: 98 F | RESPIRATION RATE: 16 BRPM | SYSTOLIC BLOOD PRESSURE: 98 MMHG | WEIGHT: 105 LBS | HEART RATE: 66 BPM | OXYGEN SATURATION: 97 %

## 2023-09-21 DIAGNOSIS — F19.10 POLYSUBSTANCE ABUSE (HCC): Primary | ICD-10-CM

## 2023-09-21 LAB
ALBUMIN SERPL BCP-MCNC: 4.3 G/DL (ref 3.5–5)
ALP SERPL-CCNC: 100 U/L (ref 34–104)
ALT SERPL W P-5'-P-CCNC: 13 U/L (ref 7–52)
ANION GAP SERPL CALCULATED.3IONS-SCNC: 10 MMOL/L
AST SERPL W P-5'-P-CCNC: 19 U/L (ref 13–39)
ATRIAL RATE: 74 BPM
BACTERIA UR QL AUTO: ABNORMAL /HPF
BASOPHILS # BLD AUTO: 0.02 THOUSANDS/ÂΜL (ref 0–0.1)
BASOPHILS NFR BLD AUTO: 0 % (ref 0–1)
BILIRUB SERPL-MCNC: 0.35 MG/DL (ref 0.2–1)
BILIRUB UR QL STRIP: NEGATIVE
BUN SERPL-MCNC: 20 MG/DL (ref 5–25)
CALCIUM SERPL-MCNC: 10.2 MG/DL (ref 8.4–10.2)
CHLORIDE SERPL-SCNC: 96 MMOL/L (ref 96–108)
CLARITY UR: ABNORMAL
CO2 SERPL-SCNC: 30 MMOL/L (ref 21–32)
COLOR UR: YELLOW
CREAT SERPL-MCNC: 0.89 MG/DL (ref 0.6–1.3)
EOSINOPHIL # BLD AUTO: 0.08 THOUSAND/ÂΜL (ref 0–0.61)
EOSINOPHIL NFR BLD AUTO: 1 % (ref 0–6)
ERYTHROCYTE [DISTWIDTH] IN BLOOD BY AUTOMATED COUNT: 13.6 % (ref 11.6–15.1)
EXT PREGNANCY TEST URINE: NEGATIVE
EXT. CONTROL: NORMAL
GFR SERPL CREATININE-BSD FRML MDRD: 89 ML/MIN/1.73SQ M
GLUCOSE SERPL-MCNC: 59 MG/DL (ref 65–140)
GLUCOSE UR STRIP-MCNC: NEGATIVE MG/DL
HCT VFR BLD AUTO: 38 % (ref 34.8–46.1)
HGB BLD-MCNC: 12.1 G/DL (ref 11.5–15.4)
HGB UR QL STRIP.AUTO: NEGATIVE
HYALINE CASTS #/AREA URNS LPF: ABNORMAL /LPF
IMM GRANULOCYTES # BLD AUTO: 0.02 THOUSAND/UL (ref 0–0.2)
IMM GRANULOCYTES NFR BLD AUTO: 0 % (ref 0–2)
KETONES UR STRIP-MCNC: ABNORMAL MG/DL
LEUKOCYTE ESTERASE UR QL STRIP: 25
LYMPHOCYTES # BLD AUTO: 2.26 THOUSANDS/ÂΜL (ref 0.6–4.47)
LYMPHOCYTES NFR BLD AUTO: 28 % (ref 14–44)
MAGNESIUM SERPL-MCNC: 2 MG/DL (ref 1.9–2.7)
MCH RBC QN AUTO: 29 PG (ref 26.8–34.3)
MCHC RBC AUTO-ENTMCNC: 31.8 G/DL (ref 31.4–37.4)
MCV RBC AUTO: 91 FL (ref 82–98)
MONOCYTES # BLD AUTO: 0.58 THOUSAND/ÂΜL (ref 0.17–1.22)
MONOCYTES NFR BLD AUTO: 7 % (ref 4–12)
NEUTROPHILS # BLD AUTO: 5.12 THOUSANDS/ÂΜL (ref 1.85–7.62)
NEUTS SEG NFR BLD AUTO: 64 % (ref 43–75)
NITRITE UR QL STRIP: NEGATIVE
NON-SQ EPI CELLS URNS QL MICRO: ABNORMAL /HPF
NRBC BLD AUTO-RTO: 0 /100 WBCS
P AXIS: 54 DEGREES
PH UR STRIP.AUTO: 6 [PH]
PLATELET # BLD AUTO: 337 THOUSANDS/UL (ref 149–390)
PMV BLD AUTO: 8.3 FL (ref 8.9–12.7)
POTASSIUM SERPL-SCNC: 3.6 MMOL/L (ref 3.5–5.3)
PR INTERVAL: 118 MS
PROT SERPL-MCNC: 7.9 G/DL (ref 6.4–8.4)
PROT UR STRIP-MCNC: ABNORMAL MG/DL
QRS AXIS: 26 DEGREES
QRSD INTERVAL: 78 MS
QT INTERVAL: 414 MS
QTC INTERVAL: 459 MS
RBC # BLD AUTO: 4.17 MILLION/UL (ref 3.81–5.12)
RBC #/AREA URNS AUTO: ABNORMAL /HPF
SODIUM SERPL-SCNC: 136 MMOL/L (ref 135–147)
SP GR UR STRIP.AUTO: 1.02 (ref 1–1.04)
T WAVE AXIS: 11 DEGREES
UROBILINOGEN UA: NEGATIVE MG/DL
VENTRICULAR RATE: 74 BPM
WBC # BLD AUTO: 8.08 THOUSAND/UL (ref 4.31–10.16)
WBC #/AREA URNS AUTO: ABNORMAL /HPF

## 2023-09-21 PROCEDURE — 93005 ELECTROCARDIOGRAM TRACING: CPT

## 2023-09-21 PROCEDURE — 99285 EMERGENCY DEPT VISIT HI MDM: CPT | Performed by: EMERGENCY MEDICINE

## 2023-09-21 PROCEDURE — 96360 HYDRATION IV INFUSION INIT: CPT

## 2023-09-21 PROCEDURE — 96361 HYDRATE IV INFUSION ADD-ON: CPT

## 2023-09-21 PROCEDURE — 83735 ASSAY OF MAGNESIUM: CPT | Performed by: EMERGENCY MEDICINE

## 2023-09-21 PROCEDURE — 81025 URINE PREGNANCY TEST: CPT | Performed by: EMERGENCY MEDICINE

## 2023-09-21 PROCEDURE — 85025 COMPLETE CBC W/AUTO DIFF WBC: CPT | Performed by: EMERGENCY MEDICINE

## 2023-09-21 PROCEDURE — 99283 EMERGENCY DEPT VISIT LOW MDM: CPT

## 2023-09-21 PROCEDURE — 93010 ELECTROCARDIOGRAM REPORT: CPT | Performed by: INTERNAL MEDICINE

## 2023-09-21 PROCEDURE — 81001 URINALYSIS AUTO W/SCOPE: CPT | Performed by: EMERGENCY MEDICINE

## 2023-09-21 PROCEDURE — 36415 COLL VENOUS BLD VENIPUNCTURE: CPT | Performed by: EMERGENCY MEDICINE

## 2023-09-21 PROCEDURE — 80053 COMPREHEN METABOLIC PANEL: CPT | Performed by: EMERGENCY MEDICINE

## 2023-09-21 RX ADMIN — SODIUM CHLORIDE 1000 ML: 0.9 INJECTION, SOLUTION INTRAVENOUS at 12:41

## 2023-09-21 NOTE — QUICK NOTE
MEDICAL DETOX UNIT, LEVEL 4  Department of Medical Toxicology    Principal Problem: opioid detoxification    Assessing Provider: Giana Lopez PA-C    09/21/23    ASSESSMENT:    Sarah Márquez is a 33 y.o. female who presents to [facility] ED for a detox evaluation. Of note, patient presented to [facility] ED yesterday, HOST referral was recommended at that time, however patient refused those services. Patient again presents today seeking fentanyl detoxification. Of note, patient is on methadone maintenance through [facility], 139 mg daily. Per discussion with ED physician Dr. Oleg Pruett, patient is now seeking to discontinue methadone maintenance and transition to Suboxone. Vitals:    09/21/23 1200   BP: 95/64   Pulse: 68   Resp: 18   Temp:    SpO2:        Recent Labs     09/21/23  1241   WBC 8.08   HGB 12.1   HCT 38.0      SODIUM 136   K 3.6   MG 2.0   CO2 30   AST 19   ALT 13        RECOMMENDATIONS:    Case reviewed with toxicology attending, Dr. Kay Harry. Patient is currently on a high dose of methadone that would require outpatient taper at her prescribing methadone clinic if she wants to discontinue methadone. Tapering methadone from that high of a dose would take several weeks and is more appropriate in an outpatient setting through the prescribing clinic. Patient thus does not meet criteria for admission to inpatient medical detox unit at this time. In the meantime, suggest HOST referral for placement at 3.7 level detox, where patient can go for detoxification from fentanyl while being continued on methadone maintenance temporarily, then recommend follow-up with her methadone clinic thereafter for outpatient taper. Please contact network detox AP or attending on-call for any further questions.

## 2023-09-21 NOTE — ED PROVIDER NOTES
History  Chief Complaint   Patient presents with   • Detox Evaluation     Pt would like to be evaluated for detox services, prior to arrival used 3 bags of IV Fentanyl, benzo's, and methadone. Pt would like to detox from all drugs including prescription methadone. 81-year-old female presenting to the emergency department requesting detox from fentanyl. Patient continues to use fentanyl on top of methadone for substance use disorder. 3 bags of fentanyl a day. Injecting in bilateral upper extremities. Can go up to 8 hours without needing second dose of fentanyl but now contemplating stopping, open to being on Suboxone. Prior to Admission Medications   Prescriptions Last Dose Informant Patient Reported? Taking? QUEtiapine (SEROquel) 100 mg tablet 9/20/2023  No Yes   Sig: Take 1 tablet (100 mg total) by mouth daily at bedtime   gabapentin (NEURONTIN) 100 mg capsule 9/20/2023  No Yes   Sig: Take 1 capsule (100 mg total) by mouth 2 (two) times a day   gabapentin (NEURONTIN) 300 mg capsule 9/20/2023  No Yes   Sig: Take 1 capsule (300 mg total) by mouth daily at bedtime   methadone (DOLOPHINE) 10 MG/5ML solution 9/21/2023 Self Yes Yes   Sig: Take 136 mg by mouth every morning Take by mouth daily   prazosin (MINIPRESS) 1 mg capsule 9/20/2023  No Yes   Sig: Take 1 capsule (1 mg total) by mouth daily at bedtime   sertraline (ZOLOFT) 100 mg tablet 9/20/2023  No Yes   Sig: Take 1 tablet (100 mg total) by mouth daily Do not start before August 17, 2023.       Facility-Administered Medications: None       Past Medical History:   Diagnosis Date   • Anxiety    • Borderline personality disorder ([address])    • Chlamydia    • Depression    • Endometriosis    • IV drug user     IV heroin use, rehab 7 times    • Panic attacks    • Psychiatric disorder     anxiety   • PTSD (post-traumatic stress disorder)    • PTSD (post-traumatic stress disorder) 8/12/2023   • Varicella     childhood   • Visual impairment        Past Surgical History:   Procedure Laterality Date   • LAPAROTOMY         Family History   Problem Relation Age of Onset   • Depression Mother    • Drug abuse Brother    • Drug abuse Maternal Aunt    • Drug abuse Paternal Uncle    • Cancer Maternal Grandmother    • Cancer Paternal Grandfather      I have reviewed and agree with the history as documented. E-Cigarette/Vaping   • E-Cigarette Use Never User      E-Cigarette/Vaping Substances   • Nicotine No    • THC No    • CBD No    • Flavoring No    • Other No    • Unknown No      Social History     Tobacco Use   • Smoking status: Never     Passive exposure: Never   • Smokeless tobacco: Never   Vaping Use   • Vaping Use: Never used   Substance Use Topics   • Alcohol use: Not Currently   • Drug use: Yes     Types: Heroin, Benzodiazepines, Fentanyl     Comment: 8 bags/day states benzos are mixed in       Review of Systems   Constitutional: Negative for chills and fever. HENT: Negative for ear pain and sore throat. Eyes: Negative for pain and visual disturbance. Respiratory: Negative for cough and shortness of breath. Cardiovascular: Negative for chest pain and palpitations. Gastrointestinal: Negative for abdominal pain and vomiting. Genitourinary: Negative for dysuria and hematuria. Musculoskeletal: Negative for arthralgias and back pain. Skin: Negative for color change and rash. Neurological: Negative for seizures and syncope. All other systems reviewed and are negative. Physical Exam  Physical Exam  Vitals and nursing note reviewed. Constitutional:       General: She is not in acute distress. Appearance: She is well-developed. HENT:      Head: Normocephalic and atraumatic. Eyes:      Conjunctiva/sclera: Conjunctivae normal.   Cardiovascular:      Rate and Rhythm: Normal rate and regular rhythm. Heart sounds: No murmur heard. Pulmonary:      Effort: Pulmonary effort is normal. No respiratory distress.       Breath sounds: Normal breath sounds. Abdominal:      Palpations: Abdomen is soft. Tenderness: There is no abdominal tenderness. Musculoskeletal:         General: No swelling. Cervical back: Neck supple. Skin:     General: Skin is warm and dry. Capillary Refill: Capillary refill takes less than 2 seconds. Neurological:      Mental Status: She is alert.    Psychiatric:         Mood and Affect: Mood normal.         Vital Signs  ED Triage Vitals [09/21/23 1156]   Temperature Pulse Respirations Blood Pressure SpO2   98 °F (36.7 °C) 86 16 (!) 88/51 96 %      Temp Source Heart Rate Source Patient Position - Orthostatic VS BP Location FiO2 (%)   Oral Monitor Sitting Left arm --      Pain Score       --           Vitals:    09/21/23 1156 09/21/23 1200 09/21/23 1325   BP: (!) 88/51 95/64 96/60   Pulse: 86 68 70   Patient Position - Orthostatic VS: Sitting Sitting Sitting         Visual Acuity      ED Medications  Medications   sodium chloride 0.9 % bolus 1,000 mL (0 mL Intravenous Stopped 9/21/23 1436)       Diagnostic Studies  Results Reviewed     Procedure Component Value Units Date/Time    Comprehensive metabolic panel [[id number]]  (Abnormal) Collected: 09/21/23 1241    Lab Status: Final result Specimen: Blood from Arm, Right Updated: 09/21/23 1305     Sodium 136 mmol/L      Potassium 3.6 mmol/L      Chloride 96 mmol/L      CO2 30 mmol/L      ANION GAP 10 mmol/L      BUN 20 mg/dL      Creatinine 0.89 mg/dL      Glucose 59 mg/dL      Calcium 10.2 mg/dL      AST 19 U/L      ALT 13 U/L      Alkaline Phosphatase 100 U/L      Total Protein 7.9 g/dL      Albumin 4.3 g/dL      Total Bilirubin 0.35 mg/dL      eGFR 89 ml/min/1.73sq m     Narrative:      [place] guidelines for Chronic Kidney Disease (CKD):   •  Stage 1 with normal or high GFR (GFR > 90 mL/min/1.73 square meters)  •  Stage 2 Mild CKD (GFR = 60-89 mL/min/1.73 square meters)  •  Stage 3A Moderate CKD (GFR = 45-59 mL/min/1.73 square meters)  • Stage 3B Moderate CKD (GFR = 30-44 mL/min/1.73 square meters)  •  Stage 4 Severe CKD (GFR = 15-29 mL/min/1.73 square meters)  •  Stage 5 End Stage CKD (GFR <15 mL/min/1.73 square meters)  Note: GFR calculation is accurate only with a steady state creatinine    Magnesium [[id number]]  (Normal) Collected: 09/21/23 1241    Lab Status: Final result Specimen: Blood from Arm, Right Updated: 09/21/23 1305     Magnesium 2.0 mg/dL     Urine Microscopic [[id number]]  (Abnormal) Collected: 09/21/23 1227    Lab Status: Final result Specimen: Urine, Clean Catch Updated: 09/21/23 1303     RBC, UA 0-1 /hpf      WBC, UA 2-4 /hpf      Epithelial Cells Occasional /hpf      Bacteria, UA Occasional /hpf      Hyaline Casts, UA 1-2 /lpf     POCT pregnancy, urine [[id number]]  (Normal) Resulted: 09/21/23 1205    Lab Status: Final result Updated: 09/21/23 1257     EXT Preg Test, Ur Negative     Control Valid    CBC and differential [[id number]]  (Abnormal) Collected: 09/21/23 1241    Lab Status: Final result Specimen: Blood from Arm, Right Updated: 09/21/23 1249     WBC 8.08 Thousand/uL      RBC 4.17 Million/uL      Hemoglobin 12.1 g/dL      Hematocrit 38.0 %      MCV 91 fL      MCH 29.0 pg      MCHC 31.8 g/dL      RDW 13.6 %      MPV 8.3 fL      Platelets 359 Thousands/uL      nRBC 0 /100 WBCs      Neutrophils Relative 64 %      Immat GRANS % 0 %      Lymphocytes Relative 28 %      Monocytes Relative 7 %      Eosinophils Relative 1 %      Basophils Relative 0 %      Neutrophils Absolute 5.12 Thousands/µL      Immature Grans Absolute 0.02 Thousand/uL      Lymphocytes Absolute 2.26 Thousands/µL      Monocytes Absolute 0.58 Thousand/µL      Eosinophils Absolute 0.08 Thousand/µL      Basophils Absolute 0.02 Thousands/µL     UA (URINE) with reflex to Scope [[id number]]  (Abnormal) Collected: 09/21/23 1227    Lab Status: Final result Specimen: Urine, Clean Catch Updated: 09/21/23 1235     Color, UA Yellow     Clarity, UA Slightly Cloudy Specific Gravity, UA 1.020     pH, UA 6.0     Leukocytes, UA 25.0     Nitrite, UA Negative     Protein, UA 15 (Trace) mg/dl      Glucose, UA Negative mg/dl      Ketones, UA 5 (Trace) mg/dl      Bilirubin, UA Negative     Occult Blood, UA Negative     UROBILINOGEN UA Negative mg/dL                  No orders to display              Procedures  US Guided Peripheral IV    Date/Time: 9/21/2023 12:39 PM    Performed by: Judah Ryder MD  Authorized by: Judah Ryder MD    Patient location:  ED  Performed by: Attending  Other Assisting Provider: No    Indications:     Indications: difficulty obtaining IV access      Image availability:  Not obtained due to urgency  Procedure details:     Patient evaluated for contraindications to access (i.e. fistula, thrombosis, etc): Yes      Standard clean technique used for ultrasound access: Yes      Location:  Right forearm    Catheter size:  20 gauge    Number of attempts:  1    Successful placement: yes    Post-procedure details:     Post-procedure:  Dressing applied    Assessment: free fluid flow and no signs of infiltration      Post-procedure complications: none      Patient tolerance of procedure: Tolerated well, no immediate complications             ED Course                               SBIRT 20yo+    Flowsheet Row Most Recent Value   Initial Alcohol Screen: US AUDIT-C     1. How often do you have a drink containing alcohol? 0 Filed at: 09/21/2023 1202   2. How many drinks containing alcohol do you have on a typical day you are drinking? 0 Filed at: 09/21/2023 1202   3b. FEMALE Any Age, or MALE 65+: How often do you have 4 or more drinks on one occassion? 0 Filed at: 09/21/2023 1202   Audit-C Score 0 Filed at: 09/21/2023 1202   LUDA: How many times in the past year have you. .. Used an illegal drug or used a prescription medication for non-medical reasons? Daily or Almost Daily Filed at: 09/21/2023 1202   DAST-10: In the past 12 months. ..     1. Have you used drugs other than those required for medical reasons? 1 Filed at: 09/21/2023 1202   2. Do you use more than one drug at a time? 1 Filed at: 09/21/2023 1202   3. Have you had medical problems as a result of your drug use (e.g., memory loss, hepatitis, convulsions, bleeding, etc.)? 0 Filed at: 09/21/2023 1202   4. Have you had "blackouts" or "flashbacks" as a result of drug use? YesNo 1 Filed at: 09/21/2023 1202   5. Do you ever feel bad or guilty about your drug use? 1 Filed at: 09/21/2023 1202   6. Does your spouse (or parent) ever complain about your involvement with drugs? 1 Filed at: 09/21/2023 1202   7. Have you neglected your family because of your use of drugs? 1 Filed at: 09/21/2023 1202   8. Have you engaged in illegal activities in order to obtain drugs? 0 Filed at: 09/21/2023 1202   9. Have you ever experienced withdrawal symptoms (felt sick) when you stopped taking drugs? 1 Filed at: 09/21/2023 1202   10. Are you always able to stop using drugs when you want to? 1 Filed at: 09/21/2023 1202   DAST-10 Score 8 Filed at: 09/21/2023 1202                    Medical Decision Making  52-year-old female presenting the emergency department requesting detox. Patient currently on methadone as well as using fentanyl 3-4 times a day. Case discussed with ZEB Parrish with detox who notes that Dr. Carrie Ayon, detox physician notes that patient cannot be admitted at our detox unit due to being on a high dose of methadone and requiring a taper. Patient insistent on detox that she would like to stop using opioids. Warm handoff referral placed. Awaiting rehab/detox bed placement elsewhere. Amount and/or Complexity of Data Reviewed  Labs: ordered. Risk  Decision regarding hospitalization.           Disposition  Final diagnoses:   Polysubstance abuse ([address])     Time reflects when diagnosis was documented in both MDM as applicable and the Disposition within this note     Time User Action Codes Description Comment    9/21/2023  2:13 PM Laxmi Chang Add [F19.10] Polysubstance abuse [facility])       ED Disposition     ED Disposition   Transfer to [facility]    Condition   --    Date/Time   Thu Sep 21, 2023  2:13 PM    Comment   Edward Langston should be transferred out to D and has been medically cleared. Follow-up Information    None         Patient's Medications   Discharge Prescriptions    No medications on file       No discharge procedures on file.     PDMP Review       Value Time User    PDMP Reviewed  Yes 8/3/2023 10:14 PM Luisana Sanchez MD          ED Provider  Electronically Signed by           Leon Rainey MD  09/21/23 1510       Leon Rainey MD  09/22/23 1201

## 2023-09-21 NOTE — ED CARE HANDOFF
[address] Warm Handoff Outcome Note    Patient name Onel Chu  Location ED 12/ED 12 MRN [id number]  Age: 33 y.o. Plan Type: Warm Handoff                                                                                    Plan Date: 9/21/2023  Service:  ED Warm Handoff      Substance Use History:  Fentanyl    Warm Handoff Update:  Discharged, HOST is following up with patient.     Warm Handoff Outcome: Treatment Related Resources

## 2023-09-21 NOTE — ED NOTES
Pt asked to be discharged as she has a cell phone and HOST can reach her at home. [employer] contacted and informed this nurse that HOST has pts contact information and that they will contact her on her cell and are also aware that she may be discharged from ED. MD made aware of pts wishes.       Areli Abbasi RN  09/21/23 3007

## 2024-03-02 ENCOUNTER — HOSPITAL ENCOUNTER (INPATIENT)
Facility: HOSPITAL | Age: 29
LOS: 3 days | Discharge: HOME | DRG: 773 | End: 2024-03-05
Attending: EMERGENCY MEDICINE | Admitting: INTERNAL MEDICINE
Payer: COMMERCIAL

## 2024-03-02 DIAGNOSIS — R11.10 INTRACTABLE VOMITING: ICD-10-CM

## 2024-03-02 DIAGNOSIS — F41.9 ANXIETY: ICD-10-CM

## 2024-03-02 DIAGNOSIS — N39.0 UTI (URINARY TRACT INFECTION): ICD-10-CM

## 2024-03-02 DIAGNOSIS — F11.20 OPIOID USE DISORDER, SEVERE, DEPENDENCE (HCC): ICD-10-CM

## 2024-03-02 DIAGNOSIS — F19.10 POLYSUBSTANCE ABUSE (HCC): ICD-10-CM

## 2024-03-02 DIAGNOSIS — G25.79 SEROTONIN SYNDROME: Primary | ICD-10-CM

## 2024-03-02 DIAGNOSIS — M62.82 RHABDOMYOLYSIS: ICD-10-CM

## 2024-03-02 DIAGNOSIS — F41.1 GAD (GENERALIZED ANXIETY DISORDER): ICD-10-CM

## 2024-03-02 PROBLEM — G90.81 SEROTONIN SYNDROME: Status: ACTIVE | Noted: 2024-03-02

## 2024-03-02 LAB
2HR DELTA HS TROPONIN: 2 NG/L
4HR DELTA HS TROPONIN: 4 NG/L
ALBUMIN SERPL BCP-MCNC: 4 G/DL (ref 3.5–5)
ALP SERPL-CCNC: 90 U/L (ref 34–104)
ALT SERPL W P-5'-P-CCNC: 15 U/L (ref 7–52)
AMPHETAMINES SERPL QL SCN: NEGATIVE
ANION GAP SERPL CALCULATED.3IONS-SCNC: 12 MMOL/L
APAP SERPL-MCNC: <2 UG/ML (ref 10–20)
AST SERPL W P-5'-P-CCNC: 42 U/L (ref 13–39)
BACTERIA UR QL AUTO: ABNORMAL /HPF
BARBITURATES UR QL: NEGATIVE
BASOPHILS # BLD AUTO: 0.06 THOUSANDS/ÂΜL (ref 0–0.1)
BASOPHILS NFR BLD AUTO: 0 % (ref 0–1)
BENZODIAZ UR QL: POSITIVE
BILIRUB DIRECT SERPL-MCNC: 0 MG/DL (ref 0–0.2)
BILIRUB SERPL-MCNC: 0.3 MG/DL (ref 0.2–1)
BILIRUB UR QL STRIP: NEGATIVE
BUN SERPL-MCNC: 6 MG/DL (ref 5–25)
CALCIUM SERPL-MCNC: 9.9 MG/DL (ref 8.4–10.2)
CARDIAC TROPONIN I PNL SERPL HS: 3 NG/L
CARDIAC TROPONIN I PNL SERPL HS: 5 NG/L
CARDIAC TROPONIN I PNL SERPL HS: 7 NG/L
CHLORIDE SERPL-SCNC: 101 MMOL/L (ref 96–108)
CK SERPL-CCNC: 1362 U/L (ref 26–192)
CLARITY UR: ABNORMAL
CO2 SERPL-SCNC: 25 MMOL/L (ref 21–32)
COCAINE UR QL: NEGATIVE
COLOR UR: ABNORMAL
CREAT SERPL-MCNC: 0.68 MG/DL (ref 0.6–1.3)
EOSINOPHIL # BLD AUTO: 0.08 THOUSAND/ÂΜL (ref 0–0.61)
EOSINOPHIL NFR BLD AUTO: 1 % (ref 0–6)
ERYTHROCYTE [DISTWIDTH] IN BLOOD BY AUTOMATED COUNT: 13.2 % (ref 11.6–15.1)
ETHANOL SERPL-MCNC: <10 MG/DL
EXT PREGNANCY TEST URINE: NEGATIVE
EXT. CONTROL: NORMAL
FLUAV RNA RESP QL NAA+PROBE: NEGATIVE
FLUBV RNA RESP QL NAA+PROBE: NEGATIVE
GFR SERPL CREATININE-BSD FRML MDRD: 120 ML/MIN/1.73SQ M
GLUCOSE SERPL-MCNC: 134 MG/DL (ref 65–140)
GLUCOSE UR STRIP-MCNC: NEGATIVE MG/DL
HCT VFR BLD AUTO: 34.9 % (ref 34.8–46.1)
HGB BLD-MCNC: 11.2 G/DL (ref 11.5–15.4)
HGB UR QL STRIP.AUTO: ABNORMAL
IMM GRANULOCYTES # BLD AUTO: 0.09 THOUSAND/UL (ref 0–0.2)
IMM GRANULOCYTES NFR BLD AUTO: 1 % (ref 0–2)
KETONES UR STRIP-MCNC: ABNORMAL MG/DL
LEUKOCYTE ESTERASE UR QL STRIP: ABNORMAL
LYMPHOCYTES # BLD AUTO: 2.87 THOUSANDS/ÂΜL (ref 0.6–4.47)
LYMPHOCYTES NFR BLD AUTO: 20 % (ref 14–44)
MCH RBC QN AUTO: 29.7 PG (ref 26.8–34.3)
MCHC RBC AUTO-ENTMCNC: 32.1 G/DL (ref 31.4–37.4)
MCV RBC AUTO: 93 FL (ref 82–98)
METHADONE UR QL: POSITIVE
MONOCYTES # BLD AUTO: 0.65 THOUSAND/ÂΜL (ref 0.17–1.22)
MONOCYTES NFR BLD AUTO: 5 % (ref 4–12)
MUCOUS THREADS UR QL AUTO: ABNORMAL
NEUTROPHILS # BLD AUTO: 10.57 THOUSANDS/ÂΜL (ref 1.85–7.62)
NEUTS SEG NFR BLD AUTO: 73 % (ref 43–75)
NITRITE UR QL STRIP: NEGATIVE
NON-SQ EPI CELLS URNS QL MICRO: ABNORMAL /HPF
NRBC BLD AUTO-RTO: 0 /100 WBCS
OPIATES UR QL SCN: POSITIVE
OXYCODONE+OXYMORPHONE UR QL SCN: NEGATIVE
PCP UR QL: NEGATIVE
PH UR STRIP.AUTO: 6 [PH]
PLATELET # BLD AUTO: 443 THOUSANDS/UL (ref 149–390)
PMV BLD AUTO: 8.6 FL (ref 8.9–12.7)
POTASSIUM SERPL-SCNC: 3.7 MMOL/L (ref 3.5–5.3)
PROT SERPL-MCNC: 8.3 G/DL (ref 6.4–8.4)
PROT UR STRIP-MCNC: NEGATIVE MG/DL
RBC # BLD AUTO: 3.77 MILLION/UL (ref 3.81–5.12)
RBC #/AREA URNS AUTO: ABNORMAL /HPF
RSV RNA RESP QL NAA+PROBE: NEGATIVE
SALICYLATES SERPL-MCNC: <5 MG/DL (ref 3–20)
SARS-COV-2 RNA RESP QL NAA+PROBE: NEGATIVE
SODIUM SERPL-SCNC: 138 MMOL/L (ref 135–147)
SP GR UR STRIP.AUTO: 1.02 (ref 1–1.03)
THC UR QL: NEGATIVE
TSH SERPL DL<=0.05 MIU/L-ACNC: 1.12 UIU/ML (ref 0.45–4.5)
UROBILINOGEN UR STRIP-ACNC: <2 MG/DL
WBC # BLD AUTO: 14.32 THOUSAND/UL (ref 4.31–10.16)
WBC #/AREA URNS AUTO: ABNORMAL /HPF

## 2024-03-02 PROCEDURE — 0241U HB NFCT DS VIR RESP RNA 4 TRGT: CPT | Performed by: NURSE PRACTITIONER

## 2024-03-02 PROCEDURE — HZ2ZZZZ DETOXIFICATION SERVICES FOR SUBSTANCE ABUSE TREATMENT: ICD-10-PCS | Performed by: INTERNAL MEDICINE

## 2024-03-02 PROCEDURE — 99284 EMERGENCY DEPT VISIT MOD MDM: CPT

## 2024-03-02 PROCEDURE — 82077 ASSAY SPEC XCP UR&BREATH IA: CPT

## 2024-03-02 PROCEDURE — 96366 THER/PROPH/DIAG IV INF ADDON: CPT

## 2024-03-02 PROCEDURE — 80076 HEPATIC FUNCTION PANEL: CPT

## 2024-03-02 PROCEDURE — 81001 URINALYSIS AUTO W/SCOPE: CPT

## 2024-03-02 PROCEDURE — 96361 HYDRATE IV INFUSION ADD-ON: CPT

## 2024-03-02 PROCEDURE — 84484 ASSAY OF TROPONIN QUANT: CPT

## 2024-03-02 PROCEDURE — 80048 BASIC METABOLIC PNL TOTAL CA: CPT

## 2024-03-02 PROCEDURE — 80307 DRUG TEST PRSMV CHEM ANLYZR: CPT

## 2024-03-02 PROCEDURE — 80179 DRUG ASSAY SALICYLATE: CPT

## 2024-03-02 PROCEDURE — 93005 ELECTROCARDIOGRAM TRACING: CPT

## 2024-03-02 PROCEDURE — 80143 DRUG ASSAY ACETAMINOPHEN: CPT

## 2024-03-02 PROCEDURE — 36415 COLL VENOUS BLD VENIPUNCTURE: CPT

## 2024-03-02 PROCEDURE — 85025 COMPLETE CBC W/AUTO DIFF WBC: CPT

## 2024-03-02 PROCEDURE — 84443 ASSAY THYROID STIM HORMONE: CPT

## 2024-03-02 PROCEDURE — 82550 ASSAY OF CK (CPK): CPT

## 2024-03-02 PROCEDURE — 81025 URINE PREGNANCY TEST: CPT | Performed by: NURSE PRACTITIONER

## 2024-03-02 PROCEDURE — 96365 THER/PROPH/DIAG IV INF INIT: CPT

## 2024-03-02 PROCEDURE — 96375 TX/PRO/DX INJ NEW DRUG ADDON: CPT

## 2024-03-02 PROCEDURE — 96376 TX/PRO/DX INJ SAME DRUG ADON: CPT

## 2024-03-02 RX ORDER — DIAZEPAM 5 MG/1
10 TABLET ORAL ONCE
Status: DISCONTINUED | OUTPATIENT
Start: 2024-03-02 | End: 2024-03-02

## 2024-03-02 RX ORDER — ENOXAPARIN SODIUM 100 MG/ML
40 INJECTION SUBCUTANEOUS DAILY
Status: DISCONTINUED | OUTPATIENT
Start: 2024-03-03 | End: 2024-03-02

## 2024-03-02 RX ORDER — SODIUM CHLORIDE, SODIUM GLUCONATE, SODIUM ACETATE, POTASSIUM CHLORIDE, MAGNESIUM CHLORIDE, SODIUM PHOSPHATE, DIBASIC, AND POTASSIUM PHOSPHATE .53; .5; .37; .037; .03; .012; .00082 G/100ML; G/100ML; G/100ML; G/100ML; G/100ML; G/100ML; G/100ML
1000 INJECTION, SOLUTION INTRAVENOUS ONCE
Status: COMPLETED | OUTPATIENT
Start: 2024-03-02 | End: 2024-03-03

## 2024-03-02 RX ORDER — SODIUM CHLORIDE 9 MG/ML
125 INJECTION, SOLUTION INTRAVENOUS CONTINUOUS
Status: DISCONTINUED | OUTPATIENT
Start: 2024-03-03 | End: 2024-03-03

## 2024-03-02 RX ORDER — ONDANSETRON 2 MG/ML
4 INJECTION INTRAMUSCULAR; INTRAVENOUS ONCE
Status: COMPLETED | OUTPATIENT
Start: 2024-03-02 | End: 2024-03-02

## 2024-03-02 RX ORDER — SODIUM CHLORIDE, SODIUM GLUCONATE, SODIUM ACETATE, POTASSIUM CHLORIDE, MAGNESIUM CHLORIDE, SODIUM PHOSPHATE, DIBASIC, AND POTASSIUM PHOSPHATE .53; .5; .37; .037; .03; .012; .00082 G/100ML; G/100ML; G/100ML; G/100ML; G/100ML; G/100ML; G/100ML
1000 INJECTION, SOLUTION INTRAVENOUS ONCE
Status: COMPLETED | OUTPATIENT
Start: 2024-03-02 | End: 2024-03-02

## 2024-03-02 RX ORDER — DIAZEPAM 5 MG/ML
10 INJECTION, SOLUTION INTRAMUSCULAR; INTRAVENOUS ONCE
Status: COMPLETED | OUTPATIENT
Start: 2024-03-02 | End: 2024-03-02

## 2024-03-02 RX ORDER — DIAZEPAM 5 MG/ML
5 INJECTION, SOLUTION INTRAMUSCULAR; INTRAVENOUS ONCE
Status: COMPLETED | OUTPATIENT
Start: 2024-03-02 | End: 2024-03-02

## 2024-03-02 RX ORDER — ONDANSETRON 2 MG/ML
4 INJECTION INTRAMUSCULAR; INTRAVENOUS EVERY 4 HOURS PRN
Status: DISCONTINUED | OUTPATIENT
Start: 2024-03-02 | End: 2024-03-03

## 2024-03-02 RX ORDER — LORAZEPAM 2 MG/ML
2 INJECTION INTRAMUSCULAR EVERY 4 HOURS PRN
Status: DISCONTINUED | OUTPATIENT
Start: 2024-03-02 | End: 2024-03-03

## 2024-03-02 RX ORDER — LORAZEPAM 2 MG/ML
2 INJECTION INTRAMUSCULAR ONCE
Status: COMPLETED | OUTPATIENT
Start: 2024-03-02 | End: 2024-03-02

## 2024-03-02 RX ADMIN — ONDANSETRON 4 MG: 2 INJECTION INTRAMUSCULAR; INTRAVENOUS at 19:17

## 2024-03-02 RX ADMIN — SODIUM CHLORIDE, SODIUM GLUCONATE, SODIUM ACETATE, POTASSIUM CHLORIDE, MAGNESIUM CHLORIDE, SODIUM PHOSPHATE, DIBASIC, AND POTASSIUM PHOSPHATE 1000 ML: .53; .5; .37; .037; .03; .012; .00082 INJECTION, SOLUTION INTRAVENOUS at 21:09

## 2024-03-02 RX ADMIN — SODIUM CHLORIDE, SODIUM GLUCONATE, SODIUM ACETATE, POTASSIUM CHLORIDE, MAGNESIUM CHLORIDE, SODIUM PHOSPHATE, DIBASIC, AND POTASSIUM PHOSPHATE 1000 ML: .53; .5; .37; .037; .03; .012; .00082 INJECTION, SOLUTION INTRAVENOUS at 20:15

## 2024-03-02 RX ADMIN — ONDANSETRON 4 MG: 2 INJECTION INTRAMUSCULAR; INTRAVENOUS at 21:03

## 2024-03-02 RX ADMIN — LORAZEPAM 2 MG: 2 INJECTION INTRAMUSCULAR; INTRAVENOUS at 19:10

## 2024-03-02 RX ADMIN — DIAZEPAM 10 MG: 10 INJECTION, SOLUTION INTRAMUSCULAR; INTRAVENOUS at 20:15

## 2024-03-02 RX ADMIN — SODIUM CHLORIDE 1000 ML: 0.9 INJECTION, SOLUTION INTRAVENOUS at 19:12

## 2024-03-02 RX ADMIN — DIAZEPAM 5 MG: 10 INJECTION, SOLUTION INTRAMUSCULAR; INTRAVENOUS at 21:19

## 2024-03-02 NOTE — ED ATTENDING ATTESTATION
3/2/2024  I, Lillian Stewart DO, saw and evaluated the patient. I have discussed the patient with the resident/non-physician practitioner and agree with the resident's/non-physician practitioner's findings, Plan of Care, and MDM as documented in the resident's/non-physician practitioner's note, except where noted. All available labs and Radiology studies were reviewed.  I was present for key portions of any procedure(s) performed by the resident/non-physician practitioner and I was immediately available to provide assistance.       At this point I agree with the current assessment done in the Emergency Department.  I have conducted an independent evaluation of this patient a history and physical is as follows:    History  Patient is a 28 y.o. year old female with a past medical history of PTSD, depression, BPD, and polysubstance abuse who presents for evaluation with nausea, vomiting, shaking, and diaphoresis.  Patient states that her symptoms began earlier today.  Emesis is nonbloody, nonbilious.  Patient denies any recent illicit drug use, and denies taking any extra prescription medication or over-the-counter medication.  Patient states that she last urinated earlier today.  She states that she is unable to hold down any liquids currently.    Current Outpatient Medications   Medication Instructions    gabapentin (NEURONTIN) 300 mg, Oral, Daily at bedtime    gabapentin (NEURONTIN) 100 mg, Oral, 2 times daily    methadone (DOLOPHINE) 136 mg, Oral, Every morning, Take by mouth daily     prazosin (MINIPRESS) 1 mg, Oral, Daily at bedtime    QUEtiapine (SEROQUEL) 100 mg, Oral, Daily at bedtime    sertraline (ZOLOFT) 100 mg, Oral, Daily     Past Medical History:   Diagnosis Date    Anxiety     Borderline personality disorder (HCC)     Chlamydia     Depression     Endometriosis     IV drug user     IV heroin use, rehab 7 times     Panic attacks     Psychiatric disorder     anxiety    PTSD (post-traumatic stress disorder)      PTSD (post-traumatic stress disorder) 8/12/2023    Varicella     childhood    Visual impairment      Past Surgical History:   Procedure Laterality Date    LAPAROTOMY         Objective  Vitals:    03/02/24 2130 03/02/24 2200 03/02/24 2230 03/02/24 2300   BP: 116/88 131/65 116/59 118/68   BP Location:       Pulse: 103 102 93 92   Resp:   20 18   Temp:       TempSrc:       SpO2: 100% 99% 100% 100%   Weight:       Height:           General: Awake, alert. Ill appearing, diaphoretic.     Head: Normocephalic, atraumatic.  Eyes: Pupils miotic, PERRL, EOM-I. No rotatory nystagmus noted.   ENT: Atraumatic external nose and ears.  Dry mucous membranes. No stridor.   Neck: Symmetric, supple, trachea midline.  CV: Tachycardic. Peripheral pulses +2 throughout.   Lungs: Respirations unlabored, no tachypnea. CTAB, lungs sounds equal bilateral.   Abd: soft, NT/ND. No guarding. No peritoneal signs.   MSK: Extremities without tenderness or gross deformity. No lower extremity edema.   Skin: Diaphoretic, intact. No lesions.  Neuro: AAOx3, GCS 15, CN II-XII grossly intact. Motor grossly intact. Sensory grossly intact. Clonus noted in bilateral lower extremities.   Psychiatric/Behavioral: Appropriate mood and affect. Behavior normal.    Results Reviewed       Procedure Component Value Units Date/Time    COVID/FLU/RSV [[id number]]  (Normal) Collected: 03/02/24 2227    Lab Status: Final result Specimen: Nares from Nose Updated: 03/02/24 2321     SARS-CoV-2 Negative     INFLUENZA A PCR Negative     INFLUENZA B PCR Negative     RSV PCR Negative    Narrative:      FOR PEDIATRIC PATIENTS - copy/paste COVID Guidelines URL to browser: [email or web address]    SARS-CoV-2 assay is a Nucleic Acid Amplification assay intended for the  qualitative detection of nucleic acid from SARS-CoV-2 in nasopharyngeal  swabs. Results are for the presumptive identification of SARS-CoV-2 RNA.    Positive  results are indicative of infection with SARS-CoV-2, the virus  causing COVID-19, but do not rule out bacterial infection or co-infection  with other viruses. Laboratories within the [place] and its  territories are required to report all positive results to the appropriate  public health authorities. Negative results do not preclude SARS-CoV-2  infection and should not be used as the sole basis for treatment or other  patient management decisions. Negative results must be combined with  clinical observations, patient history, and epidemiological information.  This test has not been FDA cleared or approved.    This test has been authorized by FDA under an Emergency Use Authorization  (EUA). This test is only authorized for the duration of time the  declaration that circumstances exist justifying the authorization of the  emergency use of an in vitro diagnostic tests for detection of SARS-CoV-2  virus and/or diagnosis of COVID-19 infection under section 564(b)(1) of  the Act, 21 U.S.C. 360bbb-3(b)(1), unless the authorization is terminated  or revoked sooner. The test has been validated but independent review by FDA  and CLIA is pending.    Test performed using [employer] Gene[employer]pert: This RT-PCR assay targets N2,  a region unique to SARS-CoV-2. A conserved region in the E-gene was chosen  for pan-Sarbecovirus detection which includes SARS-CoV-2.    According to CMS-2020-01-R, this platform meets the definition of high-throughput technology.    HS Troponin I 4hr [[id number]]  (Normal) Collected: 03/02/24 2230    Lab Status: Final result Specimen: Blood from Arm, Right Updated: 03/02/24 2306     hs TnI 4hr 7 ng/L      Delta 4hr hsTnI 4 ng/L     Rapid drug screen, urine [[id number]]  (Abnormal) Collected: 03/02/24 2226    Lab Status: Final result Specimen: Urine, Clean Catch Updated: 03/02/24 2302     Amph/Meth UR Negative     Barbiturate Ur Negative     Benzodiazepine Urine Positive     Cocaine Urine Negative      Methadone Urine Positive     Opiate Urine Positive     PCP Ur Negative     THC Urine Negative     Oxycodone Urine Negative    Narrative:      Presumptive report. If requested, specimen will be sent to reference lab for confirmation.  FOR MEDICAL PURPOSES ONLY.   IF CONFIRMATION NEEDED PLEASE CONTACT THE LAB WITHIN 5 DAYS.    Drug Screen Cutoff Levels:  AMPHETAMINE/METHAMPHETAMINES  1000 ng/mL  BARBITURATES     200 ng/mL  BENZODIAZEPINES     200 ng/mL  COCAINE      300 ng/mL  METHADONE      300 ng/mL  OPIATES      300 ng/mL  PHENCYCLIDINE     25 ng/mL  THC       50 ng/mL  OXYCODONE      100 ng/mL    Urine Microscopic [[id number]]  (Abnormal) Collected: 03/02/24 2226    Lab Status: Final result Specimen: Urine, Other Updated: 03/02/24 2254     RBC, UA 1-2 /hpf      WBC, UA 2-4 /hpf      Epithelial Cells Occasional /hpf      Bacteria, UA Occasional /hpf      MUCUS THREADS Occasional    UA w Reflex to Microscopic w Reflex to Culture [[id number]]  (Abnormal) Collected: 03/02/24 2226    Lab Status: Final result Specimen: Urine, Other Updated: 03/02/24 2245     Color, UA Light Yellow     Clarity, UA Turbid     Specific Gravity, UA 1.016     pH, UA 6.0     Leukocytes, UA Trace     Nitrite, UA Negative     Protein, UA Negative mg/dl      Glucose, UA Negative mg/dl      Ketones, UA 60 (2+) mg/dl      Urobilinogen, UA <2.0 mg/dl      Bilirubin, UA Negative     Occult Blood, UA Large    POCT pregnancy, urine [[id number]]  (Normal) Resulted: 03/02/24 2240    Lab Status: Final result Updated: 03/02/24 2240     EXT Preg Test, Ur Negative     Control Valid    HS Troponin I 2hr [[id number]]  (Normal) Collected: 03/02/24 2100    Lab Status: Final result Specimen: Blood from Arm, Right Updated: 03/02/24 2143     hs TnI 2hr 5 ng/L      Delta 2hr hsTnI 2 ng/L     Salicylate level [[id number]]  (Normal) Collected: 03/02/24 1906    Lab Status: Final result Specimen: Blood from Arm, Right Updated: 03/02/24 2007     Salicylate Lvl <5 mg/dL      Acetaminophen level-If concentration is detectable, please discuss with medical  on call. [[id number]]  (Abnormal) Collected: 03/02/24 1906    Lab Status: Final result Specimen: Blood from Arm, Right Updated: 03/02/24 2007     Acetaminophen Level <2 ug/mL     TSH, 3rd generation with Free T4 reflex [[id number]]  (Normal) Collected: 03/02/24 1853    Lab Status: Final result Specimen: Blood from Arm, Right Updated: 03/02/24 1941     TSH 3RD GENERATON 1.119 uIU/mL     Ethanol [[id number]]  (Normal) Collected: 03/02/24 1906    Lab Status: Final result Specimen: Blood from Arm, Right Updated: 03/02/24 1935     Ethanol Lvl <10 mg/dL     HS Troponin 0hr (reflex protocol) [[id number]]  (Normal) Collected: 03/02/24 1853    Lab Status: Final result Specimen: Blood from Arm, Right Updated: 03/02/24 1932     hs TnI 0hr 3 ng/L     Basic metabolic panel [[id number]] Collected: 03/02/24 1853    Lab Status: Final result Specimen: Blood from Arm, Right Updated: 03/02/24 1929     Sodium 138 mmol/L      Potassium 3.7 mmol/L      Chloride 101 mmol/L      CO2 25 mmol/L      ANION GAP 12 mmol/L      BUN 6 mg/dL      Creatinine 0.68 mg/dL      Glucose 134 mg/dL      Calcium 9.9 mg/dL      eGFR 120 ml/min/1.73sq m     Narrative:      [employer] guidelines for Chronic Kidney Disease (CKD):     Stage 1 with normal or high GFR (GFR > 90 mL/min/1.73 square meters)    Stage 2 Mild CKD (GFR = 60-89 mL/min/1.73 square meters)    Stage 3A Moderate CKD (GFR = 45-59 mL/min/1.73 square meters)    Stage 3B Moderate CKD (GFR = 30-44 mL/min/1.73 square meters)    Stage 4 Severe CKD (GFR = 15-29 mL/min/1.73 square meters)    Stage 5 End Stage CKD (GFR <15 mL/min/1.73 square meters)  Note: GFR calculation is accurate only with a steady state creatinine    Hepatic function panel [[id number]]  (Abnormal) Collected: 03/02/24 1853    Lab Status: Final result Specimen: Blood from Arm, Right Updated: 03/02/24 1929     Total  Bilirubin 0.30 mg/dL      Bilirubin, Direct 0.00 mg/dL      Alkaline Phosphatase 90 U/L      AST 42 U/L      ALT 15 U/L      Total Protein 8.3 g/dL      Albumin 4.0 g/dL     CK [[id number]]  (Abnormal) Collected: 03/02/24 1853    Lab Status: Final result Specimen: Blood from Arm, Right Updated: 03/02/24 1929     Total CK 1,362 U/L     CBC and differential [[id number]]  (Abnormal) Collected: 03/02/24 1853    Lab Status: Final result Specimen: Blood from Arm, Right Updated: 03/02/24 1908     WBC 14.32 Thousand/uL      RBC 3.77 Million/uL      Hemoglobin 11.2 g/dL      Hematocrit 34.9 %      MCV 93 fL      MCH 29.7 pg      MCHC 32.1 g/dL      RDW 13.2 %      MPV 8.6 fL      Platelets 443 Thousands/uL      nRBC 0 /100 WBCs      Neutrophils Relative 73 %      Immat GRANS % 1 %      Lymphocytes Relative 20 %      Monocytes Relative 5 %      Eosinophils Relative 1 %      Basophils Relative 0 %      Neutrophils Absolute 10.57 Thousands/µL      Immature Grans Absolute 0.09 Thousand/uL      Lymphocytes Absolute 2.87 Thousands/µL      Monocytes Absolute 0.65 Thousand/µL      Eosinophils Absolute 0.08 Thousand/µL      Basophils Absolute 0.06 Thousands/µL           No orders to display     Medications   sodium chloride 0.9 % bolus 1,000 mL (0 mL Intravenous Stopped 3/2/24 2012)   LORazepam (ATIVAN) injection 2 mg (2 mg Intravenous Given 3/2/24 1910)   ondansetron (ZOFRAN) injection 4 mg (4 mg Intravenous Given 3/2/24 1917)   diazepam (VALIUM) injection 10 mg (10 mg Intravenous Given 3/2/24 2015)   multi-electrolyte (ISOLYTE-S PH 7.4 equivalent) IV solution 1,000 mL (0 mL Intravenous Stopped 3/2/24 2115)   ondansetron (ZOFRAN) injection 4 mg (4 mg Intravenous Given 3/2/24 2103)   multi-electrolyte (ISOLYTE-S PH 7.4 equivalent) IV solution 1,000 mL (1,000 mL Intravenous New Bag 3/2/24 2109)   diazepam (VALIUM) injection 5 mg (5 mg Intravenous Given 3/2/24 2119)     ED Course as of 03/02/24 2333   Sat Mar 02, 2024   1831 Pulse(!):  125   1926 Procedure Note: EKG  Date/Time: 03/02/24 7:26 PM   Interpreted by: Lillian Stewart D.O.  Indications / Diagnosis: tachycardia  ECG reviewed by me, the ED Provider: yes   The EKG demonstrates:  Rhythm: sinus tachycardia at 121 bpm  Intervals: normal intervals (Qtc 454)  Axis: right axis  QRS/Blocks: normal QRS  ST Changes: No acute ST Changes, no STD/FARIBA.   1932 Total CK(!): 1,362   2220 Recent bladder scan with 450 mLs in the bladder. Patient will require straight cath per urinary retention protocol.        MDM    Final Diagnosis:  1. Serotonin syndrome    2. Rhabdomyolysis    3. Intractable vomiting        Critical Care Time  Procedures       Time  Procedures

## 2024-03-02 NOTE — ED PROVIDER NOTES
History  Chief Complaint   Patient presents with    Vomiting     Patient started vomiting today, shaking, dizzy.     27yo F w/ h/o IVDU and Rx h/o zoloft, methadone presenting for vomiting. For the past few days Pt has had intermittent episodes of nausea. Notably, Pt states she has intermittent episodes of nausea weekly, but without vomiting, Earlier today, Pt developed several episodes of NBNB emesis with associated HA, dizziness, shaking, diaphoresis, difficulty urinating, palpitations. Denies F/C, abdominal pain, CP, SOB, ingestion of an illicit drug, medication overdose, change in medication dosages. Per the mother, Pt has had an episode of self-harm in the past, but mother otherwise denies that the Pt is attempting to harm herself at this moment.      History provided by:  Patient      Prior to Admission Medications   Prescriptions Last Dose Informant Patient Reported? Taking?   QUEtiapine (SEROquel) 100 mg tablet   No No   Sig: Take 1 tablet (100 mg total) by mouth daily at bedtime   gabapentin (NEURONTIN) 100 mg capsule   No No   Sig: Take 1 capsule (100 mg total) by mouth 2 (two) times a day   gabapentin (NEURONTIN) 300 mg capsule   No No   Sig: Take 1 capsule (300 mg total) by mouth daily at bedtime   methadone (DOLOPHINE) 10 MG/5ML solution  Self Yes No   Sig: Take 136 mg by mouth every morning Take by mouth daily   prazosin (MINIPRESS) 1 mg capsule   No No   Sig: Take 1 capsule (1 mg total) by mouth daily at bedtime   sertraline (ZOLOFT) 100 mg tablet   No No   Sig: Take 1 tablet (100 mg total) by mouth daily Do not start before August 17, 2023.      Facility-Administered Medications: None       Past Medical History:   Diagnosis Date    Anxiety     Borderline personality disorder (HCC)     Chlamydia     Depression     Endometriosis     IV drug user     IV heroin use, rehab 7 times     Panic attacks     Psychiatric disorder     anxiety    PTSD (post-traumatic stress disorder)     PTSD (post-traumatic  stress disorder) 8/12/2023    Varicella     childhood    Visual impairment        Past Surgical History:   Procedure Laterality Date    LAPAROTOMY         Family History   Problem Relation Age of Onset    Depression Mother     Drug abuse Brother     Drug abuse Maternal Aunt     Drug abuse Paternal Uncle     Cancer Maternal Grandmother     Cancer Paternal Grandfather      I have reviewed and agree with the history as documented.    E-Cigarette/Vaping    E-Cigarette Use Never User      E-Cigarette/Vaping Substances    Nicotine Yes     THC No     CBD No     Flavoring Yes     Other No     Unknown No      Social History     Tobacco Use    Smoking status: Never     Passive exposure: Never    Smokeless tobacco: Never   Vaping Use    Vaping status: Never Used   Substance Use Topics    Alcohol use: Not Currently    Drug use: Not Currently     Comment: 8 bags/day states benzos are mixed in        Review of Systems   Constitutional:  Positive for diaphoresis.   Cardiovascular:  Positive for palpitations.   Gastrointestinal:  Positive for nausea and vomiting.   Genitourinary:  Positive for difficulty urinating.   Neurological:  Positive for dizziness, tremors and headaches.   All other systems reviewed and are negative.      Physical Exam  ED Triage Vitals [03/02/24 1827]   Temperature Pulse Respirations Blood Pressure SpO2   99.2 °F (37.3 °C) (!) 125 22 134/74 98 %      Temp Source Heart Rate Source Patient Position - Orthostatic VS BP Location FiO2 (%)   Oral Monitor Lying Right arm --      Pain Score       No Pain             Orthostatic Vital Signs  Vitals:    03/03/24 0220 03/03/24 0615 03/03/24 0701 03/03/24 1100   BP: 128/71 137/61 110/60    Pulse: 93 (!) 112 96 (!) 109   Patient Position - Orthostatic VS: Lying  Lying        Physical Exam  Constitutional:       General: She is in acute distress.      Appearance: She is ill-appearing and diaphoretic.      Comments: Pt markedly rigorous on exam, actively dry heaving    Eyes:      Extraocular Movements:      Right eye: Nystagmus present.      Left eye: Nystagmus present.      Conjunctiva/sclera: Conjunctivae normal.      Pupils: Pupils are equal, round, and reactive to light.      Comments: Reactive, dilated pupils   Cardiovascular:      Rate and Rhythm: Tachycardia present. Rhythm irregular.      Pulses: Normal pulses.      Heart sounds: Normal heart sounds.   Pulmonary:      Effort: Pulmonary effort is normal.      Breath sounds: Normal breath sounds.   Abdominal:      General: Abdomen is flat.      Palpations: Abdomen is soft.      Comments: Hyperactive bowel sounds   Skin:     General: Skin is warm.      Capillary Refill: Capillary refill takes 2 to 3 seconds.   Neurological:      Mental Status: She is alert and oriented to person, place, and time.      Motor: Abnormal muscle tone (Inducible clonus) present.      Comments: Hyperreflexia          ED Medications  Medications   LORazepam (ATIVAN) injection 2 mg (2 mg Intravenous Given 3/3/24 1011)   diphenhydrAMINE (BENADRYL) injection 25 mg (0 mg Intravenous Return to Cabinet 3/3/24 1013)   pantoprazole (PROTONIX) injection 40 mg (40 mg Intravenous Given 3/3/24 1109)   trimethobenzamide (TIGAN) IM injection 200 mg (has no administration in time range)   ondansetron (ZOFRAN) injection 4 mg (4 mg Intravenous Given 3/3/24 1108)   sodium chloride 0.9 % infusion (200 mL/hr Intravenous New Bag 3/3/24 1109)   sodium chloride 0.9 % infusion (has no administration in time range)   diazepam (VALIUM) injection 10 mg (10 mg Intravenous Given 3/3/24 1115)   sodium chloride 0.9 % bolus 1,000 mL (0 mL Intravenous Stopped 3/2/24 2012)   LORazepam (ATIVAN) injection 2 mg (2 mg Intravenous Given 3/2/24 1910)   ondansetron (ZOFRAN) injection 4 mg (4 mg Intravenous Given 3/2/24 1917)   diazepam (VALIUM) injection 10 mg (10 mg Intravenous Given 3/2/24 2015)   multi-electrolyte (ISOLYTE-S PH 7.4 equivalent) IV solution 1,000 mL (0 mL Intravenous  Stopped 3/2/24 2115)   ondansetron (ZOFRAN) injection 4 mg (4 mg Intravenous Given 3/2/24 2103)   multi-electrolyte (ISOLYTE-S PH 7.4 equivalent) IV solution 1,000 mL (0 mL Intravenous Stopped 3/3/24 0231)   diazepam (VALIUM) injection 5 mg (5 mg Intravenous Given 3/2/24 2119)       Diagnostic Studies  Results Reviewed       Procedure Component Value Units Date/Time    Comprehensive metabolic panel [[id number]]  (Abnormal) Collected: 03/03/24 0430    Lab Status: Final result Specimen: Blood from Arm, Right Updated: 03/03/24 0536     Sodium 138 mmol/L      Potassium 4.5 mmol/L      Chloride 102 mmol/L      CO2 20 mmol/L      ANION GAP 16 mmol/L      BUN 4 mg/dL      Creatinine 0.53 mg/dL      Glucose 134 mg/dL      Calcium 8.4 mg/dL      AST 49 U/L      ALT 15 U/L      Alkaline Phosphatase 76 U/L      Total Protein 7.6 g/dL      Albumin 3.7 g/dL      Total Bilirubin 0.28 mg/dL      eGFR 130 ml/min/1.73sq m     Narrative:      National Kidney Disease Foundation guidelines for Chronic Kidney Disease (CKD):     Stage 1 with normal or high GFR (GFR > 90 mL/min/1.73 square meters)    Stage 2 Mild CKD (GFR = 60-89 mL/min/1.73 square meters)    Stage 3A Moderate CKD (GFR = 45-59 mL/min/1.73 square meters)    Stage 3B Moderate CKD (GFR = 30-44 mL/min/1.73 square meters)    Stage 4 Severe CKD (GFR = 15-29 mL/min/1.73 square meters)    Stage 5 End Stage CKD (GFR <15 mL/min/1.73 square meters)  Note: GFR calculation is accurate only with a steady state creatinine    COVID/FLU/RSV [[id number]]  (Normal) Collected: 03/02/24 2227    Lab Status: Final result Specimen: Nares from Nose Updated: 03/02/24 2321     SARS-CoV-2 Negative     INFLUENZA A PCR Negative     INFLUENZA B PCR Negative     RSV PCR Negative    Narrative:      FOR PEDIATRIC PATIENTS - copy/paste COVID Guidelines URL to browser: [email or web address]    SARS-CoV-2 assay is a Nucleic Acid Amplification assay intended  for the  qualitative detection of nucleic acid from SARS-CoV-2 in nasopharyngeal  swabs. Results are for the presumptive identification of SARS-CoV-2 RNA.    Positive results are indicative of infection with SARS-CoV-2, the virus  causing COVID-19, but do not rule out bacterial infection or co-infection  with other viruses. Laboratories within the [place] and its  territories are required to report all positive results to the appropriate  public health authorities. Negative results do not preclude SARS-CoV-2  infection and should not be used as the sole basis for treatment or other  patient management decisions. Negative results must be combined with  clinical observations, patient history, and epidemiological information.  This test has not been FDA cleared or approved.    This test has been authorized by FDA under an Emergency Use Authorization  (EUA). This test is only authorized for the duration of time the  declaration that circumstances exist justifying the authorization of the  emergency use of an in vitro diagnostic tests for detection of SARS-CoV-2  virus and/or diagnosis of COVID-19 infection under section 564(b)(1) of  the Act, 21 U.S.C. 360bbb-3(b)(1), unless the authorization is terminated  or revoked sooner. The test has been validated but independent review by FDA  and CLIA is pending.    Test performed using [employer] Gene[employer]pert: This RT-PCR assay targets N2,  a region unique to SARS-CoV-2. A conserved region in the E-gene was chosen  for pan-Sarbecovirus detection which includes SARS-CoV-2.    According to CMS-2020-01-R, this platform meets the definition of high-throughput technology.    HS Troponin I 4hr [[id number]]  (Normal) Collected: 03/02/24 2230    Lab Status: Final result Specimen: Blood from Arm, Right Updated: 03/02/24 2306     hs TnI 4hr 7 ng/L      Delta 4hr hsTnI 4 ng/L     Rapid drug screen, urine [[id number]]  (Abnormal) Collected: 03/02/24 2226    Lab Status: Final result Specimen:  Urine, Clean Catch Updated: 03/02/24 2302     Amph/Meth UR Negative     Barbiturate Ur Negative     Benzodiazepine Urine Positive     Cocaine Urine Negative     Methadone Urine Positive     Opiate Urine Positive     PCP Ur Negative     THC Urine Negative     Oxycodone Urine Negative    Narrative:      Presumptive report. If requested, specimen will be sent to reference lab for confirmation.  FOR MEDICAL PURPOSES ONLY.   IF CONFIRMATION NEEDED PLEASE CONTACT THE LAB WITHIN 5 DAYS.    Drug Screen Cutoff Levels:  AMPHETAMINE/METHAMPHETAMINES  1000 ng/mL  BARBITURATES     200 ng/mL  BENZODIAZEPINES     200 ng/mL  COCAINE      300 ng/mL  METHADONE      300 ng/mL  OPIATES      300 ng/mL  PHENCYCLIDINE     25 ng/mL  THC       50 ng/mL  OXYCODONE      100 ng/mL    Urine Microscopic [[id number]]  (Abnormal) Collected: 03/02/24 2226    Lab Status: Final result Specimen: Urine, Other Updated: 03/02/24 2254     RBC, UA 1-2 /hpf      WBC, UA 2-4 /hpf      Epithelial Cells Occasional /hpf      Bacteria, UA Occasional /hpf      MUCUS THREADS Occasional    UA w Reflex to Microscopic w Reflex to Culture [[id number]]  (Abnormal) Collected: 03/02/24 2226    Lab Status: Final result Specimen: Urine, Other Updated: 03/02/24 2245     Color, UA Light Yellow     Clarity, UA Turbid     Specific Gravity, UA 1.016     pH, UA 6.0     Leukocytes, UA Trace     Nitrite, UA Negative     Protein, UA Negative mg/dl      Glucose, UA Negative mg/dl      Ketones, UA 60 (2+) mg/dl      Urobilinogen, UA <2.0 mg/dl      Bilirubin, UA Negative     Occult Blood, UA Large    POCT pregnancy, urine [[id number]]  (Normal) Resulted: 03/02/24 2240    Lab Status: Final result Updated: 03/02/24 2240     EXT Preg Test, Ur Negative     Control Valid    HS Troponin I 2hr [[id number]]  (Normal) Collected: 03/02/24 2100    Lab Status: Final result Specimen: Blood from Arm, Right Updated: 03/02/24 2143     hs TnI 2hr 5 ng/L      Delta 2hr hsTnI 2 ng/L     Salicylate level  [[id number]]  (Normal) Collected: 03/02/24 1906    Lab Status: Final result Specimen: Blood from Arm, Right Updated: 03/02/24 2007     Salicylate Lvl <5 mg/dL     Acetaminophen level-If concentration is detectable, please discuss with medical  on call. [[id number]]  (Abnormal) Collected: 03/02/24 1906    Lab Status: Final result Specimen: Blood from Arm, Right Updated: 03/02/24 2007     Acetaminophen Level <2 ug/mL     TSH, 3rd generation with Free T4 reflex [[id number]]  (Normal) Collected: 03/02/24 1853    Lab Status: Final result Specimen: Blood from Arm, Right Updated: 03/02/24 1941     TSH 3RD GENERATON 1.119 uIU/mL     Ethanol [[id number]]  (Normal) Collected: 03/02/24 1906    Lab Status: Final result Specimen: Blood from Arm, Right Updated: 03/02/24 1935     Ethanol Lvl <10 mg/dL     HS Troponin 0hr (reflex protocol) [[id number]]  (Normal) Collected: 03/02/24 1853    Lab Status: Final result Specimen: Blood from Arm, Right Updated: 03/02/24 1932     hs TnI 0hr 3 ng/L     Basic metabolic panel [[id number]] Collected: 03/02/24 1853    Lab Status: Final result Specimen: Blood from Arm, Right Updated: 03/02/24 1929     Sodium 138 mmol/L      Potassium 3.7 mmol/L      Chloride 101 mmol/L      CO2 25 mmol/L      ANION GAP 12 mmol/L      BUN 6 mg/dL      Creatinine 0.68 mg/dL      Glucose 134 mg/dL      Calcium 9.9 mg/dL      eGFR 120 ml/min/1.73sq m     Narrative:      National Kidney Disease Foundation guidelines for Chronic Kidney Disease (CKD):     Stage 1 with normal or high GFR (GFR > 90 mL/min/1.73 square meters)    Stage 2 Mild CKD (GFR = 60-89 mL/min/1.73 square meters)    Stage 3A Moderate CKD (GFR = 45-59 mL/min/1.73 square meters)    Stage 3B Moderate CKD (GFR = 30-44 mL/min/1.73 square meters)    Stage 4 Severe CKD (GFR = 15-29 mL/min/1.73 square meters)    Stage 5 End Stage CKD (GFR <15 mL/min/1.73 square meters)  Note: GFR calculation is accurate only with a steady state creatinine    Hepatic  function panel [[id number]]  (Abnormal) Collected: 03/02/24 1853    Lab Status: Final result Specimen: Blood from Arm, Right Updated: 03/02/24 1929     Total Bilirubin 0.30 mg/dL      Bilirubin, Direct 0.00 mg/dL      Alkaline Phosphatase 90 U/L      AST 42 U/L      ALT 15 U/L      Total Protein 8.3 g/dL      Albumin 4.0 g/dL     CK [[id number]]  (Abnormal) Collected: 03/02/24 1853    Lab Status: Final result Specimen: Blood from Arm, Right Updated: 03/02/24 1929     Total CK 1,362 U/L     CBC and differential [[id number]]  (Abnormal) Collected: 03/02/24 1853    Lab Status: Final result Specimen: Blood from Arm, Right Updated: 03/02/24 1908     WBC 14.32 Thousand/uL      RBC 3.77 Million/uL      Hemoglobin 11.2 g/dL      Hematocrit 34.9 %      MCV 93 fL      MCH 29.7 pg      MCHC 32.1 g/dL      RDW 13.2 %      MPV 8.6 fL      Platelets 443 Thousands/uL      nRBC 0 /100 WBCs      Neutrophils Relative 73 %      Immat GRANS % 1 %      Lymphocytes Relative 20 %      Monocytes Relative 5 %      Eosinophils Relative 1 %      Basophils Relative 0 %      Neutrophils Absolute 10.57 Thousands/µL      Immature Grans Absolute 0.09 Thousand/uL      Lymphocytes Absolute 2.87 Thousands/µL      Monocytes Absolute 0.65 Thousand/µL      Eosinophils Absolute 0.08 Thousand/µL      Basophils Absolute 0.06 Thousands/µL                    No orders to display         Procedures  ECG 12 Lead Documentation Only    Date/Time: 3/2/2024 7:27 PM    Performed by: Ruddy Henderson MD  Authorized by: Ruddy Henderson MD    ECG reviewed by me, the ED Provider: yes    Patient location:  ED  Interpretation:     Interpretation: abnormal    Rate:     ECG rate:  121    ECG rate assessment: tachycardic    Rhythm:     Rhythm: sinus rhythm    Ectopy:     Ectopy: none    QRS:     QRS axis:  Normal    QRS intervals:  Normal  Conduction:     Conduction: normal    ST segments:     ST segments:  Normal  T waves:     T waves: normal          ED Course  ED Course as of  03/03/24 1222   Sat Mar 02, 2024   1912 WBC(!): 14.32   1914 Physical exam shows dilated pupils, nystagmus, diaphoresis, hyperactive bowel sounds, clonus, hyperreflexia, rigors, tachycardia   1920 27yo F w/ h/o IVDU and Rx h/o Prazosin, zoloft, methadone presenting for vomiting. For the past few days Pt has had intermittent episodes of nausea. Earlier today, Pt developed several episodes of NBNB emesis with associated HA, dizziness, shaking, diaphoresis, difficulty urinating, palpitations. Denies F/C, abdominal pain, CP, SOB, ingestion of an illicit drug, medication overdose.   1920 EKG without Qtc prolongation or QRS widening   1920 WBC(!): 14.32  May be reactive leukocytosis from vomiting   1932 Total CK(!): 1,362  Rhabdo; c/w 5-HT syndrome     2116 Ddx includes but not limited to 5-HT syndrome, sympathomimetic overdose, hyperthyroidism, cyclical vomiting syndrome; Less likely NMS given low grade fever, absent lead-pipe rigidity.    2232 Pt with urinary retention, c/w 5-HT syndrome. Wong catheter will be placed.   2232 Critical Care assessed the Pt and deemed her appropriate for stepdown 2.            Clinical Opiate Withdrawal Scale       Row Name 03/03/24 1100                Pulse 109  -ML        Resting Pulse Rate: Measured After Patient is Sitting or Lying for One Minute 2  -ML        GI Upset: Over Last Half Hour 3  -ML        Sweating: Over Past Half Hour Not Accounted for by Room Temperature of Patient Activity 2  -ML        Tremor: Observation of Outstretched Hands 2  -ML        Restlessness: Observation During Assessment 1  -ML        Yawning: Observation During Assessment 0  -ML        Pupil Size 2  -ML        Anxiety and Irritability 2  -ML        Bone or Joint Aches: If Patient was Having Pain Previously, Only the Additional Component Attributed to Opiate Withdrawal is Scored 0  -ML        Gooseflesh Skin 0  -ML        Runny Nose or Tearing: Not Accounted for by Cold Symptoms or Allergies 0  -ML         Clinical Opiate Withdrawal Scale Total Score 14  -ML        Heart Rate Source --        Patient Position - Orthostatic VS --                  User Key  (r) = Recorded By, (t) = Taken By, (c) = Cosigned By      Initials Name    FLACA Lavonne Rojo RN                                  SBIRT 20yo+      Flowsheet Row Most Recent Value   Initial Alcohol Screen: US AUDIT-C     1. How often do you have a drink containing alcohol? 0 Filed at: 03/02/2024 1830   2. How many drinks containing alcohol do you have on a typical day you are drinking?  0 Filed at: 03/02/2024 1830   3a. Male UNDER 65: How often do you have five or more drinks on one occasion? 0 Filed at: 03/02/2024 1830   3b. FEMALE Any Age, or MALE 65+: How often do you have 4 or more drinks on one occassion? 0 Filed at: 03/02/2024 1830   Audit-C Score 0 Filed at: 03/02/2024 1830   LUDA: How many times in the past year have you...    Used an illegal drug or used a prescription medication for non-medical reasons? Never Filed at: 03/02/2024 1830                  Medical Decision Making  Pt's clinical appearance is c/w sympathomimetic vs. Serotonergic toxidrome without EKG changes. Given Pt is on multiple serotonergic medications, there is a strong suspicion for 5-HT syndrome without autonomic instability. Per Marco criteria she meets multiple conditions that would qualify her for 5-HT syndrome. Mentation remained normal while in the ED with some improvement in her symptoms following multiple dosages of benzodiazapines. Pt will require admission for monitoring of her mental status, fever, aggressive symptomatic treatment. Fever would unlikely respond to tylenol in this situation given it would be driven by agitation. Caution should be taken for methadone withdrawal during her hospitalization, which could worsen or even mask some of her presenting symptoms. Will avoid all serotonergic medications at this time.     Amount and/or Complexity of Data Reviewed  Labs:  ordered. Decision-making details documented in ED Course.    Risk  Prescription drug management.  Decision regarding hospitalization.          Disposition  Final diagnoses:   Serotonin syndrome   Rhabdomyolysis   Intractable vomiting     Time reflects when diagnosis was documented in both MDM as applicable and the Disposition within this note       Time User Action Codes Description Comment    3/2/2024  7:38 PM Ruddy Henderson [G25.79] Serotonin syndrome     3/2/2024  7:39 PM Ruddy Henderson [M62.82] Rhabdomyolysis     3/2/2024 10:31 PM Ruddy Henderson Add [R11.10] Intractable vomiting     3/3/2024 12:04 AM Devendra Stone Add [F11.20] Opioid use disorder, severe, dependence (HCC)     3/3/2024 12:05 AM Devendra Stone Add [F19.10] Polysubstance abuse (HCC)           ED Disposition       ED Disposition   Admit    Condition   Stable    Date/Time   Sat Mar 2, 2024 6039    Comment   Case was discussed with Meron Campos and the patient's admission status was agreed to be Admission Status: inpatient status to the service of Dr. Monreal .               Follow-up Information    None         Current Discharge Medication List        CONTINUE these medications which have NOT CHANGED    Details   gabapentin (NEURONTIN) 100 mg capsule Take 1 capsule (100 mg total) by mouth 2 (two) times a day  Qty: 60 capsule, Refills: 1    Associated Diagnoses: Anxiety      gabapentin (NEURONTIN) 300 mg capsule Take 1 capsule (300 mg total) by mouth daily at bedtime  Qty: 30 capsule, Refills: 1    Associated Diagnoses: Anxiety      methadone (DOLOPHINE) 10 MG/5ML solution Take 136 mg by mouth every morning Take by mouth daily      prazosin (MINIPRESS) 1 mg capsule Take 1 capsule (1 mg total) by mouth daily at bedtime  Qty: 30 capsule, Refills: 1    Associated Diagnoses: PTSD (post-traumatic stress disorder)      QUEtiapine (SEROquel) 100 mg tablet Take 1 tablet (100 mg total) by mouth daily at bedtime  Qty: 30 tablet, Refills: 1     Associated Diagnoses: Depression      sertraline (ZOLOFT) 100 mg tablet Take 1 tablet (100 mg total) by mouth daily Do not start before August 17, 2023.  Qty: 30 tablet, Refills: 1    Associated Diagnoses: Depression           No discharge procedures on file.    PDMP Review         Value Time User    PDMP Reviewed  Yes 8/3/2023 10:14 PM Tom Martinez MD             ED Provider  Attending physically available and evaluated Elda Calderon. I managed the patient along with the ED Attending.    Electronically Signed by           Ruddy Henderson MD  03/03/24 1222       Ruddy Henderson MD  03/03/24 1229

## 2024-03-02 NOTE — Clinical Note
Case was discussed with Meron Campos and the patient's admission status was agreed to be Admission Status: inpatient status to the service of Dr. engel .

## 2024-03-03 PROBLEM — M62.82 RHABDOMYOLYSIS: Status: ACTIVE | Noted: 2024-03-03

## 2024-03-03 LAB
ALBUMIN SERPL BCP-MCNC: 3.7 G/DL (ref 3.5–5)
ALP SERPL-CCNC: 76 U/L (ref 34–104)
ALT SERPL W P-5'-P-CCNC: 15 U/L (ref 7–52)
ANION GAP SERPL CALCULATED.3IONS-SCNC: 16 MMOL/L
AST SERPL W P-5'-P-CCNC: 49 U/L (ref 13–39)
ATRIAL RATE: 121 BPM
BILIRUB SERPL-MCNC: 0.28 MG/DL (ref 0.2–1)
BUN SERPL-MCNC: 4 MG/DL (ref 5–25)
CALCIUM SERPL-MCNC: 8.4 MG/DL (ref 8.4–10.2)
CHLORIDE SERPL-SCNC: 102 MMOL/L (ref 96–108)
CK SERPL-CCNC: 1375 U/L (ref 26–192)
CO2 SERPL-SCNC: 20 MMOL/L (ref 21–32)
CREAT SERPL-MCNC: 0.53 MG/DL (ref 0.6–1.3)
GFR SERPL CREATININE-BSD FRML MDRD: 130 ML/MIN/1.73SQ M
GLUCOSE SERPL-MCNC: 134 MG/DL (ref 65–140)
P AXIS: 67 DEGREES
POTASSIUM SERPL-SCNC: 4.5 MMOL/L (ref 3.5–5.3)
PR INTERVAL: 122 MS
PROT SERPL-MCNC: 7.6 G/DL (ref 6.4–8.4)
QRS AXIS: 95 DEGREES
QRSD INTERVAL: 80 MS
QT INTERVAL: 320 MS
QTC INTERVAL: 454 MS
SODIUM SERPL-SCNC: 138 MMOL/L (ref 135–147)
T WAVE AXIS: 33 DEGREES
VENTRICULAR RATE: 121 BPM

## 2024-03-03 PROCEDURE — 93010 ELECTROCARDIOGRAM REPORT: CPT | Performed by: INTERNAL MEDICINE

## 2024-03-03 PROCEDURE — C9113 INJ PANTOPRAZOLE SODIUM, VIA: HCPCS

## 2024-03-03 PROCEDURE — 99255 IP/OBS CONSLTJ NEW/EST HI 80: CPT | Performed by: EMERGENCY MEDICINE

## 2024-03-03 PROCEDURE — 80053 COMPREHEN METABOLIC PANEL: CPT

## 2024-03-03 PROCEDURE — 82550 ASSAY OF CK (CPK): CPT | Performed by: NURSE PRACTITIONER

## 2024-03-03 PROCEDURE — 99223 1ST HOSP IP/OBS HIGH 75: CPT | Performed by: INTERNAL MEDICINE

## 2024-03-03 RX ORDER — PANTOPRAZOLE SODIUM 40 MG/10ML
40 INJECTION, POWDER, LYOPHILIZED, FOR SOLUTION INTRAVENOUS
Status: DISCONTINUED | OUTPATIENT
Start: 2024-03-03 | End: 2024-03-05 | Stop reason: HOSPADM

## 2024-03-03 RX ORDER — DIAZEPAM 5 MG/ML
10 INJECTION, SOLUTION INTRAMUSCULAR; INTRAVENOUS EVERY 4 HOURS
Status: DISCONTINUED | OUTPATIENT
Start: 2024-03-03 | End: 2024-03-03

## 2024-03-03 RX ORDER — CLONIDINE HYDROCHLORIDE 0.1 MG/1
0.2 TABLET ORAL EVERY 12 HOURS SCHEDULED
Status: DISCONTINUED | OUTPATIENT
Start: 2024-03-03 | End: 2024-03-03

## 2024-03-03 RX ORDER — DIPHENHYDRAMINE HYDROCHLORIDE 50 MG/ML
25 INJECTION INTRAMUSCULAR; INTRAVENOUS EVERY 6 HOURS PRN
Status: DISCONTINUED | OUTPATIENT
Start: 2024-03-03 | End: 2024-03-05 | Stop reason: HOSPADM

## 2024-03-03 RX ORDER — PRAZOSIN HYDROCHLORIDE 1 MG/1
1 CAPSULE ORAL DAILY
Status: DISCONTINUED | OUTPATIENT
Start: 2024-03-03 | End: 2024-03-05 | Stop reason: HOSPADM

## 2024-03-03 RX ORDER — DIAZEPAM 5 MG/ML
10 INJECTION, SOLUTION INTRAMUSCULAR; INTRAVENOUS EVERY 6 HOURS
Status: DISCONTINUED | OUTPATIENT
Start: 2024-03-03 | End: 2024-03-03

## 2024-03-03 RX ORDER — SODIUM CHLORIDE 9 MG/ML
200 INJECTION, SOLUTION INTRAVENOUS CONTINUOUS
Status: DISPENSED | OUTPATIENT
Start: 2024-03-03 | End: 2024-03-03

## 2024-03-03 RX ORDER — METHADONE HYDROCHLORIDE 10 MG/ML
140 CONCENTRATE ORAL DAILY
Status: DISCONTINUED | OUTPATIENT
Start: 2024-03-03 | End: 2024-03-04

## 2024-03-03 RX ORDER — CLONIDINE HYDROCHLORIDE 0.1 MG/1
0.2 TABLET ORAL EVERY 8 HOURS PRN
Status: DISCONTINUED | OUTPATIENT
Start: 2024-03-03 | End: 2024-03-04

## 2024-03-03 RX ORDER — ONDANSETRON 2 MG/ML
4 INJECTION INTRAMUSCULAR; INTRAVENOUS EVERY 6 HOURS
Status: DISCONTINUED | OUTPATIENT
Start: 2024-03-03 | End: 2024-03-05

## 2024-03-03 RX ORDER — LORAZEPAM 2 MG/ML
2 INJECTION INTRAMUSCULAR EVERY 4 HOURS PRN
Status: DISCONTINUED | OUTPATIENT
Start: 2024-03-03 | End: 2024-03-05 | Stop reason: HOSPADM

## 2024-03-03 RX ORDER — HALOPERIDOL 5 MG/ML
5 INJECTION INTRAMUSCULAR ONCE
Status: DISCONTINUED | OUTPATIENT
Start: 2024-03-03 | End: 2024-03-03

## 2024-03-03 RX ORDER — SODIUM CHLORIDE 9 MG/ML
100 INJECTION, SOLUTION INTRAVENOUS CONTINUOUS
Status: DISCONTINUED | OUTPATIENT
Start: 2024-03-03 | End: 2024-03-05

## 2024-03-03 RX ADMIN — LORAZEPAM 2 MG: 2 INJECTION INTRAMUSCULAR; INTRAVENOUS at 21:22

## 2024-03-03 RX ADMIN — PRAZOSIN HYDROCHLORIDE 1 MG: 1 CAPSULE ORAL at 21:22

## 2024-03-03 RX ADMIN — ONDANSETRON 4 MG: 2 INJECTION INTRAMUSCULAR; INTRAVENOUS at 16:05

## 2024-03-03 RX ADMIN — DIAZEPAM 10 MG: 10 INJECTION, SOLUTION INTRAMUSCULAR; INTRAVENOUS at 08:02

## 2024-03-03 RX ADMIN — TRIMETHOBENZAMIDE HYDROCHLORIDE 200 MG: 100 INJECTION INTRAMUSCULAR at 18:07

## 2024-03-03 RX ADMIN — TRIMETHOBENZAMIDE HYDROCHLORIDE 200 MG: 100 INJECTION INTRAMUSCULAR at 08:02

## 2024-03-03 RX ADMIN — SODIUM CHLORIDE 200 ML/HR: 0.9 INJECTION, SOLUTION INTRAVENOUS at 11:09

## 2024-03-03 RX ADMIN — SODIUM CHLORIDE 125 ML/HR: 0.9 INJECTION, SOLUTION INTRAVENOUS at 23:30

## 2024-03-03 RX ADMIN — LORAZEPAM 2 MG: 2 INJECTION INTRAMUSCULAR; INTRAVENOUS at 00:22

## 2024-03-03 RX ADMIN — SODIUM CHLORIDE 125 ML/HR: 0.9 INJECTION, SOLUTION INTRAVENOUS at 16:05

## 2024-03-03 RX ADMIN — ONDANSETRON 4 MG: 2 INJECTION INTRAMUSCULAR; INTRAVENOUS at 11:08

## 2024-03-03 RX ADMIN — SODIUM CHLORIDE 125 ML/HR: 0.9 INJECTION, SOLUTION INTRAVENOUS at 08:09

## 2024-03-03 RX ADMIN — ONDANSETRON 4 MG: 2 INJECTION INTRAMUSCULAR; INTRAVENOUS at 00:22

## 2024-03-03 RX ADMIN — TRIMETHOBENZAMIDE HYDROCHLORIDE 200 MG: 100 INJECTION INTRAMUSCULAR at 02:43

## 2024-03-03 RX ADMIN — ONDANSETRON 4 MG: 2 INJECTION INTRAMUSCULAR; INTRAVENOUS at 22:23

## 2024-03-03 RX ADMIN — DIAZEPAM 10 MG: 10 INJECTION, SOLUTION INTRAMUSCULAR; INTRAVENOUS at 11:15

## 2024-03-03 RX ADMIN — METHADONE HYDROCHLORIDE 140 MG: 10 CONCENTRATE ORAL at 13:18

## 2024-03-03 RX ADMIN — PANTOPRAZOLE SODIUM 40 MG: 40 INJECTION, POWDER, FOR SOLUTION INTRAVENOUS at 11:09

## 2024-03-03 RX ADMIN — DIPHENHYDRAMINE HYDROCHLORIDE 25 MG: 50 INJECTION, SOLUTION INTRAMUSCULAR; INTRAVENOUS at 04:13

## 2024-03-03 RX ADMIN — LORAZEPAM 2 MG: 2 INJECTION INTRAMUSCULAR; INTRAVENOUS at 16:12

## 2024-03-03 RX ADMIN — SODIUM CHLORIDE 125 ML/HR: 0.9 INJECTION, SOLUTION INTRAVENOUS at 00:56

## 2024-03-03 RX ADMIN — DIPHENHYDRAMINE HYDROCHLORIDE 25 MG: 50 INJECTION, SOLUTION INTRAMUSCULAR; INTRAVENOUS at 13:16

## 2024-03-03 RX ADMIN — LORAZEPAM 2 MG: 2 INJECTION INTRAMUSCULAR; INTRAVENOUS at 04:26

## 2024-03-03 RX ADMIN — LORAZEPAM 2 MG: 2 INJECTION INTRAMUSCULAR; INTRAVENOUS at 10:11

## 2024-03-03 NOTE — QUICK NOTE
Progress Note - Triage Assessment   Elda Calderon 28 y.o. female MRN: [id number]    Time Called: 2134  Date Called: 03/02/24  Room#: ED 41  Time Evaluated: 2155  Person requesting evaluation: Dr. Ruddy Henderson    Called to ED to evaluate patient for possible admission to Critical Care Service, chart reviewed and patient evaluated.     Case discussed with Critical Care attending Dr. Juanito Smith and after review it was felt the patient is appropriate for admission to a general medical floor.    Recommendations discussed with ED attending Dr. REJI Henderson, Dr. JAXSON Stewart.    28 y.o. female with PMHx IVDU, depression, PTSD, BPD.  Home meds include methadone, gabapentin, prazosin, Seroquel, and Zoloft.  Now presenting with suspected serotonin syndrome. She presented to the ER today with intractable nausea/vomiting.  She reportedly had several episodes of nausea over the last several days, then today developed multiple episodes of NBNB.  On arrival to the ER she was noted to be tachycardic with stable blood pressure, temp 99 degrees, without respiratory distress.  Labs noted CK13 100, WBC 14, hemoglobin 11, creatinine appears to be at baseline, coma panel negative.  Urine pregnancy and UDS pending.  On ER exam patient found to have dilated pupils, diaphoresis, headache palpitations, urinary retention,  Hyperreflexia, tachycardia, hyperactive bowel sounds.  In the ER she received 3 L IV fluid, Valium IV 15 mg, Zofran.      On assessment by [facility] patient's tachycardia improved, BP remained stable, she continues to have tremors, though is no longer having clonus or hyperreflexia.  Muscle compartments soft, no respiratory distress.  Ultrasound revealed urinary retention of bladder, bladder scan revealed 450 mL retained.    Would recommend consult to toxicology for methadone management to prevent withdrawal patient is having vomiting, hold further serotonergic agents, continue aggressive IVF, follow CK, monitor UO, continue  benzodiazepines for suspected serotonin syndrome.  Vitals including temperature q4h.         Triage Assessment:     Patient appropriate to be admitted to Stepdown Level 2 level of care.    If any questions or concerns please contact the critical care team via [facility].

## 2024-03-03 NOTE — H&P
[facility]  H&P  Name: Elda Calderon 28 y.o. female I MRN: [id number]  Unit/Bed#: ED-41 I Date of Admission: 3/2/2024   Date of Service: 3/3/2024 I Hospital Day: 1      Assessment/Plan   * Serotonin syndrome  Assessment & Plan  Presents with one day history of progressive nausea and vomiting, headache and palpitations  Home medications include seroquel, zoloft, and methadone, patient reports compliance with medications, denies overdose or any further illicit substance intake  Doses of zoloft and seroquel were adjusted in December, no change in dose since then  Found to be tachycardic with hyperreflexia and muscle rigidity on exam. CK elevated >1000  Bladder ultrasound shows urinary retention  Blood pressure and temperature stable.  Not in respiratory distress.   Given ativan and valium in the emergency department with some improvement in symptoms  Reason for sudden reaction unclear given reported stability of serotonergic medication doses. History and physical still most consistent with serotonin syndrome and will treat as such    Plan:  Will admit to ICU step down 2 for observation and supportive care  Watch on telemetry  IV ativan prn for agitation  Zofran prn for nausea vomiting  Will recheck bladder scan, if patient still retaining will place armendariz  Toxicology consult requested for help in decision on whether or not safe to resume SSRI, recommendations are appreciated           Rhabdomyolysis  Assessment & Plan  In the setting of serotonin syndrome  CK elevated to 1362, AST elevated to 42  Without renal complications  Continue IV fluids, monitor urinary output and CMP             VTE Pharmacologic Prophylaxis: VTE Score: 0 Low Risk (Score 0-2) - Encourage Ambulation.  Code Status: Level 1 - Full Code   Discussion with family: Updated  (mother) at bedside.    Anticipated Length of Stay: Patient will be admitted on an observation basis with an anticipated length of stay of  less than 2 midnights secondary to severity of symtpoms.    Chief Complaint: Nausea and vomiting    History of Present Illness:  Elda Calderon is a 28 y.o. female with a PMH of polysubstance abuse, methadone use, bipolar depression who presents with nausea and vomiting for one day.  Patient is maintained on zoloft and seroquel.  She says earlier this morning she began feeling unwell with nausea that progressed to vomiting. Patient presented to the ED for this reason.     On arrival to the ED, patient noted to be tachcardic, tremulous, and diaphoretic.  She had hyperreflexia and bladder scan showed urinary retention.  Overall presentation was concerning for serotonin syndrome and patient was administered IV fluids, ativan and valium with some improvement in her symptoms.  Initial laboratory studies showed elevated AST and CK without evidence of renal injury.      Patient reports she has been compliant with her medications and has been taking them as prescribed but will sometimes miss a dose. She denies doubling up on her dose the following day to make up for it. She reports the dose of her zoloft and seroquel were increased but this was months ago. She denies overdose or intake of any other substance than what she is prescribed.  Mother is at bedside and when pulled aside separately she does not believe her daughter is taking her medications correctly.     Review of Systems:  Review of Systems   Constitutional:  Positive for diaphoresis. Negative for chills and fever.   HENT:  Negative for ear pain and sore throat.    Eyes:  Negative for pain and visual disturbance.   Respiratory:  Negative for cough and shortness of breath.    Cardiovascular:  Positive for palpitations. Negative for chest pain.   Gastrointestinal:  Positive for abdominal pain, nausea and vomiting.   Genitourinary:  Positive for difficulty urinating. Negative for hematuria.   Musculoskeletal:  Negative for arthralgias and back pain.   Skin:   Negative for color change and rash.   Neurological:  Positive for tremors. Negative for seizures and syncope.   Psychiatric/Behavioral:  Negative for confusion. The patient is nervous/anxious.    All other systems reviewed and are negative.      Past Medical and Surgical History:   Past Medical History:   Diagnosis Date    Anxiety     Borderline personality disorder (HCC)     Chlamydia     Depression     Endometriosis     IV drug user     IV heroin use, rehab 7 times     Panic attacks     Psychiatric disorder     anxiety    PTSD (post-traumatic stress disorder)     PTSD (post-traumatic stress disorder) 8/12/2023    Varicella     childhood    Visual impairment        Past Surgical History:   Procedure Laterality Date    LAPAROTOMY         Meds/Allergies:  Prior to Admission medications    Medication Sig Start Date End Date Taking? Authorizing Provider   gabapentin (NEURONTIN) 100 mg capsule Take 1 capsule (100 mg total) by mouth 2 (two) times a day 8/16/23 10/15/23  FRANK Dooley   gabapentin (NEURONTIN) 300 mg capsule Take 1 capsule (300 mg total) by mouth daily at bedtime 8/16/23 10/15/23  FRANK Dooley   methadone (DOLOPHINE) 10 MG/5ML solution Take 136 mg by mouth every morning Take by mouth daily    Historical Provider, MD   prazosin (MINIPRESS) 1 mg capsule Take 1 capsule (1 mg total) by mouth daily at bedtime 8/16/23 10/15/23  FRANK Dooley   QUEtiapine (SEROquel) 100 mg tablet Take 1 tablet (100 mg total) by mouth daily at bedtime 8/16/23 10/15/23  FRANK Dooley   sertraline (ZOLOFT) 100 mg tablet Take 1 tablet (100 mg total) by mouth daily Do not start before August 17, 2023. 8/17/23 10/16/23  FRANK Dooley     I have reviewed home medications with patient personally.    Allergies: No Known Allergies    Social History:  Marital Status: Single   Occupation:   Patient Pre-hospital Living Situation: Home  Patient Pre-hospital Level of Mobility:  "walks  Patient Pre-hospital Diet Restrictions:   Substance Use History:   Social History     Substance and Sexual Activity   Alcohol Use Not Currently     Social History     Tobacco Use   Smoking Status Never    Passive exposure: Never   Smokeless Tobacco Never     Social History     Substance and Sexual Activity   Drug Use Not Currently    Comment: 8 bags/day states benzos are mixed in       Family History:  Family History   Problem Relation Age of Onset    Depression Mother     Drug abuse Brother     Drug abuse Maternal Aunt     Drug abuse Paternal Uncle     Cancer Maternal Grandmother     Cancer Paternal Grandfather        Physical Exam:     Vitals:   Blood Pressure: 113/59 (03/02/24 2330)  Pulse: 87 (03/02/24 2330)  Temperature: 99.2 °F (37.3 °C) (03/02/24 1827)  Temp Source: Oral (03/02/24 1827)  Respirations: 18 (03/02/24 2330)  Height: 5' 4\" (162.6 cm) (03/02/24 1827)  Weight - Scale: 54.4 kg (120 lb) (03/02/24 1827)  SpO2: 99 % (03/02/24 2330)    Physical Exam  Constitutional:       General: She is not in acute distress.     Appearance: She is ill-appearing. She is not toxic-appearing.   HENT:      Head: Normocephalic and atraumatic.      Mouth/Throat:      Mouth: Mucous membranes are moist.      Pharynx: Oropharynx is clear.   Eyes:      Extraocular Movements: Extraocular movements intact.      Conjunctiva/sclera: Conjunctivae normal.      Pupils: Pupils are equal, round, and reactive to light.   Cardiovascular:      Rate and Rhythm: Regular rhythm. Tachycardia present.      Pulses: Normal pulses.      Heart sounds: Normal heart sounds. No murmur heard.     No gallop.   Pulmonary:      Effort: Pulmonary effort is normal. No respiratory distress.      Breath sounds: Normal breath sounds. No stridor. No wheezing, rhonchi or rales.   Chest:      Chest wall: No tenderness.   Abdominal:      General: Abdomen is flat. Bowel sounds are normal.      Palpations: Abdomen is soft.   Musculoskeletal:         General: " Normal range of motion.   Skin:     General: Skin is warm.      Coloration: Skin is not jaundiced or pale.      Findings: No rash.   Neurological:      General: No focal deficit present.      Mental Status: She is alert and oriented to person, place, and time. Mental status is at baseline.      Cranial Nerves: No cranial nerve deficit.      Sensory: No sensory deficit.      Motor: No weakness.   Psychiatric:         Mood and Affect: Mood normal.         Behavior: Behavior normal.          Additional Data:     Lab Results:  Results from last 7 days   Lab Units 03/02/24  1853   WBC Thousand/uL 14.32*   HEMOGLOBIN g/dL 11.2*   HEMATOCRIT % 34.9   PLATELETS Thousands/uL 443*   NEUTROS PCT % 73   LYMPHS PCT % 20   MONOS PCT % 5   EOS PCT % 1     Results from last 7 days   Lab Units 03/02/24  1853   SODIUM mmol/L 138   POTASSIUM mmol/L 3.7   CHLORIDE mmol/L 101   CO2 mmol/L 25   BUN mg/dL 6   CREATININE mg/dL 0.68   ANION GAP mmol/L 12   CALCIUM mg/dL 9.9   ALBUMIN g/dL 4.0   TOTAL BILIRUBIN mg/dL 0.30   ALK PHOS U/L 90   ALT U/L 15   AST U/L 42*   GLUCOSE RANDOM mg/dL 134                       Lines/Drains:  Invasive Devices       Peripheral Intravenous Line  Duration             Peripheral IV 03/02/24 Right;Upper Arm <1 day                        Imaging: No pertinent imaging reviewed.  No orders to display       EKG and Other Studies Reviewed on Admission:   EKG: Sinus Tachycardia. .    ** Please Note: This note has been constructed using a voice recognition system. **

## 2024-03-03 NOTE — UTILIZATION REVIEW
Initial Clinical Review    Admission: Date/Time/Statement:   Admission Orders (From admission, onward)       Ordered        03/02/24 2342  INPATIENT ADMISSION  Once                          Orders Placed This Encounter   Procedures    INPATIENT ADMISSION     Standing Status:   Standing     Number of Occurrences:   1     Order Specific Question:   Level of Care     Answer:   Level 2 Stepdown / HOT [14]     Order Specific Question:   Estimated length of stay     Answer:   More than 2 Midnights     Order Specific Question:   Certification     Answer:   I certify that inpatient services are medically necessary for this patient for a duration of greater than two midnights. See H&P and MD Progress Notes for additional information about the patient's course of treatment.     ED Arrival Information       Expected   -    Arrival   3/2/2024 18:20    Acuity   Emergent              Means of arrival   Walk-In    Escorted by   Family Member    Service   Hospitalist    Admission type   Emergency              Arrival complaint   vomiting and shaking             Chief Complaint   Patient presents with    Vomiting     Patient started vomiting today, shaking, dizzy.       Initial Presentation: 28 y.o. female with a PMH of polysubstance abuse, methadone use, bipolar depression who presents with nausea and vomiting for one day.  Patient is maintained on zoloft and seroquel. She says earlier this morning she began feeling unwell with nausea that progressed to vomiting. Patient presented to the ED for this reason. On arrival to the ED, patient noted to be tachcardic, tremulous, and diaphoretic. She had hyperreflexia and bladder scan showed urinary retention. Overall presentation was concerning for serotonin syndrome and patient was administered IV fluids, ativan and valium with some improvement in her symptoms. Initial laboratory studies showed elevated AST and CK without evidence of renal injury. Patient reports she has been compliant  with her medications and has been taking them as prescribed but will sometimes miss a dose. She denies doubling up on her dose the following day to make up for it. She reports the dose of her zoloft and seroquel were increased but this was months ago. She denies overdose or intake of any other substance than what she is prescribed. Mother is at bedside and when pulled aside separately she does not believe her daughter is taking her medications correctly. Plan: Inpatient admission for evaluation and treatment of serotonin syndrome, rhabdomyolysis: telemetry, Zofran prn, will recheck bladder scan, if patient still retaining will place armendariz, Toxicology consult, IV fluids.     Date: 3/3   Day 2:     Critical Care: Patient was initially admitted for concern for serotonin syndrome. However family states that she has not been taking her medications recently. Urine drug screen positive for methadone, opioids, benzodiazepines. Concern for possible opiate withdrawal. Seen by toxicology and patient discussed at length. Will treat for opioid withdrawal with IV hydration, as needed benzodiazepine, close monitoring of symptoms as needed antiemetics. Will resume methadone. Dosage confirmed by  with patient's methadone clinic. Continue to hold Seroquel and Zoloft. Psychiatry evaluation will be obtained.     ED Triage Vitals [03/02/24 1827]   Temperature Pulse Respirations Blood Pressure SpO2   99.2 °F (37.3 °C) (!) 125 22 134/74 98 %      Temp Source Heart Rate Source Patient Position - Orthostatic VS BP Location FiO2 (%)   Oral Monitor Lying Right arm --      Pain Score       No Pain          Wt Readings from Last 1 Encounters:   03/03/24 57.2 kg (126 lb 1.7 oz)     Additional Vital Signs:     Date/Time Temp Pulse Resp BP MAP (mmHg) SpO2 O2 Device   03/03/24 1400 -- 108 Abnormal  -- -- -- -- --   03/03/24 1321 -- 126 Abnormal  -- 112/76 88 98 % --   03/03/24 1100 -- 109 Abnormal  -- -- -- -- --   03/03/24 0701 99.5 °F  (37.5 °C) 96 20 110/60 79 -- None (Room air)   03/03/24 0615 -- 112 Abnormal  19 137/61 84 97 % --   03/03/24 0220 98.3 °F (36.8 °C) 93 20 128/71 95 99 % None (Room air)   03/03/24 0040 98 °F (36.7 °C) 75 18 117/58 81 97 % None (Room air)   03/03/24 0000 -- 84 18 108/56 77 99 % None (Room air)   03/02/24 2330 -- 87 18 113/59 81 99 % None (Room air)   03/02/24 2300 -- 92 18 118/68 88 100 % None (Room air)   03/02/24 2230 -- 93 20 116/59 80 100 % None (Room air)   03/02/24 2200 -- 102 -- 131/65 93 99 % --   03/02/24 2130 -- 103 -- 116/88 100 100 % --   03/02/24 2100 -- 107 Abnormal  -- 117/81 93 98 % --   03/02/24 2030 -- 82 -- 96/65 77 97 % --   03/02/24 2000 -- 100 20 124/76 92 98 % None (Room air)   03/02/24 1930 -- 82 22 129/66 90 99 % None (Room air)     Pertinent Labs/Diagnostic Test Results:     3/2 EKG:  Sinus tachycardia  Biatrial enlargement  Rightward axis  Abnormal ECG  When compared with ECG of 21-SEP-2023 13:03,  Vent. rate has increased BY  47 BPM  Questionable change in QRS axis  Nonspecific T wave abnormality, improved in Inferior leads  Nonspecific T wave abnormality has replaced inverted T waves in Anterior leads    Results from last 7 days   Lab Units 03/02/24 2227   SARS-COV-2  Negative     Results from last 7 days   Lab Units 03/02/24  1853   WBC Thousand/uL 14.32*   HEMOGLOBIN g/dL 11.2*   HEMATOCRIT % 34.9   PLATELETS Thousands/uL 443*   NEUTROS ABS Thousands/µL 10.57*         Results from last 7 days   Lab Units 03/03/24  0430 03/02/24  1853   SODIUM mmol/L 138 138   POTASSIUM mmol/L 4.5 3.7   CHLORIDE mmol/L 102 101   CO2 mmol/L 20* 25   ANION GAP mmol/L 16 12   BUN mg/dL 4* 6   CREATININE mg/dL 0.53* 0.68   EGFR ml/min/1.73sq m 130 120   CALCIUM mg/dL 8.4 9.9     Results from last 7 days   Lab Units 03/03/24  0430 03/02/24  1853   AST U/L 49* 42*   ALT U/L 15 15   ALK PHOS U/L 76 90   TOTAL PROTEIN g/dL 7.6 8.3   ALBUMIN g/dL 3.7 4.0   TOTAL BILIRUBIN mg/dL 0.28 0.30   BILIRUBIN DIRECT  mg/dL  --  0.00         Results from last 7 days   Lab Units 03/03/24  0430 03/02/24  1853   GLUCOSE RANDOM mg/dL 134 134       Results from last 7 days   Lab Units 03/03/24  0056 03/02/24  1853   CK TOTAL U/L 1,375* 1,362*     Results from last 7 days   Lab Units 03/02/24  2230 03/02/24  2100 03/02/24  1853   HS TNI 0HR ng/L  --   --  3   HS TNI 2HR ng/L  --  5  --    HSTNI D2 ng/L  --  2  --    HS TNI 4HR ng/L 7  --   --    HSTNI D4 ng/L 4  --   --              Results from last 7 days   Lab Units 03/02/24  1853   TSH 3RD GENERATON uIU/mL 1.119           Results from last 7 days   Lab Units 03/02/24  2226   CLARITY UA  Turbid   COLOR UA  Light Yellow   SPEC GRAV UA  1.016   PH UA  6.0   GLUCOSE UA mg/dl Negative   KETONES UA mg/dl 60 (2+)*   BLOOD UA  Large*   PROTEIN UA mg/dl Negative   NITRITE UA  Negative   BILIRUBIN UA  Negative   UROBILINOGEN UA (BE) mg/dl <2.0   LEUKOCYTES UA  Trace*   WBC UA /hpf 2-4*   RBC UA /hpf 1-2   BACTERIA UA /hpf Occasional   EPITHELIAL CELLS WET PREP /hpf Occasional   MUCUS THREADS  Occasional*     Results from last 7 days   Lab Units 03/02/24  2227   INFLUENZA A PCR  Negative   INFLUENZA B PCR  Negative   RSV PCR  Negative         Results from last 7 days   Lab Units 03/02/24  2226   AMPH/METH  Negative   BARBITURATE UR  Negative   BENZODIAZEPINE UR  Positive*   COCAINE UR  Negative   METHADONE URINE  Positive*   OPIATE UR  Positive*   PCP UR  Negative   THC UR  Negative     Results from last 7 days   Lab Units 03/02/24  1906   ETHANOL LVL mg/dL <10   ACETAMINOPHEN LVL ug/mL <2*   SALICYLATE LVL mg/dL <5         ED Treatment:   Medication Administration from 03/02/2024 1820 to 03/03/2024 0040         Date/Time Order Dose Route Action     03/02/2024 1912 EST sodium chloride 0.9 % bolus 1,000 mL 1,000 mL Intravenous New Bag     03/02/2024 1910 EST LORazepam (ATIVAN) injection 2 mg 2 mg Intravenous Given     03/02/2024 1917 EST ondansetron (ZOFRAN) injection 4 mg 4 mg Intravenous  Given     03/02/2024 2015 EST diazepam (VALIUM) injection 10 mg 10 mg Intravenous Given     03/02/2024 2015 EST multi-electrolyte (ISOLYTE-S PH 7.4 equivalent) IV solution 1,000 mL 1,000 mL Intravenous New Bag     03/02/2024 2103 EST ondansetron (ZOFRAN) injection 4 mg 4 mg Intravenous Given     03/02/2024 2109 EST multi-electrolyte (ISOLYTE-S PH 7.4 equivalent) IV solution 1,000 mL 1,000 mL Intravenous New Bag     03/02/2024 2119 EST diazepam (VALIUM) injection 5 mg 5 mg Intravenous Given     03/03/2024 0022 EST LORazepam (ATIVAN) injection 2 mg 2 mg Intravenous Given     03/03/2024 0022 EST ondansetron (ZOFRAN) injection 4 mg 4 mg Intravenous Given          Past Medical History:   Diagnosis Date    Anxiety     Borderline personality disorder (HCC)     Chlamydia     Depression     Endometriosis     IV drug user     IV heroin use, rehab 7 times     Panic attacks     Psychiatric disorder     anxiety    PTSD (post-traumatic stress disorder)     PTSD (post-traumatic stress disorder) 8/12/2023    Varicella     childhood    Visual impairment      Present on Admission:  **None**      Admitting Diagnosis: Rhabdomyolysis [M62.82]  Vomiting [R11.10]  Serotonin syndrome [G25.79]  Polysubstance abuse (HCC) [F19.10]  Intractable vomiting [R11.10]  Opioid use disorder, severe, dependence (HCC) [F11.20]  Age/Sex: 28 y.o. female  Admission Orders:  Scheduled Medications:  methadone, 140 mg, Oral, Daily  ondansetron, 4 mg, Intravenous, Q6H  pantoprazole, 40 mg, Intravenous, Q24H JERI  prazosin, 1 mg, Oral, Daily      Continuous IV Infusions:  sodium chloride, 200 mL/hr, Intravenous, Continuous  sodium chloride, 125 mL/hr, Intravenous, Continuous      PRN Meds:  cloNIDine, 0.2 mg, Oral, Q8H PRN  diphenhydrAMINE, 25 mg, Intravenous, Q6H PRN  LORazepam, 2 mg, Intravenous, Q4H PRN  trimethobenzamide, 200 mg, Intramuscular, Q6H PRN        IP CONSULT TO TOXICOLOGY  IP CONSULT TO PSYCHIATRY    Network Utilization Review  Department  ATTENTION: Please call with any questions or concerns to [phone] and carefully listen to the prompts so that you are directed to the right person. All voicemails are confidential.   For Discharge needs, contact Care Management DC Support Team at [phone] opt. [phone]  Send all requests for admission clinical reviews, approved or denied determinations and any other requests to dedicated fax number below belonging to the campus where the patient is receiving treatment. List of dedicated fax numbers for the Facilities:  FACILITY NAME UR FAX NUMBER   ADMISSION DENIALS (Administrative/Medical Necessity) [phone]   DISCHARGE SUPPORT TEAM (NETWORK) [phone]   PARENT CHILD HEALTH (Maternity/NICU/Pediatrics) [phone]   [facility] [phone]   [facility] [phone]   [facility] [phone]   [facility] [phone]   [facility] [phone]   [facility] [phone]   [facility] [phone]   [facility] [phone]   [facility] [phone]   [facility] [phone]   [facility] [phone]   [facility] [phone]

## 2024-03-03 NOTE — PLAN OF CARE
Problem: PAIN - ADULT  Goal: Verbalizes/displays adequate comfort level or baseline comfort level  Description: Interventions:  - Encourage patient to monitor pain and request assistance  - Assess pain using appropriate pain scale  - Administer analgesics based on type and severity of pain and evaluate response  - Implement non-pharmacological measures as appropriate and evaluate response  - Consider cultural and social influences on pain and pain management  - Notify physician/advanced practitioner if interventions unsuccessful or patient reports new pain  Outcome: Progressing     Problem: INFECTION - ADULT  Goal: Absence or prevention of progression during hospitalization  Description: INTERVENTIONS:  - Assess and monitor for signs and symptoms of infection  - Monitor lab/diagnostic results  - Monitor all insertion sites, i.e. indwelling lines, tubes, and drains  - Monitor endotracheal if appropriate and nasal secretions for changes in amount and color  - [place] appropriate cooling/warming therapies per order  - Administer medications as ordered  - Instruct and encourage patient and family to use good hand hygiene technique  - Identify and instruct in appropriate isolation precautions for identified infection/condition  Outcome: Progressing  Goal: Absence of fever/infection during neutropenic period  Description: INTERVENTIONS:  - Monitor WBC    Outcome: Progressing     Problem: SAFETY ADULT  Goal: Patient will remain free of falls  Description: INTERVENTIONS:  - Educate patient/family on patient safety including physical limitations  - Instruct patient to call for assistance with activity   - Consult OT/PT to assist with strengthening/mobility   - Keep Call bell within reach  - Keep bed low and locked with side rails adjusted as appropriate  - Keep care items and personal belongings within reach  - Initiate and maintain comfort rounds  - Make Fall Risk Sign visible to staff  - Offer Toileting every 2 Hours,  in advance of need  - Initiate/Maintain bed alarm  - Obtain necessary fall risk management equipment  - Apply yellow socks and bracelet for high fall risk patients  - Consider moving patient to room near nurses station  Outcome: Progressing  Goal: Maintain or return to baseline ADL function  Description: INTERVENTIONS:  -  Assess patient's ability to carry out ADLs; assess patient's baseline for ADL function and identify physical deficits which impact ability to perform ADLs (bathing, care of mouth/teeth, toileting, grooming, dressing, etc.)  - Assess/evaluate cause of self-care deficits   - Assess range of motion  - Assess patient's mobility; develop plan if impaired  - Assess patient's need for assistive devices and provide as appropriate  - Encourage maximum independence but intervene and supervise when necessary  - Involve family in performance of ADLs  - Assess for home care needs following discharge   - Consider OT consult to assist with ADL evaluation and planning for discharge  - Provide patient education as appropriate  Outcome: Progressing  Goal: Maintains/Returns to pre admission functional level  Description: INTERVENTIONS:  - Perform AM-PAC 6 Click Basic Mobility/ Daily Activity assessment daily.  - Set and communicate daily mobility goal to care team and patient/family/caregiver.   - Collaborate with rehabilitation services on mobility goals if consulted  - Perform Range of Motion 3 times a day.  - Reposition patient every 2 hours.  - Dangle patient 3 times a day  - Stand patient 3 times a day  - Ambulate patient 3 times a day  - Out of bed to chair 3 times a day   - Out of bed for meals 3 times a day  - Out of bed for toileting  - Record patient progress and toleration of activity level   Outcome: Progressing     Problem: DISCHARGE PLANNING  Goal: Discharge to home or other facility with appropriate resources  Description: INTERVENTIONS:  - Identify barriers to discharge w/patient and caregiver  -  Arrange for needed discharge resources and transportation as appropriate  - Identify discharge learning needs (meds, wound care, etc.)  - Arrange for interpretive services to assist at discharge as needed  - Refer to Case Management Department for coordinating discharge planning if the patient needs post-hospital services based on physician/advanced practitioner order or complex needs related to functional status, cognitive ability, or social support system  Outcome: Progressing     Problem: Knowledge Deficit  Goal: Patient/family/caregiver demonstrates understanding of disease process, treatment plan, medications, and discharge instructions  Description: Complete learning assessment and assess knowledge base.  Interventions:  - Provide teaching at level of understanding  - Provide teaching via preferred learning methods  Outcome: Progressing

## 2024-03-03 NOTE — ASSESSMENT & PLAN NOTE
Presents with one day history of progressive nausea and vomiting, headache and palpitations  Home medications include seroquel, zoloft, and methadone, patient reports compliance with medications, denies overdose or any further illicit substance intake  Doses of zoloft and seroquel were adjusted in December, no change in dose since then  Found to be tachycardic with hyperreflexia and muscle rigidity on exam. CK elevated >1000  Bladder ultrasound shows urinary retention  Blood pressure and temperature stable.  Not in respiratory distress.   Given ativan and valium in the emergency department with some improvement in symptoms  Reason for sudden reaction unclear given reported stability of serotonergic medication doses. History and physical still most consistent with serotonin syndrome and will treat as such    Plan:  Will admit to ICU step down 2 for observation and supportive care  Watch on telemetry  IV ativan prn for agitation  Zofran prn for nausea vomiting  Will recheck bladder scan, if patient still retaining will place armendariz  Toxicology consult requested for help in decision on whether or not safe to resume SSRI, recommendations are appreciated

## 2024-03-03 NOTE — ASSESSMENT & PLAN NOTE
In the setting of serotonin syndrome  CK elevated to 1362, AST elevated to 42  Without renal complications  Continue IV fluids, monitor urinary output and CMP

## 2024-03-04 ENCOUNTER — APPOINTMENT (INPATIENT)
Dept: RADIOLOGY | Facility: HOSPITAL | Age: 29
DRG: 773 | End: 2024-03-04
Payer: COMMERCIAL

## 2024-03-04 PROBLEM — F11.93 ACUTE OPIOID WITHDRAWAL (HCC): Status: ACTIVE | Noted: 2024-03-02

## 2024-03-04 PROBLEM — R65.10 SIRS (SYSTEMIC INFLAMMATORY RESPONSE SYNDROME) (HCC): Status: ACTIVE | Noted: 2024-03-04

## 2024-03-04 LAB
ANION GAP SERPL CALCULATED.3IONS-SCNC: 9 MMOL/L
BASOPHILS # BLD AUTO: 0.03 THOUSANDS/ÂΜL (ref 0–0.1)
BASOPHILS NFR BLD AUTO: 0 % (ref 0–1)
BUN SERPL-MCNC: 3 MG/DL (ref 5–25)
CALCIUM SERPL-MCNC: 8.3 MG/DL (ref 8.4–10.2)
CHLORIDE SERPL-SCNC: 107 MMOL/L (ref 96–108)
CK SERPL-CCNC: 949 U/L (ref 26–192)
CO2 SERPL-SCNC: 23 MMOL/L (ref 21–32)
CREAT SERPL-MCNC: 0.49 MG/DL (ref 0.6–1.3)
EOSINOPHIL # BLD AUTO: 0.01 THOUSAND/ÂΜL (ref 0–0.61)
EOSINOPHIL NFR BLD AUTO: 0 % (ref 0–6)
ERYTHROCYTE [DISTWIDTH] IN BLOOD BY AUTOMATED COUNT: 13.5 % (ref 11.6–15.1)
GFR SERPL CREATININE-BSD FRML MDRD: 133 ML/MIN/1.73SQ M
GLUCOSE SERPL-MCNC: 96 MG/DL (ref 65–140)
HCT VFR BLD AUTO: 27.6 % (ref 34.8–46.1)
HGB BLD-MCNC: 9 G/DL (ref 11.5–15.4)
HGB BLD-MCNC: 9.4 G/DL (ref 11.5–15.4)
IMM GRANULOCYTES # BLD AUTO: 0.08 THOUSAND/UL (ref 0–0.2)
IMM GRANULOCYTES NFR BLD AUTO: 1 % (ref 0–2)
LYMPHOCYTES # BLD AUTO: 1.65 THOUSANDS/ÂΜL (ref 0.6–4.47)
LYMPHOCYTES NFR BLD AUTO: 12 % (ref 14–44)
MCH RBC QN AUTO: 29.4 PG (ref 26.8–34.3)
MCHC RBC AUTO-ENTMCNC: 32.6 G/DL (ref 31.4–37.4)
MCV RBC AUTO: 90 FL (ref 82–98)
MONOCYTES # BLD AUTO: 1.07 THOUSAND/ÂΜL (ref 0.17–1.22)
MONOCYTES NFR BLD AUTO: 8 % (ref 4–12)
NEUTROPHILS # BLD AUTO: 10.98 THOUSANDS/ÂΜL (ref 1.85–7.62)
NEUTS SEG NFR BLD AUTO: 79 % (ref 43–75)
NRBC BLD AUTO-RTO: 0 /100 WBCS
PLATELET # BLD AUTO: 323 THOUSANDS/UL (ref 149–390)
PMV BLD AUTO: 8.9 FL (ref 8.9–12.7)
POTASSIUM SERPL-SCNC: 3.6 MMOL/L (ref 3.5–5.3)
RBC # BLD AUTO: 3.06 MILLION/UL (ref 3.81–5.12)
SODIUM SERPL-SCNC: 139 MMOL/L (ref 135–147)
WBC # BLD AUTO: 13.82 THOUSAND/UL (ref 4.31–10.16)

## 2024-03-04 PROCEDURE — 71045 X-RAY EXAM CHEST 1 VIEW: CPT

## 2024-03-04 PROCEDURE — 99232 SBSQ HOSP IP/OBS MODERATE 35: CPT | Performed by: INTERNAL MEDICINE

## 2024-03-04 PROCEDURE — 85025 COMPLETE CBC W/AUTO DIFF WBC: CPT

## 2024-03-04 PROCEDURE — 82550 ASSAY OF CK (CPK): CPT

## 2024-03-04 PROCEDURE — 80048 BASIC METABOLIC PNL TOTAL CA: CPT

## 2024-03-04 PROCEDURE — 85018 HEMOGLOBIN: CPT

## 2024-03-04 PROCEDURE — 93005 ELECTROCARDIOGRAM TRACING: CPT

## 2024-03-04 PROCEDURE — C9113 INJ PANTOPRAZOLE SODIUM, VIA: HCPCS

## 2024-03-04 RX ORDER — LANOLIN ALCOHOL/MO/W.PET/CERES
3 CREAM (GRAM) TOPICAL
Status: DISCONTINUED | OUTPATIENT
Start: 2024-03-04 | End: 2024-03-05 | Stop reason: HOSPADM

## 2024-03-04 RX ORDER — TRAZODONE HYDROCHLORIDE 50 MG/1
50 TABLET ORAL
Status: DISCONTINUED | OUTPATIENT
Start: 2024-03-04 | End: 2024-03-05 | Stop reason: HOSPADM

## 2024-03-04 RX ORDER — CLONIDINE HYDROCHLORIDE 0.1 MG/1
0.1 TABLET ORAL EVERY 6 HOURS PRN
Status: DISCONTINUED | OUTPATIENT
Start: 2024-03-04 | End: 2024-03-05 | Stop reason: HOSPADM

## 2024-03-04 RX ORDER — GABAPENTIN 100 MG/1
100 CAPSULE ORAL 3 TIMES DAILY
Status: DISCONTINUED | OUTPATIENT
Start: 2024-03-04 | End: 2024-03-05 | Stop reason: HOSPADM

## 2024-03-04 RX ORDER — SERTRALINE HYDROCHLORIDE 25 MG/1
25 TABLET, FILM COATED ORAL DAILY
Status: DISCONTINUED | OUTPATIENT
Start: 2024-03-04 | End: 2024-03-05 | Stop reason: HOSPADM

## 2024-03-04 RX ORDER — QUETIAPINE FUMARATE 25 MG/1
25 TABLET, FILM COATED ORAL
Status: DISCONTINUED | OUTPATIENT
Start: 2024-03-04 | End: 2024-03-05 | Stop reason: HOSPADM

## 2024-03-04 RX ORDER — LOPERAMIDE HYDROCHLORIDE 2 MG/1
4 CAPSULE ORAL EVERY 6 HOURS PRN
Status: DISCONTINUED | OUTPATIENT
Start: 2024-03-04 | End: 2024-03-05 | Stop reason: HOSPADM

## 2024-03-04 RX ORDER — METHADONE HYDROCHLORIDE 10 MG/ML
140 CONCENTRATE ORAL DAILY
Status: DISCONTINUED | OUTPATIENT
Start: 2024-03-04 | End: 2024-03-05 | Stop reason: HOSPADM

## 2024-03-04 RX ADMIN — PRAZOSIN HYDROCHLORIDE 1 MG: 1 CAPSULE ORAL at 22:57

## 2024-03-04 RX ADMIN — SODIUM CHLORIDE 125 ML/HR: 0.9 INJECTION, SOLUTION INTRAVENOUS at 09:27

## 2024-03-04 RX ADMIN — GABAPENTIN 100 MG: 100 CAPSULE ORAL at 17:20

## 2024-03-04 RX ADMIN — ONDANSETRON 4 MG: 2 INJECTION INTRAMUSCULAR; INTRAVENOUS at 22:58

## 2024-03-04 RX ADMIN — ONDANSETRON 4 MG: 2 INJECTION INTRAMUSCULAR; INTRAVENOUS at 04:24

## 2024-03-04 RX ADMIN — METHADONE HYDROCHLORIDE 140 MG: 10 CONCENTRATE ORAL at 09:03

## 2024-03-04 RX ADMIN — SODIUM CHLORIDE 100 ML/HR: 0.9 INJECTION, SOLUTION INTRAVENOUS at 22:26

## 2024-03-04 RX ADMIN — LORAZEPAM 2 MG: 2 INJECTION INTRAMUSCULAR; INTRAVENOUS at 02:24

## 2024-03-04 RX ADMIN — SERTRALINE 25 MG: 25 TABLET, FILM COATED ORAL at 14:09

## 2024-03-04 RX ADMIN — ONDANSETRON 4 MG: 2 INJECTION INTRAMUSCULAR; INTRAVENOUS at 17:20

## 2024-03-04 RX ADMIN — GABAPENTIN 100 MG: 100 CAPSULE ORAL at 22:57

## 2024-03-04 RX ADMIN — LORAZEPAM 2 MG: 2 INJECTION INTRAMUSCULAR; INTRAVENOUS at 23:06

## 2024-03-04 RX ADMIN — LORAZEPAM 2 MG: 2 INJECTION INTRAMUSCULAR; INTRAVENOUS at 06:18

## 2024-03-04 RX ADMIN — LORAZEPAM 2 MG: 2 INJECTION INTRAMUSCULAR; INTRAVENOUS at 11:11

## 2024-03-04 RX ADMIN — ONDANSETRON 4 MG: 2 INJECTION INTRAMUSCULAR; INTRAVENOUS at 11:11

## 2024-03-04 RX ADMIN — PANTOPRAZOLE SODIUM 40 MG: 40 INJECTION, POWDER, FOR SOLUTION INTRAVENOUS at 08:11

## 2024-03-04 RX ADMIN — DIPHENHYDRAMINE HYDROCHLORIDE 25 MG: 50 INJECTION, SOLUTION INTRAMUSCULAR; INTRAVENOUS at 02:24

## 2024-03-04 RX ADMIN — QUETIAPINE FUMARATE 25 MG: 25 TABLET ORAL at 22:58

## 2024-03-04 NOTE — ASSESSMENT & PLAN NOTE
Presents with one day history of progressive nausea and vomiting, headache and palpitations  Home medications include seroquel, zoloft, and methadone, patient reports compliance with medications, denies overdose or any further illicit substance intake  Doses of zoloft and seroquel were adjusted in December, no change in dose since then  Found to be tachycardic with hyperreflexia and muscle rigidity on exam. CK elevated >1000  Bladder ultrasound shows urinary retention  Blood pressure and temperature stable.  Not in respiratory distress.   Given ativan and valium in the emergency department with some improvement in symptoms  Reason for sudden reaction unclear given reported stability of serotonergic medication doses. History and physical still most consistent with serotonin syndrome and will treat as such    Plan:  Continue methadone 140 mg daily  Zofran 4 mg scheduled every 6 hours  Toxicology consult -as needed trazodone, Ativan, loperamide, clonidine  Psychiatry consulted-pending recommendations resume Seroquel and Zoloft at a lower dose  Watch on telemetry

## 2024-03-04 NOTE — CONSULTS
Consultation - Medical Toxicology  Elda Calderon 28 y.o. female MRN: [id number]  Unit/Bed#: ICU 05 Encounter: [id number]    Reason for Consult / Principal Problem:   Sympathomimetic toxidrome    Inpatient consult to Toxicology  Consult performed by: Polina Stephens MD  Consult ordered by: Devendra Stone MD        03/03/24    ASSESSMENT:  Opioid withdrawal  Anion gap acidemia  Elevated AST  Rhabdomyolysis  Ketonuria  UDS (+) benzodiazepines, opiates, methadone    RECOMMENDATIONS:    Patient's clinical presentation on my evaluation is consistent with opioid withdrawal over serotonin syndrome.    I have verified her methadone dosing with her clinic: 140 mg daily.    Continue benzodiazepines I.e. 5-10 mg IV diazepam for agitation, seizures, SBP > 180, T > 103F, HR > 150.    Adjunctive medications administered as needed:  Clonidine 0.1 mg PO Q6 hours PRN anxiety or palpitations    Gabapentin 300mg PO Q8 hours PRN anxiety    Diazepam 5 mg p.o. or IV q8 PRN refractory anxiety  Ibuprofen 600 mg PO Q6 hours PRN pain    Acetaminophen 1000mg PO Q8 hours PRN pain    Ondansetron 4 mg PO Q6 hours PRN N/V    Reglan 5-10 mg IV q8 p.r.n. refractory nausea vomiting  Nicotine patch 7, 14, 21 mg  PRN nicotine withdrawal   Trazodone 50 mg PO QHS PRN sleep    Loperamide 4 mg PO PRN diarrhea up to 16 mg/day          For further questions, please call [facility]  Service or Patient Access Center to reach the medical  on call.   Please see additional teaching note below (if available)    Hx and PE limited by: none    HPI: Elda Calderon is a 28 y.o. year old female who was admitted overnight for nausea, vomiting, anxiety with findings suspicious for serotonergic toxicity.  PMH significant for opioid use disorder on methadone, BPD, anxiety, PTSD on quetiapine and sertraline. She presented to the ED for intractable nausea, vomiting, and subsequent development of HA, shakiness, diaphoresis, difficulty urinating.  She denied  overdose though has been somewhat inconsistent in taking her medications -- sometimes missing doses, possibly doubling dose.  Tachycardic and tacypneic on arrival.  (+) clonus, tremulous, nystagmus, sweaty, acitve bowel sounds, hyperrflexia per ED provider.  Received 40 mg IV diazepam, IV fluids, antiemetics during evaluation and admission.  This morning, patient reports body aches, nausea, abdominal cramping.    Review of Systems   Constitutional:  Positive for chills. Negative for fever.   Respiratory:  Negative for shortness of breath.    Cardiovascular:  Negative for chest pain.   Gastrointestinal:  Positive for abdominal pain, nausea and vomiting. Negative for abdominal distention.   Skin:  Negative for color change.   Neurological:  Positive for tremors. Negative for dizziness.   Psychiatric/Behavioral:  The patient is nervous/anxious.      Historical Information   Past Medical History:   Diagnosis Date    Anxiety     Borderline personality disorder (HCC)     Chlamydia     Depression     Endometriosis     IV drug user     IV heroin use, rehab 7 times     Panic attacks     Psychiatric disorder     anxiety    PTSD (post-traumatic stress disorder)     PTSD (post-traumatic stress disorder) 8/12/2023    Varicella     childhood    Visual impairment      Past Surgical History:   Procedure Laterality Date    LAPAROTOMY       Social History   Social History     Substance and Sexual Activity   Alcohol Use Not Currently     Social History     Substance and Sexual Activity   Drug Use Not Currently    Comment: 8 bags/day states benzos are mixed in     Social History     Tobacco Use   Smoking Status Never    Passive exposure: Never   Smokeless Tobacco Never     Family History   Problem Relation Age of Onset    Depression Mother     Drug abuse Brother     Drug abuse Maternal Aunt     Drug abuse Paternal Uncle     Cancer Maternal Grandmother     Cancer Paternal Grandfather      Prior to Admission medications    Medication Sig  Start Date End Date Taking? Authorizing Provider   gabapentin (NEURONTIN) 100 mg capsule Take 1 capsule (100 mg total) by mouth 2 (two) times a day 8/16/23 10/15/23  FRANK Dooley   gabapentin (NEURONTIN) 300 mg capsule Take 1 capsule (300 mg total) by mouth daily at bedtime 8/16/23 10/15/23  FRANK Dooley   methadone (DOLOPHINE) 10 MG/5ML solution Take 136 mg by mouth every morning Take by mouth daily    Historical Provider, MD   prazosin (MINIPRESS) 1 mg capsule Take 1 capsule (1 mg total) by mouth daily at bedtime 8/16/23 10/15/23  FRANK Dooley   QUEtiapine (SEROquel) 100 mg tablet Take 1 tablet (100 mg total) by mouth daily at bedtime 8/16/23 10/15/23  FRANK Dooley   sertraline (ZOLOFT) 100 mg tablet Take 1 tablet (100 mg total) by mouth daily Do not start before August 17, 2023. 8/17/23 10/16/23  FRANK Dooley     Current Facility-Administered Medications   Medication Dose Route Frequency    cloNIDine (CATAPRES) tablet 0.2 mg  0.2 mg Oral Q8H PRN    diphenhydrAMINE (BENADRYL) injection 25 mg  25 mg Intravenous Q6H PRN    LORazepam (ATIVAN) injection 2 mg  2 mg Intravenous Q4H PRN    methadone (DOLOPHINE) oral concentrated solution 140 mg  140 mg Oral Daily    ondansetron (ZOFRAN) injection 4 mg  4 mg Intravenous Q6H    pantoprazole (PROTONIX) injection 40 mg  40 mg Intravenous Q24H [facility]    prazosin (MINIPRESS) capsule 1 mg  1 mg Oral Daily    sodium chloride 0.9 % infusion  125 mL/hr Intravenous Continuous    trimethobenzamide (TIGAN) IM injection 200 mg  200 mg Intramuscular Q6H PRN     No Known Allergies  Objective     Intake/Output Summary (Last 24 hours) at 3/3/2024 1937  Last data filed at 3/3/2024 1600  Gross per 24 hour   Intake 4259.58 ml   Output 1225 ml   Net 3034.58 ml       Invasive Devices:   Peripheral IV 03/02/24 Right;Upper Arm (Active)   Site Assessment WDL 03/03/24 0900   Dressing Type Transparent 03/03/24 0900   Line Status  Flushed;Infusing 03/03/24 0900   Dressing Status Clean;Dry;Intact 03/03/24 0900   Dressing Change Due 03/06/24 03/03/24 0900   Reason Not Rotated Not due 03/03/24 0100       Vitals   Vitals:    03/03/24 1321 03/03/24 1400 03/03/24 1600 03/03/24 1827   BP: 112/76   109/59   TempSrc:       Pulse: (!) 126 (!) 108 (!) 109 99   Resp:    22   Patient Position - Orthostatic VS:       Temp:           Physical Exam  Vitals and nursing note reviewed. Exam conducted with a chaperone present.   Constitutional:       General: She is in acute distress (uncomfortable, anxious, cooperative).      Appearance: She is ill-appearing (toxidromic).   HENT:      Head: Normocephalic.      Right Ear: External ear normal.      Left Ear: External ear normal.      Nose: Rhinorrhea present.      Mouth/Throat:      Mouth: Mucous membranes are moist.   Eyes:      General:         Right eye: No discharge.         Left eye: No discharge.      Extraocular Movements: Extraocular movements intact.      Conjunctiva/sclera: Conjunctivae normal.      Pupils: Pupils are equal, round, and reactive to light.      Comments: Pupils 5-6 mm, reactive   Cardiovascular:      Rate and Rhythm: Tachycardia present.   Pulmonary:      Effort: Pulmonary effort is normal. No respiratory distress.      Breath sounds: Normal breath sounds. No stridor. No wheezing, rhonchi or rales.   Chest:      Chest wall: No tenderness.   Abdominal:      General: Abdomen is flat. Bowel sounds are normal. There is no distension.      Palpations: Abdomen is soft. There is no mass.      Tenderness: There is no abdominal tenderness. There is no guarding or rebound.      Hernia: No hernia is present.   Musculoskeletal:         General: No swelling or tenderness. Normal range of motion.      Cervical back: Normal range of motion and neck supple.      Right lower leg: No edema.      Left lower leg: No edema.   Skin:     General: Skin is warm.      Capillary Refill: Capillary refill takes less  "than 2 seconds.      Comments: Sweaty, goosebumps   Neurological:      Mental Status: She is alert and oriented to person, place, and time.      GCS: GCS eye subscore is 4. GCS verbal subscore is 5. GCS motor subscore is 6.      Motor: No weakness or tremor.      Deep Tendon Reflexes: Reflexes are normal and symmetric.      Comments: No clonus, no rigidity   Psychiatric:         Mood and Affect: Mood is anxious. Affect is tearful.         Behavior: Behavior is hyperactive.           EKG, Pathology, and Other Studies: I have personally reviewed pertinent reports.    Sinus tachycardia 121, QRS 80, Qtc 454, no FARIBA or STD, no terminal R, no ectopy.  Overall impression: no toxicologic finingis    Lab Results: I have personally reviewed pertinent reports.      Labs:  Results from last 7 days   Lab Units 03/02/24  1853   WBC Thousand/uL 14.32*   HEMOGLOBIN g/dL 11.2*   HEMATOCRIT % 34.9   PLATELETS Thousands/uL 443*   NEUTROS PCT % 73   LYMPHS PCT % 20   MONOS PCT % 5   EOS PCT % 1      Results from last 7 days   Lab Units 03/03/24  0430   SODIUM mmol/L 138   POTASSIUM mmol/L 4.5   CHLORIDE mmol/L 102   CO2 mmol/L 20*   BUN mg/dL 4*   CREATININE mg/dL 0.53*   CALCIUM mg/dL 8.4   ALK PHOS U/L 76   ALT U/L 15   AST U/L 49*              No results found for: \"TROPONINI\"      Results from last 7 days   Lab Units 03/02/24  1906   ACETAMINOPHEN LVL ug/mL <2*   ETHANOL LVL mg/dL <10   SALICYLATE LVL mg/dL <5     Invalid input(s): \"EXTPREGUR\"    Imaging Studies: I have personally reviewed pertinent reports.      Counseling / Coordination of Care  Total floor / unit time spent today 45 minutes. Greater than 50% of total time was spent with the patient and / or family counseling and / or coordination of care.     "

## 2024-03-04 NOTE — CASE MANAGEMENT
Case Management Assessment & Discharge Planning Note    Patient name Elda VEGA /S -01 MRN [id number]  : 1995 Date 3/4/2024       Current Admission Date: 3/2/2024  Current Admission Diagnosis:Acute opioid withdrawal (HCC)   Patient Active Problem List    Diagnosis Date Noted    SIRS (systemic inflammatory response syndrome) (HCC) 2024    Rhabdomyolysis 2024    Acute opioid withdrawal (HCC) 2024    Polysubstance abuse (HCC) 2023    BMI less than 19,adult 2023    Severe episode of recurrent major depressive disorder, 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    BESSY (generalized anxiety disorder) 2023    PTSD (post-traumatic stress disorder) 2023    Depression 2023    Mild protein-calorie malnutrition (HCC) 2023    Transaminitis 2023    Bilateral calf pain 2023    Confusion 2023    Opioid use disorder, severe, dependence (HCC) 2023    Cellulitis of right upper extremity 2023    Prolonged Q-T interval on ECG 2023    Leukocytosis 2023    Spontaneous vaginal delivery 2017    40 weeks gestation of pregnancy 2017      LOS (days): 2  Geometric Mean LOS (GMLOS) (days):   Days to GMLOS:     OBJECTIVE:    Risk of Unplanned Readmission Score: 23.58         Current admission status: Inpatient       Preferred Pharmacy:   [facility]/pharmacy #[id number] - [place], [place] - [address]  [address] [place] [address]  Phone: [phone] Fax: [phone]    Primary Care Provider: Mike Tillman DO    Primary Insurance: [employer]  Secondary Insurance:     ASSESSMENT:  Active Health Care Proxies    There are no active Health Care Proxies on file.                      Patient Information  Mental Status: Alert  During Assessment patient was accompanied by: Parent  Assessment information provided by:: Patient  Primary Caregiver: Self  Support Systems: Self  County of Residence:  [place]  What city do you live in?: [place]  Home entry access options. Select all that apply.: No steps to enter home, Stairs  Number of steps to enter home.: 4  Do the steps have railings?: Yes  Type of Current Residence: Apartment  Floor Level: 1  Upon entering residence, is there a bedroom on the main floor (no further steps)?: Yes  Upon entering residence, is there a bathroom on the main floor (no further steps)?: Yes  Is patient a ?: No    Activities of Daily Living Prior to Admission  Functional Status: Independent  Ambulates independently?: Yes  Does patient use assisted devices?: No  Does patient currently own DME?: No  Does patient have a history of Outpatient Therapy (PT/OT)?: No  Does the patient have a history of Short-Term Rehab?: No  Does patient have a history of HHC?: No  Does patient currently have HHC?: No         Patient Information Continued  Income Source: Unemployed  Does patient have prescription coverage?: Yes  Does patient receive dialysis treatments?: No  Does patient have a history of substance abuse?: Yes  Historical substance use preference: Fentanyl  History of Withdrawal Symptoms: Delirium tremors, Other withdrawal symptoms (specify in comment), Hallucinations (Tremors)  Is patient currently in treatment for substance abuse?: Yes (On Methadone)  Does patient have a history of Mental Health Diagnosis?: Yes (Depression and PTSD)  Is patient receiving treatment for mental health?: Yes  Has patient received inpatient treatment related to mental health in the last 2 years?: No         Means of Transportation  Means of Transport to Appts:: Drives Self      Social Determinants of Health (SDOH)      Flowsheet Row Most Recent Value   Housing Stability    In the last 12 months, was there a time when you were not able to pay the mortgage or rent on time? N   In the last 12 months, how many places have you lived? 1   In the last 12 months, was there a time when you did not have a  steady place to sleep or slept in a shelter (including now)? N   Transportation Needs    In the past 12 months, has lack of transportation kept you from medical appointments or from getting medications? no   In the past 12 months, has lack of transportation kept you from meetings, work, or from getting things needed for daily living? No   Food Insecurity    Within the past 12 months, you worried that your food would run out before you got the money to buy more. Never true   Within the past 12 months, the food you bought just didn't last and you didn't have money to get more. Never true   Utilities    In the past 12 months has the electric, gas, oil, or water company threatened to shut off services in your home? No            DISCHARGE DETAILS:    Discharge planning discussed with:: Patient and Mom at bedside  Freedom of Choice: Yes  Comments - Freedom of Choice: Cm discussed discharge plans per care team recs, patient declining CATCH referral.  CM contacted family/caregiver?: Yes (Mom at bedside)  Were Treatment Team discharge recommendations reviewed with patient/caregiver?: Yes  Did patient/caregiver verbalize understanding of patient care needs?: Yes  Were patient/caregiver advised of the risks associated with not following Treatment Team discharge recommendations?: Yes    Contacts  Patient Contacts: Kylah  Relationship to Patient:: Family  Contact Method: In Person  Reason/Outcome: Discharge Planning    Requested Home Health Care         Is the patient interested in HHC at discharge?: No    DME Referral Provided  Referral made for DME?: No    Other Referral/Resources/Interventions Provided:  Interventions: None Indicated  Referral Comments: CM met with patient/mom at bedside. Patient declines CATCH referral and any discharge needs at this time. CM will return if any changes regarding discharge needs.    Would you like to participate in our [facility] service program?  : No - Declined    Treatment Team  Recommendation: Home  Discharge Destination Plan:: Home

## 2024-03-04 NOTE — PLAN OF CARE
Problem: PAIN - ADULT  Goal: Verbalizes/displays adequate comfort level or baseline comfort level  Description: Interventions:  - Encourage patient to monitor pain and request assistance  - Assess pain using appropriate pain scale  - Administer analgesics based on type and severity of pain and evaluate response  - Implement non-pharmacological measures as appropriate and evaluate response  - Consider cultural and social influences on pain and pain management  - Notify physician/advanced practitioner if interventions unsuccessful or patient reports new pain  Outcome: Progressing     Problem: INFECTION - ADULT  Goal: Absence or prevention of progression during hospitalization  Description: INTERVENTIONS:  - Assess and monitor for signs and symptoms of infection  - Monitor lab/diagnostic results  - Monitor all insertion sites, i.e. indwelling lines, tubes, and drains  - Monitor endotracheal if appropriate and nasal secretions for changes in amount and color  - [place] appropriate cooling/warming therapies per order  - Administer medications as ordered  - Instruct and encourage patient and family to use good hand hygiene technique  - Identify and instruct in appropriate isolation precautions for identified infection/condition  Outcome: Progressing  Goal: Absence of fever/infection during neutropenic period  Description: INTERVENTIONS:  - Monitor WBC    Outcome: Progressing     Problem: SAFETY ADULT  Goal: Patient will remain free of falls  Description: INTERVENTIONS:  - Educate patient/family on patient safety including physical limitations  - Instruct patient to call for assistance with activity   - Consult OT/PT to assist with strengthening/mobility   - Keep Call bell within reach  - Keep bed low and locked with side rails adjusted as appropriate  - Keep care items and personal belongings within reach  - Initiate and maintain comfort rounds  - Make Fall Risk Sign visible to staff  - Offer Toileting every 2 Hours,  in advance of need  - Initiate/Maintain bed alarm  - Obtain necessary fall risk management equipment  - Apply yellow socks and bracelet for high fall risk patients  - Consider moving patient to room near nurses station  Outcome: Progressing  Goal: Maintain or return to baseline ADL function  Description: INTERVENTIONS:  -  Assess patient's ability to carry out ADLs; assess patient's baseline for ADL function and identify physical deficits which impact ability to perform ADLs (bathing, care of mouth/teeth, toileting, grooming, dressing, etc.)  - Assess/evaluate cause of self-care deficits   - Assess range of motion  - Assess patient's mobility; develop plan if impaired  - Assess patient's need for assistive devices and provide as appropriate  - Encourage maximum independence but intervene and supervise when necessary  - Involve family in performance of ADLs  - Assess for home care needs following discharge   - Consider OT consult to assist with ADL evaluation and planning for discharge  - Provide patient education as appropriate  Outcome: Progressing  Goal: Maintains/Returns to pre admission functional level  Description: INTERVENTIONS:  - Perform AM-PAC 6 Click Basic Mobility/ Daily Activity assessment daily.  - Set and communicate daily mobility goal to care team and patient/family/caregiver.   - Collaborate with rehabilitation services on mobility goals if consulted  - Perform Range of Motion 3 times a day.  - Reposition patient every 2 hours.  - Dangle patient 3 times a day  - Stand patient 3 times a day  - Ambulate patient 3 times a day  - Out of bed to chair 3 times a day   - Out of bed for meals 3 times a day  - Out of bed for toileting  - Record patient progress and toleration of activity level   Outcome: Progressing     Problem: DISCHARGE PLANNING  Goal: Discharge to home or other facility with appropriate resources  Description: INTERVENTIONS:  - Identify barriers to discharge w/patient and caregiver  -  Arrange for needed discharge resources and transportation as appropriate  - Identify discharge learning needs (meds, wound care, etc.)  - Arrange for interpretive services to assist at discharge as needed  - Refer to Case Management Department for coordinating discharge planning if the patient needs post-hospital services based on physician/advanced practitioner order or complex needs related to functional status, cognitive ability, or social support system  Outcome: Progressing     Problem: Knowledge Deficit  Goal: Patient/family/caregiver demonstrates understanding of disease process, treatment plan, medications, and discharge instructions  Description: Complete learning assessment and assess knowledge base.  Interventions:  - Provide teaching at level of understanding  - Provide teaching via preferred learning methods  Outcome: Progressing     Problem: Prexisting or High Potential for Compromised Skin Integrity  Goal: Skin integrity is maintained or improved  Description: INTERVENTIONS:  - Identify patients at risk for skin breakdown  - Assess and monitor skin integrity  - Assess and monitor nutrition and hydration status  - Monitor labs   - Assess for incontinence   - Turn and reposition patient  - Assist with mobility/ambulation  - Relieve pressure over bony prominences  - Avoid friction and shearing  - Provide appropriate hygiene as needed including keeping skin clean and dry  - Evaluate need for skin moisturizer/barrier cream  - Collaborate with interdisciplinary team   - Patient/family teaching  - Consider wound care consult   Outcome: Progressing

## 2024-03-04 NOTE — ASSESSMENT & PLAN NOTE
In the setting of serotonin syndrome  CK elevated to 1362, AST elevated to 42  Without renal complications    3/4 continue IV fluids at 100 cc/hour  Follow-up on CK  monitor urinary output and CMP

## 2024-03-04 NOTE — PROGRESS NOTES
[facility]  Progress Note  Name: Elda Calderon I  MRN: [id number]  Unit/Bed#: S -Eliseo I Date of Admission: 3/2/2024   Date of Service: 3/4/2024 I Hospital Day: 2    Assessment/Plan   * Acute opioid withdrawal (HCC)  Assessment & Plan  Presents with one day history of progressive nausea and vomiting, headache and palpitations  Home medications include seroquel, zoloft, and methadone, patient reports compliance with medications, denies overdose or any further illicit substance intake  Doses of zoloft and seroquel were adjusted in December, no change in dose since then  Found to be tachycardic with hyperreflexia and muscle rigidity on exam. CK elevated >1000  Bladder ultrasound shows urinary retention  Blood pressure and temperature stable.  Not in respiratory distress.   Given ativan and valium in the emergency department with some improvement in symptoms  Reason for sudden reaction unclear given reported stability of serotonergic medication doses. History and physical still most consistent with serotonin syndrome and will treat as such    Plan:  Continue methadone 140 mg daily  Zofran 4 mg scheduled every 6 hours  Toxicology consult -as needed trazodone, Ativan, loperamide, clonidine  Psychiatry consulted-pending recommendations resume Seroquel and Zoloft at a lower dose  Watch on telemetry           Rhabdomyolysis  Assessment & Plan  In the setting of serotonin syndrome  CK elevated to 1362, AST elevated to 42  Without renal complications    3/4 continue IV fluids at 100 cc/hour  Follow-up on CK  monitor urinary output and CMP      SIRS (systemic inflammatory response syndrome) (Former[facility])  Assessment & Plan  Patient meeting SIRS criteria on admission with tachycardia and elevated leukocytosis  Low suspicion for infectious at this time  Follow-up on chest x-ray               VTE Pharmacologic Prophylaxis: VTE Score: 0 Low Risk (Score 0-2) - Encourage Ambulation.    Mobility:   Basic  Mobility Inpatient Raw Score: 20  -HLM Goal: 6: Walk 10 steps or more  -HLM Achieved: 4: Move to chair/commode      Patient Centered Rounds: I performed bedside rounds with nursing staff today.   Discussions with Specialists or Other Care Team Provider:     Education and Discussions with Family / Patient: Updated  (father) at bedside.    Total Time Spent on Date of Encounter in care of patient: 30 mins. This time was spent on one or more of the following: performing physical exam; counseling and coordination of care; obtaining or reviewing history; documenting in the medical record; reviewing/ordering tests, medications or procedures; communicating with other healthcare professionals and discussing with patient's family/caregivers.    Current Length of Stay: 2 day(s)  Current Patient Status: Inpatient   Certification Statement:   Discharge Plan: Anticipate discharge in 24-48 hrs to home.    Code Status: Level 1 - Full Code    Subjective:   Patient was seen and examined bedside this morning.  Patient overall doing much better compared to yesterday but still continues to have some tremors, anxiety.  Patient reported not being able to sleep well last night.  Patient did admit with psychiatry that she overdosed on fentanyl and is going through withdrawal.  Otherwise no questions or concerns this time nausea and vomiting symptoms much improved.   Objective:     Vitals:   Temp (24hrs), Av °F (37.2 °C), Min:98.4 °F (36.9 °C), Max:99.4 °F (37.4 °C)    Temp:  [98.4 °F (36.9 °C)-99.4 °F (37.4 °C)] 98.4 °F (36.9 °C)  HR:  [] 114  Resp:  [14-28] 14  BP: (103-120)/(59-76) 103/66  SpO2:  [95 %-98 %] 97 %  Body mass index is 21.65 kg/m².     Input and Output Summary (last 24 hours):     Intake/Output Summary (Last 24 hours) at 3/4/2024 1134  Last data filed at 3/4/2024 0600  Gross per 24 hour   Intake 2740.83 ml   Output 650 ml   Net 2090.83 ml       Physical Exam:   Physical Exam  Constitutional:        General: She is not in acute distress.     Appearance: She is not ill-appearing or toxic-appearing.   HENT:      Head: Normocephalic and atraumatic.      Mouth/Throat:      Mouth: Mucous membranes are moist.      Pharynx: Oropharynx is clear.   Eyes:      Extraocular Movements: Extraocular movements intact.      Conjunctiva/sclera: Conjunctivae normal.      Pupils: Pupils are equal, round, and reactive to light.   Cardiovascular:      Rate and Rhythm: Regular rhythm. Tachycardia present.      Pulses: Normal pulses.      Heart sounds: Normal heart sounds. No murmur heard.     No gallop.   Pulmonary:      Effort: Pulmonary effort is normal. No respiratory distress.      Breath sounds: Normal breath sounds. No stridor. No wheezing, rhonchi or rales.   Chest:      Chest wall: No tenderness.   Abdominal:      General: Abdomen is flat. Bowel sounds are normal.      Palpations: Abdomen is soft.   Musculoskeletal:         General: Normal range of motion.   Skin:     General: Skin is warm.      Coloration: Skin is not jaundiced or pale.      Findings: No rash.   Neurological:      General: No focal deficit present.      Mental Status: She is alert and oriented to person, place, and time. Mental status is at baseline.      Cranial Nerves: No cranial nerve deficit.      Sensory: No sensory deficit.      Motor: No weakness.      Comments: Tremors.  Dilated pupils   Psychiatric:         Mood and Affect: Mood normal.         Behavior: Behavior normal.          Additional Data:     Labs:  Results from last 7 days   Lab Units 03/04/24  0429   WBC Thousand/uL 13.82*   HEMOGLOBIN g/dL 9.0*   HEMATOCRIT % 27.6*   PLATELETS Thousands/uL 323   NEUTROS PCT % 79*   LYMPHS PCT % 12*   MONOS PCT % 8   EOS PCT % 0     Results from last 7 days   Lab Units 03/04/24  0713 03/03/24  0430   SODIUM mmol/L 139 138   POTASSIUM mmol/L 3.6 4.5   CHLORIDE mmol/L 107 102   CO2 mmol/L 23 20*   BUN mg/dL 3* 4*   CREATININE mg/dL 0.49* 0.53*   ANION GAP  mmol/L 9 16   CALCIUM mg/dL 8.3* 8.4   ALBUMIN g/dL  --  3.7   TOTAL BILIRUBIN mg/dL  --  0.28   ALK PHOS U/L  --  76   ALT U/L  --  15   AST U/L  --  49*   GLUCOSE RANDOM mg/dL 96 134                       Lines/Drains:  Invasive Devices       Peripheral Intravenous Line  Duration             Peripheral IV 03/02/24 Right;Upper Arm 1 day                      Telemetry:  Telemetry Orders (From admission, onward)               24 Hour Telemetry Monitoring  Continuous x 24 Hours (Telem)        Question:  Reason for 24 Hour Telemetry  Answer:  Drug overdose known to cause cardiac arrhythmias (e.g. - Cocaine, TCA, Amphetamines, Phenothiazines, Digoxin, etc.) Meds known to need cardiac monitoring: Amiodarone, Dopamine, Dobutamine, Phenytoin                     Telemetry Reviewed: Sinus Tachycardia  Indication for Continued Telemetry Use: Arrthymias requiring medical therapy             Imaging:     Recent Cultures (last 7 days):         Last 24 Hours Medication List:   Current Facility-Administered Medications   Medication Dose Route Frequency Provider Last Rate    cloNIDine  0.1 mg Oral Q6H PRN Jose Resendiz MD      diphenhydrAMINE  25 mg Intravenous Q6H PRN Jose Resendiz MD      loperamide  4 mg Oral Q6H PRN Jose Resendiz MD      LORazepam  2 mg Intravenous Q4H PRN Jose Resendiz MD      melatonin  3 mg Oral HS PRN Jose Resendiz MD      methadone  140 mg Oral Daily Jose Resendiz MD      ondansetron  4 mg Intravenous Q6H Jose Resendiz MD      pantoprazole  40 mg Intravenous Q24H JERI Jose Resendiz MD      prazosin  1 mg Oral Daily Jose Resendiz MD      sodium chloride  100 mL/hr Intravenous Continuous Jose Resendiz  mL/hr (03/04/24 1043)    traZODone  50 mg Oral HS PRN Jose Resendiz MD      trimethobenzamide  200 mg Intramuscular Q6H PRN Jose Resendiz MD          Today, Patient Was Seen By: Jose Resendiz MD    **Please Note: This note may have been constructed using a voice recognition system.**

## 2024-03-04 NOTE — ASSESSMENT & PLAN NOTE
Patient meeting SIRS criteria on admission with tachycardia and elevated leukocytosis  Low suspicion for infectious at this time  Follow-up on chest x-ray

## 2024-03-04 NOTE — UTILIZATION REVIEW
NOTIFICATION OF INPATIENT ADMISSION   AUTHORIZATION REQUEST   SERVICING FACILITY:   [place] [address]  Tax ID: [id number]  NPI: [id number]   ATTENDING PROVIDER:  Attending Name and NPI#: Sarah Monreal Md [[id number]]  Address: [address] [place] [address]  Phone: [phone]     ADMISSION INFORMATION:  Place of Service: Inpatient [facility]  Place of Service Code: 21  Inpatient Admission Date/Time: 3/2/24 10:45 PM  Discharge Date/Time: No discharge date for patient encounter.  Admitting Diagnosis Code/Description:  Rhabdomyolysis [M62.82]  Vomiting [R11.10]  Serotonin syndrome [G25.79]  Polysubstance abuse (HCC) [F19.10]  Intractable vomiting [R11.10]  Opioid use disorder, severe, dependence (HCC) [F11.20]     UTILIZATION REVIEW CONTACT:  Tolu Hutchison, Utilization   Network Utilization Review Department  Phone: [phone]  Fax: [phone]  Email: [email or web address]  Contact for approvals/pending authorizations, clinical reviews, and discharge.     PHYSICIAN ADVISORY SERVICES:  Medical Necessity Denial & Awvr-pk-Sdbz Review  Phone: [phone]  Fax: [phone]  Email: [email or web address]     DISCHARGE SUPPORT TEAM:  For Patients Discharge Needs & Updates  Phone: [phone] opt. 2 Fax: [phone]  Email: [email or web address]

## 2024-03-04 NOTE — CONSULTS
Psychiatry Consultation Note   Elda Calderon 28 y.o. female MRN: [id number]  Unit/Bed#: S -01 Encounter: [id number]      Assessment and Plan     Assessment       Assessment:    Elda Calderon is 28-years-old with history of hospitalizations and substance use, admitted for withdrawal symptoms. Reviewed toxicology note suspected opioid withdrawal. Patient disclosed recent use of fentanyl for reason being unable to go to the methadone clinic; timing of this is suspect more likely due to some short-term memory impairment. She has not consistently taken medication in at least one month. She has outpatient psychiatry provided by [facility] and receives methadone at [facility] where she also participates in group therapies. She would like to continue with these treatments and does not think she needs inpatient or outpatient rehabilitation or PHP at this time.            Principal Psychiatric Problem:  Opioid use disorder  History consistent with bipolar I disorder  History of PTSD    Principal Problem:    Acute opioid withdrawal (HCC)  Active Problems:    Rhabdomyolysis    SIRS (systemic inflammatory response syndrome) (HCC)      Plan     Plan:   Discussed with primary team, with the following recommendations:    Treatment Recommendations:  No indication for inpatient psychiatry at this time.  Should provide explicit documentation of last methadone dose received during admission so she can resume without issue after discharge.  She will need a new appointment scheduled at [facility] for psychiatry.  Recommended laboratory studies: vitamin D 25, B12, syphilis, folate  Restart medications at appropriate starting doses:  Quetiapine 25 mg QHS  Sertraline 25 mg QD  Gabapentin 100 mg TID  Prazosin 2 mg QHS  Continue methadone at dose previously confirmed.    Please contact our service via [employer] with any additional questions or concerns. If contacting after hours, please call or  "Park[facility] the on-call team ([facility]: [phone]) with any questions or concerns.    Risks, benefits and possible side effects of Medications: No new medications at this time.     HPI   History of Present Illness   Physician Requesting Consult: Sarah Monreal MD  Reason for Consult / Principal Problem: medication    Chief Complaint: \"I wasn't able to get to the methadone clinic.\"    Elda Calderon is 28-years-old with history of hospitalizations and substance use, admitted for withdrawal symptoms. Patient reports she stopped taking medication last month. She said the medications caused drowsiness and her family would see this and accuse her of using a non-prescribed substance and she described the situation as \"frustrating.\"    She occasionally experiences nausea and vomiting for a few days that self-resolves, however she went to the hospital due to more severe symptoms with tremor and perceived increased heart rate. She later reveals that approximately two weeks ago she was unable to go to the methadone clinic and so used fentanyl instead and thinks it also contained benzodiazepines.    She reports her mood has been stable. Although there are life stressors. Her daughter's father was released from prison last month after five years and she is worried about his presence in her daughter's life. The patient feels the need to \"please\" her parents. Her parents now have custody of the patient's daughter after the patient had custody for five years and she described this change as hurtful.    At time of interview she denies thoughts of wanting to harm herself, not to be alive, and to end her life. She denies hallucinations.    Psychiatric ROS and PMHx     Psychiatric Review Of Systems:  Sleep: Yes, decreased  Interest/Anhedonia: Denies  Guilt/hopeless: Yes  Low energy/anergy: Yes  Poor Concentration: Yes  Appetite changes: Yes  Weight changes: Denies  Somatic symptoms: Yes  Anxiety/panic: Yes  Emily: no  Self injurious " behavior/risky behavior: no  Trauma: yes   If yes: nightmares    Suicidal ideation: no  Homicidal ideation: no  Auditory hallucinations: no  Visual hallucinations: no  Other hallucinations: Denies  Delusional thinking: no    Eating disorder history: no  Obsessive/compulsive symptoms: no    Historical Information     Past Psychiatric History:   First hospitalization 14-years-old.   Most recent hospitalization August 2023.  History of SIB years ago. Denies suicide attempts.  Current outpatient [facility]ry at [facility]. Medications:   Quetiapine 250 mg, sertraline 100 mg, gabapentin 100-100-300, prazosin 2 mg  Reports these are effective, specifically quetiapine and gabapentin.        Substance Abuse History:  Social History       Tobacco History       Smoking Status  Never      Passive Exposure  Never      Smokeless Tobacco Use  Never              Alcohol History       Alcohol Use Status  Not Currently Drinks/Week  0 Glasses of wine, 0 Cans of beer, 0 Shots of liquor, 0 Standard drinks or equivalent per week              Drug Use       Drug Use Status  Not Currently Comment  8 bags/day states benzos are mixed in              Sexual Activity       Sexually Active  Not Currently Partners  Male Birth Control/Protection  None              Activities of Daily Living    Not Asked                 Additional Substance Use Detail       Questions Responses    Problems Due to Past Use of Alcohol? No    Problems Due to Past Use of Substances? Yes    Substance Use Assessment Substance use within the past 12 months    Alcohol Use Frequency Denies use in past 12 months    Cannabis frequency Never used    Comment:  Never used on 8/11/2023     Heroin Frequency Daily    Heroin Method IV    Heroin 1st Use 25/26    Heroin Last Use & Amount 8 bags, 7/20/2023    Heroin Longest Abstinence unknown    Cocaine frequency Never used    Comment:  Never used on 8/11/2023     Crack Cocaine Frequency Denies use in past 12 months    Methamphetamine  "Frequency Denies use in past 12 months    Narcotic Frequency Denies use in past 12 months    Benzodiazepine Frequency Denies use in past 12 months    Amphetamine frequency Denies use in past 12 months    Benzodiazepine Method IV    Benzodiazepine 1st Use 25    Benzodiazepine Last Use & Amount 7/20/2023    Barbituate Frequency Denies use use in past 12 months    Inhalant frequency Never used    Comment:  Never used on 8/11/2023     Hallucinogen frequency Never used    Comment:  Never used on 8/11/2023     Ecstasy frequency Never used    Comment:  Never used on 8/11/2023     Other drug frequency Daily    Comment: Fentanyl Daily on 7/1/2023     Other drug method IV    Comment:  IV on 7/1/2023     Opiate frequency Past abuse    Other drug last use 7/20/23    Comment:  6/30/2023 on 7/1/2023 6/30/2023 -> 7/20/2023 on 7/21/2023     Other Substance Abuse Comments (free text) potential xylazine in fentanyl    Last reviewed by Corina Gonzalez RN on 3/3/2024          First opioid use in early adolescence.  Two inpatient detoxifications noted Summer 2023.  Previously on suboxone 5 years ago, then had 5 years of sobriety.  Recently receiving methadone.    Family Psychiatric History:   Mother: patient reports \"anxiety\"    Social History:  Education: associate degree  Marital History: single  Children: one six-year-old daughter  Living Arrangement: lives alone in an apartment adjacent to/on parent's property  Occupational History: unemployed      Traumatic History:   Past traumatic experiences    Past Medical History:      Past Medical History:   Diagnosis Date    Anxiety     Borderline personality disorder (HCC)     Chlamydia     Depression     Endometriosis     IV drug user     IV heroin use, rehab 7 times     Panic attacks     Psychiatric disorder     anxiety    PTSD (post-traumatic stress disorder)     PTSD (post-traumatic stress disorder) 8/12/2023    Varicella     childhood    Visual impairment      Past Surgical " "History:   Procedure Laterality Date    LAPAROTOMY         Mental Status Evaluation and Medical ROS     Medical Review Of Systems:  Pertinent items are noted in HPI.    Meds/Allergies   All current active medications have been reviewed.  No Known Allergies    Objective   Vital signs in last 24 hours:  Temp:  [98.4 °F (36.9 °C)-99.4 °F (37.4 °C)] 98.9 °F (37.2 °C)  HR:  [] 89  Resp:  [14-28] 18  BP: (103-124)/(59-87) 124/87      Intake/Output Summary (Last 24 hours) at 3/4/2024 1224  Last data filed at 3/4/2024 0600  Gross per 24 hour   Intake 2740.83 ml   Output 650 ml   Net 2090.83 ml       Mental Status Evaluation:  Appearance:  Blue hair tied up, gauge earrings with rabbit design, lip and nose piercings, tattoos on hands  Noted scaring on UE bilaterally  Casual attire  Appropriate eye contact   Behavior:  Slightly tremulous  Calm, cooperative   Speech:  Hoarse, normal rate and volume   Mood:  \"stable\"   Affect:  Constricted, sullen, one occasion of tearfulness   Thought Process:  Logical, goal-directed   Thought Content:  No overt delusions  Denies hallucinations  Denies suicidal and homicidal ideations   Cognition: Awake and alert  Oriented  Possibly some short-term memory impairment  Normal attention   Insight:  moderate   Judgment: moderate     Laboratory Results: I have personally reviewed all pertinent laboratory/tests results        Imaging Studies: No results found.  EKG: GJj=203 on 3/2/2024    Code Status: Level 1 - Full Code  Advance Directive and Living Will:       Power of :        Getachew Zhong MD 03/04/24  Psychiatry Resident, PGY-II    This note was completed in part utilizing [employer] Software. Grammatical, translation, syntax errors, random word insertions, spelling mistakes, and incomplete sentences may be an occasional consequence of this system secondary to software limitations with voice recognition, ambient noise, and hardware issues. If you have any questions " or concerns about the content, text, or information contained within the body of this dictation, please contact the provider for clarification.

## 2024-03-05 VITALS
RESPIRATION RATE: 18 BRPM | SYSTOLIC BLOOD PRESSURE: 117 MMHG | HEIGHT: 64 IN | HEART RATE: 92 BPM | BODY MASS INDEX: 21.53 KG/M2 | WEIGHT: 126.1 LBS | OXYGEN SATURATION: 96 % | TEMPERATURE: 98.7 F | DIASTOLIC BLOOD PRESSURE: 76 MMHG

## 2024-03-05 PROBLEM — N39.0 UTI (URINARY TRACT INFECTION): Status: ACTIVE | Noted: 2024-03-05

## 2024-03-05 LAB
ANION GAP SERPL CALCULATED.3IONS-SCNC: 9 MMOL/L
BASOPHILS # BLD AUTO: 0.03 THOUSANDS/ÂΜL (ref 0–0.1)
BASOPHILS NFR BLD AUTO: 0 % (ref 0–1)
BUN SERPL-MCNC: 3 MG/DL (ref 5–25)
CALCIUM SERPL-MCNC: 8.7 MG/DL (ref 8.4–10.2)
CHLORIDE SERPL-SCNC: 105 MMOL/L (ref 96–108)
CK SERPL-CCNC: 460 U/L (ref 26–192)
CO2 SERPL-SCNC: 26 MMOL/L (ref 21–32)
CREAT SERPL-MCNC: 0.58 MG/DL (ref 0.6–1.3)
EOSINOPHIL # BLD AUTO: 0.04 THOUSAND/ÂΜL (ref 0–0.61)
EOSINOPHIL NFR BLD AUTO: 0 % (ref 0–6)
ERYTHROCYTE [DISTWIDTH] IN BLOOD BY AUTOMATED COUNT: 13.2 % (ref 11.6–15.1)
GFR SERPL CREATININE-BSD FRML MDRD: 126 ML/MIN/1.73SQ M
GLUCOSE SERPL-MCNC: 91 MG/DL (ref 65–140)
HCT VFR BLD AUTO: 31.2 % (ref 34.8–46.1)
HGB BLD-MCNC: 10.3 G/DL (ref 11.5–15.4)
IMM GRANULOCYTES # BLD AUTO: 0.05 THOUSAND/UL (ref 0–0.2)
IMM GRANULOCYTES NFR BLD AUTO: 1 % (ref 0–2)
LYMPHOCYTES # BLD AUTO: 2.61 THOUSANDS/ÂΜL (ref 0.6–4.47)
LYMPHOCYTES NFR BLD AUTO: 29 % (ref 14–44)
MCH RBC QN AUTO: 29.5 PG (ref 26.8–34.3)
MCHC RBC AUTO-ENTMCNC: 33 G/DL (ref 31.4–37.4)
MCV RBC AUTO: 89 FL (ref 82–98)
MONOCYTES # BLD AUTO: 0.7 THOUSAND/ÂΜL (ref 0.17–1.22)
MONOCYTES NFR BLD AUTO: 8 % (ref 4–12)
NEUTROPHILS # BLD AUTO: 5.65 THOUSANDS/ÂΜL (ref 1.85–7.62)
NEUTS SEG NFR BLD AUTO: 62 % (ref 43–75)
NRBC BLD AUTO-RTO: 0 /100 WBCS
PLATELET # BLD AUTO: 324 THOUSANDS/UL (ref 149–390)
PMV BLD AUTO: 8.2 FL (ref 8.9–12.7)
POTASSIUM SERPL-SCNC: 3 MMOL/L (ref 3.5–5.3)
RBC # BLD AUTO: 3.49 MILLION/UL (ref 3.81–5.12)
SODIUM SERPL-SCNC: 140 MMOL/L (ref 135–147)
WBC # BLD AUTO: 9.08 THOUSAND/UL (ref 4.31–10.16)

## 2024-03-05 PROCEDURE — 99239 HOSP IP/OBS DSCHRG MGMT >30: CPT | Performed by: INTERNAL MEDICINE

## 2024-03-05 PROCEDURE — C9113 INJ PANTOPRAZOLE SODIUM, VIA: HCPCS

## 2024-03-05 PROCEDURE — 82550 ASSAY OF CK (CPK): CPT | Performed by: INTERNAL MEDICINE

## 2024-03-05 PROCEDURE — 85025 COMPLETE CBC W/AUTO DIFF WBC: CPT | Performed by: INTERNAL MEDICINE

## 2024-03-05 PROCEDURE — 80048 BASIC METABOLIC PNL TOTAL CA: CPT | Performed by: INTERNAL MEDICINE

## 2024-03-05 RX ORDER — SERTRALINE HYDROCHLORIDE 25 MG/1
25 TABLET, FILM COATED ORAL DAILY
Qty: 30 TABLET | Refills: 0 | Status: SHIPPED | OUTPATIENT
Start: 2024-03-06 | End: 2024-04-05

## 2024-03-05 RX ORDER — QUETIAPINE FUMARATE 25 MG/1
25 TABLET, FILM COATED ORAL
Qty: 30 TABLET | Refills: 0 | Status: SHIPPED | OUTPATIENT
Start: 2024-03-05 | End: 2024-04-04

## 2024-03-05 RX ORDER — ONDANSETRON 2 MG/ML
4 INJECTION INTRAMUSCULAR; INTRAVENOUS EVERY 4 HOURS PRN
Status: DISCONTINUED | OUTPATIENT
Start: 2024-03-05 | End: 2024-03-05 | Stop reason: HOSPADM

## 2024-03-05 RX ORDER — CEFDINIR 300 MG/1
300 CAPSULE ORAL EVERY 12 HOURS SCHEDULED
Qty: 6 CAPSULE | Refills: 0 | Status: SHIPPED | OUTPATIENT
Start: 2024-03-05 | End: 2024-03-08

## 2024-03-05 RX ORDER — POTASSIUM CHLORIDE 20 MEQ/1
40 TABLET, EXTENDED RELEASE ORAL ONCE
Status: COMPLETED | OUTPATIENT
Start: 2024-03-05 | End: 2024-03-05

## 2024-03-05 RX ORDER — GABAPENTIN 100 MG/1
100 CAPSULE ORAL 3 TIMES DAILY
Qty: 60 CAPSULE | Refills: 0 | Status: SHIPPED | OUTPATIENT
Start: 2024-03-05 | End: 2024-05-04

## 2024-03-05 RX ORDER — ONDANSETRON 4 MG/1
4 TABLET, FILM COATED ORAL EVERY 8 HOURS PRN
Qty: 2 TABLET | Refills: 0 | Status: SHIPPED | OUTPATIENT
Start: 2024-03-05

## 2024-03-05 RX ORDER — POTASSIUM CHLORIDE 14.9 MG/ML
20 INJECTION INTRAVENOUS ONCE
Status: COMPLETED | OUTPATIENT
Start: 2024-03-05 | End: 2024-03-05

## 2024-03-05 RX ADMIN — METHADONE HYDROCHLORIDE 140 MG: 10 CONCENTRATE ORAL at 12:04

## 2024-03-05 RX ADMIN — POTASSIUM CHLORIDE 40 MEQ: 1500 TABLET, EXTENDED RELEASE ORAL at 12:04

## 2024-03-05 RX ADMIN — LORAZEPAM 2 MG: 2 INJECTION INTRAMUSCULAR; INTRAVENOUS at 07:44

## 2024-03-05 RX ADMIN — POTASSIUM CHLORIDE 40 MEQ: 1500 TABLET, EXTENDED RELEASE ORAL at 07:38

## 2024-03-05 RX ADMIN — ONDANSETRON 4 MG: 2 INJECTION INTRAMUSCULAR; INTRAVENOUS at 04:19

## 2024-03-05 RX ADMIN — SERTRALINE 25 MG: 25 TABLET, FILM COATED ORAL at 08:23

## 2024-03-05 RX ADMIN — POTASSIUM CHLORIDE 20 MEQ: 14.9 INJECTION, SOLUTION INTRAVENOUS at 07:39

## 2024-03-05 RX ADMIN — SODIUM CHLORIDE 100 ML/HR: 0.9 INJECTION, SOLUTION INTRAVENOUS at 08:24

## 2024-03-05 RX ADMIN — PANTOPRAZOLE SODIUM 40 MG: 40 INJECTION, POWDER, FOR SOLUTION INTRAVENOUS at 08:23

## 2024-03-05 RX ADMIN — GABAPENTIN 100 MG: 100 CAPSULE ORAL at 08:23

## 2024-03-05 NOTE — DISCHARGE SUMMARY
[facility]  Discharge- Elda Calderon 1995, 28 y.o. female MRN: [id number]  Unit/Bed#: S -Eliseo Encounter: [id number]  Primary Care Provider: Mike Tillman DO   Date and time admitted to hospital: 3/2/2024  6:24 PM    * Acute opioid withdrawal (HCC)  Assessment & Plan  Presents with one day history of progressive nausea and vomiting, headache and palpitations  Home medications include seroquel, zoloft, and methadone, patient reports compliance with medications, denies overdose or any further illicit substance intake  Doses of zoloft and seroquel were adjusted in December, no change in dose since then  Found to be tachycardic with hyperreflexia and muscle rigidity on exam. CK elevated >1000  Bladder ultrasound shows urinary retention  Blood pressure and temperature stable.  Not in respiratory distress.   Given ativan and valium in the emergency department with some improvement in symptoms  Reason for sudden reaction unclear given reported stability of serotonergic medication doses. History and physical still most consistent with serotonin syndrome and will treat as such    Plan:  Continue methadone 140 mg daily at outpatient clinic  Resume Seroquel and Zoloft at a lower dose per psychiatry recommendation.  Continue to follow outpatient  3/5 patient's nausea/vomiting has resolved and patient is currently stable for discharge   discussed with patient for options for rehab-patient declined at this time    Rhabdomyolysis  Assessment & Plan  In the setting of serotonin syndrome  CK elevated to 1362, AST elevated to 42  Without renal complications    CK trending down.  Resolved    UTI (urinary tract infection)  Assessment & Plan  Patient is UA positive for leukocytosis and occasional bacteria  On admission patient denied having any symptoms  On 3/5 patient reported having dysuria and foul-smelling urine  Patient will be discharged with 3 days of antibiotics    SIRS (systemic  inflammatory response syndrome) (HCC)  Assessment & Plan  Patient meeting SIRS criteria on admission with tachycardia and elevated leukocytosis  Low suspicion for infectious at this time  Chest x-ray acute cardiopulmonary disease    Resolved          Medical Problems       Resolved Problems  Date Reviewed: 3/3/2024   None       Discharging Resident: Jose Resendiz MD  Discharging Attending: Danna Spear MD  PCP: Mike Tillman DO  Admission Date:   Admission Orders (From admission, onward)       Ordered        03/02/24 2342  INPATIENT ADMISSION  Once                          Discharge Date: 03/05/24    Consultations During Hospital Stay:  IP CONSULT TO TOXICOLOGY  IP CONSULT TO PSYCHIATRY     Procedures Performed:       Significant Findings / Test Results:   XR chest portable   Final Result by Ignacio Graham MD (03/04 1603)      No acute cardiopulmonary disease.            Workstation performed: [id number]               Incidental Findings:          Test Results Pending at Discharge (will require follow up):       Outpatient Tests Requested:      Complications:      Reason for Admission: Nausea/vomiting, opioid withdrawal    Hospital Course:   Elda Calderon is a 28 y.o. female with a PMH of polysubstance abuse, methadone use, bipolar depression who presents with nausea and vomiting for one day.  Patient is maintained on zoloft and seroquel.     On arrival to the ED, patient noted to be tachcardic, tremulous, and diaphoretic.  She had hyperreflexia and bladder scan showed urinary retention.  Overall presentation was concerning for serotonin syndrome initially and patient was administered IV fluids, ativan and valium with some improvement in her symptoms.  Initial laboratory studies showed elevated AST and CK without evidence of renal injury.  UA is positive for leukocytosis and occasional bacteria but denies having urinary symptoms.  Further workup and talking to her alone revealed that she might have been  "going through opioid withdrawal.  Overall patient's symptoms improved with IV fluids, as needed Ativan.  On 3/5 patient is tolerating diet denies having any nausea/vomiting but did endorse having dysuria and foul-smelling urine likely secondary to UTI.  Otherwise patient denied wanting any rehab.  Patient otherwise stable for discharge on as needed Zofran and 3-day course of antibiotics for UTI.  All the questions and concerns were answered at bedside.         Please see above list of diagnoses and related plan for additional information.     Condition at Discharge: stable    Discharge Day Visit / Exam:   Subjective:    Vitals: Blood Pressure: 117/76 (03/05/24 0739)  Pulse: 92 (03/05/24 0739)  Temperature: 98.7 °F (37.1 °C) (03/05/24 0739)  Temp Source: Oral (03/04/24 0700)  Respirations: 18 (03/05/24 0739)  Height: 5' 4\" (162.6 cm) (03/03/24 0040)  Weight - Scale: 57.2 kg (126 lb 1.7 oz) (03/03/24 0040)  SpO2: 96 % (03/05/24 0739)  Exam:   Physical Exam  Vitals and nursing note reviewed.   Constitutional:       General: She is not in acute distress.     Appearance: She is well-developed.   HENT:      Head: Normocephalic and atraumatic.   Eyes:      Conjunctiva/sclera: Conjunctivae normal.   Cardiovascular:      Rate and Rhythm: Regular rhythm. Tachycardia present.      Heart sounds: No murmur heard.  Pulmonary:      Effort: Pulmonary effort is normal. No respiratory distress.      Breath sounds: Normal breath sounds.   Abdominal:      Palpations: Abdomen is soft.      Tenderness: There is no abdominal tenderness.   Musculoskeletal:         General: No swelling.      Cervical back: Neck supple.   Skin:     General: Skin is warm and dry.      Capillary Refill: Capillary refill takes less than 2 seconds.   Neurological:      Mental Status: She is alert.   Psychiatric:         Mood and Affect: Mood normal.          Discussion with Family:  Boyfriend was in the room, patient declined to update the family.     Discharge " instructions/Information to patient and family:   See after visit summary for information provided to patient and family.      Provisions for Follow-Up Care:  See after visit summary for information related to follow-up care and any pertinent home health orders.      Mobility at time of Discharge:   Basic Mobility Inpatient Raw Score: 23  JH-HLM Goal: 7: Walk 25 feet or more  JH-HLM Achieved: 7: Walk 25 feet or more       Disposition:   Home    Planned Readmission: no    Discharge Medications:  See after visit summary for reconciled discharge medications provided to patient and/or family.      **Please Note: This note may have been constructed using a voice recognition system**

## 2024-03-05 NOTE — UTILIZATION REVIEW
Per VM request - Note that the member is still in house with us as of today, 3/5/2024    Determinations MUST be faxed to [phone]    NOTIFICATION OF INPATIENT ADMISSION   AUTHORIZATION REQUEST   SERVICING FACILITY:   [place] [address]  Tax ID: [id number]  NPI: [id number]   ATTENDING PROVIDER:  Attending Name and NPI#: Danna Spear Md [[id number]]  Address: [address] [place] [address]  Phone: [phone]     ADMISSION INFORMATION:  Place of Service: Inpatient [facility]  Place of Service Code: 21  Inpatient Admission Date/Time: 3/2/24 10:45 PM  Discharge Date/Time: No discharge date for patient encounter.  Admitting Diagnosis Code/Description:  Rhabdomyolysis [M62.82]  Vomiting [R11.10]  Serotonin syndrome [G25.79]  Polysubstance abuse (HCC) [F19.10]  Intractable vomiting [R11.10]  Opioid use disorder, severe, dependence (HCC) [F11.20]     UTILIZATION REVIEW CONTACT:  Tolu Hutchison, Utilization   Network Utilization Review Department  Phone: [phone]  Fax: [phone]  Email: [email or web address]  Contact for approvals/pending authorizations, clinical reviews, and discharge.     PHYSICIAN ADVISORY SERVICES:  Medical Necessity Denial & Wczl-fl-Fmgl Review  Phone: [phone]  Fax: [phone]  Email: [email or web address]     DISCHARGE SUPPORT TEAM:  For Patients Discharge Needs & Updates  Phone: [phone] opt. 2 Fax: [phone]  Email: [email or web address]

## 2024-03-05 NOTE — ASSESSMENT & PLAN NOTE
Patient meeting SIRS criteria on admission with tachycardia and elevated leukocytosis  Low suspicion for infectious at this time  Chest x-ray acute cardiopulmonary disease    Resolved

## 2024-03-05 NOTE — ASSESSMENT & PLAN NOTE
Presents with one day history of progressive nausea and vomiting, headache and palpitations  Home medications include seroquel, zoloft, and methadone, patient reports compliance with medications, denies overdose or any further illicit substance intake  Doses of zoloft and seroquel were adjusted in December, no change in dose since then  Found to be tachycardic with hyperreflexia and muscle rigidity on exam. CK elevated >1000  Bladder ultrasound shows urinary retention  Blood pressure and temperature stable.  Not in respiratory distress.   Given ativan and valium in the emergency department with some improvement in symptoms  Reason for sudden reaction unclear given reported stability of serotonergic medication doses. History and physical still most consistent with serotonin syndrome and will treat as such    Plan:  Continue methadone 140 mg daily at outpatient clinic  Resume Seroquel and Zoloft at a lower dose per psychiatry recommendation.  Continue to follow outpatient  3/5 patient's nausea/vomiting has resolved and patient is currently stable for discharge   discussed with patient for options for rehab-patient declined at this time

## 2024-03-05 NOTE — PLAN OF CARE
Problem: PAIN - ADULT  Goal: Verbalizes/displays adequate comfort level or baseline comfort level  Description: Interventions:  - Encourage patient to monitor pain and request assistance  - Assess pain using appropriate pain scale  - Administer analgesics based on type and severity of pain and evaluate response  - Implement non-pharmacological measures as appropriate and evaluate response  - Consider cultural and social influences on pain and pain management  - Notify physician/advanced practitioner if interventions unsuccessful or patient reports new pain  Outcome: Progressing     Problem: INFECTION - ADULT  Goal: Absence or prevention of progression during hospitalization  Description: INTERVENTIONS:  - Assess and monitor for signs and symptoms of infection  - Monitor lab/diagnostic results  - Monitor all insertion sites, i.e. indwelling lines, tubes, and drains  - Monitor endotracheal if appropriate and nasal secretions for changes in amount and color  - [place] appropriate cooling/warming therapies per order  - Administer medications as ordered  - Instruct and encourage patient and family to use good hand hygiene technique  - Identify and instruct in appropriate isolation precautions for identified infection/condition  Outcome: Progressing     Problem: SAFETY ADULT  Goal: Patient will remain free of falls  Description: INTERVENTIONS:  - Educate patient/family on patient safety including physical limitations  - Instruct patient to call for assistance with activity   - Consult OT/PT to assist with strengthening/mobility   - Keep Call bell within reach  - Keep bed low and locked with side rails adjusted as appropriate  - Keep care items and personal belongings within reach  - Initiate and maintain comfort rounds  - Make Fall Risk Sign visible to staff  - Apply yellow socks and bracelet for high fall risk patients  - Consider moving patient to room near nurses station  Outcome: Progressing     Problem: DISCHARGE  PLANNING  Goal: Discharge to home or other facility with appropriate resources  Description: INTERVENTIONS:  - Identify barriers to discharge w/patient and caregiver  - Arrange for needed discharge resources and transportation as appropriate  - Identify discharge learning needs (meds, wound care, etc.)  - Arrange for interpretive services to assist at discharge as needed  - Refer to Case Management Department for coordinating discharge planning if the patient needs post-hospital services based on physician/advanced practitioner order or complex needs related to functional status, cognitive ability, or social support system  Outcome: Progressing

## 2024-03-05 NOTE — ASSESSMENT & PLAN NOTE
In the setting of serotonin syndrome  CK elevated to 1362, AST elevated to 42  Without renal complications    CK trending down.  Resolved

## 2024-03-05 NOTE — ASSESSMENT & PLAN NOTE
Patient is UA positive for leukocytosis and occasional bacteria  On admission patient denied having any symptoms  On 3/5 patient reported having dysuria and foul-smelling urine  Patient will be discharged with 3 days of antibiotics

## 2024-03-05 NOTE — DISCHARGE INSTR - AVS FIRST PAGE
Dear Elda Calderon,     It was our pleasure to care for you here at [facility].  It is our hope that we were always able to exceed the expected standards for your care during your stay.  You were hospitalized due to nausea/vomiting.  You were cared for on the second floor by Jose Resendiz MD under the service of Danna Spear MD with the [facility] who covers for your primary care physician (PCP), Mike Tillman DO, while you were hospitalized.  If you have any questions or concerns related to this hospitalization, you may contact us at .  For follow up as well as any medication refills, we recommend that you follow up with your primary care physician.  A registered nurse will reach out to you by phone within a few days after your discharge to answer any additional questions that you may have after going home.  However, at this time we provide for you here, the most important instructions / recommendations at discharge:     Notable Medication Adjustments -   Please complete 3-day course of Omnicef 300 mg twice daily for UTI  Continue taking her gabapentin at a reduced dose of 100 mg 3 times daily per psychiatry  Continue taking your Seroquel and Zoloft at a reduced dose of 25 mg daily per psychiatry  Zofran as needed for nausea/vomiting  Testing Required after Discharge -   none    Important follow up information -   Please follow-up with your primary care provider and psychiatry outpatient  Please continue to follow-up with your methadone clinic  Other Instructions -   none  Please review this entire after visit summary as additional general instructions including medication list, appointments, activity, diet, any pertinent wound care, and other additional recommendations from your care team that may be provided for you.      Sincerely,     Jose Resendiz MD

## 2024-03-06 LAB
ATRIAL RATE: 87 BPM
ATRIAL RATE: 95 BPM
P AXIS: 83 DEGREES
P AXIS: 85 DEGREES
PR INTERVAL: 112 MS
PR INTERVAL: 112 MS
QRS AXIS: 75 DEGREES
QRS AXIS: 78 DEGREES
QRSD INTERVAL: 84 MS
QRSD INTERVAL: 84 MS
QT INTERVAL: 362 MS
QT INTERVAL: 384 MS
QTC INTERVAL: 454 MS
QTC INTERVAL: 462 MS
T WAVE AXIS: 66 DEGREES
T WAVE AXIS: 68 DEGREES
VENTRICULAR RATE: 87 BPM
VENTRICULAR RATE: 95 BPM

## 2024-03-06 PROCEDURE — 93010 ELECTROCARDIOGRAM REPORT: CPT | Performed by: INTERNAL MEDICINE

## 2024-05-01 PROBLEM — N39.0 UTI (URINARY TRACT INFECTION): Status: RESOLVED | Noted: 2024-03-05 | Resolved: 2024-05-01

## 2024-09-04 NOTE — PROGRESS NOTES
Progress Note - [address] Preeti Bolanos 33 y.o. female MRN: [id number]  Unit/Bed#: [facility] 381-01 Encounter: [id number]    Patient was seen today for continuation of care, records reviewed and patient was discussed with the morning case review team.  Per staff, Reinaldo Parker has been compliant with medications. She is more social.  She is appropriate and cooperative with staff. She is reporting decreased anxiety and depression. Reinaldo Parker was seen today for psychiatric follow-up. On assessment today, Reinaldo Parker was seen in her room. Elda appears brighter with better grooming today. She reports that her mood is "much better". She reports that her anxiety and depression have both improved "a lot". She reports good sleep last night and her appetite has been improving as well. She is excited to return home to her family and daughter. She appears more hopeful for the future and states that she feels she will be able to abstain from drug use. She will follow up with her methadone clinic on Friday. She tolerated the increase in gabapentin at bedtime well. Reinaldo Parker denies acute suicidal/self-harm ideation/intent/plan upon direct inquiry at this time. Reinaldo Parker remains behaviorally appropriate, no agitation or aggression noted on exam or in report. Reinaldo Parker denies HI/AH/VH, and does not appear overtly manic. No overt delusions or paranoia are verbalized. Reinaldo Parker remains adherent to her current psychotropic medication regimen and denies any side effects from medications, as well as none noted on exam.    We will continue current medications: Gabapentin 100mg PO BID and 300mg PO at HS, Methadone 139mg PO daily for OUD, Prazosin 1mg PO at HS for PTSD, and Zoloft 100mg PO daily for depression/anxiety. Discharge home to family tomorrow if stable.       Vitals:  Vitals:    08/16/23 0709   BP: 104/73   Pulse: (!) 118   Resp: 16   Temp: 97.9 °F (36.6 °C)   SpO2: 97%       Laboratory Results:    I have personally reviewed all 64.4 pertinent laboratory/tests results. Most Recent Labs:   Lab Results   Component Value Date    WBC 16.62 (H) 07/20/2023    RBC 4.17 07/20/2023    HGB 11.9 07/20/2023    HCT 37.7 07/20/2023     07/20/2023    RDW 14.1 07/20/2023    NEUTROABS 13.08 (H) 07/20/2023    SODIUM 138 07/22/2023    K 4.2 07/22/2023     07/22/2023    CO2 25 07/22/2023    BUN 4 (L) 07/22/2023    CREATININE 0.48 (L) 07/22/2023    GLUC 77 07/22/2023    CALCIUM 8.8 07/22/2023    AST 63 (H) 07/22/2023    ALT 70 (H) 07/22/2023    ALKPHOS 112 (H) 07/22/2023    TP 6.8 07/22/2023    ALB 3.7 07/22/2023    TBILI 0.18 (L) 07/22/2023    HCG 72.3 10/18/2016    RPR Non-Reactive 05/04/2017       Psychiatric Review of Systems:  Behavior over the last 24 hours:  improved.    Sleep:  adequate  Appetite: adequate  Medication side effects: denies and none observed  ROS: no complaints, denies shortness of breath or chest pain and all other systems are negative for acute changes    Mental Status Evaluation:    Appearance:  casually dressed, improved grooming   Behavior:  pleasant, cooperative, calm   Speech:  normal rate and volume   Mood:  improved, euthymic   Affect:  brighter   Thought Process:  goal directed, linear, normal rate of thoughts   Associations: intact associations   Thought Content:  no overt delusions   Perceptual Disturbances: denies auditory or visual hallucinations when asked, does not appear responding to internal stimuli   Risk Potential: Suicidal ideation - None  Homicidal ideation - None  Potential for aggression - No   Sensorium:  oriented to person, place and time/date, and situation   Memory:  recent and remote memory grossly intact   Consciousness:  alert and awake   Attention/Concentration: attention span and concentration are age appropriate   Insight:  improving   Judgment: improving   Gait/Station: normal gait/station   Motor Activity: no abnormal movements     Progress Toward Goals:   Elda is progressing towards goals of inpatient psychiatric treatment by continued medication compliance and is attending therapeutic modalities on the milieu. However, the patient continues to require inpatient psychiatric hospitalization for continued medication management and titration to optimize symptom reduction, improve sleep hygiene, and demonstrate adequate self-care. Risk of Harm to Self:   Nursing Suicide Risk Assessment Last 24 hours: C-SSRS Risk (Since Last Contact)  Calculated C-SSRS Risk Score (Since Last Contact): No Risk Indicated    Risk of Harm to Others:  Nursing Homicide Risk Assessment: Violence Risk to Others: Denies within past 6 months    The following interventions are recommended: behavioral checks every 7 minutes, continued hospitalization on locked unit      Assessment/Plan   Principal Problem:    Severe episode of recurrent major depressive disorder, without psychotic features ([facility])  Active Problems:    Polysubstance abuse ([facility])    BMI less than 19,adult    BESSY (generalized anxiety disorder)    PTSD (post-traumatic stress disorder)      Recommended Treatment: Treatment plan and medication changes discussed and per the attending physician the plan is: 1. Continue with group therapy, milieu therapy and occupational therapy  2. Behavioral Health checks every 7 minutes  3. Continue frequent safety checks and vitals per unit protocol  4. Continue with SLIM medical management as indicated  5. Continue with current medication regimen  6. Will review labs in the a.m. 7.Disposition Planning: Discharge planning and efforts remain ongoing    Behavioral Health Medications: all current active meds have been reviewed and continue current psychiatric medications.   Current Facility-Administered Medications   Medication Dose Route Frequency Provider Last Rate   • acetaminophen  650 mg Oral Q6H PRN Carlton Gonzalez MD     • acetaminophen  650 mg Oral Q4H PRN Carlton Gonzalez MD     • acetaminophen  975 mg Oral Q6H PRN Carlton Gonzalez MD     • benztropine 1 mg Intramuscular Q4H PRN Max 6/day Italo Byrd MD     • benztropine  1 mg Oral Q4H PRN Max 6/day Italo Byrd MD     • hydrOXYzine HCL  50 mg Oral Q6H PRN Max 4/day Italo Byrd MD      Or   • diphenhydrAMINE  50 mg Intramuscular Q6H PRN Italo Byrd MD     • gabapentin  100 mg Oral BID FRANK Reza     • gabapentin  300 mg Oral HS FRANK Wang     • hydrOXYzine HCL  100 mg Oral Q6H PRN Max 4/day Italo Byrd MD      Or   • LORazepam  2 mg Intramuscular Q6H PRN Italo Byrd MD     • hydrOXYzine HCL  25 mg Oral Q6H PRN Max 4/day Italo Byrd MD     • melatonin  3 mg Oral HS PRN Italo Byrd MD     • methadone  139 mg Oral Daily Italo Byrd MD     • OLANZapine  10 mg Oral Q3H PRN Max 3/day Italo Byrd MD      Or   • OLANZapine  10 mg Intramuscular Q3H PRN Max 3/day Italo Byrd MD     • OLANZapine  5 mg Oral Q3H PRN Max 6/day Italo Byrd MD      Or   • OLANZapine  5 mg Intramuscular Q3H PRN Max 6/day Italo Byrd MD     • OLANZapine  2.5 mg Oral Q3H PRN Max 8/day Italo Byrd MD     • prazosin  1 mg Oral HS Jeancarlos Daily DO     • QUEtiapine  100 mg Oral HS FRANK Wang     • sertraline  100 mg Oral Daily Italo Byrd MD         Risks / Benefits of Treatment:  Risks, benefits, and possible side effects of medications explained to patient and patient verbalizes understanding and agreement for treatment. Counseling / Coordination of Care:  Patient's progress reviewed with nursing staff. Medications, treatment progress and treatment plan reviewed with patient. Supportive counseling provided to the patient. Total floor/unit time spent today 25 minutes. Greater than 50% of total time was spent with the patient and / or family counseling and / or coordination of care. A description of the counseling / coordination of care: medication education, treatment plan, supportive therapy.

## 2024-10-27 ENCOUNTER — HOSPITAL ENCOUNTER (EMERGENCY)
Facility: HOSPITAL | Age: 29
Discharge: HOME | End: 2024-10-27
Attending: EMERGENCY MEDICINE

## 2024-10-27 VITALS
SYSTOLIC BLOOD PRESSURE: 101 MMHG | TEMPERATURE: 97.6 F | WEIGHT: 112.9 LBS | OXYGEN SATURATION: 99 % | DIASTOLIC BLOOD PRESSURE: 67 MMHG | HEART RATE: 58 BPM | BODY MASS INDEX: 19.38 KG/M2 | RESPIRATION RATE: 18 BRPM

## 2024-10-27 DIAGNOSIS — M79.642 LEFT HAND PAIN: Primary | ICD-10-CM

## 2024-10-27 DIAGNOSIS — R20.0 NUMBNESS OF LEFT HAND: ICD-10-CM

## 2024-10-27 PROCEDURE — 99284 EMERGENCY DEPT VISIT MOD MDM: CPT | Performed by: EMERGENCY MEDICINE

## 2024-10-27 PROCEDURE — 99283 EMERGENCY DEPT VISIT LOW MDM: CPT

## 2024-10-27 RX ORDER — IBUPROFEN 600 MG/1
600 TABLET, FILM COATED ORAL ONCE
Status: COMPLETED | OUTPATIENT
Start: 2024-10-27 | End: 2024-10-27

## 2024-10-27 RX ADMIN — IBUPROFEN 600 MG: 600 TABLET, FILM COATED ORAL at 15:26

## 2024-10-27 NOTE — ED PROVIDER NOTES
Time reflects when diagnosis was documented in both MDM as applicable and the Disposition within this note       Time User Action Codes Description Comment    10/27/2024  3:16 PM VincentHeron mota Add [M79.642] Left hand pain     10/27/2024  3:16 PM Heron Alcantar Add [R20.0] Numbness of left hand           ED Disposition       ED Disposition   Discharge    Condition   Stable    Date/Time   Sun Oct 27, 2024  3:20 PM    Comment   Elda Calderon discharge to home/self care.                   Assessment & Plan       Medical Decision Making  This is a 29-year-old female who presents to the emergency department complaining of left arm pain and numbness.  I considered infection, neurologic damage, compartment syndrome. These and other diagnoses were considered.     The patient was well-appearing on exam.  This has been ongoing for months at this point.  She has no signs of infection including erythema or swelling.  She has no acute abnormality or tenderness on evaluation.  She does have chronic skin changes and exam and history that are consistent with peripheral neuropathy.  The patient will be discharged with follow-up to her primary care physician.    Risk  Prescription drug management.             Medications   ibuprofen (MOTRIN) tablet 600 mg (600 mg Oral Given 10/27/24 1526)       ED Risk Strat Scores                           SBIRT 22yo+      Flowsheet Row Most Recent Value   Initial Alcohol Screen: US AUDIT-C     1. How often do you have a drink containing alcohol? 0 Filed at: 10/27/2024 1500   2. How many drinks containing alcohol do you have on a typical day you are drinking?  0 Filed at: 10/27/2024 1500   3b. FEMALE Any Age, or MALE 65+: How often do you have 4 or more drinks on one occassion? 0 Filed at: 10/27/2024 1500   Audit-C Score 0 Filed at: 10/27/2024 1500   LUDA: How many times in the past year have you...    Used an illegal drug or used a prescription medication for non-medical reasons? Never Filed  at: 10/27/2024 1500                            History of Present Illness       Chief Complaint   Patient presents with    Hand Pain     L hand pain and numbness/tingling L pinky x months, denies injury, worsening since starting new job        Past Medical History:   Diagnosis Date    Anxiety     Borderline personality disorder (HCC)     Chlamydia     Depression     Endometriosis     IV drug user     IV heroin use, rehab 7 times     Panic attacks     Psychiatric disorder     anxiety    PTSD (post-traumatic stress disorder)     PTSD (post-traumatic stress disorder) 8/12/2023    Varicella     childhood    Visual impairment       Past Surgical History:   Procedure Laterality Date    LAPAROTOMY        Family History   Problem Relation Age of Onset    Depression Mother     Drug abuse Brother     Drug abuse Maternal Aunt     Drug abuse Paternal Uncle     Cancer Maternal Grandmother     Cancer Paternal Grandfather       Social History     Tobacco Use    Smoking status: Never     Passive exposure: Never    Smokeless tobacco: Never   Vaping Use    Vaping status: Every Day    Substances: Nicotine, Flavoring   Substance Use Topics    Alcohol use: Not Currently    Drug use: Not Currently     Comment: 8 bags/day states benzos are mixed in      E-Cigarette/Vaping    E-Cigarette Use Current Every Day User       E-Cigarette/Vaping Substances    Nicotine Yes     THC No     CBD No     Flavoring Yes     Other No     Unknown No       I have reviewed and agree with the history as documented.     This is a 29-year-old female who presents to the emergency department complaining of left arm pain and numbness.  Patient states it is located on the medial aspect of the hand.  Digits 4 and 5.  She states this been ongoing for months.  She states it is worse after working.  She denies fevers or chills.  She denies any new swelling.  She denies any new skin changes.  She states she has been clean for approximately 1 year and has not injected  anything to her arms.  She denies nausea or vomiting.        Review of Systems   All other systems reviewed and are negative.          Objective       ED Triage Vitals [10/27/24 1447]   Temperature Pulse Blood Pressure Respirations SpO2 Patient Position - Orthostatic VS   97.6 °F (36.4 °C) 58 101/67 18 99 % Sitting      Temp Source Heart Rate Source BP Location FiO2 (%) Pain Score    Oral Monitor Left arm -- No Pain      Vitals      Date and Time Temp Pulse SpO2 Resp BP Pain Score FACES Pain Rating User   10/27/24 1447 97.6 °F (36.4 °C) 58 99 % 18 101/67 No Pain -- AM            Physical Exam  Constitutional:  Vital signs reviewed, patient appears nontoxic, no acute distress  Eyes: Pupils equal round reactive to light and accommodation, extraocular muscles intact  HEENT: trachea midline, no JVD, moist mucous membranes  Respiratory: lung sounds clear throughout, no rhonchi, no rales  Cardiovascular: regular rate rhythm, no murmurs or rubs  Abdomen: soft, nontender, nondistended, no rebound or guarding  Back: no CVA tenderness, normal inspection  Extremities: no edema, pulses equal in all 4 extremities  Neuro: awake, alert, oriented, no focal weakness  Skin: warm, dry, chronic old skin changes consistent with injecting, left fourth and fifth fingers flexed with inability to extend, normal sensation, no rashes noted    Results Reviewed       None            No orders to display       Procedures    ED Medication and Procedure Management   Prior to Admission Medications   Prescriptions Last Dose Informant Patient Reported? Taking?   QUEtiapine (SEROquel) 25 mg tablet   No No   Sig: Take 1 tablet (25 mg total) by mouth daily at bedtime   gabapentin (NEURONTIN) 100 mg capsule   No No   Sig: Take 1 capsule (100 mg total) by mouth 3 (three) times a day   methadone (DOLOPHINE) 10 MG/5ML solution  Self Yes No   Sig: Take 136 mg by mouth every morning Take by mouth daily   ondansetron (ZOFRAN) 4 mg tablet   No No   Sig: Take 1  tablet (4 mg total) by mouth every 8 (eight) hours as needed for nausea or vomiting   prazosin (MINIPRESS) 1 mg capsule   No No   Sig: Take 1 capsule (1 mg total) by mouth daily at bedtime   sertraline (ZOLOFT) 25 mg tablet   No No   Sig: Take 1 tablet (25 mg total) by mouth daily      Facility-Administered Medications: None     Discharge Medication List as of 10/27/2024  3:23 PM        CONTINUE these medications which have NOT CHANGED    Details   gabapentin (NEURONTIN) 100 mg capsule Take 1 capsule (100 mg total) by mouth 3 (three) times a day, Starting Tue 3/5/2024, Until Sat 5/4/2024, Normal      methadone (DOLOPHINE) 10 MG/5ML solution Take 136 mg by mouth every morning Take by mouth daily, Historical Med      ondansetron (ZOFRAN) 4 mg tablet Take 1 tablet (4 mg total) by mouth every 8 (eight) hours as needed for nausea or vomiting, Starting Tue 3/5/2024, Normal      prazosin (MINIPRESS) 1 mg capsule Take 1 capsule (1 mg total) by mouth daily at bedtime, Starting Wed 8/16/2023, Until Sun 10/15/2023, Normal      QUEtiapine (SEROquel) 25 mg tablet Take 1 tablet (25 mg total) by mouth daily at bedtime, Starting Tue 3/5/2024, Until Thu 4/4/2024, Normal      sertraline (ZOLOFT) 25 mg tablet Take 1 tablet (25 mg total) by mouth daily, Starting Wed 3/6/2024, Until Fri 4/5/2024, Normal           No discharge procedures on file.  ED SEPSIS DOCUMENTATION   Time reflects when diagnosis was documented in both MDM as applicable and the Disposition within this note       Time User Action Codes Description Comment    10/27/2024  3:16 PM Heron Alcantar [M79.642] Left hand pain     10/27/2024  3:16 PM Heron Alcantar [R20.0] Numbness of left hand                  Heron Alcantar, DO  10/27/24 1717

## 2025-03-15 ENCOUNTER — APPOINTMENT (EMERGENCY)
Dept: CT IMAGING | Facility: HOSPITAL | Age: 30
End: 2025-03-15

## 2025-03-15 ENCOUNTER — HOSPITAL ENCOUNTER (EMERGENCY)
Facility: HOSPITAL | Age: 30
Discharge: HOME | End: 2025-03-15
Attending: EMERGENCY MEDICINE

## 2025-03-15 VITALS
HEART RATE: 79 BPM | TEMPERATURE: 97.6 F | DIASTOLIC BLOOD PRESSURE: 85 MMHG | WEIGHT: 101.19 LBS | OXYGEN SATURATION: 95 % | RESPIRATION RATE: 16 BRPM | BODY MASS INDEX: 17.37 KG/M2 | SYSTOLIC BLOOD PRESSURE: 119 MMHG

## 2025-03-15 DIAGNOSIS — K76.9 LIVER LESION: ICD-10-CM

## 2025-03-15 DIAGNOSIS — R00.2 HEART PALPITATIONS: Primary | ICD-10-CM

## 2025-03-15 DIAGNOSIS — F19.10 SUBSTANCE ABUSE (HCC): ICD-10-CM

## 2025-03-15 LAB
ALBUMIN SERPL BCG-MCNC: 4.1 G/DL (ref 3.5–5)
ALP SERPL-CCNC: 105 U/L (ref 34–104)
ALT SERPL W P-5'-P-CCNC: 24 U/L (ref 7–52)
AMPHETAMINES SERPL QL SCN: NEGATIVE
ANION GAP SERPL CALCULATED.3IONS-SCNC: 12 MMOL/L (ref 4–13)
AST SERPL W P-5'-P-CCNC: 44 U/L (ref 13–39)
BACTERIA UR QL AUTO: ABNORMAL /HPF
BARBITURATES UR QL: NEGATIVE
BASOPHILS # BLD AUTO: 0.03 THOUSANDS/ÂΜL (ref 0–0.1)
BASOPHILS NFR BLD AUTO: 0 % (ref 0–1)
BENZODIAZ UR QL: NEGATIVE
BILIRUB SERPL-MCNC: 0.49 MG/DL (ref 0.2–1)
BILIRUB UR QL STRIP: NEGATIVE
BUN SERPL-MCNC: 5 MG/DL (ref 5–25)
CALCIUM SERPL-MCNC: 9.4 MG/DL (ref 8.4–10.2)
CHLORIDE SERPL-SCNC: 99 MMOL/L (ref 96–108)
CLARITY UR: CLEAR
CO2 SERPL-SCNC: 29 MMOL/L (ref 21–32)
COCAINE UR QL: NEGATIVE
COLOR UR: ABNORMAL
CREAT SERPL-MCNC: 0.59 MG/DL (ref 0.6–1.3)
EOSINOPHIL # BLD AUTO: 0.05 THOUSAND/ÂΜL (ref 0–0.61)
EOSINOPHIL NFR BLD AUTO: 1 % (ref 0–6)
ERYTHROCYTE [DISTWIDTH] IN BLOOD BY AUTOMATED COUNT: 13.1 % (ref 11.6–15.1)
EXT PREGNANCY TEST URINE: NEGATIVE
EXT. CONTROL: NORMAL
FENTANYL UR QL SCN: POSITIVE
GFR SERPL CREATININE-BSD FRML MDRD: 125 ML/MIN/1.73SQ M
GLUCOSE SERPL-MCNC: 148 MG/DL (ref 65–140)
GLUCOSE UR STRIP-MCNC: NEGATIVE MG/DL
HCT VFR BLD AUTO: 36.6 % (ref 34.8–46.1)
HGB BLD-MCNC: 11.8 G/DL (ref 11.5–15.4)
HGB UR QL STRIP.AUTO: NEGATIVE
HYALINE CASTS #/AREA URNS LPF: ABNORMAL /LPF
HYDROCODONE UR QL SCN: NEGATIVE
IMM GRANULOCYTES # BLD AUTO: 0.05 THOUSAND/UL (ref 0–0.2)
IMM GRANULOCYTES NFR BLD AUTO: 1 % (ref 0–2)
KETONES UR STRIP-MCNC: ABNORMAL MG/DL
LEUKOCYTE ESTERASE UR QL STRIP: 25
LIPASE SERPL-CCNC: 11 U/L (ref 11–82)
LYMPHOCYTES # BLD AUTO: 1 THOUSANDS/ÂΜL (ref 0.6–4.47)
LYMPHOCYTES NFR BLD AUTO: 11 % (ref 14–44)
MAGNESIUM SERPL-MCNC: 1.5 MG/DL (ref 1.9–2.7)
MCH RBC QN AUTO: 28.6 PG (ref 26.8–34.3)
MCHC RBC AUTO-ENTMCNC: 32.2 G/DL (ref 31.4–37.4)
MCV RBC AUTO: 89 FL (ref 82–98)
METHADONE UR QL: NEGATIVE
MONOCYTES # BLD AUTO: 0.45 THOUSAND/ÂΜL (ref 0.17–1.22)
MONOCYTES NFR BLD AUTO: 5 % (ref 4–12)
MUCOUS THREADS UR QL AUTO: ABNORMAL
NEUTROPHILS # BLD AUTO: 7.61 THOUSANDS/ÂΜL (ref 1.85–7.62)
NEUTS SEG NFR BLD AUTO: 82 % (ref 43–75)
NITRITE UR QL STRIP: NEGATIVE
NON-SQ EPI CELLS URNS QL MICRO: ABNORMAL /HPF
NRBC BLD AUTO-RTO: 0 /100 WBCS
OPIATES UR QL SCN: NEGATIVE
OXYCODONE+OXYMORPHONE UR QL SCN: NEGATIVE
PCP UR QL: NEGATIVE
PH UR STRIP.AUTO: 6 [PH]
PLATELET # BLD AUTO: 349 THOUSANDS/UL (ref 149–390)
PMV BLD AUTO: 8.5 FL (ref 8.9–12.7)
POTASSIUM SERPL-SCNC: 3.2 MMOL/L (ref 3.5–5.3)
PROT SERPL-MCNC: 7.3 G/DL (ref 6.4–8.4)
PROT UR STRIP-MCNC: ABNORMAL MG/DL
RBC # BLD AUTO: 4.13 MILLION/UL (ref 3.81–5.12)
RBC #/AREA URNS AUTO: ABNORMAL /HPF
SODIUM SERPL-SCNC: 140 MMOL/L (ref 135–147)
SP GR UR STRIP.AUTO: 1.02 (ref 1–1.04)
THC UR QL: NEGATIVE
UROBILINOGEN UA: NEGATIVE MG/DL
WBC # BLD AUTO: 9.19 THOUSAND/UL (ref 4.31–10.16)
WBC #/AREA URNS AUTO: ABNORMAL /HPF

## 2025-03-15 PROCEDURE — 96375 TX/PRO/DX INJ NEW DRUG ADDON: CPT

## 2025-03-15 PROCEDURE — 85025 COMPLETE CBC W/AUTO DIFF WBC: CPT

## 2025-03-15 PROCEDURE — 96361 HYDRATE IV INFUSION ADD-ON: CPT

## 2025-03-15 PROCEDURE — 81025 URINE PREGNANCY TEST: CPT

## 2025-03-15 PROCEDURE — 36415 COLL VENOUS BLD VENIPUNCTURE: CPT

## 2025-03-15 PROCEDURE — 81001 URINALYSIS AUTO W/SCOPE: CPT

## 2025-03-15 PROCEDURE — 80053 COMPREHEN METABOLIC PANEL: CPT

## 2025-03-15 PROCEDURE — 83690 ASSAY OF LIPASE: CPT

## 2025-03-15 PROCEDURE — 99284 EMERGENCY DEPT VISIT MOD MDM: CPT

## 2025-03-15 PROCEDURE — 99285 EMERGENCY DEPT VISIT HI MDM: CPT

## 2025-03-15 PROCEDURE — 81003 URINALYSIS AUTO W/O SCOPE: CPT

## 2025-03-15 PROCEDURE — 96374 THER/PROPH/DIAG INJ IV PUSH: CPT

## 2025-03-15 PROCEDURE — 83735 ASSAY OF MAGNESIUM: CPT

## 2025-03-15 PROCEDURE — 80307 DRUG TEST PRSMV CHEM ANLYZR: CPT

## 2025-03-15 PROCEDURE — 93005 ELECTROCARDIOGRAM TRACING: CPT

## 2025-03-15 PROCEDURE — 71275 CT ANGIOGRAPHY CHEST: CPT

## 2025-03-15 RX ORDER — DIPHENHYDRAMINE HYDROCHLORIDE 50 MG/ML
25 INJECTION, SOLUTION INTRAMUSCULAR; INTRAVENOUS ONCE
Status: COMPLETED | OUTPATIENT
Start: 2025-03-15 | End: 2025-03-15

## 2025-03-15 RX ORDER — CLONIDINE HYDROCHLORIDE 0.1 MG/1
0.2 TABLET ORAL ONCE
Status: COMPLETED | OUTPATIENT
Start: 2025-03-15 | End: 2025-03-15

## 2025-03-15 RX ORDER — DROPERIDOL 2.5 MG/ML
1.25 INJECTION, SOLUTION INTRAMUSCULAR; INTRAVENOUS ONCE
Status: COMPLETED | OUTPATIENT
Start: 2025-03-15 | End: 2025-03-15

## 2025-03-15 RX ADMIN — IOHEXOL 85 ML: 350 INJECTION, SOLUTION INTRAVENOUS at 15:34

## 2025-03-15 RX ADMIN — DIPHENHYDRAMINE HYDROCHLORIDE 25 MG: 50 INJECTION, SOLUTION INTRAMUSCULAR; INTRAVENOUS at 15:14

## 2025-03-15 RX ADMIN — DROPERIDOL 1.25 MG: 2.5 INJECTION, SOLUTION INTRAMUSCULAR; INTRAVENOUS at 15:16

## 2025-03-15 RX ADMIN — NALOXONE HYDROCHLORIDE 4 MG: 4 SPRAY NASAL at 16:09

## 2025-03-15 RX ADMIN — SODIUM CHLORIDE 1000 ML: 0.9 INJECTION, SOLUTION INTRAVENOUS at 15:08

## 2025-03-15 RX ADMIN — CLONIDINE HYDROCHLORIDE 0.2 MG: 0.1 TABLET ORAL at 15:12

## 2025-03-15 NOTE — Clinical Note
Elda Calderon was seen and treated in our emergency department on 3/15/2025.    No restrictions            Diagnosis:     Elda  .    She may return on this date: 03/17/2025         If you have any questions or concerns, please don't hesitate to call.      Ruddy Sutton PA-C    ______________________________           _______________          _______________  Hospital Representative                              Date                                Time

## 2025-03-15 NOTE — ED PROVIDER NOTES
Time reflects when diagnosis was documented in both MDM as applicable and the Disposition within this note       Time User Action Codes Description Comment    3/15/2025  4:03 PM Ruddy Sutton [R00.2] Heart palpitations     3/15/2025  4:04 PM Ruddy Sutton [F19.10] Substance abuse (HCC)     3/15/2025  4:04 PM Ruddy Sutton [K76.9] Liver lesion           ED Disposition       ED Disposition   Discharge    Condition   Stable    Date/Time   Sat Mar 15, 2025  4:06 PM    Comment   Elda Calderon discharge to home/self care.                   Assessment & Plan       Medical Decision Making  Patient is a 29-year-old female who comes in for evaluation of detox.  Patient does not have a medical need for admission to the hospital at this time, did offer patient outpatient rehab, however patient has declined at this time.  Does not want Suboxone currently.  PE study shows no sign of acute pulmonary embolism or acute cardiopulmonary disease.  Patient discharged home with [facility] Narcan.    Patient was informed of the liver lesion seen on CT, and referred to family medicine for further evaluation    Amount and/or Complexity of Data Reviewed  Labs: ordered. Decision-making details documented in ED Course.  Radiology: ordered. Decision-making details documented in ED Course.    Risk  Prescription drug management.        ED Course as of 03/15/25 1858   Sat Mar 15, 2025   1514 Bacteria, UA(!): Moderate   1514 WBC, UA(!): 4-10   1514 FENTANYL URINE(!): Positive   1516 WBC: 9.19   1603 CTA chest pe study  No pulmonary embolus.     Lungs clear.     Irregular 1.3 cm hypervascular lesion in the right hepatic lobe. Recommend further evaluation with a nonemergent liver MRI.     1613 Discussed with patient that she has a likely benign liver lesion seen on CT.  Recommended that she follow-up with PCP.  Did refer to family medicine, as patient does not currently see a family medicine provider.  Patient is  "declining host evaluation currently.  Will discharge patient home.       Medications   droperidol (INAPSINE) injection 1.25 mg (1.25 mg Intravenous Given 3/15/25 1516)   diphenhydrAMINE (BENADRYL) injection 25 mg (25 mg Intravenous Given 3/15/25 1514)   cloNIDine (CATAPRES) tablet 0.2 mg (0.2 mg Oral Given 3/15/25 1512)   sodium chloride 0.9 % bolus 1,000 mL (0 mL Intravenous Stopped 3/15/25 1610)   iohexol (OMNIPAQUE) 350 MG/ML injection (MULTI-DOSE) 85 mL (85 mL Intravenous Given 3/15/25 1534)   naloxone nasal- Given to patient by provider at discharge. (NARCAN) 4 mg/0.1 mL nasal spray 4 mg (4 mg Does not apply Given by Other 3/15/25 1609)       ED Risk Strat Scores                            SBIRT 22yo+      Flowsheet Row Most Recent Value   Initial Alcohol Screen: US AUDIT-C     1. How often do you have a drink containing alcohol? 0 Filed at: 03/15/2025 1412   2. How many drinks containing alcohol do you have on a typical day you are drinking?  0 Filed at: 03/15/2025 1412   3b. FEMALE Any Age, or MALE 65+: How often do you have 4 or more drinks on one occassion? 0 Filed at: 03/15/2025 1412   Audit-C Score 0 Filed at: 03/15/2025 1412   LUDA: How many times in the past year have you...    Used an illegal drug or used a prescription medication for non-medical reasons? Daily or Almost Daily Filed at: 03/15/2025 1412   DAST-10: In the past 12 months...    1. Have you used drugs other than those required for medical reasons? 1 Filed at: 03/15/2025 1412   2. Do you use more than one drug at a time? 1 Filed at: 03/15/2025 1412   3. Have you had medical problems as a result of your drug use (e.g., memory loss, hepatitis, convulsions, bleeding, etc.)? 1 Filed at: 03/15/2025 1412   4. Have you had \"blackouts\" or \"flashbacks\" as a result of drug use?YesNo 1 Filed at: 03/15/2025 0835   5. Do you ever feel bad or guilty about your drug use? 1 Filed at: 03/15/2025 1412   6. Does your spouse (or parent) ever complain about your " involvement with drugs? 1 Filed at: 03/15/2025 1412   7. Have you neglected your family because of your use of drugs? 1 Filed at: 03/15/2025 1412   8. Have you engaged in illegal activities in order to obtain drugs? 1 Filed at: 03/15/2025 1412   9. Have you ever experienced withdrawal symptoms (felt sick) when you stopped taking drugs? 1 Filed at: 03/15/2025 1412   10. Are you always able to stop using drugs when you want to? 1 Filed at: 03/15/2025 1412   DAST-10 Score 10 Filed at: 03/15/2025 1412                            History of Present Illness       Chief Complaint   Patient presents with    Palpitations     On and off for the past few months     Detox Evaluation     Patient reports chronic daily fentanyl use.  Patient denies alcohol use. Patient reports some of the Fentanyl she has been using has been laced with Tranq       Past Medical History:   Diagnosis Date    Anxiety     Borderline personality disorder (HCC)     Chlamydia     Depression     Endometriosis     IV drug user     IV heroin use, rehab 7 times     Panic attacks     Psychiatric disorder     anxiety    PTSD (post-traumatic stress disorder)     PTSD (post-traumatic stress disorder) 8/12/2023    Varicella     childhood    Visual impairment       Past Surgical History:   Procedure Laterality Date    LAPAROTOMY        Family History   Problem Relation Age of Onset    Depression Mother     Drug abuse Brother     Drug abuse Maternal Aunt     Drug abuse Paternal Uncle     Cancer Maternal Grandmother     Cancer Paternal Grandfather       Social History     Tobacco Use    Smoking status: Never     Passive exposure: Never    Smokeless tobacco: Never   Vaping Use    Vaping status: Every Day    Substances: Nicotine, Flavoring   Substance Use Topics    Alcohol use: Not Currently    Drug use: Not Currently     Comment: 8 bags/day states benzos are mixed in      E-Cigarette/Vaping    E-Cigarette Use Current Every Day User       E-Cigarette/Vaping Substances     Nicotine Yes     THC No     CBD No     Flavoring Yes     Other No     Unknown No       I have reviewed and agree with the history as documented.     Patient is a 29-year-old female with a past medical history significant for abusing fentanyl, IV, uses up to 16 bags per day.  Believes that she is withdrawing at this time, last use was several hours ago. Complaining of worsening of her palpitations. Is interested in detox.  Is not interested in Suboxone.  Is no longer on methadone.  Is complaining of pain with inspiration, as well as slight nausea      Palpitations  Associated symptoms: chest pain and nausea    Associated symptoms: no shortness of breath and no vomiting    Detox Evaluation  Associated symptoms: abdominal pain and nausea    Associated symptoms: no palpitations, no shortness of breath and no vomiting        Review of Systems   Constitutional:  Negative for chills, fatigue and fever.   Respiratory:  Negative for chest tightness and shortness of breath.    Cardiovascular:  Positive for chest pain. Negative for palpitations and leg swelling.   Gastrointestinal:  Positive for abdominal pain and nausea. Negative for vomiting.           Objective       ED Triage Vitals [03/15/25 1411]   Temperature Pulse Blood Pressure Respirations SpO2 Patient Position - Orthostatic VS   97.6 °F (36.4 °C) (!) 127 (!) 173/95 18 96 % Sitting      Temp Source Heart Rate Source BP Location FiO2 (%) Pain Score    Oral Monitor Left arm -- --      Vitals      Date and Time Temp Pulse SpO2 Resp BP Pain Score FACES Pain Rating User   03/15/25 1605 -- 79 95 % -- 119/85 -- --    03/15/25 1600 -- 72 -- 16 -- -- --    03/15/25 1512 -- -- -- -- 118/75 -- --    03/15/25 1411 97.6 °F (36.4 °C) 127 96 % 18 173/95 -- -- AP            Physical Exam  Vitals reviewed.   Constitutional:       Appearance: Normal appearance. She is cachectic.   HENT:      Head: Normocephalic and atraumatic.      Right Ear: External ear normal.      Left  Ear: External ear normal.      Nose: Nose normal.   Eyes:      Conjunctiva/sclera: Conjunctivae normal.   Cardiovascular:      Rate and Rhythm: Regular rhythm. Tachycardia present.   Pulmonary:      Effort: Pulmonary effort is normal.   Musculoskeletal:         General: Normal range of motion.      Cervical back: Normal range of motion.      Right lower leg: No edema.      Left lower leg: No edema.   Skin:     General: Skin is warm and dry.   Neurological:      Mental Status: She is alert.         Results Reviewed       Procedure Component Value Units Date/Time    CMP [[id number]]  (Abnormal) Collected: 03/15/25 1509    Lab Status: Final result Specimen: Blood from Arm, Left Updated: 03/15/25 1533     Sodium 140 mmol/L      Potassium 3.2 mmol/L      Chloride 99 mmol/L      CO2 29 mmol/L      ANION GAP 12 mmol/L      BUN 5 mg/dL      Creatinine 0.59 mg/dL      Glucose 148 mg/dL      Calcium 9.4 mg/dL      AST 44 U/L      ALT 24 U/L      Alkaline Phosphatase 105 U/L      Total Protein 7.3 g/dL      Albumin 4.1 g/dL      Total Bilirubin 0.49 mg/dL      eGFR 125 ml/min/1.73sq m     Narrative:      National Kid[employer] Foundation guidelines for Chronic Kidney Disease (CKD):     Stage 1 with normal or high GFR (GFR > 90 mL/min/1.73 square meters)    Stage 2 Mild CKD (GFR = 60-89 mL/min/1.73 square meters)    Stage 3A Moderate CKD (GFR = 45-59 mL/min/1.73 square meters)    Stage 3B Moderate CKD (GFR = 30-44 mL/min/1.73 square meters)    Stage 4 Severe CKD (GFR = 15-29 mL/min/1.73 square meters)    Stage 5 End Stage CKD (GFR <15 mL/min/1.73 square meters)  Note: GFR calculation is accurate only with a steady state creatinine    Lipase [[id number]]  (Normal) Collected: 03/15/25 1509    Lab Status: Final result Specimen: Blood from Arm, Left Updated: 03/15/25 1533     Lipase 11 u/L     Magnesium [[id number]]  (Abnormal) Collected: 03/15/25 1509    Lab Status: Final result Specimen: Blood from Arm, Left Updated: 03/15/25 1533      Magnesium 1.5 mg/dL     CBC and differential [[id number]]  (Abnormal) Collected: 03/15/25 1509    Lab Status: Final result Specimen: Blood from Arm, Left Updated: 03/15/25 1516     WBC 9.19 Thousand/uL      RBC 4.13 Million/uL      Hemoglobin 11.8 g/dL      Hematocrit 36.6 %      MCV 89 fL      MCH 28.6 pg      MCHC 32.2 g/dL      RDW 13.1 %      MPV 8.5 fL      Platelets 349 Thousands/uL      nRBC 0 /100 WBCs      Segmented % 82 %      Immature Grans % 1 %      Lymphocytes % 11 %      Monocytes % 5 %      Eosinophils Relative 1 %      Basophils Relative 0 %      Absolute Neutrophils 7.61 Thousands/µL      Absolute Immature Grans 0.05 Thousand/uL      Absolute Lymphocytes 1.00 Thousands/µL      Absolute Monocytes 0.45 Thousand/µL      Eosinophils Absolute 0.05 Thousand/µL      Basophils Absolute 0.03 Thousands/µL     POCT pregnancy, urine [[id number]]  (Normal) Collected: 03/15/25 1514    Lab Status: Final result Specimen: Urine Updated: 03/15/25 1515     EXT Preg Test, Ur Negative     Control Valid    Urine Microscopic [[id number]]  (Abnormal) Collected: 03/15/25 1442    Lab Status: Final result Specimen: Urine, Clean Catch Updated: 03/15/25 1513     RBC, UA 0-1 /hpf      WBC, UA 4-10 /hpf      Epithelial Cells Moderate /hpf      Bacteria, UA Moderate /hpf      Hyaline Casts, UA 1-2 /lpf      MUCUS THREADS Occasional    Rapid drug screen, urine [[id number]]  (Abnormal) Collected: 03/15/25 1442    Lab Status: Final result Specimen: Urine, Clean Catch Updated: 03/15/25 1511     Amph/Meth UR Negative     Barbiturate Ur Negative     Benzodiazepine Urine Negative     Cocaine Urine Negative     Methadone Urine Negative     Opiate Urine Negative     PCP Ur Negative     THC Urine Negative     Oxycodone Urine Negative     Fentanyl Urine Positive     HYDROCODONE URINE Negative    Narrative:      Presumptive report. If requested, specimen will be sent to reference lab for confirmation.  FOR MEDICAL PURPOSES ONLY.   IF  CONFIRMATION NEEDED PLEASE CONTACT THE LAB WITHIN 5 DAYS.    Drug Screen Cutoff Levels:  AMPHETAMINE/METHAMPHETAMINES  1000 ng/mL  BARBITURATES     200 ng/mL  BENZODIAZEPINES     200 ng/mL  COCAINE      300 ng/mL  METHADONE      300 ng/mL  OPIATES      300 ng/mL  PHENCYCLIDINE     25 ng/mL  THC       50 ng/mL  OXYCODONE      100 ng/mL  FENTANYL      5 ng/mL  HYDROCODONE     300 ng/mL    UA w Reflex to Microscopic w Reflex to Culture [[id number]]  (Abnormal) Collected: 03/15/25 1442    Lab Status: Final result Specimen: Urine, Clean Catch Updated: 03/15/25 1459     Color, UA Laver[place]     Clarity, UA Clear     Specific Gravity, UA 1.025     pH, UA 6.0     Leukocytes, UA 25.0     Nitrite, UA Negative     Protein, UA 30 (1+) mg/dl      Glucose, UA Negative mg/dl      Ketones, UA >=150 (3+) mg/dl      Bilirubin, UA Negative     Occult Blood, UA Negative     UROBILINOGEN UA Negative mg/dL             CTA chest pe study   Final Interpretation by Jeny Doshi MD (03/15 1601)      No pulmonary embolus.      Lungs clear.      Irregular 1.3 cm hypervascular lesion in the right hepatic lobe. Recommend further evaluation with a nonemergent liver MRI.      This study was marked in Epic for immediate notification and follow-up.            Workstation performed: [id number]             Procedures    ED Medication and Procedure Management   Prior to Admission Medications   Prescriptions Last Dose Informant Patient Reported? Taking?   QUEtiapine (SEROquel) 25 mg tablet   No No   Sig: Take 1 tablet (25 mg total) by mouth daily at bedtime   gabapentin (NEURONTIN) 100 mg capsule   No No   Sig: Take 1 capsule (100 mg total) by mouth 3 (three) times a day   methadone (DOLOPHINE) 10 MG/5ML solution  Self Yes No   Sig: Take 136 mg by mouth every morning Take by mouth daily   ondansetron (ZOFRAN) 4 mg tablet   No No   Sig: Take 1 tablet (4 mg total) by mouth every 8 (eight) hours as needed for nausea or vomiting   prazosin (MINIPRESS) 1 mg  capsule   No No   Sig: Take 1 capsule (1 mg total) by mouth daily at bedtime   sertraline (ZOLOFT) 25 mg tablet   No No   Sig: Take 1 tablet (25 mg total) by mouth daily      Facility-Administered Medications: None     Discharge Medication List as of 3/15/2025  4:07 PM        CONTINUE these medications which have NOT CHANGED    Details   gabapentin (NEURONTIN) 100 mg capsule Take 1 capsule (100 mg total) by mouth 3 (three) times a day, Starting Tue 3/5/2024, Until Sat 5/4/2024, Normal      methadone (DOLOPHINE) 10 MG/5ML solution Take 136 mg by mouth every morning Take by mouth daily, Historical Med      ondansetron (ZOFRAN) 4 mg tablet Take 1 tablet (4 mg total) by mouth every 8 (eight) hours as needed for nausea or vomiting, Starting Tue 3/5/2024, Normal      prazosin (MINIPRESS) 1 mg capsule Take 1 capsule (1 mg total) by mouth daily at bedtime, Starting Wed 8/16/2023, Until Sun 10/15/2023, Normal      QUEtiapine (SEROquel) 25 mg tablet Take 1 tablet (25 mg total) by mouth daily at bedtime, Starting Tue 3/5/2024, Until Thu 4/4/2024, Normal      sertraline (ZOLOFT) 25 mg tablet Take 1 tablet (25 mg total) by mouth daily, Starting Wed 3/6/2024, Until Fri 4/5/2024, Normal             ED SEPSIS DOCUMENTATION   Time reflects when diagnosis was documented in both MDM as applicable and the Disposition within this note       Time User Action Codes Description Comment    3/15/2025  4:03 PM Ruddy Sutton [R00.2] Heart palpitations     3/15/2025  4:04 PM Ruddy Sutton [F19.10] Substance abuse (HCC)     3/15/2025  4:04 PM Ruddy Sutton [K76.9] Liver lesion                  Ruddy Sutton PA-C  03/15/25 [id number]

## 2025-03-16 ENCOUNTER — PATIENT OUTREACH (OUTPATIENT)
Facility: HOSPITAL | Age: 30
End: 2025-03-16

## 2025-03-16 LAB
ATRIAL RATE: 93 BPM
P AXIS: 61 DEGREES
PR INTERVAL: 124 MS
QRS AXIS: -16 DEGREES
QRSD INTERVAL: 84 MS
QT INTERVAL: 366 MS
QTC INTERVAL: 456 MS
T WAVE AXIS: 8 DEGREES
VENTRICULAR RATE: 93 BPM

## 2025-03-16 PROCEDURE — 93010 ELECTROCARDIOGRAM REPORT: CPT | Performed by: STUDENT IN AN ORGANIZED HEALTH CARE EDUCATION/TRAINING PROGRAM

## 2025-03-16 NOTE — PROGRESS NOTES
[facility] Warm Handoff Outcome Note    Patient name Elda Calderon  Location Room/bed info not found MRN [id number]  Age: 29 y.o.          Plan Type:  Warm Handoff                                                                                    Plan Date: 3/16/2025  Service:  ED Warm Handoff      Substance Use History:  fentanyl    Warm Handoff Update:  Refused assessment with HOST    Warm Handoff Outcome: Patient Refused

## 2025-04-17 ENCOUNTER — HOSPITAL ENCOUNTER (EMERGENCY)
Facility: HOSPITAL | Age: 30
Discharge: HOME | End: 2025-04-17
Attending: EMERGENCY MEDICINE
Payer: COMMERCIAL

## 2025-04-17 ENCOUNTER — APPOINTMENT (EMERGENCY)
Dept: CT IMAGING | Facility: HOSPITAL | Age: 30
End: 2025-04-17
Payer: COMMERCIAL

## 2025-04-17 ENCOUNTER — APPOINTMENT (EMERGENCY)
Dept: RADIOLOGY | Facility: HOSPITAL | Age: 30
End: 2025-04-17
Payer: COMMERCIAL

## 2025-04-17 VITALS
RESPIRATION RATE: 18 BRPM | TEMPERATURE: 98.6 F | DIASTOLIC BLOOD PRESSURE: 63 MMHG | BODY MASS INDEX: 18.02 KG/M2 | SYSTOLIC BLOOD PRESSURE: 102 MMHG | OXYGEN SATURATION: 97 % | WEIGHT: 105 LBS | HEART RATE: 86 BPM

## 2025-04-17 DIAGNOSIS — R00.2 PALPITATIONS: ICD-10-CM

## 2025-04-17 DIAGNOSIS — R07.9 CHEST PAIN: Primary | ICD-10-CM

## 2025-04-17 DIAGNOSIS — R65.10 SIRS (SYSTEMIC INFLAMMATORY RESPONSE SYNDROME) (HCC): ICD-10-CM

## 2025-04-17 DIAGNOSIS — R11.0 NAUSEA: ICD-10-CM

## 2025-04-17 DIAGNOSIS — D72.829 LEUKOCYTOSIS: ICD-10-CM

## 2025-04-17 DIAGNOSIS — E87.6 HYPOKALEMIA: ICD-10-CM

## 2025-04-17 DIAGNOSIS — E83.42 HYPOMAGNESEMIA: ICD-10-CM

## 2025-04-17 DIAGNOSIS — R79.89 ELEVATED TROPONIN: ICD-10-CM

## 2025-04-17 DIAGNOSIS — F19.11 HX OF SUBSTANCE ABUSE (HCC): ICD-10-CM

## 2025-04-17 LAB
2HR DELTA HS TROPONIN: 26 NG/L
4HR DELTA HS TROPONIN: 10 NG/L
ALBUMIN SERPL BCG-MCNC: 3.4 G/DL (ref 3.5–5)
ALP SERPL-CCNC: 89 U/L (ref 34–104)
ALT SERPL W P-5'-P-CCNC: 9 U/L (ref 7–52)
AMPHETAMINES SERPL QL SCN: NEGATIVE
ANION GAP SERPL CALCULATED.3IONS-SCNC: 15 MMOL/L (ref 4–13)
APTT PPP: 22 SECONDS (ref 23–34)
AST SERPL W P-5'-P-CCNC: 22 U/L (ref 13–39)
ATRIAL RATE: 73 BPM
BARBITURATES UR QL: NEGATIVE
BASOPHILS # BLD AUTO: 0.06 THOUSANDS/ÂΜL (ref 0–0.1)
BASOPHILS NFR BLD AUTO: 0 % (ref 0–1)
BENZODIAZ UR QL: NEGATIVE
BILIRUB SERPL-MCNC: 0.52 MG/DL (ref 0.2–1)
BILIRUB UR QL STRIP: NEGATIVE
BNP SERPL-MCNC: 54 PG/ML (ref 0–100)
BUN SERPL-MCNC: 8 MG/DL (ref 5–25)
CALCIUM ALBUM COR SERPL-MCNC: 9.2 MG/DL (ref 8.3–10.1)
CALCIUM SERPL-MCNC: 8.7 MG/DL (ref 8.4–10.2)
CARDIAC TROPONIN I PNL SERPL HS: 39 NG/L (ref ?–50)
CARDIAC TROPONIN I PNL SERPL HS: 49 NG/L (ref ?–50)
CARDIAC TROPONIN I PNL SERPL HS: 65 NG/L (ref ?–50)
CHLORIDE SERPL-SCNC: 100 MMOL/L (ref 96–108)
CK SERPL-CCNC: 249 U/L (ref 26–192)
CLARITY UR: CLEAR
CO2 SERPL-SCNC: 24 MMOL/L (ref 21–32)
COCAINE UR QL: NEGATIVE
COLOR UR: YELLOW
CREAT SERPL-MCNC: 0.56 MG/DL (ref 0.6–1.3)
D DIMER PPP FEU-MCNC: 4.47 UG/ML FEU
EOSINOPHIL # BLD AUTO: 0.48 THOUSAND/ÂΜL (ref 0–0.61)
EOSINOPHIL NFR BLD AUTO: 3 % (ref 0–6)
ERYTHROCYTE [DISTWIDTH] IN BLOOD BY AUTOMATED COUNT: 12.4 % (ref 11.6–15.1)
EXT PREGNANCY TEST URINE: NEGATIVE
EXT. CONTROL: NORMAL
FENTANYL UR QL SCN: POSITIVE
FLUAV RNA RESP QL NAA+PROBE: NEGATIVE
FLUBV RNA RESP QL NAA+PROBE: NEGATIVE
GFR SERPL CREATININE-BSD FRML MDRD: 127 ML/MIN/1.73SQ M
GLUCOSE SERPL-MCNC: 79 MG/DL (ref 65–140)
GLUCOSE UR STRIP-MCNC: NEGATIVE MG/DL
HCT VFR BLD AUTO: 34.5 % (ref 34.8–46.1)
HGB BLD-MCNC: 11.3 G/DL (ref 11.5–15.4)
HGB UR QL STRIP.AUTO: NEGATIVE
HYDROCODONE UR QL SCN: NEGATIVE
IMM GRANULOCYTES # BLD AUTO: 0.13 THOUSAND/UL (ref 0–0.2)
IMM GRANULOCYTES NFR BLD AUTO: 1 % (ref 0–2)
INR PPP: 1.23 (ref 0.85–1.19)
KETONES UR STRIP-MCNC: ABNORMAL MG/DL
LACTATE SERPL-SCNC: 1.1 MMOL/L (ref 0.5–2)
LEUKOCYTE ESTERASE UR QL STRIP: NEGATIVE
LIPASE SERPL-CCNC: 15 U/L (ref 11–82)
LYMPHOCYTES # BLD AUTO: 1.63 THOUSANDS/ÂΜL (ref 0.6–4.47)
LYMPHOCYTES NFR BLD AUTO: 9 % (ref 14–44)
MAGNESIUM SERPL-MCNC: 1.5 MG/DL (ref 1.9–2.7)
MCH RBC QN AUTO: 29.3 PG (ref 26.8–34.3)
MCHC RBC AUTO-ENTMCNC: 32.8 G/DL (ref 31.4–37.4)
MCV RBC AUTO: 89 FL (ref 82–98)
METHADONE UR QL: NEGATIVE
MONOCYTES # BLD AUTO: 1.42 THOUSAND/ÂΜL (ref 0.17–1.22)
MONOCYTES NFR BLD AUTO: 8 % (ref 4–12)
NEUTROPHILS # BLD AUTO: 15.34 THOUSANDS/ÂΜL (ref 1.85–7.62)
NEUTS SEG NFR BLD AUTO: 79 % (ref 43–75)
NITRITE UR QL STRIP: NEGATIVE
NRBC BLD AUTO-RTO: 0 /100 WBCS
OPIATES UR QL SCN: NEGATIVE
OXYCODONE+OXYMORPHONE UR QL SCN: NEGATIVE
P AXIS: 54 DEGREES
PCP UR QL: NEGATIVE
PH UR STRIP.AUTO: 7 [PH]
PLATELET # BLD AUTO: 271 THOUSANDS/UL (ref 149–390)
PMV BLD AUTO: 8.7 FL (ref 8.9–12.7)
POTASSIUM SERPL-SCNC: 2.7 MMOL/L (ref 3.5–5.3)
PR INTERVAL: 86 MS
PROCALCITONIN SERPL-MCNC: <0.05 NG/ML
PROT SERPL-MCNC: 6.8 G/DL (ref 6.4–8.4)
PROT UR STRIP-MCNC: NEGATIVE MG/DL
PROTHROMBIN TIME: 16.2 SECONDS (ref 12.3–15)
QRS AXIS: 14 DEGREES
QRSD INTERVAL: 80 MS
QT INTERVAL: 398 MS
QTC INTERVAL: 438 MS
RBC # BLD AUTO: 3.86 MILLION/UL (ref 3.81–5.12)
RSV RNA RESP QL NAA+PROBE: NEGATIVE
SARS-COV-2 RNA RESP QL NAA+PROBE: NEGATIVE
SODIUM SERPL-SCNC: 139 MMOL/L (ref 135–147)
SP GR UR STRIP.AUTO: 1.02 (ref 1–1.03)
T WAVE AXIS: 44 DEGREES
THC UR QL: NEGATIVE
TSH SERPL DL<=0.05 MIU/L-ACNC: 0.48 UIU/ML (ref 0.45–4.5)
UROBILINOGEN UR STRIP-ACNC: <2 MG/DL
VENTRICULAR RATE: 73 BPM
WBC # BLD AUTO: 19.06 THOUSAND/UL (ref 4.31–10.16)

## 2025-04-17 PROCEDURE — 81025 URINE PREGNANCY TEST: CPT

## 2025-04-17 PROCEDURE — 85610 PROTHROMBIN TIME: CPT

## 2025-04-17 PROCEDURE — 93010 ELECTROCARDIOGRAM REPORT: CPT | Performed by: INTERNAL MEDICINE

## 2025-04-17 PROCEDURE — 83690 ASSAY OF LIPASE: CPT

## 2025-04-17 PROCEDURE — 84484 ASSAY OF TROPONIN QUANT: CPT

## 2025-04-17 PROCEDURE — 36415 COLL VENOUS BLD VENIPUNCTURE: CPT

## 2025-04-17 PROCEDURE — 83605 ASSAY OF LACTIC ACID: CPT

## 2025-04-17 PROCEDURE — 96367 TX/PROPH/DG ADDL SEQ IV INF: CPT

## 2025-04-17 PROCEDURE — 84145 PROCALCITONIN (PCT): CPT | Performed by: EMERGENCY MEDICINE

## 2025-04-17 PROCEDURE — 71275 CT ANGIOGRAPHY CHEST: CPT

## 2025-04-17 PROCEDURE — 93005 ELECTROCARDIOGRAM TRACING: CPT

## 2025-04-17 PROCEDURE — 71045 X-RAY EXAM CHEST 1 VIEW: CPT

## 2025-04-17 PROCEDURE — 96361 HYDRATE IV INFUSION ADD-ON: CPT

## 2025-04-17 PROCEDURE — 99285 EMERGENCY DEPT VISIT HI MDM: CPT

## 2025-04-17 PROCEDURE — 96365 THER/PROPH/DIAG IV INF INIT: CPT

## 2025-04-17 PROCEDURE — 0241U HB NFCT DS VIR RESP RNA 4 TRGT: CPT

## 2025-04-17 PROCEDURE — 80307 DRUG TEST PRSMV CHEM ANLYZR: CPT

## 2025-04-17 PROCEDURE — 81003 URINALYSIS AUTO W/O SCOPE: CPT | Performed by: EMERGENCY MEDICINE

## 2025-04-17 PROCEDURE — 84443 ASSAY THYROID STIM HORMONE: CPT

## 2025-04-17 PROCEDURE — 85730 THROMBOPLASTIN TIME PARTIAL: CPT

## 2025-04-17 PROCEDURE — 85025 COMPLETE CBC W/AUTO DIFF WBC: CPT

## 2025-04-17 PROCEDURE — 80053 COMPREHEN METABOLIC PANEL: CPT

## 2025-04-17 PROCEDURE — 87040 BLOOD CULTURE FOR BACTERIA: CPT

## 2025-04-17 PROCEDURE — 96368 THER/DIAG CONCURRENT INF: CPT

## 2025-04-17 PROCEDURE — 82550 ASSAY OF CK (CPK): CPT

## 2025-04-17 PROCEDURE — 83880 ASSAY OF NATRIURETIC PEPTIDE: CPT

## 2025-04-17 PROCEDURE — 96375 TX/PRO/DX INJ NEW DRUG ADDON: CPT

## 2025-04-17 PROCEDURE — 83735 ASSAY OF MAGNESIUM: CPT

## 2025-04-17 PROCEDURE — 85379 FIBRIN DEGRADATION QUANT: CPT

## 2025-04-17 PROCEDURE — 74177 CT ABD & PELVIS W/CONTRAST: CPT

## 2025-04-17 RX ORDER — POTASSIUM CHLORIDE 1500 MG/1
40 TABLET, EXTENDED RELEASE ORAL ONCE
Status: COMPLETED | OUTPATIENT
Start: 2025-04-17 | End: 2025-04-17

## 2025-04-17 RX ORDER — ONDANSETRON 2 MG/ML
4 INJECTION INTRAMUSCULAR; INTRAVENOUS ONCE
Status: COMPLETED | OUTPATIENT
Start: 2025-04-17 | End: 2025-04-17

## 2025-04-17 RX ORDER — KETOROLAC TROMETHAMINE 30 MG/ML
15 INJECTION, SOLUTION INTRAMUSCULAR; INTRAVENOUS ONCE
Status: COMPLETED | OUTPATIENT
Start: 2025-04-17 | End: 2025-04-17

## 2025-04-17 RX ORDER — ACETAMINOPHEN 325 MG/1
650 TABLET ORAL ONCE
Status: COMPLETED | OUTPATIENT
Start: 2025-04-17 | End: 2025-04-17

## 2025-04-17 RX ORDER — MAGNESIUM SULFATE HEPTAHYDRATE 40 MG/ML
2 INJECTION, SOLUTION INTRAVENOUS ONCE
Status: COMPLETED | OUTPATIENT
Start: 2025-04-17 | End: 2025-04-17

## 2025-04-17 RX ORDER — METRONIDAZOLE 500 MG/100ML
500 INJECTION, SOLUTION INTRAVENOUS EVERY 12 HOURS
Status: DISCONTINUED | OUTPATIENT
Start: 2025-04-17 | End: 2025-04-17 | Stop reason: HOSPADM

## 2025-04-17 RX ORDER — POTASSIUM CHLORIDE 14.9 MG/ML
20 INJECTION INTRAVENOUS ONCE
Status: COMPLETED | OUTPATIENT
Start: 2025-04-17 | End: 2025-04-17

## 2025-04-17 RX ORDER — PROMETHAZINE HYDROCHLORIDE 25 MG/ML
6.25 INJECTION, SOLUTION INTRAMUSCULAR; INTRAVENOUS ONCE
Status: COMPLETED | OUTPATIENT
Start: 2025-04-17 | End: 2025-04-17

## 2025-04-17 RX ORDER — ONDANSETRON 4 MG/1
4 TABLET, ORALLY DISINTEGRATING ORAL EVERY 6 HOURS PRN
Qty: 10 TABLET | Refills: 0 | Status: SHIPPED | OUTPATIENT
Start: 2025-04-17

## 2025-04-17 RX ORDER — HYDROXYZINE HYDROCHLORIDE 25 MG/1
25 TABLET, FILM COATED ORAL ONCE
Status: COMPLETED | OUTPATIENT
Start: 2025-04-17 | End: 2025-04-17

## 2025-04-17 RX ADMIN — IOHEXOL 60 ML: 350 INJECTION, SOLUTION INTRAVENOUS at 09:02

## 2025-04-17 RX ADMIN — HYDROXYZINE HYDROCHLORIDE 25 MG: 25 TABLET, FILM COATED ORAL at 12:57

## 2025-04-17 RX ADMIN — CEFTRIAXONE SODIUM 1000 MG: 10 INJECTION, POWDER, FOR SOLUTION INTRAVENOUS at 11:07

## 2025-04-17 RX ADMIN — SODIUM CHLORIDE 1000 ML: 0.9 INJECTION, SOLUTION INTRAVENOUS at 06:32

## 2025-04-17 RX ADMIN — METRONIDAZOLE 500 MG: 500 INJECTION, SOLUTION INTRAVENOUS at 11:14

## 2025-04-17 RX ADMIN — POTASSIUM CHLORIDE 20 MEQ: 14.9 INJECTION, SOLUTION INTRAVENOUS at 08:30

## 2025-04-17 RX ADMIN — POTASSIUM CHLORIDE 40 MEQ: 1500 TABLET, EXTENDED RELEASE ORAL at 08:15

## 2025-04-17 RX ADMIN — PROMETHAZINE HYDROCHLORIDE 6.25 MG: 25 INJECTION INTRAMUSCULAR; INTRAVENOUS at 12:37

## 2025-04-17 RX ADMIN — IOHEXOL 70 ML: 350 INJECTION, SOLUTION INTRAVENOUS at 11:35

## 2025-04-17 RX ADMIN — ONDANSETRON 4 MG: 2 INJECTION INTRAMUSCULAR; INTRAVENOUS at 11:08

## 2025-04-17 RX ADMIN — KETOROLAC TROMETHAMINE 15 MG: 30 INJECTION, SOLUTION INTRAMUSCULAR; INTRAVENOUS at 05:59

## 2025-04-17 RX ADMIN — ONDANSETRON 4 MG: 2 INJECTION INTRAMUSCULAR; INTRAVENOUS at 05:59

## 2025-04-17 RX ADMIN — SODIUM CHLORIDE 1000 ML: 0.9 INJECTION, SOLUTION INTRAVENOUS at 08:17

## 2025-04-17 RX ADMIN — ACETAMINOPHEN 650 MG: 325 TABLET, FILM COATED ORAL at 06:14

## 2025-04-17 RX ADMIN — MAGNESIUM SULFATE HEPTAHYDRATE 2 G: 40 INJECTION, SOLUTION INTRAVENOUS at 08:27

## 2025-04-17 NOTE — DISCHARGE INSTRUCTIONS
As discussed, you had very abnormal test results today.  These included a very high white blood cell count (concerning for inflammation, potentially infection) and elevated troponin (heart enzyme level) among other things.  Further in-hospital evaluation and treatment was advised.    You desired discharge, against medical recommendations.  Please take ondansetron as prescribed if needed for nausea.  Please take Augmentin (amoxicillin-clavulanate) antibiotic as prescribed to treat bacteria which may be contributing to your illness and schedule a follow-up appointment with your primary care physician for reevaluation-ideally within the next couple of days.    PLEASE return for any worsening-increased pain, shortness of breath, development of fever, vomiting, inability to eat or drink or any other concerns.  As discussed, YOU MAY HAVE A LIFE THREATENING INFECTION.

## 2025-04-17 NOTE — ED NOTES
Patient requesting to go home in spite of multiple education and encouragement to stay by this RN and MD. Patient repeats back understanding and acknowledges risk but states she would like to go home. Patient A&O x 4; denies complaints at this time; denies SI/HI. MD aware. Comfort and safety measures provided.      Hortensia Peña RN  04/17/25 0965

## 2025-04-17 NOTE — ED PROVIDER NOTES
Time reflects when diagnosis was documented in both MDM as applicable and the Disposition within this note       Time User Action Codes Description Comment    4/17/2025  7:57 AM Carmen Lauren Add [R07.9] Chest pain     4/17/2025  7:57 AM Carmen Lauren Add [R00.2] Palpitations     4/17/2025  7:57 AM Carmen Lauren Add [E87.6] Hypokalemia     4/17/2025  7:57 AM Carmen Lauren Add [E83.42] Hypomagnesemia           ED Disposition       None          Assessment & Plan       Medical Decision Making  DDX including but not limited to: chest wall pain, pleurisy, costochondritis, pericarditis, myocarditis, PTX, pneumonia, GI etiology; doubt ACS or MI or dissection or PE or rhabdomyolysis.     Viral swab negative.   Pt is very difficult stick for blood and IV access due to history of IV drug abuse.   D-dimer elevated. Will obtain CT PE study  Magnesium low, will give IV repletion  Potassium low. Will give IV and PO repletion  Leukocytosis of 19. Blood cultures ordered  Pt signed out to Dr Hendricks pending remaining work up and disposition.     Amount and/or Complexity of Data Reviewed  Labs: ordered. Decision-making details documented in ED Course.  Radiology: ordered.    Risk  OTC drugs.  Prescription drug management.        ED Course as of 04/17/25 0801   Thu Apr 17, 2025   0644 Placed US guided PIV in the right distal forearm. Pt is very hard stick.    0654 WBC(!): 19.06   0654 SARS-COV-2: Negative   0654 INFLU A PCR: Negative   0654 INFLU B PCR: Negative   0654 RSV PCR: Negative   0654 PREGNANCY TEST URINE: Negative   0700 FENTANYL URINE(!): Positive   0719 MAGNESIUM(!): 1.5   0719 D-Dimer, Quant(!): 4.47  Will get CT PE study    0719 Potassium(!): 2.7   0719 Discussed results so far with pt and plan cor CT PE study along with repletion of electrolytes. Pain and nausea currently tolerable. Pt agreeable to plan   0737 Pt signed out to Dr Hendricks pending remaining work up and disposition.         Medications   magnesium sulfate 2 g/50 mL IVPB (premix) 2 g (has no administration in time range)   potassium chloride (Klor-Con M20) CR tablet 40 mEq (has no administration in time range)   potassium chloride 20 mEq IVPB (premix) (has no administration in time range)   sodium chloride 0.9 % bolus 1,000 mL (has no administration in time range)   sodium chloride 0.9 % bolus 1,000 mL (1,000 mL Intravenous New Bag 4/17/25 0632)   acetaminophen (TYLENOL) tablet 650 mg (650 mg Oral Given 4/17/25 0614)   ketorolac (TORADOL) injection 15 mg (15 mg Intravenous Given 4/17/25 0559)   ondansetron (ZOFRAN) injection 4 mg (4 mg Intravenous Given 4/17/25 0559)       ED Risk Strat Scores   HEART Risk Score      Flowsheet Row Most Recent Value   Heart Score Risk Calculator    History 1 Filed at: 04/17/2025 0800   ECG 0 Filed at: 04/17/2025 0800   Age 0 Filed at: 04/17/2025 0800   Risk Factors 1 Filed at: 04/17/2025 0800   Troponin 2 Filed at: 04/17/2025 0800   HEART Score 4 Filed at: 04/17/2025 0800          HEART Risk Score      Flowsheet Row Most Recent Value   Heart Score Risk Calculator    History 1 Filed at: 04/17/2025 0800   ECG 0 Filed at: 04/17/2025 0800   Age 0 Filed at: 04/17/2025 0800   Risk Factors 1 Filed at: 04/17/2025 0800   Troponin 2 Filed at: 04/17/2025 0800   HEART Score 4 Filed at: 04/17/2025 0800                      No data recorded    PERC Rule for PE      Flowsheet Row Most Recent Value   PERC Rule for PE    Age >=50 0 Filed at: 04/17/2025 0753   HR >=100 0 Filed at: 04/17/2025 0753   O2 Sat on room air < 95% 0 Filed at: 04/17/2025 0753   History of PE or DVT 0 Filed at: 04/17/2025 0753   Recent trauma or surgery 1  [recetn IV drug use] Filed at: 04/17/2025 0753   Hemoptysis 0 Filed at: 04/17/2025 0753   Exogenous estrogen 0 Filed at: 04/17/2025 0753   Unilateral leg swelling 0 Filed at: 04/17/2025 0753   PERC Rule for PE Results 1 Filed at: 04/17/2025 0753            SBIRT 20yo+      Flowsheet Row  Most Recent Value   Initial Alcohol Screen: US AUDIT-C     1. How often do you have a drink containing alcohol? 0 Filed at: 04/17/2025 0428   2. How many drinks containing alcohol do you have on a typical day you are drinking?  0 Filed at: 04/17/2025 0428   3b. FEMALE Any Age, or MALE 65+: How often do you have 4 or more drinks on one occassion? 0 Filed at: 04/17/2025 0428   Audit-C Score 0 Filed at: 04/17/2025 0428            Wells' Criteria for PE      Flowsheet Row Most Recent Value   Wells' Criteria for PE    Clinical signs and symptoms of DVT 0 Filed at: 04/17/2025 0755   PE is primary diagnosis or equally likely 0 Filed at: 04/17/2025 0755   HR >100 0 Filed at: 04/17/2025 0755   Immobilization at least 3 days or Surgery in the previous 4 weeks 1.5  [IV drug abuse] Filed at: 04/17/2025 0755   Previous, objectively diagnosed PE or DVT 0 Filed at: 04/17/2025 0755   Hemoptysis 0 Filed at: 04/17/2025 0755   Malignancy with treatment within 6 months or palliative 0 Filed at: 04/17/2025 0755   Wells' Criteria Total 1.5 Filed at: 04/17/2025 0755                        History of Present Illness       Chief Complaint   Patient presents with    Chest Pain     Pt complaining of chest pain, SOB, and palpitations that started around 0300. Also feeling nausea since yesterday no vomiting of diarrhea.        Past Medical History:   Diagnosis Date    Anxiety     Borderline personality disorder (HCC)     Chlamydia     Depression     Endometriosis     IV drug user     IV heroin use, rehab 7 times     Panic attacks     Psychiatric disorder     anxiety    PTSD (post-traumatic stress disorder)     PTSD (post-traumatic stress disorder) 8/12/2023    Varicella     childhood    Visual impairment       Past Surgical History:   Procedure Laterality Date    LAPAROTOMY        Family History   Problem Relation Age of Onset    Depression Mother     Drug abuse Brother     Drug abuse Maternal Aunt     Drug abuse Paternal Uncle     Cancer  Maternal Grandmother     Cancer Paternal Grandfather       Social History     Tobacco Use    Smoking status: Never     Passive exposure: Never    Smokeless tobacco: Never   Vaping Use    Vaping status: Every Day    Substances: Nicotine, Flavoring   Substance Use Topics    Alcohol use: Not Currently    Drug use: Not Currently     Comment: 8 bags/day states benzos are mixed in      E-Cigarette/Vaping    E-Cigarette Use Current Every Day User       E-Cigarette/Vaping Substances    Nicotine Yes     THC No     CBD No     Flavoring Yes     Other No     Unknown No       I have reviewed and agree with the history as documented.     The patient is a 29 year old female who presents to ED for evaluation of chest pain, palpitations. Pt states she woke around 0345 with left sided chest pain radiating to the back and palpitations. Pain and palpitations are accompanied by SOB and nausea. Pt states she had palpitations a few weeks ago but no chest pain. She reports she had been using IV opiates until a few weeks ago. Denies recent injury or trauma, fever, LE swelling, cough        Review of Systems   Constitutional:  Negative for fever.   HENT:  Negative for congestion.    Respiratory:  Positive for shortness of breath. Negative for cough.    Cardiovascular:  Positive for chest pain.   Gastrointestinal:  Positive for nausea. Negative for abdominal pain and vomiting.   Genitourinary:  Negative for difficulty urinating and vaginal bleeding.   Musculoskeletal:  Positive for back pain.   Skin:  Negative for rash.   Neurological:  Negative for syncope and headaches.           Objective       ED Triage Vitals   Temperature Pulse Blood Pressure Respirations SpO2 Patient Position - Orthostatic VS   04/17/25 0427 04/17/25 0427 04/17/25 0427 04/17/25 0427 04/17/25 0427 04/17/25 0427   97.5 °F (36.4 °C) 69 128/65 18 98 % Lying      Temp Source Heart Rate Source BP Location FiO2 (%) Pain Score    04/17/25 0427 04/17/25 0645 04/17/25 0427 --  04/17/25 0559    Oral Monitor Right arm  8      Vitals      Date and Time Temp Pulse SpO2 Resp BP Pain Score FACES Pain Rating User   04/17/25 0645 -- 68 97 % -- 105/55 -- -- ML   04/17/25 0634 -- 77 96 % 16 105/55 3 [place] [facility]   04/17/25 0614 -- -- -- -- -- 5 [place] [facility] [place]   04/17/25 0559 -- -- -- -- -- 8 [place] [facility] [place]   04/17/25 0427 97.5 °F (36.4 °C) 69 98 % 18 128/65 -- -- JR            Physical Exam  Vitals and nursing note reviewed.   HENT:      Head: Normocephalic and atraumatic.      Nose: Nose normal.   Cardiovascular:      Rate and Rhythm: Normal rate and regular rhythm.      Pulses:           Radial pulses are 2+ on the right side and 2+ on the left side.        Dorsalis pedis pulses are 2+ on the right side and 2+ on the left side.      Heart sounds: Normal heart sounds.   Pulmonary:      Effort: Pulmonary effort is normal.      Breath sounds: Normal breath sounds. No decreased breath sounds or wheezing.   Abdominal:      General: Abdomen is flat. Bowel sounds are normal.      Tenderness: There is no abdominal tenderness.   Musculoskeletal:      Cervical back: Neck supple.      Right lower leg: No tenderness. No edema.      Left lower leg: No tenderness. No edema.      Comments: Unable to fully extend left arm, reported as chronic   Skin:     General: Skin is warm and dry.      Coloration: Skin is pale.      Comments: Multiple old healed scars to the bilateral UE with thick tough skin    Neurological:      General: No focal deficit present.      Mental Status: She is alert and oriented to person, place, and time.   Psychiatric:         Mood and Affect: Mood normal.         Results Reviewed       Procedure Component Value Units Date/Time    Protime-INR [[id number]] Collected: 04/17/25 0754    Lab Status: No result Specimen: Blood from Arm, Left     APTT [[id number]] Collected: 04/17/25 0754    Lab Status: No result Specimen: Blood from Arm, Left     Blood culture #1 [[id number]] Collected: 04/17/25 0754    Lab Status: No  result Specimen: Blood from Arm, Left     Lactic acid, plasma (w/reflex if result > 2.0) [[id number]] Collected: 04/17/25 0754    Lab Status: No result Specimen: Blood from Arm, Left     B-Type Natriuretic Peptide(BNP) [[id number]]  (Normal) Collected: 04/17/25 0625    Lab Status: Final result Specimen: Blood from Arm, Right Updated: 04/17/25 0746     BNP 54 pg/mL     TSH, 3rd generation with Free T4 reflex [[id number]]  (Normal) Collected: 04/17/25 0625    Lab Status: Final result Specimen: Blood from Arm, Right Updated: 04/17/25 0745     TSH 3RD GENERATON 0.484 uIU/mL     UA w Reflex to Microscopic w Reflex to Culture [[id number]] Collected: 04/17/25 0510    Lab Status: In process Specimen: Urine, Clean Catch Updated: 04/17/25 0733    Procalcitonin [[id number]]     Lab Status: No result Specimen: Blood     Blood culture #2 [[id number]]     Lab Status: No result Specimen: Blood     D-Dimer [[id number]]  (Abnormal) Collected: 04/17/25 0625    Lab Status: Final result Specimen: Blood from Arm, Right Updated: 04/17/25 0707     D-Dimer, Quant 4.47 ug/ml FEU     HS Troponin I 2hr [[id number]]     Lab Status: No result Specimen: Blood     HS Troponin 0hr (reflex protocol) [[id number]]  (Normal) Collected: 04/17/25 0625    Lab Status: Final result Specimen: Blood from Arm, Right Updated: 04/17/25 0706     hs TnI 0hr 39 ng/L     Comprehensive metabolic panel [[id number]]  (Abnormal) Collected: 04/17/25 0625    Lab Status: Final result Specimen: Blood from Arm, Right Updated: 04/17/25 0657     Sodium 139 mmol/L      Potassium 2.7 mmol/L      Chloride 100 mmol/L      CO2 24 mmol/L      ANION GAP 15 mmol/L      BUN 8 mg/dL      Creatinine 0.56 mg/dL      Glucose 79 mg/dL      Calcium 8.7 mg/dL      Corrected Calcium 9.2 mg/dL      AST 22 U/L      ALT 9 U/L      Alkaline Phosphatase 89 U/L      Total Protein 6.8 g/dL      Albumin 3.4 g/dL      Total Bilirubin 0.52 mg/dL      eGFR 127 ml/min/1.73sq m     Narrative:      National Kidney  D[employer] guidelines for Chronic Kidney Disease (CKD):     Stage 1 with normal or high GFR (GFR > 90 mL/min/1.73 square meters)    Stage 2 Mild CKD (GFR = 60-89 mL/min/1.73 square meters)    Stage 3A Moderate CKD (GFR = 45-59 mL/min/1.73 square meters)    Stage 3B Moderate CKD (GFR = 30-44 mL/min/1.73 square meters)    Stage 4 Severe CKD (GFR = 15-29 mL/min/1.73 square meters)    Stage 5 End Stage CKD (GFR <15 mL/min/1.73 square meters)  Note: GFR calculation is accurate only with a steady state creatinine    Magnesium [[id number]]  (Abnormal) Collected: 04/17/25 0625    Lab Status: Final result Specimen: Blood from Arm, Right Updated: 04/17/25 0657     Magnesium 1.5 mg/dL     Lipase [[id number]]  (Normal) Collected: 04/17/25 0625    Lab Status: Final result Specimen: Blood from Arm, Right Updated: 04/17/25 0656     Lipase 15 u/L     CK [[id number]]  (Abnormal) Collected: 04/17/25 0625    Lab Status: Final result Specimen: Blood from Arm, Right Updated: 04/17/25 0656     Total  U/L     CBC and differential [[id number]]  (Abnormal) Collected: 04/17/25 0625    Lab Status: Final result Specimen: Blood from Arm, Right Updated: 04/17/25 0638     WBC 19.06 Thousand/uL      RBC 3.86 Million/uL      Hemoglobin 11.3 g/dL      Hematocrit 34.5 %      MCV 89 fL      MCH 29.3 pg      MCHC 32.8 g/dL      RDW 12.4 %      MPV 8.7 fL      Platelets 271 Thousands/uL      nRBC 0 /100 WBCs      Segmented % 79 %      Immature Grans % 1 %      Lymphocytes % 9 %      Monocytes % 8 %      Eosinophils Relative 3 %      Basophils Relative 0 %      Absolute Neutrophils 15.34 Thousands/µL      Absolute Immature Grans 0.13 Thousand/uL      Absolute Lymphocytes 1.63 Thousands/µL      Absolute Monocytes 1.42 Thousand/µL      Eosinophils Absolute 0.48 Thousand/µL      Basophils Absolute 0.06 Thousands/µL     Rapid drug screen, urine [[id number]]  (Abnormal) Collected: 04/17/25 0510    Lab Status: Final result Specimen: Urine, Clean Catch  Updated: 04/17/25 0604     Amph/Meth UR Negative     Barbiturate Ur Negative     Benzodiazepine Urine Negative     Cocaine Urine Negative     Methadone Urine Negative     Opiate Urine Negative     PCP Ur Negative     THC Urine Negative     Oxycodone Urine Negative     Fentanyl Urine Positive     HYDROCODONE URINE Negative    Narrative:      Presumptive report. If requested, specimen will be sent to reference lab for confirmation.  FOR MEDICAL PURPOSES ONLY.   IF CONFIRMATION NEEDED PLEASE CONTACT THE LAB WITHIN 5 DAYS.    Drug Screen Cutoff Levels:  AMPHETAMINE/METHAMPHETAMINES  1000 ng/mL  BARBITURATES     200 ng/mL  BENZODIAZEPINES     200 ng/mL  COCAINE      300 ng/mL  METHADONE      300 ng/mL  OPIATES      300 ng/mL  PHENCYCLIDINE     25 ng/mL  THC       50 ng/mL  OXYCODONE      100 ng/mL  FENTANYL      5 ng/mL  HYDROCODONE     300 ng/mL    FLU/RSV/COVID - if FLU/RSV clinically relevant (2hr TAT) [[id number]]  (Normal) Collected: 04/17/25 0510    Lab Status: Final result Specimen: Nares from Nose Updated: 04/17/25 0604     SARS-CoV-2 Negative     INFLUENZA A PCR Negative     INFLUENZA B PCR Negative     RSV PCR Negative    Narrative:      This test has been performed using the CoV-2/Flu/RSV plus assay on the [employer] GeneXpert platform. This test has been validated by the  and verified by the performing laboratory.     This test is designed to amplify and detect the following: nucleocapsid (N), envelope (E), and RNA-dependent RNA polymerase (RdRP) genes of the SARS-CoV-2 genome; matrix (M), basic polymerase (PB2), and acidic protein (PA) segments of the influenza A genome; matrix (M) and non-structural protein (NS) segments of the influenza B genome, and the nucleocapsid genes of RSV A and RSV B.     Positive results are indicative of the presence of Flu A, Flu B, RSV, and/or SARS-CoV-2 RNA. Positive results for SARS-CoV-2 or suspected novel influenza should be reported to state, local, or federal  health departments according to local reporting requirements.      All results should be assessed in conjunction with clinical presentation and other laboratory markers for clinical management.     FOR PEDIATRIC PATIENTS - copy/paste COVID Guidelines URL to browser: [email or web address]       POCT pregnancy, urine [[id number]]  (Normal) Collected: 04/17/25 0507    Lab Status: Final result Updated: 04/17/25 0510     EXT Preg Test, Ur Negative     Control Valid            XR chest 1 view portable   Final Interpretation by Zheng Friend MD (04/17 0657)      No acute cardiopulmonary disease.            Workstation performed: [id number]         CTA chest pe study    (Results Pending)       ECG 12 Lead Documentation Only    Date/Time: 4/17/2025 4:33 AM    Performed by: Carmen Lauren MD  Authorized by: Carmen Lauren MD    Indications / Diagnosis:  Cp  ECG reviewed by me, the ED Provider: yes    Patient location:  ED  Previous ECG:     Previous ECG:  Compared to current    Comparison ECG info:  03/15/25    Similarity:  No change  Interpretation:     Interpretation: normal    Rate:     ECG rate:  73    ECG rate assessment: normal    Rhythm:     Rhythm: sinus rhythm    Ectopy:     Ectopy: none    QRS:     QRS axis:  Normal    QRS intervals:  Normal  Conduction:     Conduction: normal    ST segments:     ST segments:  Normal  T waves:     T waves: normal    General Procedure    Date/Time: 4/17/2025 7:24 AM    Performed by: Carmen Lauren MD  Authorized by: Carmen Lauren MD    Comments:      Incorrect template. See complex vascular access  Complex Venous Access Line    Date/Time: 4/17/2025 7:24 AM    Performed by: Carmen Lauren MD  Authorized by: Carmen Lauren MD    Patient location:  ED  Consent:     Consent obtained:  Verbal    Consent given by:  Patient    Risks discussed:  Bleeding, infection and incorrect placement    Alternatives  discussed:  Delayed treatment  Pre-procedure details:     Hand hygiene: Hand hygiene performed prior to insertion      Skin preparation:  Alcohol    Skin preparation agent: Skin preparation agent completely dried prior to procedure    Procedure details:     Complex Venous Access Line Type: US Guided Peripheral IV      Peripheral IV Indications: other specified disorders of the vein      Orientation:  Right    Location:  Forearm    Catheter size:  20 gauge    Patient evaluated for contraindications to access (i.e. fistula, thrombosis, etc): Yes      Approach: percutaneous technique used      Patient position:  Flat    Ultrasound image availability:  Not obtained due to urgency    Number of attempts:  2    Successful placement: yes      Landmarks identified: yes    Anesthesia (see MAR for exact dosages):     Anesthesia method:  None  Post-procedure details:     Post-procedure:  Dressing applied    Post-procedure complications: none      Patient tolerance of procedure:  Tolerated with difficulty      ED Medication and Procedure Management   Prior to Admission Medications   Prescriptions Last Dose Informant Patient Reported? Taking?   QUEtiapine (SEROquel) 25 mg tablet   No No   Sig: Take 1 tablet (25 mg total) by mouth daily at bedtime   gabapentin (NEURONTIN) 100 mg capsule   No No   Sig: Take 1 capsule (100 mg total) by mouth 3 (three) times a day   methadone (DOLOPHINE) 10 MG/5ML solution  Self Yes No   Sig: Take 136 mg by mouth every morning Take by mouth daily   ondansetron (ZOFRAN) 4 mg tablet   No No   Sig: Take 1 tablet (4 mg total) by mouth every 8 (eight) hours as needed for nausea or vomiting   prazosin (MINIPRESS) 1 mg capsule   No No   Sig: Take 1 capsule (1 mg total) by mouth daily at bedtime   sertraline (ZOLOFT) 25 mg tablet   No No   Sig: Take 1 tablet (25 mg total) by mouth daily      Facility-Administered Medications: None     Patient's Medications   Discharge Prescriptions    No medications on file      No discharge procedures on file.  ED SEPSIS DOCUMENTATION   Time reflects when diagnosis was documented in both MDM as applicable and the Disposition within this note       Time User Action Codes Description Comment    4/17/2025  7:57 AM Carmen Lauren Add [R07.9] Chest pain     4/17/2025  7:57 AM Carmen Lauren Add [R00.2] Palpitations     4/17/2025  7:57 AM Carmen Lauren Add [E87.6] Hypokalemia     4/17/2025  7:57 AM Carmen Lauren Add [E83.42] Hypomagnesemia                  Carmen Lauren MD  04/17/25 0751       Carmen Lauren MD  04/17/25 0801

## 2025-04-17 NOTE — ED ATTENDING ATTESTATION
4/17/2025  I, Tom Martinez MD, saw and evaluated the patient. I have discussed the patient with the resident/non-physician practitioner and agree with the resident's/non-physician practitioner's findings, Plan of Care, and MDM as documented in the resident's/non-physician practitioner's note, except where noted. All available labs and Radiology studies were reviewed.  I was present for key portions of any procedure(s) performed by the resident/non-physician practitioner and I was immediately available to provide assistance.       At this point I agree with the current assessment done in the Emergency Department.  I have conducted an independent evaluation of this patient a history and physical is as follows: Patient is a 29 year old female with pleuritic chest pain with sob and radiation to the back tonight. (+) nausea. (+) palpitations. No fever. No cough. No travel. LMP - irregular and ?last time since patient has endometriosis. (+) vapes. No CAD in family. Was last seen at \[facility]\"" ED on 3/15/25 for palpitations. [facility] website checked on this patient and no Rx found. NCAT. No scleral icterus. Normal conjunctiva. Moist mucous membranes. Lungs clear. Heart regular without murmur. Nontender chest wall. Abdomen soft and nontender. Good bowel sounds. No edema. No rash noted. Somewhat pale skin. DDX including but not limited to: ACS, MI, PE, PTX, pneumonia, doubt dissection; pleurisy, pericarditis, myocarditis, rhabdomyolysis, GI etiology; doubt CHF, pleural effusion, metabolic abnormality, anemia, renal failure. I reviewed EKG. Will check labs and CXR.      ED Course         Critical Care Time  Procedures

## 2025-04-18 NOTE — ED CARE HANDOFF
Emergency Department Sign Out Note        Sign out and transfer of care from [facility]. See Separate Emergency Department note.     The patient, Elda Calderon, was evaluated by the previous provider for Chest pain & dyspnea.    Workup Completed:  Labs revealing leukocytosis, hypokalemia, hypomagnesemia, elevated troponin & d-dimer.     ED Course / Workup Pending (followup):  Additional labs including UA, procalcitonin, delta troponin and imaging (CTA PE study).    Admission anticipated.      HEART Risk Score      Flowsheet Row Most Recent Value   Heart Score Risk Calculator    History 1 Filed at: 04/17/2025 0800   ECG 0 Filed at: 04/17/2025 0800   Age 0 Filed at: 04/17/2025 0800   Risk Factors 1 Filed at: 04/17/2025 0800   Troponin 2 Filed at: 04/17/2025 0800   HEART Score 4 Filed at: 04/17/2025 0800          No data recorded        PERC Rule for PE      Flowsheet Row Most Recent Value   PERC Rule for PE    Age >=50 0 Filed at: 04/17/2025 0753   HR >=100 0 Filed at: 04/17/2025 0753   O2 Sat on room air < 95% 0 Filed at: 04/17/2025 0753   History of PE or DVT 0 Filed at: 04/17/2025 0753   Recent trauma or surgery 1  [recetn IV drug use] Filed at: 04/17/2025 0753   Hemoptysis 0 Filed at: 04/17/2025 0753   Exogenous estrogen 0 Filed at: 04/17/2025 0753   Unilateral leg swelling 0 Filed at: 04/17/2025 0753   PERC Rule for PE Results 1 Filed at: 04/17/2025 0753                Wells' Criteria for PE      Flowsheet Row Most Recent Value   Wells' Criteria for PE    Clinical signs and symptoms of DVT 0 Filed at: 04/17/2025 0755   PE is primary diagnosis or equally likely 0 Filed at: 04/17/2025 0755   HR >100 0 Filed at: 04/17/2025 0755   Immobilization at least 3 days or Surgery in the previous 4 weeks 1.5  [IV drug abuse] Filed at: 04/17/2025 0755   Previous, objectively diagnosed PE or DVT 0 Filed at: 04/17/2025 0755   Hemoptysis 0 Filed at: 04/17/2025 0755   Malignancy with treatment within 6 months or  palliative 0 Filed at: 04/17/2025 0755   Wells' Criteria Total 1.5 Filed at: 04/17/2025 0755                  ED Course as of 04/17/25 2156   Thu Apr 17, 2025   0919 Just returned from CT.  Nausea has returned.  Additional Zofran ordered.    Delta troponin elevated.   1047 Spoke with radiology resident Dr. Eligio Pope.  No central PE or evidence of right heart strain.  There are tiny foci of gas in the neck and upper back of uncertain etiology.  Additionally contrast blush appreciated in liver of uncertain cause.  Will require dedicated abdominal imaging-ordering now.   1113 Feeling improved at this time.  No further chest pain or dyspnea.  She does still feel nauseous and second dose of Zofran is being given now.  She does have some very mild generalized abdominal tenderness.  I talked about the concerns appreciated by Dr. Pope.  She relates that she was told she had a lesion on her liver and that MRI could be considered.  She relates that another physician indicated that it was probably not anything concerning and the study was not pursued.  She is familiar with the term hemangioma and relays that that was suggested as a possibility.  I explained there was concern that it may have enlarged, had some bleeding associated with it or may be from another etiology.  With regard to the gas in the tissues she does affirm having injected into neck vasculature though denies this was any more recent than 2 months ago.  I explained that with degree of symptoms and lab abnormality even if full explanation is not provided by further abdominal imaging I do wish for her to be hospitalized.  She is receptive to this.    I reached out to [facility] indicating I wanted to admit Elda.  She is 29 with history of IVDA (reportedly no more recently than 2 months ago) who presented with chest pain that awoke her from sleep this morning.  (Pain now resolved.  She has only mild nausea) CT chest, abdomen and pelvis notable for very tiny foci of gas  within neck tissues (non specific).  Clinically skin is clear/nontender.  (No PE, pneumonia, intra-abdominal abnormality.) She does have marked leukocytosis with white count of 19K and troponin elevation peaking at 65.  I empirically covered her with ceftriaxone.  Cultures in progress.     1258 Elda had initially been receptive to staying as recommended.  Upon my last conversation with her reviewing CT findings she was uncertain if she wished to stay despite strong medical recommendation to.  Medication was ordered to help with her ongoing nausea and progressive anxiety.  I had discussed hx with [facility] attending who was receptive to admitting pt.   1406 Upon entering the room approximately 10 minutes ago Elda appeared uncomfortable.  She did have her arms crossed over her abdomen and I asked if she was having abdominal pain.  She affirmed that she did not feel well and indicated that she just generally did not feel good.  She indicated though that she did want to go home.    I asked if there was anything that we could offer here for comfort or to help her change her mind and stay.  I asked if there is anything at home that she had difficulty finding coverage for.  She indicated that she just did not want to stay in the hospital overnight.    I encouraged her to at least get up and walk around and make sure that she felt well enough upon doing so to walk much further distance and manage at home.  She was receptive to this and indicated that she felt okay walking to the restroom.    I do remain highly concerned for her health.  I remain concerned that she may have bacteremia and especially with history of IV substance use the possibility of endocarditis.  I remain highly concerned for possible infection and do not have a specific explanation for her elevated heart enzymes.  I repeatedly discussed my concerns -and indicated that many of the potential conditions would require more than just a short course of  antibiotics.  She expressed understanding and remained set on discharge.  She indicates that she does have a primary care physician (Dr. Tillman) and will follow-up with him.    Prescriptions sent for zofran & Augmentin (empiric short term tx in consideration of possible bacteremia/ endocarditis w/ hx of IVDA & current poor dentition/ caries).  Very strongly encouraged PCP F/U & return at any point, especially if she develops fever, repeated emesis or other worsening.       Critical Care Time Statement: Upon my evaluation, this patient had a high probability of imminent or life-threatening deterioration due to CP, dyspnea, elevated troponin & concern for possible PE, endocarditis & sepsis, which required my direct attention, intervention, and personal management.  I spent a total of 45 minutes directly providing critical care services, including interpretation of complex medical databases, evaluating for the presence of life-threatening injuries or illnesses, complex medical decision making (to support/prevent further life-threatening deterioration)., and discussion w/ additional care team members - nursing staff & Radiologist . This time is exclusive of procedures, teaching, treating other patients, family meetings, and any prior time recorded by providers other than myself.       Procedures  Medical Decision Making  Amount and/or Complexity of Data Reviewed  Labs: ordered.  Radiology: ordered.    Risk  OTC drugs.  Prescription drug management.            Disposition  Final diagnoses:   Chest pain   Palpitations   Hypokalemia   Hypomagnesemia   Leukocytosis   Elevated troponin   Nausea   SIRS (systemic inflammatory response syndrome) (HCC)   Hx of substance abuse (HCC)     Time reflects when diagnosis was documented in both MDM as applicable and the Disposition within this note       Time User Action Codes Description Comment    4/17/2025  7:57 AM Carmen Lauren [R07.9] Chest pain     4/17/2025  7:57 AM  Carmen Lauren Add [R00.2] Palpitations     4/17/2025  7:57 AM Carmen Lauren Add [E87.6] Hypokalemia     4/17/2025  7:57 AM Carmen Lauren Add [E83.42] Hypomagnesemia     4/17/2025  2:16 PM Eladio Jing A Add [D72.829] Leukocytosis     4/17/2025  2:17 PM JhonyyehudaLinnea Jing A Add [R79.89] Elevated troponin     4/17/2025  2:17 PM Zabalbiryehuda-Josue Jing A Add [R11.0] Nausea     4/17/2025  2:18 PM J[place]ony[place]huda[place], Jing A Add [R65.10] SIRS (systemic inflammatory response syndrome) (HCC)     4/17/2025 10:06 PM EvelynbonnieLinnea Jing VALE Add [F19.11] Hx of substance abuse (HCC)           ED Disposition       ED Disposition   AMA    Condition   --    Date/Time   Thu Apr 17, 2025 1416    Comment   Date: 4/17/2025  Patient: Elda Calderon  Admitted: 4/17/2025  4:22 AM  Attending Provider: Jing Garvin*    Elda Calderon or her authorized caregiver has made the decision for the patient to leave the emergency department against the ad vice of her attending physician. She or her authorized caregiver has been informed and understands the inherent risks, including death, worsening pain, nausea, progression of infection leading to possible change in heart function or other temporary o r permanent disability.  She or her authorized caregiver has decided to accept the responsibility for this decision. Elda Calderon and all necessary parties have been advised that she may return for further evaluation or treatment. Her condition at  time of discharge was guarded.  Elda Calderon had current vital signs as follows:  /64 (BP Location: Left arm)   Pulse 83   Temp 98.6 °F (37 °C) (Oral)   Resp 18   Wt 47.6 kg (105 lb)                Follow-up Information       Follow up With Specialties Details Why Contact Info    Mike Tillman DO Family Medicine Schedule an appointment as soon as possible for a visit   [address] [place] [address] [id number] [phone]             Discharge Medication List as of 4/17/2025  2:22 PM        START taking these medications    Details   amoxicillin-clavulanate (AUGMENTIN) 875-125 mg per tablet Take 1 tablet by mouth every 12 (twelve) hours for 7 days, Starting Thu 4/17/2025, Until Thu 4/24/2025, Normal      ondansetron (ZOFRAN-ODT) 4 mg disintegrating tablet Take 1 tablet (4 mg total) by mouth every 6 (six) hours as needed for nausea or vomiting, Starting Thu 4/17/2025, Normal           CONTINUE these medications which have NOT CHANGED    Details   gabapentin (NEURONTIN) 100 mg capsule Take 1 capsule (100 mg total) by mouth 3 (three) times a day, Starting Tue 3/5/2024, Until Sat 5/4/2024, Normal      methadone (DOLOPHINE) 10 MG/5ML solution Take 136 mg by mouth every morning Take by mouth daily, Historical Med      ondansetron (ZOFRAN) 4 mg tablet Take 1 tablet (4 mg total) by mouth every 8 (eight) hours as needed for nausea or vomiting, Starting Tue 3/5/2024, Normal      prazosin (MINIPRESS) 1 mg capsule Take 1 capsule (1 mg total) by mouth daily at bedtime, Starting Wed 8/16/2023, Until Sun 10/15/2023, Normal      QUEtiapine (SEROquel) 25 mg tablet Take 1 tablet (25 mg total) by mouth daily at bedtime, Starting Tue 3/5/2024, Until Thu 4/4/2024, Normal      sertraline (ZOLOFT) 25 mg tablet Take 1 tablet (25 mg total) by mouth daily, Starting Wed 3/6/2024, Until Fri 4/5/2024, Normal           No discharge procedures on file.       ED Provider  Electronically Signed by     Jing Hendricks MD  04/17/25 1785

## 2025-04-20 LAB
BACTERIA BLD CULT: NORMAL
BACTERIA BLD CULT: NORMAL

## 2025-04-22 LAB
BACTERIA BLD CULT: NORMAL
BACTERIA BLD CULT: NORMAL

## 2025-05-06 ENCOUNTER — HOSPITAL ENCOUNTER (INPATIENT)
Facility: HOSPITAL | Age: 30
LOS: 1 days | Discharge: LEFT BEFORE DISCHARGE | End: 2025-05-07
Attending: INTERNAL MEDICINE | Admitting: INTERNAL MEDICINE
Payer: COMMERCIAL

## 2025-05-06 ENCOUNTER — HOSPITAL ENCOUNTER (EMERGENCY)
Facility: HOSPITAL | Age: 30
End: 2025-05-06
Attending: EMERGENCY MEDICINE
Payer: COMMERCIAL

## 2025-05-06 ENCOUNTER — APPOINTMENT (EMERGENCY)
Dept: RADIOLOGY | Facility: HOSPITAL | Age: 30
End: 2025-05-06
Payer: COMMERCIAL

## 2025-05-06 VITALS
OXYGEN SATURATION: 98 % | TEMPERATURE: 98.6 F | SYSTOLIC BLOOD PRESSURE: 151 MMHG | HEART RATE: 65 BPM | DIASTOLIC BLOOD PRESSURE: 105 MMHG | RESPIRATION RATE: 18 BRPM

## 2025-05-06 DIAGNOSIS — R79.89 ELEVATED TROPONIN: ICD-10-CM

## 2025-05-06 DIAGNOSIS — F11.20 OPIOID USE DISORDER, SEVERE, DEPENDENCE (HCC): Primary | ICD-10-CM

## 2025-05-06 DIAGNOSIS — D64.9 ANEMIA: ICD-10-CM

## 2025-05-06 DIAGNOSIS — E87.6 HYPOKALEMIA: ICD-10-CM

## 2025-05-06 PROBLEM — L03.113 CELLULITIS OF RIGHT UPPER EXTREMITY: Status: RESOLVED | Noted: 2023-06-30 | Resolved: 2025-05-06

## 2025-05-06 PROBLEM — Z3A.40 40 WEEKS GESTATION OF PREGNANCY: Status: RESOLVED | Noted: 2017-05-04 | Resolved: 2025-05-06

## 2025-05-06 PROBLEM — M62.82 RHABDOMYOLYSIS: Status: RESOLVED | Noted: 2024-03-03 | Resolved: 2025-05-06

## 2025-05-06 LAB
2HR DELTA HS TROPONIN: 2 NG/L
4HR DELTA HS TROPONIN: -3 NG/L
ALBUMIN SERPL BCG-MCNC: 3.4 G/DL (ref 3.5–5)
ALP SERPL-CCNC: 75 U/L (ref 34–104)
ALT SERPL W P-5'-P-CCNC: 12 U/L (ref 7–52)
ANION GAP SERPL CALCULATED.3IONS-SCNC: 7 MMOL/L (ref 4–13)
APTT PPP: 33 SECONDS (ref 23–34)
AST SERPL W P-5'-P-CCNC: 28 U/L (ref 13–39)
ATRIAL RATE: 52 BPM
BASOPHILS # BLD AUTO: 0.07 THOUSANDS/ÂΜL (ref 0–0.1)
BASOPHILS NFR BLD AUTO: 1 % (ref 0–1)
BILIRUB SERPL-MCNC: 0.41 MG/DL (ref 0.2–1)
BUN SERPL-MCNC: 7 MG/DL (ref 5–25)
CALCIUM ALBUM COR SERPL-MCNC: 9.1 MG/DL (ref 8.3–10.1)
CALCIUM SERPL-MCNC: 8.6 MG/DL (ref 8.4–10.2)
CARDIAC TROPONIN I PNL SERPL HS: 15 NG/L (ref ?–50)
CARDIAC TROPONIN I PNL SERPL HS: 18 NG/L (ref ?–50)
CARDIAC TROPONIN I PNL SERPL HS: 20 NG/L (ref ?–50)
CHLORIDE SERPL-SCNC: 103 MMOL/L (ref 96–108)
CO2 SERPL-SCNC: 30 MMOL/L (ref 21–32)
CREAT SERPL-MCNC: 0.62 MG/DL (ref 0.6–1.3)
CRP SERPL QL: 9.6 MG/L
EOSINOPHIL # BLD AUTO: 0.43 THOUSAND/ÂΜL (ref 0–0.61)
EOSINOPHIL NFR BLD AUTO: 5 % (ref 0–6)
ERYTHROCYTE [DISTWIDTH] IN BLOOD BY AUTOMATED COUNT: 12.4 % (ref 11.6–15.1)
ERYTHROCYTE [SEDIMENTATION RATE] IN BLOOD: 32 MM/HOUR (ref 0–19)
GFR SERPL CREATININE-BSD FRML MDRD: 123 ML/MIN/1.73SQ M
GLUCOSE SERPL-MCNC: 98 MG/DL (ref 65–140)
HCT VFR BLD AUTO: 31.7 % (ref 34.8–46.1)
HGB BLD-MCNC: 10.7 G/DL (ref 11.5–15.4)
IMM GRANULOCYTES # BLD AUTO: 0.03 THOUSAND/UL (ref 0–0.2)
IMM GRANULOCYTES NFR BLD AUTO: 0 % (ref 0–2)
INR PPP: 1.14 (ref 0.85–1.19)
LACTATE SERPL-SCNC: 0.9 MMOL/L (ref 0.5–2)
LYMPHOCYTES # BLD AUTO: 2.62 THOUSANDS/ÂΜL (ref 0.6–4.47)
LYMPHOCYTES NFR BLD AUTO: 29 % (ref 14–44)
MCH RBC QN AUTO: 29.3 PG (ref 26.8–34.3)
MCHC RBC AUTO-ENTMCNC: 33.8 G/DL (ref 31.4–37.4)
MCV RBC AUTO: 87 FL (ref 82–98)
MONOCYTES # BLD AUTO: 0.72 THOUSAND/ÂΜL (ref 0.17–1.22)
MONOCYTES NFR BLD AUTO: 8 % (ref 4–12)
NEUTROPHILS # BLD AUTO: 5.31 THOUSANDS/ÂΜL (ref 1.85–7.62)
NEUTS SEG NFR BLD AUTO: 57 % (ref 43–75)
NRBC BLD AUTO-RTO: 0 /100 WBCS
P AXIS: 55 DEGREES
PLATELET # BLD AUTO: 269 THOUSANDS/UL (ref 149–390)
PMV BLD AUTO: 8.6 FL (ref 8.9–12.7)
POTASSIUM SERPL-SCNC: 2.8 MMOL/L (ref 3.5–5.3)
PR INTERVAL: 104 MS
PROCALCITONIN SERPL-MCNC: <0.05 NG/ML
PROT SERPL-MCNC: 6.4 G/DL (ref 6.4–8.4)
PROTHROMBIN TIME: 15.4 SECONDS (ref 12.3–15)
QRS AXIS: -4 DEGREES
QRSD INTERVAL: 78 MS
QT INTERVAL: 428 MS
QTC INTERVAL: 398 MS
RBC # BLD AUTO: 3.65 MILLION/UL (ref 3.81–5.12)
SODIUM SERPL-SCNC: 140 MMOL/L (ref 135–147)
T WAVE AXIS: 78 DEGREES
VENTRICULAR RATE: 52 BPM
WBC # BLD AUTO: 9.18 THOUSAND/UL (ref 4.31–10.16)

## 2025-05-06 PROCEDURE — 85652 RBC SED RATE AUTOMATED: CPT

## 2025-05-06 PROCEDURE — 80053 COMPREHEN METABOLIC PANEL: CPT

## 2025-05-06 PROCEDURE — 84484 ASSAY OF TROPONIN QUANT: CPT

## 2025-05-06 PROCEDURE — 96366 THER/PROPH/DIAG IV INF ADDON: CPT

## 2025-05-06 PROCEDURE — 83605 ASSAY OF LACTIC ACID: CPT

## 2025-05-06 PROCEDURE — 99285 EMERGENCY DEPT VISIT HI MDM: CPT | Performed by: EMERGENCY MEDICINE

## 2025-05-06 PROCEDURE — 96361 HYDRATE IV INFUSION ADD-ON: CPT

## 2025-05-06 PROCEDURE — 96376 TX/PRO/DX INJ SAME DRUG ADON: CPT

## 2025-05-06 PROCEDURE — 96375 TX/PRO/DX INJ NEW DRUG ADDON: CPT

## 2025-05-06 PROCEDURE — 85025 COMPLETE CBC W/AUTO DIFF WBC: CPT

## 2025-05-06 PROCEDURE — 86140 C-REACTIVE PROTEIN: CPT

## 2025-05-06 PROCEDURE — 85610 PROTHROMBIN TIME: CPT

## 2025-05-06 PROCEDURE — 87040 BLOOD CULTURE FOR BACTERIA: CPT

## 2025-05-06 PROCEDURE — 84145 PROCALCITONIN (PCT): CPT

## 2025-05-06 PROCEDURE — 93010 ELECTROCARDIOGRAM REPORT: CPT | Performed by: STUDENT IN AN ORGANIZED HEALTH CARE EDUCATION/TRAINING PROGRAM

## 2025-05-06 PROCEDURE — 85730 THROMBOPLASTIN TIME PARTIAL: CPT

## 2025-05-06 PROCEDURE — 99285 EMERGENCY DEPT VISIT HI MDM: CPT

## 2025-05-06 PROCEDURE — 71046 X-RAY EXAM CHEST 2 VIEWS: CPT

## 2025-05-06 PROCEDURE — 96365 THER/PROPH/DIAG IV INF INIT: CPT

## 2025-05-06 PROCEDURE — 36415 COLL VENOUS BLD VENIPUNCTURE: CPT

## 2025-05-06 PROCEDURE — 93005 ELECTROCARDIOGRAM TRACING: CPT

## 2025-05-06 RX ORDER — CLONIDINE HYDROCHLORIDE 0.1 MG/1
0.1 TABLET ORAL ONCE
Status: COMPLETED | OUTPATIENT
Start: 2025-05-06 | End: 2025-05-06

## 2025-05-06 RX ORDER — LORAZEPAM 2 MG/ML
2 INJECTION INTRAMUSCULAR ONCE
Status: COMPLETED | OUTPATIENT
Start: 2025-05-06 | End: 2025-05-06

## 2025-05-06 RX ORDER — POTASSIUM CHLORIDE 14.9 MG/ML
20 INJECTION INTRAVENOUS ONCE
Status: DISCONTINUED | OUTPATIENT
Start: 2025-05-06 | End: 2025-05-06 | Stop reason: HOSPADM

## 2025-05-06 RX ORDER — POTASSIUM CHLORIDE 14.9 MG/ML
20 INJECTION INTRAVENOUS ONCE
Status: COMPLETED | OUTPATIENT
Start: 2025-05-06 | End: 2025-05-06

## 2025-05-06 RX ORDER — GABAPENTIN 300 MG/1
300 CAPSULE ORAL EVERY 8 HOURS PRN
Status: DISCONTINUED | OUTPATIENT
Start: 2025-05-06 | End: 2025-05-08 | Stop reason: HOSPADM

## 2025-05-06 RX ORDER — POTASSIUM CHLORIDE 1500 MG/1
40 TABLET, EXTENDED RELEASE ORAL DAILY
Status: DISCONTINUED | OUTPATIENT
Start: 2025-05-07 | End: 2025-05-07

## 2025-05-06 RX ORDER — ONDANSETRON 2 MG/ML
4 INJECTION INTRAMUSCULAR; INTRAVENOUS ONCE
Status: COMPLETED | OUTPATIENT
Start: 2025-05-06 | End: 2025-05-06

## 2025-05-06 RX ORDER — LOPERAMIDE HYDROCHLORIDE 2 MG/1
4 CAPSULE ORAL ONCE
Status: COMPLETED | OUTPATIENT
Start: 2025-05-06 | End: 2025-05-06

## 2025-05-06 RX ORDER — CLONIDINE HYDROCHLORIDE 0.1 MG/1
0.1 TABLET ORAL EVERY 8 HOURS SCHEDULED
Status: DISCONTINUED | OUTPATIENT
Start: 2025-05-06 | End: 2025-05-08 | Stop reason: HOSPADM

## 2025-05-06 RX ORDER — ONDANSETRON 2 MG/ML
4 INJECTION INTRAMUSCULAR; INTRAVENOUS EVERY 6 HOURS PRN
Status: DISCONTINUED | OUTPATIENT
Start: 2025-05-06 | End: 2025-05-08 | Stop reason: HOSPADM

## 2025-05-06 RX ORDER — TRAZODONE HYDROCHLORIDE 50 MG/1
50 TABLET ORAL
Status: DISCONTINUED | OUTPATIENT
Start: 2025-05-06 | End: 2025-05-08 | Stop reason: HOSPADM

## 2025-05-06 RX ORDER — CYCLOBENZAPRINE HCL 10 MG
10 TABLET ORAL 3 TIMES DAILY
Status: DISCONTINUED | OUTPATIENT
Start: 2025-05-06 | End: 2025-05-08 | Stop reason: HOSPADM

## 2025-05-06 RX ORDER — ACETAMINOPHEN 325 MG/1
650 TABLET ORAL EVERY 6 HOURS PRN
Status: DISCONTINUED | OUTPATIENT
Start: 2025-05-06 | End: 2025-05-08 | Stop reason: HOSPADM

## 2025-05-06 RX ORDER — POTASSIUM CHLORIDE 29.8 MG/ML
40 INJECTION INTRAVENOUS ONCE
Status: DISCONTINUED | OUTPATIENT
Start: 2025-05-06 | End: 2025-05-06 | Stop reason: CLARIF

## 2025-05-06 RX ORDER — POTASSIUM CHLORIDE 1500 MG/1
40 TABLET, EXTENDED RELEASE ORAL ONCE
Status: DISCONTINUED | OUTPATIENT
Start: 2025-05-06 | End: 2025-05-06

## 2025-05-06 RX ORDER — LOPERAMIDE HYDROCHLORIDE 2 MG/1
2 CAPSULE ORAL EVERY 4 HOURS PRN
Status: DISCONTINUED | OUTPATIENT
Start: 2025-05-07 | End: 2025-05-08 | Stop reason: HOSPADM

## 2025-05-06 RX ADMIN — ONDANSETRON 4 MG: 2 INJECTION, SOLUTION INTRAMUSCULAR; INTRAVENOUS at 16:31

## 2025-05-06 RX ADMIN — LORAZEPAM 2 MG: 2 INJECTION INTRAMUSCULAR; INTRAVENOUS at 19:28

## 2025-05-06 RX ADMIN — SODIUM CHLORIDE 1000 ML: 0.9 INJECTION, SOLUTION INTRAVENOUS at 16:31

## 2025-05-06 RX ADMIN — POTASSIUM CHLORIDE 20 MEQ: 14.9 INJECTION, SOLUTION INTRAVENOUS at 19:21

## 2025-05-06 RX ADMIN — TRAZODONE HYDROCHLORIDE 50 MG: 50 TABLET ORAL at 22:38

## 2025-05-06 RX ADMIN — LOPERAMIDE HYDROCHLORIDE 4 MG: 2 CAPSULE ORAL at 23:14

## 2025-05-06 RX ADMIN — CLONIDINE HYDROCHLORIDE 0.1 MG: 0.1 TABLET ORAL at 18:46

## 2025-05-06 RX ADMIN — CYCLOBENZAPRINE 10 MG: 10 TABLET, FILM COATED ORAL at 23:14

## 2025-05-06 RX ADMIN — GABAPENTIN 300 MG: 300 CAPSULE ORAL at 22:39

## 2025-05-06 RX ADMIN — ONDANSETRON 4 MG: 2 INJECTION, SOLUTION INTRAMUSCULAR; INTRAVENOUS at 18:31

## 2025-05-06 RX ADMIN — CLONIDINE HYDROCHLORIDE 0.1 MG: 0.1 TABLET ORAL at 22:38

## 2025-05-06 NOTE — ED PROVIDER NOTES
Time reflects when diagnosis was documented in both MDM as applicable and the Disposition within this note       Time User Action Codes Description Comment    5/6/2025  4:23 PM [place] [F11.20] Opioid use disorder, severe, dependence (HCC)     5/6/2025  4:47 PM Ruddy Henderson [E87.6] Hypokalemia     5/6/2025  6:19 PM Ruddy Henderson [R79.89] Elevated troponin     5/6/2025  6:19 PM Ruddy Henderson [D64.9] Anemia           ED Disposition       ED Disposition   Transfer to Another Facility-In Network    Condition   --    Date/Time   Tue May 6, 2025  7:22 PM    Comment   Elda Calderon should be transferred out to [facility] detox unit.               Assessment & Plan       Medical Decision Making  Ddx includes but not limited to PNA, ACS; exam, history, and recent CT PE study makes PE less likely; exam not c/w endocarditis. Labs improved markedly from prior labs. Leukocytosis no longer evident. Her troponin remains elevated, albeit improved from prior. EKG without acute ischemic changes. IV potassium given for her hypokalemia. Pt was beginning to experience withdrawal sx, therefore she was given IV fluids, zofra, ativan, and clonidine. Given her opioid withdrawal and hypokalemia, Pt will benefit from admission. She was admitted to the medical detox unit.     Amount and/or Complexity of Data Reviewed  Labs: ordered.  Radiology: ordered. Decision-making details documented in ED Course.    Risk  Prescription drug management.        ED Course as of 05/06/25 2323   Tue May 06, 2025   1743 XR chest 2 views  IMPRESSION:     No acute cardiopulmonary disease.     1923 Accepted at [facility] detox unit       Medications   sodium chloride 0.9 % bolus 1,000 mL (0 mL Intravenous Stopped 5/6/25 1845)   ondansetron (ZOFRAN) injection 4 mg (4 mg Intravenous Given 5/6/25 1631)   ondansetron (ZOFRAN) injection 4 mg (4 mg Intravenous Given 5/6/25 1831)   cloNIDine (CATAPRES) tablet 0.1 mg (0.1 mg Oral Given  5/6/25 1846)   potassium chloride 20 mEq IVPB (premix) (0 mEq Intravenous Stopped 5/6/25 2106)   LORazepam (ATIVAN) injection 2 mg (2 mg Intravenous Given 5/6/25 1928)       ED Risk Strat Scores                   Clinical Opiate Withdrawal Scale       Row Name 05/06/25 1830                Pulse --        Resting Pulse Rate: Measured After Patient is Sitting or Lying for One Minute 0  -RC        GI Upset: Over Last Half Hour 2  -RC        Sweating: Over Past Half Hour Not Accounted for by Room Temperature of Patient Activity 1  -RC        Tremor: Observation of Outstretched Hands 0  -RC        Restlessness: Observation During Assessment 0  -RC        Yawning: Observation During Assessment 1  -RC        Pupil Size 1  -RC        Anxiety and Irritability 1  -RC        Bone or Joint Aches: If Patient was Having Pain Previously, Only the Additional Component Attributed to Opiate Withdrawal is Scored 1  -RC        Gooseflesh Skin 0  -RC        Runny Nose or Tearing: Not Accounted for by Cold Symptoms or Allergies 0  -RC        Clinical Opiate Withdrawal Scale Total Score 7  -RC        Heart Rate Source --        Patient Position - Orthostatic VS --                  User Key  (r) = Recorded By, (t) = Taken By, (c) = Cosigned By      Initials Name     Zayra Koroma, RN                  No data recorded                            History of Present Illness       Chief Complaint   Patient presents with    Shortness of Breath     Pt sob, chills, and aches. Was here recently and was supposed to be admitted but left AMA. Hx IV drug abuse        Past Medical History:   Diagnosis Date    Anxiety     Borderline personality disorder (HCC)     Chlamydia     Depression     Endometriosis     IV drug user     IV heroin use, rehab 7 times     Panic attacks     Psychiatric disorder     anxiety    PTSD (post-traumatic stress disorder)     PTSD (post-traumatic stress disorder) 8/12/2023    Varicella     childhood    Visual impairment        Past Surgical History:   Procedure Laterality Date    LAPAROTOMY        Family History   Problem Relation Age of Onset    Depression Mother     Drug abuse Brother     Drug abuse Maternal Aunt     Drug abuse Paternal Uncle     Cancer Maternal Grandmother     Cancer Paternal Grandfather       Social History     Tobacco Use    Smoking status: Never     Passive exposure: Never    Smokeless tobacco: Never   Vaping Use    Vaping status: Every Day    Substances: Nicotine, Flavoring   Substance Use Topics    Alcohol use: Not Currently    Drug use: Not Currently     Comment: 8 bags/day states benzos are mixed in      E-Cigarette/Vaping    E-Cigarette Use Current Every Day User       E-Cigarette/Vaping Substances    Nicotine Yes     THC No     CBD No     Flavoring Yes     Other No     Unknown No       I have reviewed and agree with the history as documented.     29y.o F w/ h/o IV fentanyl use (inject into her neck) presenting for SOB. 2 weeks ago she developed SOB, chills, myalgias, nausea, palpitations. She was seen at the ED where she had labs and imaging done. Imaging was concerning for gas in her neck. Labs showed an elevated troponin and WBC. She left AMA due to concerns that the medical team wouldn't take or treat her opioid withdrawal serious. Denies CP, abdominal pain, anxiety, agitation, cough, HA, neck pain/stiffness, vision changes, fever, numbness, weakness. She desires to quit opioids.       History provided by:  Patient      Review of Systems   Constitutional:  Positive for activity change. Negative for appetite change and fever.   Eyes:  Negative for visual disturbance.   Respiratory:  Positive for shortness of breath. Negative for cough.    Cardiovascular:  Positive for palpitations. Negative for chest pain and leg swelling.   Gastrointestinal:  Positive for nausea. Negative for abdominal pain and diarrhea.   Musculoskeletal:  Negative for neck pain and neck stiffness.   Neurological:  Negative for weakness,  numbness and headaches.   Psychiatric/Behavioral:  Negative for confusion. The patient is not nervous/anxious.            Objective       ED Triage Vitals [05/06/25 1457]   Temperature Pulse Blood Pressure Respirations SpO2 Patient Position - Orthostatic VS   98.6 °F (37 °C) 73 130/99 18 96 % Sitting      Temp src Heart Rate Source BP Location FiO2 (%) Pain Score    -- Monitor Left arm -- --      Vitals      Date and Time Temp Pulse SpO2 Resp BP Pain Score FACES Pain Rating User   05/06/25 2100 -- 65 98 % 18 151/105 -- -- MLR   05/06/25 1930 -- 57 97 % 18 151/100 -- -- MLR   05/06/25 1900 -- 50 97 % 18 157/105 -- -- [facility]   05/06/25 1830 -- 55 97 % 18 151/98 -- --    05/06/25 1457 98.6 °F (37 °C) 73 96 % 18 130/99 -- -- EM            Physical Exam  Constitutional:       General: She is not in acute distress.     Appearance: Normal appearance.   HENT:      Head: Normocephalic and atraumatic.      Nose: Nose normal. No rhinorrhea.      Mouth/Throat:      Mouth: Mucous membranes are moist.      Pharynx: Oropharynx is clear.   Eyes:      Extraocular Movements: Extraocular movements intact.      Conjunctiva/sclera: Conjunctivae normal.      Pupils: Pupils are equal, round, and reactive to light.   Neck:      Comments: No crepitus or signs of cellulitis  Cardiovascular:      Rate and Rhythm: Normal rate and regular rhythm.      Pulses: Normal pulses.      Heart sounds: Normal heart sounds.   Pulmonary:      Effort: Pulmonary effort is normal.      Breath sounds: Normal breath sounds.   Abdominal:      General: Abdomen is flat.      Palpations: Abdomen is soft.   Musculoskeletal:      Cervical back: Normal range of motion and neck supple.      Right lower leg: No edema.      Left lower leg: No edema.   Skin:     General: Skin is warm and dry.      Capillary Refill: Capillary refill takes less than 2 seconds.   Neurological:      General: No focal deficit present.      Mental Status: She is alert and oriented to person,  place, and time.         Results Reviewed       Procedure Component Value Units Date/Time    Blood culture #2 [[id number]] Collected: 05/06/25 1621    Lab Status: Preliminary result Specimen: Blood from Arm, Right Updated: 05/06/25 2201     Blood Culture Received in Microbiology Lab. Culture in Progress.    HS Troponin I 4hr [[id number]]  (Normal) Collected: 05/06/25 2039    Lab Status: Final result Specimen: Blood from Arm, Right Updated: 05/06/25 2109     hs TnI 4hr 15 ng/L      Delta 4hr hsTnI -3 ng/L     HS Troponin I 2hr [[id number]]  (Normal) Collected: 05/06/25 1826    Lab Status: Final result Specimen: Blood from Arm, Right Updated: 05/06/25 1915     hs TnI 2hr 20 ng/L      Delta 2hr hsTnI 2 ng/L     Procalcitonin [[id number]]  (Normal) Collected: 05/06/25 1621    Lab Status: Final result Specimen: Blood from Arm, Right Updated: 05/06/25 1655     Procalcitonin <0.05 ng/ml     HS Troponin 0hr (reflex protocol) [[id number]]  (Normal) Collected: 05/06/25 1621    Lab Status: Final result Specimen: Blood from Arm, Right Updated: 05/06/25 1654     hs TnI 0hr 18 ng/L     Protime-INR [[id number]]  (Abnormal) Collected: 05/06/25 1621    Lab Status: Final result Specimen: Blood from Arm, Right Updated: 05/06/25 1647     Protime 15.4 seconds      INR 1.14    Narrative:      INR Therapeutic Range    Indication                                             INR Range      Atrial Fibrillation                                               2.0-3.0  Hypercoagulable State                                    2.0.2.3  Left Ventricular Asist Device                            2.0-3.0  Mechanical Heart Valve                                  -    Aortic(with afib, MI, embolism, HF, LA enlargement,    and/or coagulopathy)                                     2.0-3.0 (2.5-3.5)     Mitral                                                             2.5-3.5  Prosthetic/Bioprosthetic Heart Valve               2.0-3.0  Venous thromboembolism (VTE:  VT, PE        2.0-3.0    APTT [[id number]]  (Normal) Collected: 05/06/25 1621    Lab Status: Final result Specimen: Blood from Arm, Right Updated: 05/06/25 1647     PTT 33 seconds     Lactic acid, plasma (w/reflex if result > 2.0) [[id number]]  (Normal) Collected: 05/06/25 1621    Lab Status: Final result Specimen: Blood from Arm, Right Updated: 05/06/25 1645     LACTIC ACID 0.9 mmol/L     Narrative:      Result may be elevated if tourniquet was used during collection.    Comprehensive metabolic panel [[id number]]  (Abnormal) Collected: 05/06/25 1621    Lab Status: Final result Specimen: Blood from Arm, Right Updated: 05/06/25 1645     Sodium 140 mmol/L      Potassium 2.8 mmol/L      Chloride 103 mmol/L      CO2 30 mmol/L      ANION GAP 7 mmol/L      BUN 7 mg/dL      Creatinine 0.62 mg/dL      Glucose 98 mg/dL      Calcium 8.6 mg/dL      Corrected Calcium 9.1 mg/dL      AST 28 U/L      ALT 12 U/L      Alkaline Phosphatase 75 U/L      Total Protein 6.4 g/dL      Albumin 3.4 g/dL      Total Bilirubin 0.41 mg/dL      eGFR 123 ml/min/1.73sq m     Narrative:      National Kid[employer] Disease Foundation guidelines for Chronic Kidney Disease (CKD):     Stage 1 with normal or high GFR (GFR > 90 mL/min/1.73 square meters)    Stage 2 Mild CKD (GFR = 60-89 mL/min/1.73 square meters)    Stage 3A Moderate CKD (GFR = 45-59 mL/min/1.73 square meters)    Stage 3B Moderate CKD (GFR = 30-44 mL/min/1.73 square meters)    Stage 4 Severe CKD (GFR = 15-29 mL/min/1.73 square meters)    Stage 5 End Stage CKD (GFR <15 mL/min/1.73 square meters)  Note: GFR calculation is accurate only with a steady state creatinine    C-reactive protein [[id number]]  (Abnormal) Collected: 05/06/25 1621    Lab Status: Final result Specimen: Blood from Arm, Right Updated: 05/06/25 1644     CRP 9.6 mg/L     Sedimentation rate, automated [[id number]]  (Abnormal) Collected: 05/06/25 1621    Lab Status: Final result Specimen: Blood from Arm, Right Updated: 05/06/25 4659      Sed Rate 32 mm/hour     CBC and differential [[id number]]  (Abnormal) Collected: 05/06/25 1621    Lab Status: Final result Specimen: Blood from Arm, Right Updated: 05/06/25 1628     WBC 9.18 Thousand/uL      RBC 3.65 Million/uL      Hemoglobin 10.7 g/dL      Hematocrit 31.7 %      MCV 87 fL      MCH 29.3 pg      MCHC 33.8 g/dL      RDW 12.4 %      MPV 8.6 fL      Platelets 269 Thousands/uL      nRBC 0 /100 WBCs      Segmented % 57 %      Immature Grans % 0 %      Lymphocytes % 29 %      Monocytes % 8 %      Eosinophils Relative 5 %      Basophils Relative 1 %      Absolute Neutrophils 5.31 Thousands/µL      Absolute Immature Grans 0.03 Thousand/uL      Absolute Lymphocytes 2.62 Thousands/µL      Absolute Monocytes 0.72 Thousand/µL      Eosinophils Absolute 0.43 Thousand/µL      Basophils Absolute 0.07 Thousands/µL     Blood culture #1 [[id number]]     Lab Status: No result Specimen: Blood             XR chest 2 views   Final Interpretation by Estephania Noyola MD (05/06 1739)      No acute cardiopulmonary disease.            Workstation performed: [id number]             ECG 12 Lead Documentation Only    Date/Time: 5/6/2025 4:40 PM    Performed by: Ruddy Henderson MD  Authorized by: Ruddy Henderson MD    ECG reviewed by me, the ED Provider: yes    Patient location:  ED  Interpretation:     Interpretation: abnormal    Rate:     ECG rate:  52    ECG rate assessment: bradycardic    Rhythm:     Rhythm: sinus rhythm    Ectopy:     Ectopy: none    QRS:     QRS axis:  Left    QRS intervals:  Normal  Conduction:     Conduction: normal    ST segments:     ST segments:  Normal  T waves:     T waves: normal        ED Medication and Procedure Management   Prior to Admission Medications   Prescriptions Last Dose Informant Patient Reported? Taking?   QUEtiapine (SEROquel) 25 mg tablet   No No   Sig: Take 1 tablet (25 mg total) by mouth daily at bedtime   Patient not taking: Reported on 5/6/2025   gabapentin (NEURONTIN) 100 mg capsule    No No   Sig: Take 1 capsule (100 mg total) by mouth 3 (three) times a day   Patient not taking: Reported on 5/6/2025   methadone (DOLOPHINE) 10 MG/5ML solution  Self Yes No   Sig: Take 136 mg by mouth every morning Take by mouth daily   ondansetron (ZOFRAN) 4 mg tablet   No No   Sig: Take 1 tablet (4 mg total) by mouth every 8 (eight) hours as needed for nausea or vomiting   ondansetron (ZOFRAN-ODT) 4 mg disintegrating tablet   No No   Sig: Take 1 tablet (4 mg total) by mouth every 6 (six) hours as needed for nausea or vomiting   prazosin (MINIPRESS) 1 mg capsule   No No   Sig: Take 1 capsule (1 mg total) by mouth daily at bedtime   Patient not taking: Reported on 5/6/2025   sertraline (ZOLOFT) 25 mg tablet   No No   Sig: Take 1 tablet (25 mg total) by mouth daily   Patient not taking: Reported on 5/6/2025      Facility-Administered Medications: None     Discharge Medication List as of 5/6/2025  9:20 PM        CONTINUE these medications which have NOT CHANGED    Details   gabapentin (NEURONTIN) 100 mg capsule Take 1 capsule (100 mg total) by mouth 3 (three) times a day, Starting Tue 3/5/2024, Until Sat 5/4/2024, Normal      methadone (DOLOPHINE) 10 MG/5ML solution Take 136 mg by mouth every morning Take by mouth daily, Historical Med      ondansetron (ZOFRAN) 4 mg tablet Take 1 tablet (4 mg total) by mouth every 8 (eight) hours as needed for nausea or vomiting, Starting Tue 3/5/2024, Normal      ondansetron (ZOFRAN-ODT) 4 mg disintegrating tablet Take 1 tablet (4 mg total) by mouth every 6 (six) hours as needed for nausea or vomiting, Starting Thu 4/17/2025, Normal      prazosin (MINIPRESS) 1 mg capsule Take 1 capsule (1 mg total) by mouth daily at bedtime, Starting Wed 8/16/2023, Until Sun 10/15/2023, Normal      QUEtiapine (SEROquel) 25 mg tablet Take 1 tablet (25 mg total) by mouth daily at bedtime, Starting Tue 3/5/2024, Until Thu 4/4/2024, Normal      sertraline (ZOLOFT) 25 mg tablet Take 1 tablet (25 mg total)  by mouth daily, Starting Wed 3/6/2024, Until Fri 4/5/2024, Normal           No discharge procedures on file.  ED SEPSIS DOCUMENTATION   Time reflects when diagnosis was documented in both MDM as applicable and the Disposition within this note       Time User Action Codes Description Comment    5/6/2025  4:23 PM Mague Villatoro [F11.20] Opioid use disorder, severe, dependence (HCC)     5/6/2025  4:47 PM Ruddy Henderson [E87.6] Hypokalemia     5/6/2025  6:19 PM Ruddy Henderson [R79.89] Elevated troponin     5/6/2025  6:19 PM Ruddy Henderson [D64.9] Anemia                  Ruddy Henderson MD  05/06/25 5355

## 2025-05-06 NOTE — ED ATTENDING ATTESTATION
5/6/2025  IMague MD, saw and evaluated the patient. I have discussed the patient with the resident/non-physician practitioner and agree with the resident's/non-physician practitioner's findings, Plan of Care, and MDM as documented in the resident's/non-physician practitioner's note, except where noted. All available labs and Radiology studies were reviewed.  I was present for key portions of any procedure(s) performed by the resident/non-physician practitioner and I was immediately available to provide assistance.       At this point I agree with the current assessment done in the Emergency Department.  I have conducted an independent evaluation of this patient a history and physical is as follows:    29-year-old female with a history of opioid use disorder presenting for evaluation of shortness of breath, fatigue, chills, and nausea.  Patient states her symptoms have been ongoing for several weeks.  Was previously evaluated in the emergency department 2 weeks ago and had elevated troponins with numerous electrolyte derangements at that time.  CTA of the chest was negative but there did show possible lesion in her liver.  Patient ultimately left AGAINST MEDICAL ADVICE because she felt her withdrawal would not be managed appropriately and that people would  her for her opioid use.  Patient states she would like assistance with substance use cessation and her opioid use disorder and would be interested in starting buprenorphine and is contemplating inpatient rehabilitation.  Patient is also interested in admission for withdrawal management.  States she is using about 8-10 bags of fentanyl per day with last use 3 hours prior to arrival.  States she has been injecting mostly in her bilateral neck.  Denies significant pain or swelling in her neck.  Denies rashes, vomiting, abdominal pain, diarrhea, upper respiratory symptoms, cough, hemoptysis, dysuria, hematuria.  Denies other substance use  including ethanol benzodiazepines.  Has not been taking any medication for her symptoms at home.  Patient was previously on methadone maintenance and was also on buprenorphine during pregnancy.    Please see resident documentation for histories and review of systems.    Exam: Vital signs and nursing notes reviewed  General: Awake, alert, chronically ill-appearing, in no acute distress  HEENT: Normocephalic, atraumatic, mucous membranes dry  Neck: Supple, no meningismus, indurated skin bilaterally to the neck without crepitus, swelling, fluctuance, erythema, or warmth  Heart: Regular rate and rhythm, no murmur  Lungs: Clear to auscultation bilateral without wheezes, rales, or rhonchi  Abdomen: Soft, nontender, nondistended, no rebound or guarding  Extremities: No swelling or deformity  Skin: Warm, dry, intact, no rash    EKG: Reviewed by myself and the resident physician: Sinus bradycardia without evidence of acute ischemia or malignant dysrhythmia    ED Course  ED Course as of 05/06/25 2229   Tue May 06, 2025   1632 Patient is interested in buprenorphine for MOUD but is not interested in transfer to the [facility] [place] at this time.  Last use of fentanyl was 3 hours ago; will place consult to toxicology for withdrawal management and initiation of buprenorphine.  Patient is in agreement with this plan.   1632 WBC: 9.18   1632 Hemoglobin(!): 10.7   1632 Platelet Count: 269   1645 LACTIC ACID: 0.9   1645 C-REACTIVE PROTEIN(!): 9.6   1645 Sed Rate(!): 32   1645 Potassium(!): 2.8   1645 Comprehensive metabolic panel(!)  Otherwise grossly normal   1647 POCT INR: 1.14   1647 PTT: 33   1654 hs TnI 0hr: 18   1700 Procalcitonin: <0.05   1730 CXR per my interpretation: no acute cardiopulmonary abnormality   1748 XR chest 2 views     IMPRESSION:     No acute cardiopulmonary disease.     1856 Attempted to admit patient to medicine but then patient states she would be willing to go to withdrawal management unit on their  evaluation.  Will discuss with internal medicine for transfer to [facility]    hs TnI 2hr: 20    Delta 2hr hsTnI: 2    Transport to [facility] 9 p.m.     ED course/Medical Decision Makin-year-old female presenting for evaluation of shortness of breath, fatigue, chills, and nausea.  Differential diagnosis includes viral upper respiratory infection, pneumonia, bacteremia, septic pulmonary emboli, endocarditis, opiate withdrawal.  Patient had previously elevated troponins but negative CT imaging 2 weeks ago when her symptoms started.  Repeat sepsis workup was pursued.  Patient has stable anemia but no leukocytosis.  Lactate is grossly normal.  ESR and CRP are mildly elevated.  Patient is hypokalemia but otherwise grossly normal CMP.  Initial troponin is 18 with a 2-hour troponin of 28 with a delta of 2.  Chest x-ray per my interpretation is negative for acute cardiopulmonary abnormality, and formal radiology interpretation agrees.  There is no murmur on exam to suggest endocarditis.  Patient treated supportively in the emergency department.  Unclear infectious etiology at this time.  No evidence of soft tissue infection on my exam.  Patient is requesting assistance with opioid withdrawal management and buprenorphine initiation.  Discussed with medical toxicology who evaluated the patient.  Plan for patient admission to medicine for further care including withdrawal management and negative culture results; patient given potassium replacement.  Patient then agreed to pursue medically supervised opioid withdrawal management at the withdrawal management unit at [facility].  Patient will be transferred there for further management.  Patient is in agreement with this plan.    Diagnosis: Dyspnea, chills, nausea, hypokalemia, opioid use disorder  Disposition: Transfer

## 2025-05-06 NOTE — EMTALA/ACUTE CARE TRANSFER
[facility] EMERGENCY DEPARTMENT  [address] [address]  Dept: [phone]      EMTALA TRANSFER CONSENT    NAME Elda MCKEON 1995                              MRN [id number]    I have been informed of my rights regarding examination, treatment, and transfer   by Dr. Mague Eden*    Benefits: Specialized equipment and/or services available at the receiving facility (Include comment)________________________ (Medical detox)    Risks: Potential for delay in receiving treatment, Potential deterioration of medical condition, Loss of IV, Increased discomfort during transfer, Possible worsening of condition or death during transfer      Transfer Request   I acknowledge that my medical condition has been evaluated and explained to me by the emergency department physician or other qualified medical person and/or my attending physician who has recommended and offered to me further medical examination and treatment. I understand the Hospital's obligation with respect to the treatment and stabilization of my emergency medical condition. I nevertheless request to be transferred. I release the Hospital, the doctor, and any other persons caring for me from all responsibility or liability for any injury or ill effects that may result from my transfer and agree to accept all responsibility for the consequences of my choice to transfer, rather than receive stabilizing treatment at the Hospital. I understand that because the transfer is my request, my insurance may not provide reimbursement for the services.  The Hospital will assist and direct me and my family in how to make arrangements for transfer, but the hospital is not liable for any fees charged by the transport service.  In spite of this understanding, I refuse to consent to further medical examination and treatment which has been offered to me, and request transfer to  Accepting Facility Name, City & State : [facility] detox unit, [place]. I authorize the performance of emergency medical procedures and treatments upon me in both transit and upon arrival at the receiving facility.  Additionally, I authorize the release of any and all medical records to the receiving facility and request they be transported with me, if possible.    I authorize the performance of emergency medical procedures and treatments upon me in both transit and upon arrival at the receiving facility.  Additionally, I authorize the release of any and all medical records to the receiving facility and request they be transported with me, if possible.  I understand that the safest mode of transportation during a medical emergency is an ambulance and that the Hospital advocates the use of this mode of transport. Risks of traveling to the receiving facility by car, including absence of medical control, life sustaining equipment, such as oxygen, and medical personnel has been explained to me and I fully understand them.    (LEVI CORRECT BOX BELOW)  [  ]  I consent to the stated transfer and to be transported by ambulance/helicopter.  [  ]  I consent to the stated transfer, but refuse transportation by ambulance and accept full responsibility for my transportation by car.  I understand the risks of non-ambulance transfers and I exonerate the Hospital and its staff from any deterioration in my condition that results from this refusal.    X___________________________________________    DATE  25  TIME________  Signature of patient or legally responsible individual signing on patient behalf           RELATIONSHIP TO PATIENT_________________________          Provider Certification    NAME Elda Calderon                                         1995                              MRN [id number]    A medical screening exam was performed on the above named patient.  Based on the  examination:    Condition Necessitating Transfer The primary encounter diagnosis was Opioid use disorder, severe, dependence (HCC). Diagnoses of Hypokalemia, Elevated troponin, and Anemia were also pertinent to this visit.    Patient Condition: The patient has been stabilized such that within reasonable medical probability, no material deterioration of the patient condition or the condition of the unborn child(kalli) is likely to result from the transfer    Reason for Transfer: Level of Care needed not available at this facility (Medical detox)    Transfer Requirements: [facility] detox unit, [place], [place]   Space available and qualified personnel available for treatment as acknowledged by    Agreed to accept transfer and to provide appropriate medical treatment as acknowledged by       Dr. Herndon  Appropriate medical records of the examination and treatment of the patient are provided at the time of transfer   STAFF INITIAL WHEN COMPLETED _______  Transfer will be performed by qualified personnel from    and appropriate transfer equipment as required, including the use of necessary and appropriate life support measures.    Provider Certification: I have examined the patient and explained the following risks and benefits of being transferred/refusing transfer to the patient/family:         Based on these reasonable risks and benefits to the patient and/or the unborn child(kalli), and based upon the information available at the time of the patient’s examination, I certify that the medical benefits reasonably to be expected from the provision of appropriate medical treatments at another medical facility outweigh the increasing risks, if any, to the individual’s medical condition, and in the case of labor to the unborn child, from effecting the transfer.    X____________________________________________ DATE 05/06/25        TIME_______      ORIGINAL - SEND TO MEDICAL RECORDS   COPY - SEND WITH  PATIENT DURING TRANSFER

## 2025-05-06 NOTE — ED NOTES
Pt would like to see provider first before obtaining blood work. Per pt she is a difficult stick.      Rossi Kerr RN  05/06/25 7103

## 2025-05-07 VITALS
TEMPERATURE: 97.8 F | HEART RATE: 87 BPM | DIASTOLIC BLOOD PRESSURE: 102 MMHG | BODY MASS INDEX: 16.66 KG/M2 | SYSTOLIC BLOOD PRESSURE: 132 MMHG | RESPIRATION RATE: 16 BRPM | WEIGHT: 97.6 LBS | HEIGHT: 64 IN | OXYGEN SATURATION: 95 %

## 2025-05-07 PROBLEM — E87.6 HYPOKALEMIA: Status: ACTIVE | Noted: 2025-05-07

## 2025-05-07 PROBLEM — K76.9 LIVER LESION: Status: ACTIVE | Noted: 2025-05-07

## 2025-05-07 LAB
ANION GAP SERPL CALCULATED.3IONS-SCNC: 14 MMOL/L (ref 4–13)
BUN SERPL-MCNC: 6 MG/DL (ref 5–25)
CALCIUM SERPL-MCNC: 8.3 MG/DL (ref 8.4–10.2)
CHLORIDE SERPL-SCNC: 104 MMOL/L (ref 96–108)
CO2 SERPL-SCNC: 22 MMOL/L (ref 21–32)
CREAT SERPL-MCNC: 0.44 MG/DL (ref 0.6–1.3)
ERYTHROCYTE [DISTWIDTH] IN BLOOD BY AUTOMATED COUNT: 12.2 % (ref 11.6–15.1)
GFR SERPL CREATININE-BSD FRML MDRD: 137 ML/MIN/1.73SQ M
GLUCOSE SERPL-MCNC: 60 MG/DL (ref 65–140)
HCT VFR BLD AUTO: 31.4 % (ref 34.8–46.1)
HGB BLD-MCNC: 10.6 G/DL (ref 11.5–15.4)
MCH RBC QN AUTO: 29.7 PG (ref 26.8–34.3)
MCHC RBC AUTO-ENTMCNC: 33.8 G/DL (ref 31.4–37.4)
MCV RBC AUTO: 88 FL (ref 82–98)
PLATELET # BLD AUTO: 207 THOUSANDS/UL (ref 149–390)
PMV BLD AUTO: 9.3 FL (ref 8.9–12.7)
POTASSIUM SERPL-SCNC: 3 MMOL/L (ref 3.5–5.3)
RBC # BLD AUTO: 3.57 MILLION/UL (ref 3.81–5.12)
SODIUM SERPL-SCNC: 140 MMOL/L (ref 135–147)
WBC # BLD AUTO: 6.15 THOUSAND/UL (ref 4.31–10.16)

## 2025-05-07 PROCEDURE — 85027 COMPLETE CBC AUTOMATED: CPT | Performed by: PHYSICIAN ASSISTANT

## 2025-05-07 PROCEDURE — 99232 SBSQ HOSP IP/OBS MODERATE 35: CPT | Performed by: EMERGENCY MEDICINE

## 2025-05-07 PROCEDURE — 80048 BASIC METABOLIC PNL TOTAL CA: CPT | Performed by: PHYSICIAN ASSISTANT

## 2025-05-07 PROCEDURE — 99239 HOSP IP/OBS DSCHRG MGMT >30: CPT

## 2025-05-07 PROCEDURE — HZ2ZZZZ DETOXIFICATION SERVICES FOR SUBSTANCE ABUSE TREATMENT: ICD-10-PCS | Performed by: INTERNAL MEDICINE

## 2025-05-07 PROCEDURE — 99223 1ST HOSP IP/OBS HIGH 75: CPT | Performed by: INTERNAL MEDICINE

## 2025-05-07 RX ORDER — BUPRENORPHINE 20 UG/H
20 PATCH TRANSDERMAL
Status: DISCONTINUED | OUTPATIENT
Start: 2025-05-07 | End: 2025-05-08 | Stop reason: HOSPADM

## 2025-05-07 RX ORDER — BUPRENORPHINE 2 MG/1
2 TABLET SUBLINGUAL EVERY 4 HOURS
Status: DISCONTINUED | OUTPATIENT
Start: 2025-05-07 | End: 2025-05-08 | Stop reason: HOSPADM

## 2025-05-07 RX ORDER — DIAZEPAM 10 MG/2ML
10 INJECTION, SOLUTION INTRAMUSCULAR; INTRAVENOUS ONCE
Status: COMPLETED | OUTPATIENT
Start: 2025-05-07 | End: 2025-05-07

## 2025-05-07 RX ORDER — BUPRENORPHINE AND NALOXONE 8; 2 MG/1; MG/1
8 FILM, SOLUBLE BUCCAL; SUBLINGUAL 2 TIMES DAILY
Status: DISCONTINUED | OUTPATIENT
Start: 2025-05-08 | End: 2025-05-08 | Stop reason: HOSPADM

## 2025-05-07 RX ORDER — POTASSIUM CHLORIDE 14.9 MG/ML
20 INJECTION INTRAVENOUS ONCE
Status: COMPLETED | OUTPATIENT
Start: 2025-05-07 | End: 2025-05-07

## 2025-05-07 RX ORDER — CLONAZEPAM 1 MG/1
2 TABLET ORAL EVERY 6 HOURS PRN
Status: DISCONTINUED | OUTPATIENT
Start: 2025-05-07 | End: 2025-05-08 | Stop reason: HOSPADM

## 2025-05-07 RX ORDER — POTASSIUM CHLORIDE 1500 MG/1
40 TABLET, EXTENDED RELEASE ORAL ONCE
Status: DISCONTINUED | OUTPATIENT
Start: 2025-05-07 | End: 2025-05-08 | Stop reason: HOSPADM

## 2025-05-07 RX ORDER — BUPRENORPHINE 2 MG/1
0.5 TABLET SUBLINGUAL
Status: COMPLETED | OUTPATIENT
Start: 2025-05-07 | End: 2025-05-07

## 2025-05-07 RX ORDER — HYDROXYZINE HYDROCHLORIDE 50 MG/1
50 TABLET, FILM COATED ORAL ONCE
Status: COMPLETED | OUTPATIENT
Start: 2025-05-07 | End: 2025-05-07

## 2025-05-07 RX ORDER — HYDROXYZINE HYDROCHLORIDE 50 MG/1
50 TABLET, FILM COATED ORAL EVERY 6 HOURS PRN
Status: DISCONTINUED | OUTPATIENT
Start: 2025-05-07 | End: 2025-05-08 | Stop reason: HOSPADM

## 2025-05-07 RX ORDER — BUPRENORPHINE 2 MG/1
0.5 TABLET SUBLINGUAL EVERY 4 HOURS
Status: DISCONTINUED | OUTPATIENT
Start: 2025-05-07 | End: 2025-05-07

## 2025-05-07 RX ADMIN — POTASSIUM CHLORIDE 20 MEQ: 14.9 INJECTION, SOLUTION INTRAVENOUS at 09:16

## 2025-05-07 RX ADMIN — CLONIDINE HYDROCHLORIDE 0.1 MG: 0.1 TABLET ORAL at 12:36

## 2025-05-07 RX ADMIN — HYDROXYZINE HYDROCHLORIDE 50 MG: 50 TABLET, FILM COATED ORAL at 09:19

## 2025-05-07 RX ADMIN — ONDANSETRON 4 MG: 2 INJECTION INTRAMUSCULAR; INTRAVENOUS at 14:45

## 2025-05-07 RX ADMIN — Medication 1 TABLET: at 09:16

## 2025-05-07 RX ADMIN — BUPRENORPHINE 20 MCG: 20 PATCH TRANSDERMAL at 07:43

## 2025-05-07 RX ADMIN — Medication 0.5 MG: at 18:47

## 2025-05-07 RX ADMIN — CLONAZEPAM 2 MG: 1 TABLET ORAL at 13:57

## 2025-05-07 RX ADMIN — CYCLOBENZAPRINE 10 MG: 10 TABLET, FILM COATED ORAL at 16:52

## 2025-05-07 RX ADMIN — ONDANSETRON 4 MG: 2 INJECTION INTRAMUSCULAR; INTRAVENOUS at 07:45

## 2025-05-07 RX ADMIN — CLONAZEPAM 2 MG: 1 TABLET ORAL at 07:53

## 2025-05-07 RX ADMIN — Medication 0.5 MG: at 16:52

## 2025-05-07 RX ADMIN — Medication 0.5 MG: at 13:05

## 2025-05-07 RX ADMIN — Medication 0.5 MG: at 15:06

## 2025-05-07 RX ADMIN — DIAZEPAM 10 MG: 5 INJECTION, SOLUTION INTRAMUSCULAR; INTRAVENOUS at 14:39

## 2025-05-07 RX ADMIN — CYCLOBENZAPRINE 10 MG: 10 TABLET, FILM COATED ORAL at 09:16

## 2025-05-07 RX ADMIN — GABAPENTIN 300 MG: 300 CAPSULE ORAL at 05:58

## 2025-05-07 RX ADMIN — HYDROXYZINE HYDROCHLORIDE 50 MG: 50 TABLET, FILM COATED ORAL at 00:10

## 2025-05-07 NOTE — UTILIZATION REVIEW
Initial Clinical Review    Pt initially presented to [facility] ED. Pt was transferred by EMS to [facility] for medically managed detox.    Admission: Date/Time/Statement:   Admission Orders (From admission, onward)       Ordered        05/06/25 2221  INPATIENT ADMISSION  Once                          Orders Placed This Encounter   Procedures    INPATIENT ADMISSION     Standing Status:   Standing     Number of Occurrences:   1     Level of Care:   Med Surg [16]     Estimated length of stay:   More than 2 Midnights     Certification:   I certify that inpatient services are medically necessary for this patient for a duration of greater than two midnights. See H&P and MD Progress Notes for additional information about the patient's course of treatment.     ED Arrival Information       Patient not seen in ED                       No chief complaint on file.      Initial Presentation: 29 y.o. female who presented to medical detox. Inpatient admission for evaluation and treatment of acute opioid withdrawal. Presented w/ request for detox from opioids, arrived w/ SOB, chills, aches. Patient was previously seen on 4/17/2025 and reportedly was post be admitted but signed out AMA after an extensive workup which revealed concern for gas in her neck which patient injects into. Exam: pallor, anxiety. K 2.8. CRP 9.6. Sed Rate 32. EKG bradycardia. COWS 6. Plan: COWS monitoring w/ symptomatic supportive care, Butrans patch, IVF, micro induction of Suboxone when clinically indicated, telemetry, continuous pulse ox, Trend labs, replete electrolytes as needed, I&O. Continue PTA meds. Toxicology consulted.    Toxicology: Uses 10 bags IV fentanyl daily, last use 5/6 @ 1100. Injecting into neck. On exam, 4+ pupils b/l, scarring over b/l forearms.       Date: 05/07/25 Day 2: Pt appears ill. Overnight, COWS 15. On exam: diaphoretic, dry mucous membranes. COWS 7 this morning. Plan: continue COWS monitoring w/  symptomatic supportive care, Butrans patch, micro induction of Suboxone, telemetry, continuous pulse ox, Trend labs, replete electrolytes as needed. Continue current meds.     Scheduled Medications:  cloNIDine, 0.1 mg, Oral, Q8H JERI  cyclobenzaprine, 10 mg, Oral, TID  multivitamin-minerals, 1 tablet, Oral, Daily  potassium chloride, 40 mEq, Oral, Daily  transdermal buprenorphine, 20 mcg, Transdermal, Q7 Days    Continuous IV Infusions: none    PRN Meds:  acetaminophen, 650 mg, Oral, Q6H PRN  clonazePAM, 2 mg, Oral, Q6H PRN; 5/7 x1  gabapentin, 300 mg, Oral, Q8H PRN; 5/6 x1, 5/7 x1  loperamide, 4 mg, Oral; 5/6 x1  loperamide, 2 mg, Oral, Q4H PRN  ondansetron, 4 mg, Intravenous, Q6H PRN; 5/7 x1  traZODone, 50 mg, Oral, HS PRN; 5/6 x1    hydrOXYzine HCL (ATARAX) tablet 50 mg  Dose: 50 mg Freq: Once Route: PO  Start: 05/07/25 0015 End: 05/07/25 0010    ED Triage Vitals [05/06/25 2215]   Temperature Pulse Respirations Blood Pressure SpO2 Pain Score   97.8 °F (36.6 °C) 58 18 130/94 96 % No Pain     Weight (last 2 days)       Date/Time Weight    05/06/25 2156 44.3 (97.6)            Vital Signs (last 3 days)       Date/Time Temp Pulse Resp BP MAP (mmHg) SpO2 O2 Device Patient Position - Orthostatic VS Santhosh Coma Scale Score Clinical Opiate Withdrawal Scale Total Score Pain    05/07/25 0735 -- -- -- -- -- -- -- -- -- 7 --    05/07/25 0708 97.5 °F (36.4 °C) 86 18 107/65 124 93 % None (Room air) Lying -- -- 2    05/07/25 0515 98.6 °F (37 °C) 86 18 105/67 -- 97 % None (Room air) Lying -- -- No Pain    05/06/25 2356 -- 124 20 -- -- -- -- -- -- 15 --    05/06/25 2215 97.8 °F (36.6 °C) 58 18 130/94 -- 96 % None (Room air) Lying 15 6 No Pain           05/07/25 0735 05/06/25 8506 05/06/25 2213   Clinical Opiate Withdrawal Scale   Resting Pulse Rate: Measured After Patient is Sitting or Lying for One Minute 1  -LL 4  -JA 0  -JA   GI Upset: Over Last Half Hour 2  -LL 0  -JA 2  -JA   Sweating: Over Past Half Hour Not Accounted for by  Room Temperature of Patient Activity 1  -LL 2  -JA 1  -JA   Tremor: Observation of Outstretched Hands 0  -LL 2  -JA 0  -JA   Restlessness: Observation During Assessment 1  -LL 3  -JA 0  -JA   Yawning: Observation During Assessment 0  -LL 0  -JA 0  -JA   Pupil Size 0  -LL 1  -JA 1  -JA   Anxiety and Irritability 1  -LL 2  -JA 1  -JA   Bone or Joint Aches: If Patient was Having Pain Previously, Only the Additional Component Attributed to Opiate Withdrawal is Scored 1  -LL 1  -JA 1  -JA   Gooseflesh Skin 0  -LL 0  -JA 0  -JA   Runny Nose or Tearing: Not Accounted for by Cold Symptoms or Allergies 0  -LL 0  -JA 0  -JA   Clinical Opiate Withdrawal Scale Total Score 7  -LL 15  -JA 6  -JA       Pertinent Labs/Diagnostic Test Results:    Results from last 7 days   Lab Units 05/07/25  0721 05/06/25  1621   WBC Thousand/uL 6.15 9.18   HEMOGLOBIN g/dL 10.6* 10.7*   HEMATOCRIT % 31.4* 31.7*   PLATELETS Thousands/uL 207 269   TOTAL NEUT ABS Thousands/µL  --  5.31         Results from last 7 days   Lab Units 05/06/25  1621   SODIUM mmol/L 140   POTASSIUM mmol/L 2.8*   CHLORIDE mmol/L 103   CO2 mmol/L 30   ANION GAP mmol/L 7   BUN mg/dL 7   CREATININE mg/dL 0.62   EGFR ml/min/1.73sq m 123   CALCIUM mg/dL 8.6     Results from last 7 days   Lab Units 05/06/25  1621   AST U/L 28   ALT U/L 12   ALK PHOS U/L 75   TOTAL PROTEIN g/dL 6.4   ALBUMIN g/dL 3.4*   TOTAL BILIRUBIN mg/dL 0.41         Results from last 7 days   Lab Units 05/06/25  1621   GLUCOSE RANDOM mg/dL 98      Results from last 7 days   Lab Units 05/06/25  2039 05/06/25  1826 05/06/25  1621   HS TNI 0HR ng/L  --   --  18   HS TNI 2HR ng/L  --  20  --    HSTNI D2 ng/L  --  2  --    HS TNI 4HR ng/L 15  --   --    HSTNI D4 ng/L -3  --   --       Results from last 7 days   Lab Units 05/06/25  1621   PROTIME seconds 15.4*   INR  1.14   PTT seconds 33       Results from last 7 days   Lab Units 05/06/25  1621   PROCALCITONIN ng/ml <0.05     Results from last 7 days   Lab Units  05/06/25  1621   LACTIC ACID mmol/L 0.9      Results from last 7 days   Lab Units 05/06/25  1621   CRP mg/L 9.6*   SED RATE mm/hour 32*      Results from last 7 days   Lab Units 05/06/25  1621   BLOOD CULTURE  Received in Microbiology Lab. Culture in Progress.          Past Medical History:   Diagnosis Date    Anxiety     Borderline personality disorder (HCC)     Chlamydia     Depression     Endometriosis     IV drug user     IV heroin use, rehab 7 times     Liver lesion 5/7/2025    Panic attacks     Psychiatric disorder     anxiety    PTSD (post-traumatic stress disorder)     PTSD (post-traumatic stress disorder) 8/12/2023    Varicella     childhood    Visual impairment      Present on Admission:   Opioid use disorder, severe, dependence (HCC)   Hypokalemia   Acute opioid withdrawal (HCC)   SIRS (systemic inflammatory response syndrome) (HCC)      Admitting Diagnosis: Opioid use disorder, severe, dependence (HCC) [F11.20]  Hypokalemia [E87.6]  Age/Sex: 29 y.o. female              Network Utilization Review Department  ATTENTION: Please call with any questions or concerns to [phone] and carefully listen to the prompts so that you are directed to the right person. All voicemails are confidential.   For Discharge needs, contact Care Management DC Support Team at [phone] opt. [phone]  Send all requests for admission clinical reviews, approved or denied determinations and any other requests to dedicated fax number below belonging to the campus where the patient is receiving treatment. List of dedicated fax numbers for the Facilities:  FACILITY NAME UR FAX NUMBER   ADMISSION DENIALS (Administrative/Medical Necessity) [phone]   DISCHARGE SUPPORT TEAM (NETWORK) [phone]   PARENT CHILD HEALTH (Maternity/NICU/Pediatrics) [phone]   [facility] [phone]   [facility] [phone]   [facility] [phone]   [facility]  [facility] [phone]   Pending sale to [facility] [phone]   [facility] [phone]   [facility] [phone]   [facility] [phone]   [facility] [phone]   [facility] [phone]   [facility] [phone]   [facility] [phone]

## 2025-05-07 NOTE — ASSESSMENT & PLAN NOTE
Patient has been using fentanyl daily for the last few months, last had a dose 3 hours prior to arrival to ED   Patient interested in admission for withdrawal management  Had discussion about possible suboxone initiation, patient stated she would think out it, has been on suboxone and methadone in the past is unsure if she wants to try these again  Inform toxicology if patient would like to initiate suboxone  For now, monitor for withdrawal symptoms and use the following medications for symptom management    Adjunctive medications administered as needed:  Clonidine 0.1 mg PO Q6 hours PRN anxiety or palpitations    Gabapentin 300mg PO Q8 hours PRN anxiety    Diazepam 5 mg p.o. or IV q8 PRN refractory anxiety  Ibuprofen 600 mg PO Q6 hours PRN pain    Acetaminophen 1000mg PO Q8 hours PRN pain    Ondansetron 4 mg PO Q6 hours PRN N/V    Reglan 5-10 mg IV q8 p.r.n. refractory nausea vomiting  Nicotine patch 7, 14, 21 mg  PRN nicotine withdrawal   Trazodone 50 mg PO QHS PRN sleep    Loperamide 4 mg PO PRN diarrhea up to 16 mg/day

## 2025-05-07 NOTE — ASSESSMENT & PLAN NOTE
Admit to telemetry  Patient's initial potassium 2.8  Complaining of generalized weakness  Is status post IV and p.o. repletion  Will give Klor-Con 40 mill equivalents p.o. daily  Continue to monitor with repeat labs in a.m.

## 2025-05-07 NOTE — ED NOTES
Per provider, okay to hold off on second set of blood cultures at this time.      Zayra Koroma RN  05/06/25 2004

## 2025-05-07 NOTE — PROGRESS NOTES
05/07/25 1407   Referral Data   Referral Name Self   Referral Reason Drug/Alcohol Abuse   County Information   [place] [facility]   Readmission Root Cause   30 Day Readmission No   Patient Information   Mental Status Alert   Primary Caregiver Self   Support System Immediate family   Congregation/Cultural Requests None   Legal Information   Legal Issues Denies   Health Care Proxy Appointed No   Activities of Daily Living Prior to Admission   Functional Status Independent   Assistive Device No device needed   Living Arrangement Apartment   Ambulation Independent   Access to Firearms   Access to Firearms No   Income Information   Income Source Employed        05/07/25 1408   Patient Intake   Special Needs None   Living Arrangement Apartment  (Pt lives in an in-law apartment attached to her parent's home where her mother/step father /daugther live)   Can patient return home? 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   Patient History   Presenting Problems Recurrence in substance use d/o   Treatment History Prior rehab stay in 2023 or 2024 at [facility], then was prescribed suboxone but did not follow up with outpatient. Prior methadone maintenance at [facility], does not remember when though, and prior U stay   Currently in Treatment No   Medical Problems Denies   Legal Issues Denies   Probation/ Name (if applicable) Denies   Substance Abuse Yes (See BH History section for detail)   Crisis Info   Release of Information Signed Yes          05/07/25 1410   Substance Abuse Addendum Details   History of Withdrawal Symptoms Seizures;Other withdrawal symptoms (specify in comment);Elevated BP   Medical  Complications Denies   Sober Supports Mother   Present Treatment Denies   Substance Abuse Treatment Hx Past Tx, Inpatient;Past Tx, Outpatient   ASAM Level & Criteria Pt will meet for 3.5LOC when she is medically stable at discharge, pt unsure at this time if she will follow through with rehab recommendation or engage in an outpatient level of care   Stage of Change   Stage of Change Contemplation

## 2025-05-07 NOTE — PROGRESS NOTES
Progress Note - Medical Toxicology   Name: Elda Calderon 29 y.o. female I MRN: [id number]  Unit/Bed#: 5T DETOX 506-01 I Date of Admission: 5/6/2025   Date of Service: 5/7/2025 I Hospital Day: 1    Assessment & Plan  Opioid use disorder, severe, dependence (HCC)   Patient has been using fentanyl daily for the last few months, last had a dose 3 hours prior to arrival to ED   Patient interested in admission for withdrawal management  Had discussion about possible suboxone initiation, patient stated she would think out it, has been on suboxone and methadone in the past is unsure if she wants to try these again  Inform toxicology if patient would like to initiate suboxone  For now, monitor for withdrawal symptoms and use the following medications for symptom management     Adjunctive medications administered as needed:  Clonidine 0.1 mg PO Q6 hours PRN anxiety or palpitations    Gabapentin 300mg PO Q8 hours PRN anxiety    Diazepam 5 mg p.o. or IV q8 PRN refractory anxiety  Ibuprofen 600 mg PO Q6 hours PRN pain    Acetaminophen 1000mg PO Q8 hours PRN pain    Ondansetron 4 mg PO Q6 hours PRN N/V    Reglan 5-10 mg IV q8 p.r.n. refractory nausea vomiting  Nicotine patch 7, 14, 21 mg  PRN nicotine withdrawal   Trazodone 50 mg PO QHS PRN sleep    Loperamide 4 mg PO PRN diarrhea up to 16 mg/day    Patient interested in suboxone, but not IP/OP rehab  Acute opioid withdrawal (HCC)      Reason for current consult: Opioid Use Disorder     Subjective   Patient states she feels very anxious and mildly nauseous this morning. She denies any headache, abd pain, vomiting. She states in the past, phenobarbital or Ativan have helped with her anxiety while going through withdrawal.     Objective :  Temp:  [97.5 °F (36.4 °C)-98.6 °F (37 °C)] 97.5 °F (36.4 °C)  HR:  [] 86  BP: (105-157)/() 107/65  Resp:  [18-20] 18  SpO2:  [93 %-98 %] 93 %  O2 Device: None (Room air)      Intake/Output Summary (Last 24 hours) at 5/7/2025  1011  Last data filed at 5/7/2025 0601  Gross per 24 hour   Intake 180 ml   Output 500 ml   Net -320 ml       Physical Exam  Constitutional:       General: She is not in acute distress.  HENT:      Head: Normocephalic and atraumatic.      Mouth/Throat:      Mouth: Mucous membranes are moist.      Pharynx: Oropharynx is clear.   Eyes:      Extraocular Movements: Extraocular movements intact.      Conjunctiva/sclera: Conjunctivae normal.      Pupils: Pupils are equal, round, and reactive to light.   Cardiovascular:      Rate and Rhythm: Normal rate and regular rhythm.      Pulses: Normal pulses.      Heart sounds: Normal heart sounds.   Pulmonary:      Effort: Pulmonary effort is normal. No respiratory distress.   Abdominal:      Palpations: Abdomen is soft.      Tenderness: There is no abdominal tenderness.   Musculoskeletal:         General: No tenderness or deformity. Normal range of motion.      Cervical back: Normal range of motion. No rigidity.   Skin:     General: Skin is warm and dry.      Capillary Refill: Capillary refill takes less than 2 seconds.   Neurological:      General: No focal deficit present.      Mental Status: She is alert and oriented to person, place, and time. Mental status is at baseline.           Lab Results: I have reviewed the following results:CBC/BMP:   .     05/07/25  0721   WBC 6.15   HGB 10.6*   HCT 31.4*      SODIUM 140   K 3.0*      CO2 22   BUN 6   CREATININE 0.44*   GLUC 60*        Imaging Results Review: No pertinent imaging studies reviewed.  Other Study Results Review: No additional pertinent studies reviewed.    Administrative Statements

## 2025-05-07 NOTE — ASSESSMENT & PLAN NOTE
Patient had previous med detox stay in July 2023   Patient reports using 8 bags of fentanyl on a daily basis for the last couple months   Placed on COWS protocol  Will give Catapres 0.1 mg p.o. every 8 hours, Flexeril 10 mg p.o. 3 times daily, Neurontin 300 mg p.o. every 8 hours and Imodium as needed  Patient had previously been on methadone open to the idea of Suboxone  We will consult medical toxicology in a.m. and case management

## 2025-05-07 NOTE — H&P
TeleMedicine H&P - [facility] Internal Medicine  Name: Elda Calderon 29 y.o. female I MRN: [id number]  Unit/Bed#: 5T DETOX 506-01 I Date of Admission: 5/6/2025   Date of Service: 5/7/2025 I Hospital Day: 1     Assessment & Plan  Hypokalemia  Admit to telemetry  Patient's initial potassium 2.8  Complaining of generalized weakness  Is status post IV and p.o. repletion  Will give Klor-Con 40 mill equivalents p.o. daily  Continue to monitor with repeat labs in a.m.  Opioid use disorder, severe, dependence (HCC)  Patient had previous med detox stay in July 2023   Patient reports using 8 bags of fentanyl on a daily basis for the last couple months   Placed on COWS protocol  Will give Catapres 0.1 mg p.o. every 8 hours, Flexeril 10 mg p.o. 3 times daily, Neurontin 300 mg p.o. every 8 hours and Imodium as needed  Patient had previously been on methadone open to the idea of Suboxone  We will consult medical toxicology in a.m. and case management    REQUIRED DOCUMENTATION:     1. This service was provided via Telemedicine.  2. Provider located at [facility].  3. TeleMed provider: Jose Hartman PA-C.  4. Identify all parties in room with patient during tele consult:  Patient and patient's nurse  5. After connecting through Deemideo, patient was identified by name and date of birth and assistant checked wristband.  Patient was then informed that this was a Telemedicine visit and that the exam was being conducted confidentially over secure lines. My office door was closed. No one else was in the room.  Patient acknowledged consent and understanding of privacy and security of the Telemedicine visit, and gave us permission to have the assistant stay in the room in order to assist with the history and to conduct the exam.  I informed the patient that I have reviewed their record in Epic and presented the opportunity for them to ask any questions regarding the visit today.  The patient agreed to participate.     VTE Prophylaxis:  Sequential compression device (Venodyne)   / sequential compression device   Code Status: Level 1  Discussion with family: None present at bedside at time of exam    Anticipated Length of Stay:  Patient will be admitted on an Inpatient basis with an anticipated length of stay of greater than 2 midnights.   Justification for Hospital Stay: Hypokalemia requiring repletion and continued monitoring, opiate use disorder requiring symptom management    Total Time for Visit, including Counseling / Coordination of Care: 1 hour.  Greater than 50% of this total time spent on direct patient counseling and coordination of care.    Chief Complaint:   Shortness of breath, chills and bodyaches    History of Present Illness:    Elda Calderon is a 29 y.o. female who presents with chest pain, shortness of breath and palpitations.  Patient with a history of anxiety as well as opiate use disorder Zentz to the ER for further evaluation and treatment of her month-long complaints of vague shortness of breath, chills and bodyaches.  Patient was previously seen on 4/17/2025 and reportedly was post be admitted but signed out AMA after an extensive workup which revealed concern for gas in her neck which patient injects into.  Patient continues to complain of some shortness of breath and generalized bodyaches but workup in ER was essentially negative with improved imaging and labs from prior visit with the exception of she was noted to be hypokalemic with a potassium of 2.8.  Patient was amenable to the idea of going to withdrawal symptom management unit for repletion of her potassium and monitoring of her withdrawal symptoms.    At time of exam patient is resting comfortably in bed, upon arrival he did have some anxiety which was treated and patient is more comfortable at time of exam.    Review of Systems:    Review of Systems   Constitutional:  Positive for chills and fatigue. Negative for fever.   HENT:  Negative for congestion and sore  throat.    Respiratory:  Positive for shortness of breath. Negative for cough and wheezing.    Cardiovascular:  Negative for chest pain and palpitations.   Gastrointestinal:  Negative for diarrhea, nausea and vomiting.   Genitourinary:  Negative for dysuria, frequency, hematuria and urgency.   Musculoskeletal:  Negative for arthralgias and myalgias.   Skin:  Negative for wound.   Neurological:  Positive for weakness. Negative for dizziness, syncope, light-headedness and headaches.   All other systems reviewed and are negative.      Past Medical and Surgical History:     Past Medical History:   Diagnosis Date    Anxiety     Borderline personality disorder (HCC)     Chlamydia     Depression     Endometriosis     IV drug user     IV heroin use, rehab 7 times     Panic attacks     Psychiatric disorder     anxiety    PTSD (post-traumatic stress disorder)     PTSD (post-traumatic stress disorder) 8/12/2023    Varicella     childhood    Visual impairment        Past Surgical History:   Procedure Laterality Date    LAPAROTOMY         Meds/Allergies:    Prior to Admission medications    Medication Sig Start Date End Date Taking? Authorizing Provider   ondansetron (ZOFRAN) 4 mg tablet Take 1 tablet (4 mg total) by mouth every 8 (eight) hours as needed for nausea or vomiting 3/5/24  Yes Jose Resendiz MD   ondansetron (ZOFRAN-ODT) 4 mg disintegrating tablet Take 1 tablet (4 mg total) by mouth every 6 (six) hours as needed for nausea or vomiting 4/17/25  Yes Jing Hendricks MD   gabapentin (NEURONTIN) 100 mg capsule Take 1 capsule (100 mg total) by mouth 3 (three) times a day  Patient not taking: Reported on 5/6/2025 3/5/24 5/4/24  Jose Resendiz MD   methadone (DOLOPHINE) 10 MG/5ML solution Take 136 mg by mouth every morning Take by mouth daily    Historical Provider, MD   prazosin (MINIPRESS) 1 mg capsule Take 1 capsule (1 mg total) by mouth daily at bedtime  Patient not taking: Reported on 5/6/2025 8/16/23 10/15/23   "FRANK Dooley   QUEtiapine (SEROquel) 25 mg tablet Take 1 tablet (25 mg total) by mouth daily at bedtime  Patient not taking: Reported on 5/6/2025 3/5/24 4/4/24  Jose Resendiz MD   sertraline (ZOLOFT) 25 mg tablet Take 1 tablet (25 mg total) by mouth daily  Patient not taking: Reported on 5/6/2025 3/6/24 4/5/24  Jose Resendiz MD     all medications and allergies reviewed    Allergies: No Known Allergies    Social History:     Marital Status: Single   Occupation: [employer]  Patient Pre-hospital Living Situation: Resides at home with parents and daughter  Patient Pre-hospital Level of Mobility: Full without assist  Patient Pre-hospital Diet Restrictions: None  Substance Use History: Patient reports injecting 8 bags of fentanyl a day into her neck for the past \"couple months\"  Social History     Substance and Sexual Activity   Alcohol Use Not Currently     Social History     Tobacco Use   Smoking Status Never    Passive exposure: Never   Smokeless Tobacco Never     Social History     Substance and Sexual Activity   Drug Use Yes    Types: Fentanyl    Comment: 8 bags/day states benzos are mixed in       Family History:  I have reviewed the patients family history     Physical Exam:     Vitals:   Blood Pressure: 130/94 (05/06/25 2215)  Pulse: (!) 124 (05/06/25 2356)  Temperature: 97.8 °F (36.6 °C) (05/06/25 2215)  Temp Source: Temporal (05/06/25 2215)  Respirations: 20 (05/06/25 2356)  Height: 5' 4\" (162.6 cm) (05/06/25 2156)  Weight - Scale: 44.3 kg (97 lb 9.6 oz) (05/06/25 2156)  SpO2: 96 % (05/06/25 2215)    Physical Exam  Vitals and nursing note reviewed.   Constitutional:       Appearance: She is well-developed. She is ill-appearing.   HENT:      Head: Normocephalic and atraumatic.      Right Ear: Tympanic membrane normal.      Left Ear: Tympanic membrane normal.      Ears:      Comments: As per nursing exam given remote location     Nose: Nose normal.      Comments: As per nursing exam given remote " location     Mouth/Throat:      Mouth: Mucous membranes are moist.      Pharynx: No oropharyngeal exudate.      Comments: As per nursing exam given remote location  Eyes:      General: No scleral icterus.     Pupils: Pupils are equal, round, and reactive to light.      Comments: As per nursing exam given remote location   Neck:      Vascular: No JVD.      Comments: As per nursing exam given remote location  Cardiovascular:      Rate and Rhythm: Normal rate and regular rhythm.      Heart sounds: Normal heart sounds. No murmur heard.     Comments: As per nursing exam given remote location  Pulmonary:      Effort: Pulmonary effort is normal. No respiratory distress.      Breath sounds: Normal breath sounds. No wheezing or rales.      Comments: As per nursing exam given remote location  Abdominal:      General: Bowel sounds are normal.      Palpations: Abdomen is soft.      Tenderness: There is no abdominal tenderness. There is no guarding or rebound.      Comments: As per nursing exam given remote location   Musculoskeletal:         General: Normal range of motion.      Cervical back: Normal range of motion and neck supple.      Right lower leg: No edema.      Left lower leg: No edema.      Comments: As per nursing exam given remote location   Lymphadenopathy:      Cervical: No cervical adenopathy.   Skin:     General: Skin is warm and dry.      Coloration: Skin is pale.      Findings: No erythema or rash.      Comments: As per nursing exam given remote location   Neurological:      Mental Status: She is alert and oriented to person, place, and time.   Psychiatric:         Behavior: Behavior normal.           Additional Data:     Lab Results: Results Review Statement: I reviewed radiology reports from this admission including: chest xray.    Results from last 7 days   Lab Units 05/06/25  1621   WBC Thousand/uL 9.18   HEMOGLOBIN g/dL 10.7*   HEMATOCRIT % 31.7*   PLATELETS Thousands/uL 269   SEGS PCT % 57   LYMPHO PCT %  29   MONO PCT % 8   EOS PCT % 5     Results from last 7 days   Lab Units 05/06/25  1621   SODIUM mmol/L 140   POTASSIUM mmol/L 2.8*   CHLORIDE mmol/L 103   CO2 mmol/L 30   BUN mg/dL 7   CREATININE mg/dL 0.62   ANION GAP mmol/L 7   CALCIUM mg/dL 8.6   ALBUMIN g/dL 3.4*   TOTAL BILIRUBIN mg/dL 0.41   ALK PHOS U/L 75   ALT U/L 12   AST U/L 28   GLUCOSE RANDOM mg/dL 98     Results from last 7 days   Lab Units 05/06/25  1621   INR  1.14             Results from last 7 days   Lab Units 05/06/25  1621   LACTIC ACID mmol/L 0.9   PROCALCITONIN ng/ml <0.05       Imaging: Results Review Statement: I reviewed radiology reports from this admission including: chest xray.    [unfilled]    EKG, Pathology, and Other Studies Reviewed on Admission:   EKG: Sinus bradycardia at a rate of 52 without ischemia    Epic / Care Everywhere Records Reviewed: Yes    ** Please Note: This note has been constructed using a voice recognition system. **

## 2025-05-07 NOTE — CONSULTS
Consultation - Medical Toxicology   Name: Elda Calderon 29 y.o. female I MRN: [id number]  Unit/Bed#: ED-18 I Date of Admission: 5/6/2025   Date of Service: 5/6/2025 I Hospital Day: 0       Inpatient consult to Toxicology     Date/Time  5/6/2025 8:07 PM     Performed by  Kelly Gay MD   Authorized by  Mague Villatoro MD           Physician Requesting Evaluation: Mague Eden*   Reason for Evaluation / Principal Problem: Opioid Withdrawal, Possible suboxone initiation      Assessment & Plan  Opioid use disorder, severe, dependence (HCC)   Patient has been using fentanyl daily for the last few months, last had a dose 3 hours prior to arrival to ED   Patient interested in admission for withdrawal management  Had discussion about possible suboxone initiation, patient stated she would think out it, has been on suboxone and methadone in the past is unsure if she wants to try these again  Inform toxicology if patient would like to initiate suboxone  For now, monitor for withdrawal symptoms and use the following medications for symptom management    Adjunctive medications administered as needed:  Clonidine 0.1 mg PO Q6 hours PRN anxiety or palpitations    Gabapentin 300mg PO Q8 hours PRN anxiety    Diazepam 5 mg p.o. or IV q8 PRN refractory anxiety  Ibuprofen 600 mg PO Q6 hours PRN pain    Acetaminophen 1000mg PO Q8 hours PRN pain    Ondansetron 4 mg PO Q6 hours PRN N/V    Reglan 5-10 mg IV q8 p.r.n. refractory nausea vomiting  Nicotine patch 7, 14, 21 mg  PRN nicotine withdrawal   Trazodone 50 mg PO QHS PRN sleep    Loperamide 4 mg PO PRN diarrhea up to 16 mg/day             For further questions, please contact the medical  on call via SecureChat between 8am and 9pm. If between 9pm and 8am, please reach out to the [facility]ison [facility] at [phone].     History of Present Illness   Elda Calderon is a 29 y.o. year old female PMH opioid use disorder who presented to [place] ED for SOB,  fatigue, chills and nausea. Toxicology being consulted since the patient would like to be admitted for the detox unit with possible suboxone initiation.   Patient states she has been using daily for the last few months. She uses around 10 bags of fentanyl a day with the last usage about 3 hours prior to arrival to ED. She has used suboxone in the past, but is unsure if she would like to try again. Does not like to idea of having to be dependent on a med and having to go through withdrawal again.   At the time of evaluation, patient denies any symptoms outside of anxiety.     Review of Systems   Constitutional:  Negative for chills and fever.   Eyes:  Negative for visual disturbance.   Respiratory:  Negative for cough and shortness of breath.    Cardiovascular:  Negative for chest pain.   Gastrointestinal:  Negative for abdominal pain, nausea and vomiting.   Neurological:  Negative for dizziness, light-headedness and headaches.   Psychiatric/Behavioral:  The patient is nervous/anxious.        Historical Information   Medical History Review: I have reviewed the patient's PMH, PSH, Social History, Family History, Meds, and Allergies   Social History     Tobacco Use    Smoking status: Never     Passive exposure: Never    Smokeless tobacco: Never   Vaping Use    Vaping status: Every Day    Substances: Nicotine, Flavoring   Substance and Sexual Activity    Alcohol use: Not Currently    Drug use: Not Currently     Comment: 8 bags/day states benzos are mixed in    Sexual activity: Not Currently     Partners: Male     Birth control/protection: None     Family History   Problem Relation Age of Onset    Depression Mother     Drug abuse Brother     Drug abuse Maternal Aunt     Drug abuse Paternal Uncle     Cancer Maternal Grandmother     Cancer Paternal Grandfather        Meds/Allergies   Prior to Admission medications    Medication Sig Start Date End Date Taking? Authorizing Provider   gabapentin (NEURONTIN) 100 mg capsule  Take 1 capsule (100 mg total) by mouth 3 (three) times a day 3/5/24 5/4/24  Jose Resendiz MD   methadone (DOLOPHINE) 10 MG/5ML solution Take 136 mg by mouth every morning Take by mouth daily    Historical MD Erwin   ondansetron (ZOFRAN) 4 mg tablet Take 1 tablet (4 mg total) by mouth every 8 (eight) hours as needed for nausea or vomiting 3/5/24   Jose Resendiz MD   ondansetron (ZOFRAN-ODT) 4 mg disintegrating tablet Take 1 tablet (4 mg total) by mouth every 6 (six) hours as needed for nausea or vomiting 4/17/25   Jing Hendricks MD   prazosin (MINIPRESS) 1 mg capsule Take 1 capsule (1 mg total) by mouth daily at bedtime 8/16/23 10/15/23  FRANK Dooley   QUEtiapine (SEROquel) 25 mg tablet Take 1 tablet (25 mg total) by mouth daily at bedtime 3/5/24 4/4/24  Jose Resendiz MD   sertraline (ZOLOFT) 25 mg tablet Take 1 tablet (25 mg total) by mouth daily 3/6/24 4/5/24  Jose Resendiz MD     Current Facility-Administered Medications:     potassium chloride 20 mEq IVPB (premix), 20 mEq, Intravenous, Once, Last Rate: 50 mL/hr at 05/06/25 1921, 20 mEq at 05/06/25 1921 **FOLLOWED BY** potassium chloride 20 mEq IVPB (premix), 20 mEq, Intravenous, Once, Ruddy Henderson MD    Current Outpatient Medications:     gabapentin (NEURONTIN) 100 mg capsule, Take 1 capsule (100 mg total) by mouth 3 (three) times a day, Disp: 60 capsule, Rfl: 0    methadone (DOLOPHINE) 10 MG/5ML solution, Take 136 mg by mouth every morning Take by mouth daily, Disp: , Rfl:     ondansetron (ZOFRAN) 4 mg tablet, Take 1 tablet (4 mg total) by mouth every 8 (eight) hours as needed for nausea or vomiting, Disp: 2 tablet, Rfl: 0    ondansetron (ZOFRAN-ODT) 4 mg disintegrating tablet, Take 1 tablet (4 mg total) by mouth every 6 (six) hours as needed for nausea or vomiting, Disp: 10 tablet, Rfl: 0    prazosin (MINIPRESS) 1 mg capsule, Take 1 capsule (1 mg total) by mouth daily at bedtime, Disp: 30 capsule, Rfl: 1    QUEtiapine (SEROquel) 25  mg tablet, Take 1 tablet (25 mg total) by mouth daily at bedtime, Disp: 30 tablet, Rfl: 0    sertraline (ZOLOFT) 25 mg tablet, Take 1 tablet (25 mg total) by mouth daily, Disp: 30 tablet, Rfl: 0   No Known Allergies    Objective :  Temp:  [98.6 °F (37 °C)] 98.6 °F (37 °C)  HR:  [50-73] 57  BP: (130-157)/() 151/100  Resp:  [18] 18  SpO2:  [96 %-97 %] 97 %  O2 Device: None (Room air)      Intake/Output Summary (Last 24 hours) at 5/6/2025 2025  Last data filed at 5/6/2025 1845  Gross per 24 hour   Intake 1000 ml   Output --   Net 1000 ml       Physical Exam  Constitutional:       General: She is not in acute distress.  HENT:      Head: Normocephalic and atraumatic.      Mouth/Throat:      Mouth: Mucous membranes are moist.      Pharynx: Oropharynx is clear.   Eyes:      Extraocular Movements: Extraocular movements intact.      Conjunctiva/sclera: Conjunctivae normal.      Pupils: Pupils are equal, round, and reactive to light.      Comments: 4+ pupils bilaterally   Cardiovascular:      Rate and Rhythm: Normal rate and regular rhythm.      Pulses: Normal pulses.      Heart sounds: Normal heart sounds.   Pulmonary:      Effort: Pulmonary effort is normal. No respiratory distress.   Abdominal:      Palpations: Abdomen is soft.      Tenderness: There is no abdominal tenderness.   Musculoskeletal:         General: No tenderness or deformity. Normal range of motion.      Cervical back: Normal range of motion. No rigidity.   Skin:     General: Skin is warm and dry.      Capillary Refill: Capillary refill takes less than 2 seconds.      Comments: Scarring over bilaterally forearms, no open wounds/areas of erythema   Neurological:      General: No focal deficit present.      Mental Status: She is alert and oriented to person, place, and time. Mental status is at baseline.           Lab Results: I have reviewed the following results:  Results from last 7 days   Lab Units 05/06/25  1621   WBC Thousand/uL 9.18   HEMOGLOBIN  g/dL 10.7*   HEMATOCRIT % 31.7*   PLATELETS Thousands/uL 269   SEGS PCT % 57   LYMPHO PCT % 29   MONO PCT % 8   EOS PCT % 5      Results from last 7 days   Lab Units 05/06/25  1621   POTASSIUM mmol/L 2.8*   CHLORIDE mmol/L 103   CO2 mmol/L 30   BUN mg/dL 7   CREATININE mg/dL 0.62   CALCIUM mg/dL 8.6   ALBUMIN g/dL 3.4*   ALK PHOS U/L 75   ALT U/L 12   AST U/L 28      Results from last 7 days   Lab Units 05/06/25  1621   INR  1.14   PTT seconds 33     Results from last 7 days   Lab Units 05/06/25  1621   LACTIC ACID mmol/L 0.9     Results from last 7 days   Lab Units 05/06/25  1826 05/06/25  1621   HS TNI 0HR ng/L  --  18   HS TNI 2HR ng/L 20  --               Imaging Results Review: No pertinent imaging studies reviewed.  Other Study Results Review: EKG was personally reviewed and my interpretation is: Personally Reviewed. Sinus Bradycardia. HR 52..    Administrative Statements

## 2025-05-07 NOTE — PLAN OF CARE
Problem: SUBSTANCE USE/ABUSE  Goal: By discharge, will develop insight into their chemical dependency and sustain motivation to continue in recovery  Description: INTERVENTIONS:- Attends all daily group sessions and scheduled AA groups- Actively practices coping skills through participation in the therapeutic community and adherence to program rules- Reviews and completes assignments from individual treatment plan- Assist patient development of understanding of their personal cycle of addiction and relapse triggers  Outcome: Progressing  Goal: By discharge, patient will have ongoing treatment plan addressing chemical dependency  Description: INTERVENTIONS:- Assist patient with resources and/or appointments for ongoing recovery based living  Outcome: Progressing

## 2025-05-07 NOTE — ASSESSMENT & PLAN NOTE
Patient has been using fentanyl daily for the last few months, last had a dose 3 hours prior to arrival to ED   Patient interested in admission for withdrawal management  Had discussion about possible suboxone initiation, patient stated she would think out it, has been on suboxone and methadone in the past is unsure if she wants to try these again  Inform toxicology if patient would like to initiate suboxone  For now, monitor for withdrawal symptoms and use the following medications for symptom management     Adjunctive medications administered as needed:  Clonidine 0.1 mg PO Q6 hours PRN anxiety or palpitations    Gabapentin 300mg PO Q8 hours PRN anxiety    Diazepam 5 mg p.o. or IV q8 PRN refractory anxiety  Ibuprofen 600 mg PO Q6 hours PRN pain    Acetaminophen 1000mg PO Q8 hours PRN pain    Ondansetron 4 mg PO Q6 hours PRN N/V    Reglan 5-10 mg IV q8 p.r.n. refractory nausea vomiting  Nicotine patch 7, 14, 21 mg  PRN nicotine withdrawal   Trazodone 50 mg PO QHS PRN sleep    Loperamide 4 mg PO PRN diarrhea up to 16 mg/day    Patient interested in suboxone, but not IP/OP rehab

## 2025-05-07 NOTE — CERTIFIED RECOVERY SPECIALIST
Time spent: 5 minutes        Purpose: To offer support and resources, treatment and recovery conversation    When CRS entered room, patient appeared to be in pain, CRS explained reason for visit. Patient was not fully engaged and said she is not feeling well. Patient said she has been asking for medication but they wont give what she had last time. Patient is detoxing from a different substance this visit, CRS explained it will likely be different medicines used. Patient began to doze off during conversation. CRS made provider aware of conversation.      Plan:CRS will continue to follow and support patient. Contact information provided.

## 2025-05-07 NOTE — PLAN OF CARE
Problem: SUBSTANCE USE/ABUSE  Goal: By discharge, will develop insight into their chemical dependency and sustain motivation to continue in recovery  Description: INTERVENTIONS:- Attends all daily group sessions and scheduled AA groups- Actively practices coping skills through participation in the therapeutic community and adherence to program rules- Reviews and completes assignments from individual treatment plan- Assist patient development of understanding of their personal cycle of addiction and relapse triggers  Outcome: Progressing  Goal: By discharge, patient will have ongoing treatment plan addressing chemical dependency  Description: INTERVENTIONS:- Assist patient with resources and/or appointments for ongoing recovery based living  Outcome: Progressing     Problem: PAIN - ADULT  Goal: Verbalizes/displays adequate comfort level or baseline comfort level  Description: Interventions:- Encourage patient to monitor pain and request assistance- Assess pain using appropriate pain scale- Administer analgesics based on type and severity of pain and evaluate response- Implement non-pharmacological measures as appropriate and evaluate response- Consider cultural and social influences on pain and pain management- Notify physician/advanced practitioner if interventions unsuccessful or patient reports new pain  Outcome: Progressing     Problem: PAIN - ADULT  Goal: Verbalizes/displays adequate comfort level or baseline comfort level  Description: Interventions:- Encourage patient to monitor pain and request assistance- Assess pain using appropriate pain scale- Administer analgesics based on type and severity of pain and evaluate response- Implement non-pharmacological measures as appropriate and evaluate response- Consider cultural and social influences on pain and pain management- Notify physician/advanced practitioner if interventions unsuccessful or patient reports new pain  Outcome: Progressing     Problem:  DISCHARGE PLANNING  Goal: Discharge to home or other facility with appropriate resources  Description: INTERVENTIONS:- Identify barriers to discharge w/patient and caregiver- Arrange for needed discharge resources and transportation as appropriate- Identify discharge learning needs (meds, wound care, etc.)- Arrange for interpretive services to assist at discharge as needed- Refer to Case Management Department for coordinating discharge planning if the patient needs post-hospital services based on physician/advanced practitioner order or complex needs related to functional status, cognitive ability, or social support system  Outcome: Progressing     Problem: Knowledge Deficit  Goal: Patient/family/caregiver demonstrates understanding of disease process, treatment plan, medications, and discharge instructions  Description: Complete learning assessment and assess knowledge base.Interventions:- Provide teaching at level of understanding- Provide teaching via preferred learning methods  Outcome: Progressing

## 2025-05-07 NOTE — ASSESSMENT & PLAN NOTE
POA patient met SIRS criteria secondary to tachycardia and tachypnea  Unclear source of infection- ED initiated work up    CXR: No acute cardiopulmonary disease.   Lactic Acid: Normal  Procalcitonin: < 0.05   Blood cultures pending- patient has a history of IVDU- will follow [facility]   Continue to monitor off of antibiotics     Patient left AGAINST MEDICAL ADVICE

## 2025-05-07 NOTE — ASSESSMENT & PLAN NOTE
Patient had previous med detox stay in July 2023   Patient reports using 8 bags of fentanyl on a daily basis for the last couple months   Placed on COWS protocol  Will give Catapres 0.1 mg p.o. every 8 hours, Flexeril 10 mg p.o. 3 times daily, Neurontin 300 mg p.o. every 8 hours and Imodium as needed  Patient had previously been on methadone open to the idea of Suboxone  We will consult medical toxicology in a.m. and case management    Patient left AGAINST MEDICAL ADVICE

## 2025-05-07 NOTE — ASSESSMENT & PLAN NOTE
Admit to telemetry  Patient's initial potassium 2.8  Complaining of generalized weakness  Is status post IV and p.o. repletion  Will give Klor-Con 40 mill equivalents p.o. daily  Continue to monitor with repeat labs in a.m.    Patient left AGAINST MEDICAL ADVICE

## 2025-05-07 NOTE — ASSESSMENT & PLAN NOTE
Patient with a history of chronic opioid use  Last use was 5/6/25 at 1 pm   Toxicology consulted; appreciate recommendations   Initiate MAT/opioid withdrawal protocol with plan for eventual microinduction with Suboxone  Initiate Butrans 20 mcg/hr patch   Continue monitoring opioid withdrawal, and plan for microinduction when >24 hours have passed since pt's last opioid use  Symptomatic and supportive care  Continuous pulse oximetry monitoring     Patient left AGAINST MEDICAL ADVICE

## 2025-05-07 NOTE — ASSESSMENT & PLAN NOTE
Enhancing segment VII/VIII hepatic lesion measuring 1.7 cm (series 301 image 39), grossly unchanged when compared to prior study from 3/15/2025, favoring a flash filling hemangioma. No suspicious mass. No biliary dilation.     Patient left AGAINST MEDICAL ADVICE

## 2025-05-07 NOTE — CASE MANAGEMENT
"Pt's name, date of birth, home address and telephone number were verified. Pt was informed of case management role and the purpose of the completion of intake with case management. Pt signed GWEN for mother/emergency contact, [employer] and her PCP. Pt completed recovery plan.     SUBSTANCE ABUSE    Stressor/Trigger    Pt presented to [facility] for withdrawal management   UDS: not completed  Audit:   PAWSS:  Nicotine:  Ethanol:   Prior D&A treatment   Prior [facility] stay in 2023, Nov 2023 [facility] for rehab, methadone [facility]  doesn't remember dates, suboxone while she was pregnant started at [facility] and sent home with continuing care   AA/NA Meetings   Prior attendance at 12 step meetings   Withdrawal  Symptoms   In the ED: shortness of breath, fatigue, chills, nausea. Today anxious, mild nausea.    History of OD/blackouts or Withdrawal Seizures   Denies OD, denies anyone using narcan on her, reports hx of withdrawal seizure but doesn't remember when, thinks maybe when she was on [facility]   MENTAL HEALTH INFORMATION    Hx of Mental Health Dx   Bipolar d/o-not taking medications   Outpatient Mental Health Tx   Has been in counseling before but doesn't remember how long ago, was at \"a bunch of places\"   Inpatient Hospitalizations (Mental Health)   Prior IP psych stays, most recent in Aug 2024 on [facility] at [facility]   Family History of Mental Health    Denies   Trauma History    Yes   Current MH Symptoms   Denies any current SI/SH/HI   Access to Firearms    Denies   PHYSICAL HEALTH INFORMATION    Physical Health Conditions (Chronic)   Denies   PCP   Does have PCP but does not seem him regularly   Insurance   [employer]   Preferred Pharmacy      LEGAL ISSUES    Past or present legal issues   Denies   Probation/[place]   Denies   EMPLOYMENT/INCOME/NEEDS    Education   HS Diploma/Associates degree   Employment 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 Daughter, mother, step father live in the house at that address   Can pt return home Yes     External Supports     Mother   Phone Number      Email      Marital Status/Children   Daughter (8), single/never      Substance First use Last Use Frequency Amount Used How long Longest period of sobriety and when Method of use   THC            Heroin/  Fentanyl    5/5/25 in am daily 8-10 bags   IV in her neck   Cocaine            ETOH            Meth            Benzos            Other:                 Pt treated in the ED two weeks ago but discharged AMA due to feeling like her withdrawal could not be managed appropriately and worried that people would  her for her substance use. Pt has previously been on methadone maintenance and burprenorphine during her pregnancy. Pt reports she believes there was tranq in her fentanyl but a UDS was not completed at ED so CM could not confirm what else was in the fentanyl. Pt moaning, restless throughout assessment. CM brought up rehab, pt immediately said no, CM suggested they discuss later when pt was feeling better and pt agreed. Pt reports current cravings for use due to withdrawal, CM reminded pt this was temporary, and she was in a safe place. Spoke with provider regarding any additional medication that might help support pt.

## 2025-05-08 ENCOUNTER — APPOINTMENT (EMERGENCY)
Dept: RADIOLOGY | Facility: HOSPITAL | Age: 30
DRG: 602 | End: 2025-05-08
Payer: COMMERCIAL

## 2025-05-08 ENCOUNTER — APPOINTMENT (EMERGENCY)
Dept: CT IMAGING | Facility: HOSPITAL | Age: 30
DRG: 602 | End: 2025-05-08
Payer: COMMERCIAL

## 2025-05-08 ENCOUNTER — HOSPITAL ENCOUNTER (INPATIENT)
Facility: HOSPITAL | Age: 30
LOS: 1 days | Discharge: LEFT BEFORE DISCHARGE | DRG: 602 | End: 2025-05-09
Attending: EMERGENCY MEDICINE | Admitting: INTERNAL MEDICINE
Payer: COMMERCIAL

## 2025-05-08 DIAGNOSIS — E44.1 MILD PROTEIN-CALORIE MALNUTRITION (HCC): ICD-10-CM

## 2025-05-08 DIAGNOSIS — L03.221 CELLULITIS OF NECK: ICD-10-CM

## 2025-05-08 DIAGNOSIS — R03.0 ELEVATED BLOOD PRESSURE READING: ICD-10-CM

## 2025-05-08 DIAGNOSIS — F19.10 POLYSUBSTANCE ABUSE (HCC): Primary | ICD-10-CM

## 2025-05-08 DIAGNOSIS — R00.1 BRADYCARDIA: ICD-10-CM

## 2025-05-08 DIAGNOSIS — F11.20 OPIOID USE DISORDER, SEVERE, DEPENDENCE (HCC): ICD-10-CM

## 2025-05-08 DIAGNOSIS — T79.7XXA SUBCUTANEOUS EMPHYSEMA, INITIAL ENCOUNTER (HCC): ICD-10-CM

## 2025-05-08 PROBLEM — K59.00 CONSTIPATION: Status: ACTIVE | Noted: 2025-05-08

## 2025-05-08 LAB
2HR DELTA HS TROPONIN: -1 NG/L
ALBUMIN SERPL BCG-MCNC: 3.9 G/DL (ref 3.5–5)
ALP SERPL-CCNC: 80 U/L (ref 34–104)
ALT SERPL W P-5'-P-CCNC: 11 U/L (ref 7–52)
ANION GAP SERPL CALCULATED.3IONS-SCNC: 8 MMOL/L (ref 4–13)
APAP SERPL-MCNC: <2 UG/ML (ref 10–20)
AST SERPL W P-5'-P-CCNC: 24 U/L (ref 13–39)
BASE EXCESS BLDA CALC-SCNC: 2 MMOL/L (ref -2–3)
BASOPHILS # BLD AUTO: 0.07 THOUSANDS/ÂΜL (ref 0–0.1)
BASOPHILS NFR BLD AUTO: 1 % (ref 0–1)
BILIRUB SERPL-MCNC: 0.54 MG/DL (ref 0.2–1)
BUN SERPL-MCNC: 5 MG/DL (ref 5–25)
CA-I BLD-SCNC: 1.21 MMOL/L (ref 1.12–1.32)
CALCIUM SERPL-MCNC: 9.2 MG/DL (ref 8.4–10.2)
CARDIAC TROPONIN I PNL SERPL HS: 10 NG/L (ref ?–50)
CARDIAC TROPONIN I PNL SERPL HS: 11 NG/L (ref ?–50)
CHLORIDE SERPL-SCNC: 106 MMOL/L (ref 96–108)
CK SERPL-CCNC: 165 U/L (ref 26–192)
CO2 SERPL-SCNC: 28 MMOL/L (ref 21–32)
CREAT SERPL-MCNC: 0.61 MG/DL (ref 0.6–1.3)
CRP SERPL QL: 11.3 MG/L
EOSINOPHIL # BLD AUTO: 0.56 THOUSAND/ÂΜL (ref 0–0.61)
EOSINOPHIL NFR BLD AUTO: 7 % (ref 0–6)
ERYTHROCYTE [DISTWIDTH] IN BLOOD BY AUTOMATED COUNT: 12.7 % (ref 11.6–15.1)
ETHANOL SERPL-MCNC: <10 MG/DL
GFR SERPL CREATININE-BSD FRML MDRD: 123 ML/MIN/1.73SQ M
GLUCOSE SERPL-MCNC: 144 MG/DL (ref 65–140)
GLUCOSE SERPL-MCNC: 154 MG/DL (ref 65–140)
GLUCOSE SERPL-MCNC: 157 MG/DL (ref 65–140)
HCG SERPL QL: NEGATIVE
HCO3 BLDA-SCNC: 27.2 MMOL/L (ref 24–30)
HCT VFR BLD AUTO: 33.5 % (ref 34.8–46.1)
HCT VFR BLD CALC: 36 % (ref 34.8–46.1)
HGB BLD-MCNC: 11.5 G/DL (ref 11.5–15.4)
HGB BLDA-MCNC: 12.2 G/DL (ref 11.5–15.4)
IMM GRANULOCYTES # BLD AUTO: 0.01 THOUSAND/UL (ref 0–0.2)
IMM GRANULOCYTES NFR BLD AUTO: 0 % (ref 0–2)
LYMPHOCYTES # BLD AUTO: 2.72 THOUSANDS/ÂΜL (ref 0.6–4.47)
LYMPHOCYTES NFR BLD AUTO: 35 % (ref 14–44)
MCH RBC QN AUTO: 29.6 PG (ref 26.8–34.3)
MCHC RBC AUTO-ENTMCNC: 34.3 G/DL (ref 31.4–37.4)
MCV RBC AUTO: 86 FL (ref 82–98)
MONOCYTES # BLD AUTO: 0.61 THOUSAND/ÂΜL (ref 0.17–1.22)
MONOCYTES NFR BLD AUTO: 8 % (ref 4–12)
NEUTROPHILS # BLD AUTO: 3.72 THOUSANDS/ÂΜL (ref 1.85–7.62)
NEUTS SEG NFR BLD AUTO: 49 % (ref 43–75)
NRBC BLD AUTO-RTO: 0 /100 WBCS
PCO2 BLD: 28 MMOL/L (ref 21–32)
PCO2 BLD: 41.7 MM HG (ref 42–50)
PH BLD: 7.42 [PH] (ref 7.3–7.4)
PLATELET # BLD AUTO: 289 THOUSANDS/UL (ref 149–390)
PMV BLD AUTO: 8.3 FL (ref 8.9–12.7)
PO2 BLD: 32 MM HG (ref 35–45)
POTASSIUM BLD-SCNC: 2.8 MMOL/L (ref 3.5–5.3)
POTASSIUM SERPL-SCNC: 3.1 MMOL/L (ref 3.5–5.3)
PROT SERPL-MCNC: 7.1 G/DL (ref 6.4–8.4)
RBC # BLD AUTO: 3.89 MILLION/UL (ref 3.81–5.12)
SALICYLATES SERPL-MCNC: <5 MG/DL (ref 5–20)
SAO2 % BLD FROM PO2: 62 % (ref 60–85)
SODIUM BLD-SCNC: 144 MMOL/L (ref 136–145)
SODIUM SERPL-SCNC: 142 MMOL/L (ref 135–147)
SPECIMEN SOURCE: ABNORMAL
WBC # BLD AUTO: 7.69 THOUSAND/UL (ref 4.31–10.16)

## 2025-05-08 PROCEDURE — 74176 CT ABD & PELVIS W/O CONTRAST: CPT

## 2025-05-08 PROCEDURE — 82948 REAGENT STRIP/BLOOD GLUCOSE: CPT

## 2025-05-08 PROCEDURE — 87040 BLOOD CULTURE FOR BACTERIA: CPT | Performed by: EMERGENCY MEDICINE

## 2025-05-08 PROCEDURE — 84484 ASSAY OF TROPONIN QUANT: CPT

## 2025-05-08 PROCEDURE — 84443 ASSAY THYROID STIM HORMONE: CPT

## 2025-05-08 PROCEDURE — 80143 DRUG ASSAY ACETAMINOPHEN: CPT

## 2025-05-08 PROCEDURE — 85025 COMPLETE CBC W/AUTO DIFF WBC: CPT

## 2025-05-08 PROCEDURE — 99222 1ST HOSP IP/OBS MODERATE 55: CPT | Performed by: SURGERY

## 2025-05-08 PROCEDURE — 70490 CT SOFT TISSUE NECK W/O DYE: CPT

## 2025-05-08 PROCEDURE — 86140 C-REACTIVE PROTEIN: CPT

## 2025-05-08 PROCEDURE — 80053 COMPREHEN METABOLIC PANEL: CPT

## 2025-05-08 PROCEDURE — 36415 COLL VENOUS BLD VENIPUNCTURE: CPT

## 2025-05-08 PROCEDURE — 99285 EMERGENCY DEPT VISIT HI MDM: CPT

## 2025-05-08 PROCEDURE — 99285 EMERGENCY DEPT VISIT HI MDM: CPT | Performed by: EMERGENCY MEDICINE

## 2025-05-08 PROCEDURE — 84295 ASSAY OF SERUM SODIUM: CPT

## 2025-05-08 PROCEDURE — 80179 DRUG ASSAY SALICYLATE: CPT

## 2025-05-08 PROCEDURE — 82550 ASSAY OF CK (CPK): CPT

## 2025-05-08 PROCEDURE — 85014 HEMATOCRIT: CPT

## 2025-05-08 PROCEDURE — 71250 CT THORAX DX C-: CPT

## 2025-05-08 PROCEDURE — 96374 THER/PROPH/DIAG INJ IV PUSH: CPT

## 2025-05-08 PROCEDURE — 82330 ASSAY OF CALCIUM: CPT

## 2025-05-08 PROCEDURE — 84703 CHORIONIC GONADOTROPIN ASSAY: CPT | Performed by: EMERGENCY MEDICINE

## 2025-05-08 PROCEDURE — 93005 ELECTROCARDIOGRAM TRACING: CPT

## 2025-05-08 PROCEDURE — 84132 ASSAY OF SERUM POTASSIUM: CPT

## 2025-05-08 PROCEDURE — 82077 ASSAY SPEC XCP UR&BREATH IA: CPT

## 2025-05-08 PROCEDURE — 82947 ASSAY GLUCOSE BLOOD QUANT: CPT

## 2025-05-08 PROCEDURE — 82803 BLOOD GASES ANY COMBINATION: CPT

## 2025-05-08 PROCEDURE — 96375 TX/PRO/DX INJ NEW DRUG ADDON: CPT

## 2025-05-08 RX ORDER — ENOXAPARIN SODIUM 100 MG/ML
40 INJECTION SUBCUTANEOUS DAILY
Status: DISCONTINUED | OUTPATIENT
Start: 2025-05-09 | End: 2025-05-09 | Stop reason: HOSPADM

## 2025-05-08 RX ORDER — CYCLOBENZAPRINE HCL 10 MG
10 TABLET ORAL 3 TIMES DAILY PRN
Status: DISCONTINUED | OUTPATIENT
Start: 2025-05-08 | End: 2025-05-09

## 2025-05-08 RX ORDER — VANCOMYCIN HYDROCHLORIDE 750 MG/150ML
15 INJECTION, SOLUTION INTRAVENOUS EVERY 8 HOURS
Status: DISCONTINUED | OUTPATIENT
Start: 2025-05-09 | End: 2025-05-09 | Stop reason: HOSPADM

## 2025-05-08 RX ORDER — LORAZEPAM 2 MG/ML
0.5 INJECTION INTRAMUSCULAR ONCE
Status: COMPLETED | OUTPATIENT
Start: 2025-05-08 | End: 2025-05-08

## 2025-05-08 RX ORDER — ONDANSETRON 2 MG/ML
4 INJECTION INTRAMUSCULAR; INTRAVENOUS EVERY 6 HOURS PRN
Status: DISCONTINUED | OUTPATIENT
Start: 2025-05-08 | End: 2025-05-09 | Stop reason: HOSPADM

## 2025-05-08 RX ORDER — LOPERAMIDE HYDROCHLORIDE 2 MG/1
2 CAPSULE ORAL 3 TIMES DAILY PRN
Status: DISCONTINUED | OUTPATIENT
Start: 2025-05-08 | End: 2025-05-09

## 2025-05-08 RX ORDER — NALOXONE HYDROCHLORIDE 0.4 MG/ML
0.1 INJECTION, SOLUTION INTRAMUSCULAR; INTRAVENOUS; SUBCUTANEOUS ONCE
Status: DISCONTINUED | OUTPATIENT
Start: 2025-05-08 | End: 2025-05-08

## 2025-05-08 RX ORDER — AMOXICILLIN 250 MG
2 CAPSULE ORAL 2 TIMES DAILY
Status: DISCONTINUED | OUTPATIENT
Start: 2025-05-08 | End: 2025-05-09 | Stop reason: HOSPADM

## 2025-05-08 RX ORDER — DIAZEPAM 10 MG/2ML
5 INJECTION, SOLUTION INTRAMUSCULAR; INTRAVENOUS EVERY 6 HOURS PRN
Status: DISCONTINUED | OUTPATIENT
Start: 2025-05-08 | End: 2025-05-09

## 2025-05-08 RX ORDER — VANCOMYCIN HYDROCHLORIDE 1 G/200ML
25 INJECTION, SOLUTION INTRAVENOUS ONCE
Status: COMPLETED | OUTPATIENT
Start: 2025-05-08 | End: 2025-05-08

## 2025-05-08 RX ORDER — ACETAMINOPHEN 325 MG/1
975 TABLET ORAL EVERY 8 HOURS SCHEDULED
Status: DISCONTINUED | OUTPATIENT
Start: 2025-05-08 | End: 2025-05-09 | Stop reason: HOSPADM

## 2025-05-08 RX ORDER — NALOXONE HYDROCHLORIDE 0.4 MG/ML
INJECTION, SOLUTION INTRAMUSCULAR; INTRAVENOUS; SUBCUTANEOUS
Status: DISPENSED
Start: 2025-05-08 | End: 2025-05-09

## 2025-05-08 RX ORDER — ACETAMINOPHEN 325 MG/1
650 TABLET ORAL EVERY 6 HOURS PRN
Status: DISCONTINUED | OUTPATIENT
Start: 2025-05-08 | End: 2025-05-09 | Stop reason: HOSPADM

## 2025-05-08 RX ORDER — ONDANSETRON 2 MG/ML
4 INJECTION INTRAMUSCULAR; INTRAVENOUS ONCE
Status: COMPLETED | OUTPATIENT
Start: 2025-05-08 | End: 2025-05-08

## 2025-05-08 RX ORDER — POTASSIUM CHLORIDE 14.9 MG/ML
20 INJECTION INTRAVENOUS ONCE
Status: COMPLETED | OUTPATIENT
Start: 2025-05-08 | End: 2025-05-09

## 2025-05-08 RX ORDER — POLYETHYLENE GLYCOL 3350 17 G/17G
17 POWDER, FOR SOLUTION ORAL DAILY PRN
Status: DISCONTINUED | OUTPATIENT
Start: 2025-05-08 | End: 2025-05-09 | Stop reason: HOSPADM

## 2025-05-08 RX ORDER — SODIUM CHLORIDE, SODIUM GLUCONATE, SODIUM ACETATE, POTASSIUM CHLORIDE, MAGNESIUM CHLORIDE, SODIUM PHOSPHATE, DIBASIC, AND POTASSIUM PHOSPHATE .53; .5; .37; .037; .03; .012; .00082 G/100ML; G/100ML; G/100ML; G/100ML; G/100ML; G/100ML; G/100ML
75 INJECTION, SOLUTION INTRAVENOUS CONTINUOUS
Status: DISCONTINUED | OUTPATIENT
Start: 2025-05-08 | End: 2025-05-09 | Stop reason: HOSPADM

## 2025-05-08 RX ORDER — CLONIDINE HYDROCHLORIDE 0.1 MG/1
0.1 TABLET ORAL EVERY 8 HOURS SCHEDULED
Status: DISCONTINUED | OUTPATIENT
Start: 2025-05-08 | End: 2025-05-09

## 2025-05-08 RX ORDER — GABAPENTIN 300 MG/1
300 CAPSULE ORAL 3 TIMES DAILY
Status: DISCONTINUED | OUTPATIENT
Start: 2025-05-08 | End: 2025-05-09

## 2025-05-08 RX ADMIN — ONDANSETRON 4 MG: 2 INJECTION INTRAMUSCULAR; INTRAVENOUS at 20:40

## 2025-05-08 RX ADMIN — CEFTRIAXONE 1000 MG: 10 INJECTION, POWDER, FOR SOLUTION INTRAVENOUS at 21:51

## 2025-05-08 RX ADMIN — SODIUM CHLORIDE, SODIUM GLUCONATE, SODIUM ACETATE, POTASSIUM CHLORIDE, MAGNESIUM CHLORIDE, SODIUM PHOSPHATE, DIBASIC, AND POTASSIUM PHOSPHATE 75 ML/HR: .53; .5; .37; .037; .03; .012; .00082 INJECTION, SOLUTION INTRAVENOUS at 23:55

## 2025-05-08 RX ADMIN — Medication 0.04 MG: at 20:53

## 2025-05-08 RX ADMIN — POTASSIUM CHLORIDE 20 MEQ: 14.9 INJECTION, SOLUTION INTRAVENOUS at 23:57

## 2025-05-08 RX ADMIN — CLONIDINE HYDROCHLORIDE 0.1 MG: 0.1 TABLET ORAL at 23:55

## 2025-05-08 RX ADMIN — LORAZEPAM 0.5 MG: 2 INJECTION INTRAMUSCULAR; INTRAVENOUS at 21:57

## 2025-05-08 RX ADMIN — Medication 3 MG: at 23:55

## 2025-05-08 RX ADMIN — VANCOMYCIN HYDROCHLORIDE 1000 MG: 1 INJECTION, SOLUTION INTRAVENOUS at 22:08

## 2025-05-08 NOTE — PLAN OF CARE
Problem: SUBSTANCE USE/ABUSE  Goal: By discharge, will develop insight into their chemical dependency and sustain motivation to continue in recovery  Description: INTERVENTIONS:- Assist patient development of understanding of their personal cycle of addiction and relapse triggers  Outcome: Progressing  Goal: By discharge, patient will have ongoing treatment plan addressing chemical dependency  Description: INTERVENTIONS:- Assist patient with resources and/or appointments for ongoing recovery based living  Outcome: Progressing     Problem: PAIN - ADULT  Goal: Verbalizes/displays adequate comfort level or baseline comfort level  Description: Interventions:- Encourage patient to monitor pain and request assistance- Assess pain using appropriate pain scale- Administer analgesics based on type and severity of pain and evaluate response- Implement non-pharmacological measures as appropriate and evaluate response- Consider cultural and social influences on pain and pain management- Notify physician/advanced practitioner if interventions unsuccessful or patient reports new pain  Outcome: Progressing     Problem: Knowledge Deficit  Goal: Patient/family/caregiver demonstrates understanding of disease process, treatment plan, medications, and discharge instructions  Description: Complete learning assessment and assess knowledge base.Interventions:- Provide teaching at level of understanding- Provide teaching via preferred learning methods  Outcome: Progressing     Problem: Nutrition/Hydration-ADULT  Goal: Nutrient/Hydration intake appropriate for improving, restoring or maintaining nutritional needs  Description: Monitor and assess patient's nutrition/hydration status for malnutrition. Collaborate with interdisciplinary team and initiate plan and interventions as ordered.  Monitor patient's weight and dietary intake as ordered or per policy. Utilize nutrition screening tool and intervene as necessary. Determine patient's  food preferences and provide high-protein, high-caloric foods as appropriate. INTERVENTIONS:- Monitor oral intake, urinary output, labs, and treatment plans- Assess nutrition and hydration status and recommend course of action- Evaluate amount of meals eaten- Assist patient with eating if necessary - Allow adequate time for meals- Recommend/ encourage appropriate diets, oral nutritional supplements, and vitamin/mineral supplements- Order, calculate, and assess calorie counts as needed- Recommend, monitor, and adjust tube feedings and TPN/PPN based on assessed needs- Assess need for intravenous fluids- Provide specific nutrition/hydration education as appropriate- Include patient/family/caregiver in decisions related to nutrition  Outcome: Progressing

## 2025-05-08 NOTE — DISCHARGE SUMMARY
Discharge Summary - Hospitalist   Name: Elda Calderon 29 y.o. female I MRN: [id number]  Unit/Bed#: 5T DETOX 506-01 I Date of Admission: 5/6/2025   Date of Service: 5/7/2025 I Hospital Day: 1     Assessment & Plan  Acute opioid withdrawal (HCC)  Patient with a history of chronic opioid use  Last use was 5/6/25 at 1 pm   Toxicology consulted; appreciate recommendations   Initiate MAT/opioid withdrawal protocol with plan for eventual microinduction with Suboxone  Initiate Butrans 20 mcg/hr patch   Continue monitoring opioid withdrawal, and plan for microinduction when >24 hours have passed since pt's last opioid use  Symptomatic and supportive care  Continuous pulse oximetry monitoring     Patient left AGAINST MEDICAL ADVICE    Opioid use disorder, severe, dependence (HCC)  Patient had previous med detox stay in July 2023   Patient reports using 8 bags of fentanyl on a daily basis for the last couple months   Placed on COWS protocol  Will give Catapres 0.1 mg p.o. every 8 hours, Flexeril 10 mg p.o. 3 times daily, Neurontin 300 mg p.o. every 8 hours and Imodium as needed  Patient had previously been on methadone open to the idea of Suboxone  We will consult medical toxicology in a.m. and case management    Patient left AGAINST MEDICAL ADVICE  Hypokalemia  Admit to telemetry  Patient's initial potassium 2.8  Complaining of generalized weakness  Is status post IV and p.o. repletion  Will give Klor-Con 40 mill equivalents p.o. daily  Continue to monitor with repeat labs in a.m.    Patient left AGAINST MEDICAL ADVICE  SIRS (systemic inflammatory response syndrome) (HCC)  POA patient met SIRS criteria secondary to tachycardia and tachypnea  Unclear source of infection- ED initiated work up    CXR: No acute cardiopulmonary disease.   Lactic Acid: Normal  Procalcitonin: < 0.05   Blood cultures pending- patient has a history of IVDU- will follow [facility]   Continue to monitor off of antibiotics     Patient left AGAINST MEDICAL  ADVICE  Liver lesion  Enhancing segment VII/VIII hepatic lesion measuring 1.7 cm (series 301 image 39), grossly unchanged when compared to prior study from 3/15/2025, favoring a flash filling hemangioma. No suspicious mass. No biliary dilation.     Patient left AGAINST MEDICAL ADVICE     Medical Problems       Resolved Problems  Date Reviewed: 5/6/2025   None         MESSAGE TO PCP (Mike Tillman DO) FOR FOLLOW UP:   Thank you for allowing us to participate in the care of your patient, Elda Calderon, who was hospitalized from 5/6/2025 through 05/07/25 with the admitting diagnosis of acute opioid withdrawal.  Patient mated for opioid withdrawal has history of chronic opioid use last use was on 5/6/2025.  Patient was seen in consultation with toxicology who started patient on Butrans 20 mcg patch with plan for initiation on Suboxone.  Patient however was unwilling to stay for further treatment.  Risks and benefits were discussed with patient.  Despite this patient opted to leave AGAINST MEDICAL ADVICE.     Medication Changes:  None  Outpatient testing recommended:  Recommend following up with outpatient PCP  Recommend following up with outpatient resources to facilitate drug abstinence  If you have any additional questions or would like to discuss further, please feel free to contact me.  FRANK Singh  [facility], Hospitalist, [phone]     Admission Date:   Admission Orders (From admission, onward)       Ordered        05/06/25 2221  INPATIENT ADMISSION  Once                          Discharge Date: 05/07/25    Consultations During Hospital Stay:  Toxicology    Procedures Performed:   CXR    Significant Findings / Test Results:   None    Incidental Findings:   N/A  Test Results Pending at Discharge (will require follow up):   Patient noted to have potassium of 3.0 has been receiving supplementation in the hospital.  Recommend following up with PCP for repeat BMP      Complications: Left AMA.  Concern for worsening withdrawal symptoms, drug relapse, cardiac arrhythmia, or death    Reason for Admission: Acute opiate withdrawal    Hospital Course:   Elda Calderon is a 29 y.o. female patient who originally presented to the hospital on 5/6/2025 due to opiate use disorder sent to the ER for further evaluation and treatment of her month-long complaints of vague shortness of breath, chills and bodyaches. Patient was previously seen on 4/17/2025 and reportedly was post be admitted but signed out AMA after an extensive workup which revealed concern for gas in her neck which patient injects into. Patient continues to complain of some shortness of breath and generalized bodyaches but workup in ER was essentially negative with improved imaging and labs from prior visit with the exception of she was noted to be hypokalemic with a potassium of 2.8. Patient was amenable to the idea of going to withdrawal symptom management unit for repletion of her potassium and monitoring of her withdrawal symptoms.  On 5/7 at 2133 patient requested to leave AGAINST MEDICAL ADVICE.  She reports that she cannot get comfortable here despite the medications we are giving her.  She states that nothing we are doing for her is working.  I spoke with the patient over the phone at length regarding her decision to leave.  I offered medication to assist the patient's comfort however patient declined and ultimately just wants to go home.  She reports to me that she denies any chest pain, shortness of breath, lightheadedness, dizziness, nausea, vomiting, diarrhea, abdominal pain at this time.  She denies any suicidal ideations, homicidal ideations, or any plans.  We discussed her sobriety is of concern if she leaves AGAINST MEDICAL ADVICE as she does not have medication at home.  Recommended the patient stay until tomorrow to have formal tox recommendations.  Patient declined.  Patient was made aware of the possible  complications of leaving AGAINST MEDICAL ADVICE which include worsening withdrawal symptoms, worsening hypokalemia, cardiac arrhythmia, drug relapse, and potentially death.  Patient understood these risks and states that she has resources she can follow-up in the outpatient setting.  When talking to the patient she is of sound mind.  She is alert and oriented to person, place, time, and situation.  She was able to accurately tell me why she was at the hospital and what her goals of care were.  I was not able to evaluate patient in person and was only able to communicate with the patient over the phone.  Patient's nurse Ashley was present during my communication with the patient.  Ashley states that the patient looks unwell but is of sound mind.  She endorses no further concerns.  Patient reports that she has a ride that is able to pick her up.  Recommend patient follow-up with her PCP and outpatient rehab resources.    No notes on file    The patient, initially admitted to the hospital as inpatient, was discharged earlier than expected given the following: Left AGAINST MEDICAL ADVICE.  Please see above list of diagnoses and related plan for additional information.     Condition at Discharge: fair    Discharge Day Visit / Exam:   * Please refer to separate progress note for these details *    Discussion with Family: Patient declined call to .     Discharge instructions/Information to patient and family:   See after visit summary for information provided to patient and family.      Provisions for Follow-Up Care:  See after visit summary for information related to follow-up care and any pertinent home health orders.      Mobility at time of Discharge:   Basic Mobility Inpatient Raw Score: 22  JH-HLM Goal: 7: Walk 25 feet or more  JH-HLM Achieved: 1: Laying in bed  HLM Goal achieved. Continue to encourage appropriate mobility.     Disposition:   Home    Planned Readmission: N/A    Discharge  Medications:  See after visit summary for reconciled discharge medications provided to patient and/or family.      Administrative Statements   Discharge Statement:  I have spent a total time of 40 minutes in caring for this patient on the day of the visit/encounter. >30 minutes of time was spent on: Diagnostic results, Risks and benefits of tx options, Instructions for management, Patient and family education, Importance of tx compliance, Risk factor reductions, Counseling / Coordination of care, Documenting in the medical record, Reviewing / ordering tests, medicine, procedures  , and Communicating with other healthcare professionals .    **Please Note: This note may have been constructed using a voice recognition system**

## 2025-05-08 NOTE — SOCIAL WORK
CM noticed that pt left AGAINST MEDICAL ADVICE overnight. CM called pt's mother, Kylah Calderon, [phone], to notify. The call went to voicemail and CM left generic message requesting return call.

## 2025-05-08 NOTE — NURSING NOTE
Provider spoke to pt over the phone about the risk of leaving AMA. Pt adamant about leaving. States medication is not working even though she refused night time dose.   AMA form signed and placed on chart. Nursing supervisor made aware.  Pt belongings returned upon discharge. Escorted to the front door by PCT and security.

## 2025-05-08 NOTE — ED ATTENDING ATTESTATION
5/8/2025  I, Ignacio Aparicio MD, saw and evaluated the patient. I have discussed the patient with the resident/non-physician practitioner and agree with the resident's/non-physician practitioner's findings, Plan of Care, and MDM as documented in the resident's/non-physician practitioner's note, except where noted. All available labs and Radiology studies were reviewed.  I was present for key portions of any procedure(s) performed by the resident/non-physician practitioner and I was immediately available to provide assistance.       At this point I agree with the current assessment done in the Emergency Department.  I have conducted an independent evaluation of this patient a history and physical is as follows:    History    Patient is a 29-year-old female, with a history significant for polysubstance use per my review of medical record, who presents to the ED today with multiple complaints.  Primarily, patient states she is feeling unwell since yesterday when she left the detox unit without completing treatment.  Patient describes dyspnea, generalized weakness, lower abdominal pain.  Patient denies drug use since yesterday but states that when she uses she uses intravenously and also skin pops in her neck.  Patient denies chest pain, urinary symptoms, numbness, fever.  Patient's stepfather, present in room providing lateral history, states appears drowsy but is not confused.  Patient has been evaluated previously for dyspnea and had a recent negative CTPA.  Patient has had elevated troponins in the past.  Point-of-care glucose on arrival within normal limits.  Patient denies trauma.  Patient also reports nausea.    Patient is without other concerns at this time.     ROS  Patient denies: Fever; dysphagia; vision change; chest pain; polyuria; dysuria; rash; numbness; confusion    Physical Exam    GENERAL APPEARANCE: Ill-appearing  NEURO: Patient is speaking clearly in short sentences.  Patient is answering  appropriately and able follow commands.  Patient is moving all four extremities spontaneously.  No facial droop.  Tongue midline.  HEENT: PERRL, Moist mucous membranes, external ears normal, nose normal  Neck: No cervical adenopathy  CV: Bradycardic rate, RR. No murmurs, rubs, gallops  LUNGS: Clear to auscultation: No wheezes, stridor, rhonchi, rales  GI: Abdomen non-distended. Soft.  Subjective tenderness to palpation in the lower abdomen, suprapubic and left side.  : Deferred at this time  MSK: No deformity.  Contractures of upper extremities.  Diffuse areas of scarring.  No gross crepitus.  No Janeway lesions/Osler nodes/splinter hemorrhages  Skin: Warm and dry  Capillary refill: <2 seconds    Patient is currently afebrile, hypertensive, bradycardic, otherwise stable    Assessment/Plan/MDM  Bradycardia, altered mental status, hypertension, nausea  -This presentation is concerning for: Drug withdrawal, coingestion, drug intoxication, ACS, electrolyte abnormality, anemia, pregnancy/pregnancy complication.  Doubt ICH based on history/physical exam.  -Will investigate with cardiac workup, coma panel, pregnancy test  - Will manage with Zofran, naloxone, further based on workup    ED Course  ED Course as of 05/08/25 2152   Thu May 08, 2025   1908 POC Glucose(!): 157  WNL    1920 ECG per my independent interpretation: Bradycardic rate, regular rhythm, no ectopy, normal axis, no ST elevations or depressions.  New T inversions when compared to prior   2008 PREGNANCY, SERUM: Negative  WNL    2109 Clinically unchanged after Narcan   2109 On reevaluation, patient states that she has been using Tranq   2129 CT neck result noted.  Will treat for cellulitis.  Patient is not septic.  Clinically does not appear to be neck Fash but will discuss with general surgery         Critical Care Time  Procedures

## 2025-05-09 VITALS
OXYGEN SATURATION: 96 % | SYSTOLIC BLOOD PRESSURE: 121 MMHG | HEART RATE: 110 BPM | RESPIRATION RATE: 16 BRPM | TEMPERATURE: 99.2 F | DIASTOLIC BLOOD PRESSURE: 89 MMHG

## 2025-05-09 PROBLEM — E43 SEVERE PROTEIN-CALORIE MALNUTRITION (HCC): Status: ACTIVE | Noted: 2025-05-09

## 2025-05-09 PROBLEM — R00.0 TACHYCARDIA: Status: ACTIVE | Noted: 2025-05-08

## 2025-05-09 LAB
AMPHETAMINES SERPL QL SCN: NEGATIVE
ATRIAL RATE: 41 BPM
BARBITURATES UR QL: NEGATIVE
BENZODIAZ UR QL: POSITIVE
COCAINE UR QL: NEGATIVE
FENTANYL UR QL SCN: POSITIVE
HYDROCODONE UR QL SCN: NEGATIVE
METHADONE UR QL: NEGATIVE
OPIATES UR QL SCN: NEGATIVE
OXYCODONE+OXYMORPHONE UR QL SCN: NEGATIVE
P AXIS: 62 DEGREES
PCP UR QL: NEGATIVE
PR INTERVAL: 108 MS
QRS AXIS: -15 DEGREES
QRSD INTERVAL: 84 MS
QT INTERVAL: 494 MS
QTC INTERVAL: 407 MS
T WAVE AXIS: 113 DEGREES
THC UR QL: NEGATIVE
TSH SERPL DL<=0.05 MIU/L-ACNC: 2.87 UIU/ML (ref 0.45–4.5)
VENTRICULAR RATE: 41 BPM

## 2025-05-09 PROCEDURE — 93010 ELECTROCARDIOGRAM REPORT: CPT | Performed by: STUDENT IN AN ORGANIZED HEALTH CARE EDUCATION/TRAINING PROGRAM

## 2025-05-09 PROCEDURE — 99231 SBSQ HOSP IP/OBS SF/LOW 25: CPT | Performed by: SURGERY

## 2025-05-09 PROCEDURE — 99223 1ST HOSP IP/OBS HIGH 75: CPT | Performed by: EMERGENCY MEDICINE

## 2025-05-09 PROCEDURE — 80307 DRUG TEST PRSMV CHEM ANLYZR: CPT

## 2025-05-09 PROCEDURE — 87081 CULTURE SCREEN ONLY: CPT

## 2025-05-09 PROCEDURE — 99223 1ST HOSP IP/OBS HIGH 75: CPT | Performed by: INTERNAL MEDICINE

## 2025-05-09 RX ORDER — CYCLOBENZAPRINE HCL 10 MG
10 TABLET ORAL 3 TIMES DAILY
Status: DISCONTINUED | OUTPATIENT
Start: 2025-05-09 | End: 2025-05-09 | Stop reason: HOSPADM

## 2025-05-09 RX ORDER — CLONIDINE HYDROCHLORIDE 0.1 MG/1
0.2 TABLET ORAL EVERY 8 HOURS PRN
Status: DISCONTINUED | OUTPATIENT
Start: 2025-05-09 | End: 2025-05-09 | Stop reason: HOSPADM

## 2025-05-09 RX ORDER — DIAZEPAM 10 MG/2ML
5 INJECTION, SOLUTION INTRAMUSCULAR; INTRAVENOUS ONCE
Status: COMPLETED | OUTPATIENT
Start: 2025-05-09 | End: 2025-05-09

## 2025-05-09 RX ORDER — TRAZODONE HYDROCHLORIDE 50 MG/1
50 TABLET ORAL
Status: DISCONTINUED | OUTPATIENT
Start: 2025-05-09 | End: 2025-05-09 | Stop reason: HOSPADM

## 2025-05-09 RX ORDER — DIAZEPAM 10 MG/2ML
5 INJECTION, SOLUTION INTRAMUSCULAR; INTRAVENOUS EVERY 2 HOUR PRN
Status: DISCONTINUED | OUTPATIENT
Start: 2025-05-09 | End: 2025-05-09 | Stop reason: HOSPADM

## 2025-05-09 RX ORDER — GABAPENTIN 400 MG/1
400 CAPSULE ORAL 3 TIMES DAILY
Status: DISCONTINUED | OUTPATIENT
Start: 2025-05-09 | End: 2025-05-09

## 2025-05-09 RX ORDER — METOCLOPRAMIDE HYDROCHLORIDE 5 MG/ML
5 INJECTION INTRAMUSCULAR; INTRAVENOUS EVERY 8 HOURS PRN
Status: DISCONTINUED | OUTPATIENT
Start: 2025-05-09 | End: 2025-05-09 | Stop reason: HOSPADM

## 2025-05-09 RX ORDER — HYDROXYZINE HYDROCHLORIDE 25 MG/1
25 TABLET, FILM COATED ORAL EVERY 6 HOURS PRN
Status: DISCONTINUED | OUTPATIENT
Start: 2025-05-09 | End: 2025-05-09

## 2025-05-09 RX ORDER — PHENOBARBITAL SODIUM 65 MG/ML
130 INJECTION, SOLUTION INTRAMUSCULAR; INTRAVENOUS ONCE
Status: COMPLETED | OUTPATIENT
Start: 2025-05-09 | End: 2025-05-09

## 2025-05-09 RX ORDER — IBUPROFEN 600 MG/1
600 TABLET, FILM COATED ORAL EVERY 6 HOURS PRN
Status: DISCONTINUED | OUTPATIENT
Start: 2025-05-09 | End: 2025-05-09 | Stop reason: HOSPADM

## 2025-05-09 RX ORDER — HYDROXYZINE HYDROCHLORIDE 25 MG/1
25 TABLET, FILM COATED ORAL EVERY 6 HOURS PRN
Status: DISCONTINUED | OUTPATIENT
Start: 2025-05-09 | End: 2025-05-09 | Stop reason: HOSPADM

## 2025-05-09 RX ORDER — BUPRENORPHINE AND NALOXONE 8; 2 MG/1; MG/1
8 FILM, SOLUBLE BUCCAL; SUBLINGUAL 2 TIMES DAILY
Qty: 60 FILM | Refills: 0 | Status: SHIPPED | OUTPATIENT
Start: 2025-05-09 | End: 2025-05-13

## 2025-05-09 RX ORDER — GABAPENTIN 300 MG/1
300 CAPSULE ORAL 3 TIMES DAILY PRN
Status: DISCONTINUED | OUTPATIENT
Start: 2025-05-09 | End: 2025-05-09 | Stop reason: HOSPADM

## 2025-05-09 RX ORDER — POTASSIUM CHLORIDE 1500 MG/1
40 TABLET, EXTENDED RELEASE ORAL ONCE
Status: DISCONTINUED | OUTPATIENT
Start: 2025-05-09 | End: 2025-05-09 | Stop reason: HOSPADM

## 2025-05-09 RX ORDER — CLONIDINE HYDROCHLORIDE 0.1 MG/1
0.2 TABLET ORAL EVERY 8 HOURS SCHEDULED
Status: DISCONTINUED | OUTPATIENT
Start: 2025-05-09 | End: 2025-05-09

## 2025-05-09 RX ORDER — LORAZEPAM 2 MG/ML
0.5 INJECTION INTRAMUSCULAR ONCE
Status: COMPLETED | OUTPATIENT
Start: 2025-05-09 | End: 2025-05-09

## 2025-05-09 RX ADMIN — LORAZEPAM 0.5 MG: 2 INJECTION INTRAMUSCULAR; INTRAVENOUS at 09:47

## 2025-05-09 RX ADMIN — CYCLOBENZAPRINE 10 MG: 10 TABLET, FILM COATED ORAL at 16:18

## 2025-05-09 RX ADMIN — ONDANSETRON 4 MG: 2 INJECTION INTRAMUSCULAR; INTRAVENOUS at 09:15

## 2025-05-09 RX ADMIN — DIAZEPAM 5 MG: 10 INJECTION, SOLUTION INTRAMUSCULAR; INTRAVENOUS at 06:05

## 2025-05-09 RX ADMIN — POTASSIUM CHLORIDE 20 MEQ: 14.9 INJECTION, SOLUTION INTRAVENOUS at 01:41

## 2025-05-09 RX ADMIN — DIAZEPAM 5 MG: 10 INJECTION, SOLUTION INTRAMUSCULAR; INTRAVENOUS at 01:41

## 2025-05-09 RX ADMIN — ONDANSETRON 4 MG: 2 INJECTION INTRAMUSCULAR; INTRAVENOUS at 01:50

## 2025-05-09 RX ADMIN — ONDANSETRON 4 MG: 2 INJECTION INTRAMUSCULAR; INTRAVENOUS at 16:18

## 2025-05-09 RX ADMIN — CLONIDINE HYDROCHLORIDE 0.1 MG: 0.1 TABLET ORAL at 05:38

## 2025-05-09 RX ADMIN — PHENOBARBITAL SODIUM 130 MG: 65 INJECTION INTRAMUSCULAR at 13:07

## 2025-05-09 RX ADMIN — DIAZEPAM 5 MG: 10 INJECTION, SOLUTION INTRAMUSCULAR; INTRAVENOUS at 16:18

## 2025-05-09 RX ADMIN — VANCOMYCIN HYDROCHLORIDE 750 MG: 750 INJECTION, SOLUTION INTRAVENOUS at 12:01

## 2025-05-09 RX ADMIN — VANCOMYCIN HYDROCHLORIDE 750 MG: 750 INJECTION, SOLUTION INTRAVENOUS at 04:43

## 2025-05-09 RX ADMIN — TRIMETHOBENZAMIDE HYDROCHLORIDE 200 MG: 100 INJECTION INTRAMUSCULAR at 13:07

## 2025-05-09 RX ADMIN — DIAZEPAM 5 MG: 10 INJECTION, SOLUTION INTRAMUSCULAR; INTRAVENOUS at 12:01

## 2025-05-09 NOTE — ASSESSMENT & PLAN NOTE
28 yo F with hx of PTSD, borderline personality disorder, IVDU w/ recent hx injection of fentyl/heroin to bilateral neck presents with imaging concerning to psb NSTI though clinically likely cellulitis.    5/8 CT Soft Tissue non con: extensive subcutaneous stranding involving supraclavicular fat pads bilaterally extending into lower tender, obscuration of fat planes in the post triangles which is most indicative of cellulitis given pt hx of recent and known IVDU. Foci of gas related to IVDU but nec fasc cannot be ruled out    -low clinical suspicion given physical exam findings  -LRINEC score (1)- low risk +1 for Hgb 11.5  -recommend IV Abx for underlying cellulitis   -will monitor clinical  -care per primary

## 2025-05-09 NOTE — UTILIZATION REVIEW
Initial Clinical Review    Admission: Date/Time/Statement:   Admission Orders (From admission, onward)       Ordered        05/08/25 2223  INPATIENT ADMISSION  Once                          Orders Placed This Encounter   Procedures    INPATIENT ADMISSION     Standing Status:   Standing     Number of Occurrences:   1     Level of Care:   Med Surg [16]     Estimated length of stay:   More than 2 Midnights     Certification:   I certify that inpatient services are medically necessary for this patient for a duration of greater than two midnights. See H&P and MD Progress Notes for additional information about the patient's course of treatment.     ED Arrival Information       Expected   -    Arrival   5/8/2025 18:37    Acuity   Emergent              Means of arrival   Wheelchair    Escorted by   Family Member    Service   Hospitalist    Admission type   Emergency              Arrival complaint   Abdominal pain, weakness, SOB             Chief Complaint   Patient presents with    Abdominal Pain     Pt reports abdominal pain and nausea starting yesterday       Initial Presentation: 29 y.o. female to ED presents in wheelchair w family member PMH borderline personality disorder, PTSD, anxiety, depression, polysubstance abuse/IV drug use including fentanyl/xylazine, previously on methadone daily IV Fentanyl last used 5/6  use presents with 2-day history of worsening fatigue, chest tightness, palpitations, AMS, mildly responded found by staff/ her Father after suspecting opioid intoxication.   Reports she was recently admitted to detox unit on 05/06 at the time treated for fentanyl withdrawal however she left AGAINST MEDICAL ADVICE presents after worsening on her symptoms. No seizures, loss of consciousness or other illicit drug use.  Denies any fevers, chills however does endorse chest discomfort, shortness of breath, fatigue.  Patient endorsed soreness in the neck bilaterally however no pain.  Endorsed that usually has  soreness in her neck and induration after IV drug use.   EXAM  COWs 12, bradycardia HR 40s-50s, hypertensive,   underweight w temporal wasting; somnolent but responsive, Upper extremities skin severely sclerosed, mild hypokalemia.  EKG sinus bradycardia no QTc prolongation   Labs hypokalemia  CT abd severe constipation  CT neck soft tissue concerning for bilateral neck cellulitis multiple planes and a foci of gas. Surgery consult for recs of IV antibx. Given anco, Rocephin, Ativan, Narcan, Zofran.   PLAN Inpatient admission med surg due to severe dependence Opioid use disorder/ withdrawal, cellulitis of neck, hypokalemia, bradycardia, constipation, mild protein calorie malnutrition. Tele, gentle IVF,  Cont COWs, recs per TOX consult:   Tylenol 975 milligrams every 8 hours as needed  Clonidine 0.1 mg p.o. every 6 hours as needed for anxiety or palpitations  Gabapentin 300 mg p.o. 3 times daily  Melatonin 3 mg nightly  Flexeril 10 mg p.o. 3 times daily  Valium IV 5 mg every 6 hours as needed for agitation  Hold off Imodium given severe constipation CAT scan  Zofran IV 4 mg every 6 hours.  IV Zosyn while inpatient, follow BCX, consider transition to PO DOXY plus Augmentin for 10 day course, follow serial exam of neck due to recent IV drug use; obtain TSH, AM BMP, bowel regimen, consult Dietary  Anticipated Length of Stay/Certification Statement: Patient will be admitted on an inpatient basis with an anticipated length of stay of greater than 2 midnights secondary to polysubstance abuse/need of detox/cellulitis.   Date: 5/9   Day 2:   Surgery  C/o of withdrawal like symptoms, neck pain is similar to mildly improved from prior.   Registered Dietitian  This medical record reflects one or more clinical indicators suggestive of malnutrition and/or morbid obesity.  Malnutrition Findings:   Adult Malnutrition type: Acute illness  Adult Degree of Malnutrition: Other severe protein calorie malnutrition  Malnutrition  Characteristics: Fat loss, Muscle loss, Weight loss     360 Statement: Severe protein-calorie malnutrition related to inadequate oral intake as evidenced by significant loss of fat and muscle extremities, orbitals, scapula, temples; 22.7% weight decrease x 14 months, BMI 16.75. Currently treated with oral supplementation.  BMI Findings:  Adult BMI Classifications: Underweight < 18.5  There is no height or weight on file to calculate BMI.   MED TOX  Consult Opioid Withdrawal   Required clonidine, valium and ativan this morning for nausea, anxiety, shakiness and tachycardia  Not tachycardic on evaluation, ongoing anxiety and nausea  Unsure if she wants to return to detox unit or if she wants to use Suboxone  Recommendations:  Adjunctive medications administered as needed:  Clonidine 0.2-0.3 mg PO Q6 hours PRN anxiety or palpitations    Gabapentin 300mg PO Q8 hours PRN anxiety    Diazepam 5 mg p.o. or IV q2 PRN refractory anxiety  Ibuprofen 600 mg PO Q6 hours PRN pain    Acetaminophen 1000mg PO Q8 hours PRN pain    Ondansetron 4 mg PO Q6 hours PRN N/V    Reglan 5-10 mg IV q8 p.r.n. refractory nausea vomiting  Nicotine patch 7, 14, 21 mg  PRN nicotine withdrawal   Trazodone 50 mg PO QHS PRN sleep    Loperamide 4 mg PO PRN diarrhea up to 16 mg/day  Atarax 50mg PO Q6H     ED Treatment-Medication Administration from 05/08/2025 1837 to 05/08/2025 2338         Date/Time Order Dose Route Action     05/08/2025 2040 ondansetron (ZOFRAN) injection 4 mg 4 mg Intravenous Given     05/08/2025 2053 naloxone (NARCAN) 0.04 mg/mL syringe 0.04 mg 0.04 mg Intravenous Given     05/08/2025 2208 vancomycin (VANCOCIN) IVPB (premix in dextrose) 1,000 mg 200 mL 1,000 mg Intravenous New Bag     05/08/2025 2151 ceftriaxone (ROCEPHIN) 1 g/50 mL in dextrose IVPB 1,000 mg Intravenous New Bag     05/08/2025 2157 LORazepam (ATIVAN) injection 0.5 mg 0.5 mg Intravenous Given            Scheduled Medications:  acetaminophen, 975 mg, Oral, Q8H  JERI  cloNIDine, 0.2 mg, Oral, Q8H JERI  cyclobenzaprine, 10 mg, Oral, TID  enoxaparin, 40 mg, Subcutaneous, Daily  gabapentin, 400 mg, Oral, TID  melatonin, 3 mg, Oral, HS  senna-docusate sodium, 2 tablet, Oral, BID  vancomycin, 15 mg/kg, Intravenous, Q8H      Continuous IV Infusions:  multi-electrolyte, 75 mL/hr, Intravenous, Continuous      PRN Meds:  acetaminophen, 650 mg, Oral, Q6H PRN  diazepam, 5 mg, Intravenous, Q2H PRN  5/9 x 2 IV  hydrOXYzine HCL, 25 mg, Oral, Q6H PRN  ondansetron, 4 mg, Intravenous, Q6H PRN 5/9 x2  polyethylene glycol, 17 g, Oral, Daily PRN  trimethobenzamide, 200 mg, Intramuscular, Q6H PRN 5/9 x1      ED Triage Vitals   Temperature Pulse Respirations Blood Pressure SpO2 Pain Score   05/08/25 1855 05/08/25 1847 05/08/25 1847 05/08/25 1847 05/08/25 1847 05/08/25 2230   98.3 °F (36.8 °C) (!) 43 18 (!) 194/114 99 % No Pain     Weight (last 2 days)       None          COWS TOOL=  Clinical Opiate Withdrawal Scale    Row Name 05/09/25 1215 05/09/25 1100 05/09/25 0800 05/09/25 07:38:06 05/09/25 0000   Clinical Opiate Withdrawal Scale   Pulse 110 Abnormal   - Abnormal   - Abnormal   -  -DI --   Resting Pulse Rate: Measured After Patient is Sitting or Lying for One Minute 2  -MM 2  -MM --  -MM -- 0  -ZJ   GI Upset: Over Last Half Hour 2  -MM 2  -MM --  -MM -- 2  -ZJ   Sweating: Over Past Half Hour Not Accounted for by Room Temperature of Patient Activity 0  -MM 0  -MM --  -MM -- 1  -ZJ   Tremor: Observation of Outstretched Hands 2  -MM 2  -MM --  -MM -- 1  -ZJ   Restlessness: Observation During Assessment 1  -MM 1  -MM --  -MM -- 0  -ZJ   Yawning: Observation During Assessment 0  -MM 0  -MM --  -MM -- 0  -ZJ   Pupil Size 2  -MM 2  -MM --  -MM -- 2  -ZJ   Anxiety and Irritability 1  -MM 1  -MM --  -MM -- 1  -ZJ   Bone or Joint Aches: If Patient was Having Pain Previously, Only the Additional Component Attributed to Opiate Withdrawal is Scored 1  -MM 1  -MM --  -MM -- 1  -ZJ    Gooseflesh Skin 3  -MM 3  -MM --  -MM -- 0  -ZJ   Runny Nose or Tearing: Not Accounted for by Cold Symptoms or Allergies 0  -MM 0  -MM --  -MM -- 0  -ZJ   Clinical Opiate Withdrawal Scale Total Score 14  -MM 14  -MM --  -MM -- 8  -ZJ   Row Name 05/08/25 23:43:27 05/08/25 2300 05/08/25 2239 05/08/25 2230 05/08/25 2200   Clinical Opiate Withdrawal Scale   Pulse 65  -DI 75  -MMA 53 Abnormal   -MMA 53 Abnormal   -MMA 51 Abnormal   -JA   Resting Pulse Rate: Measured After Patient is Sitting or Lying for One Minute -- -- 0  -MMA -- --   GI Upset: Over Last Half Hour -- -- 2  -MMA -- --   Sweating: Over Past Half Hour Not Accounted for by Room Temperature of Patient Activity -- -- 1  -MMA -- --   Tremor: Observation of Outstretched Hands -- -- 1  -MMA -- --   Restlessness: Observation During Assessment -- -- 1  -MMA -- --   Yawning: Observation During Assessment -- -- 2  -MMA -- --   Pupil Size -- -- 2  -MMA -- --   Anxiety and Irritability -- -- 2  -MMA -- --   Bone or Joint Aches: If Patient was Having Pain Previously, Only the Additional Component Attributed to Opiate Withdrawal is Scored -- -- 1  -MMA -- --   Gooseflesh Skin -- -- 0  -MMA -- --   Runny Nose or Tearing: Not Accounted for by Cold Symptoms or Allergies -- -- 0  -MMA -- --   Clinical Opiate Withdrawal Scale Total Score -- -- 12  -MMA --      Vital Signs (last 3 days)       Date/Time Temp Pulse Resp BP MAP (mmHg) SpO2 O2 Device Patient Position - Orthostatic VS Clinical Opiate Withdrawal Scale Total Score Pain    05/09/25 1100 -- 112 -- -- -- -- -- -- 14 --    05/09/25 0800 -- 109 -- -- -- -- -- -- -- --    05/09/25 07:38:06 99.3 °F (37.4 °C) 103 16 121/82 95 97 % -- -- -- --    05/09/25 0538 -- -- -- 125/88 -- -- -- -- -- --    05/09/25 0000 -- -- -- -- -- -- -- -- 8 --    05/08/25 23:43:27 98.4 °F (36.9 °C) 65 -- 126/90 102 96 % -- -- -- No Pain    05/08/25 2300 -- 75 16 110/69 85 99 % None (Room air) Lying -- 3    05/08/25 2239 -- 53 -- -- -- -- -- --  12 --    05/08/25 2235 98.1 °F (36.7 °C) -- -- -- -- -- -- -- -- --    05/08/25 2230 98.1 °F (36.7 °C) 53 16 154/91 117 99 % None (Room air) Lying -- No Pain    05/08/25 2200 -- 51 16 155/98 121 99 % -- Lying -- --    05/08/25 2155 -- 50 16 141/99 115 99 % -- -- -- --    05/08/25 2058 -- -- -- -- -- -- None (Room air) -- -- --    05/08/25 1900 -- 42 16 179/108 137 99 % None (Room air) -- -- --    05/08/25 1855 98.3 °F (36.8 °C) -- -- -- -- -- -- -- -- --    05/08/25 1847 -- 43 18 194/114 146 99 % None (Room air) -- -- --              Pertinent Labs/Diagnostic Test Results:   Radiology:  CT soft tissue neck wo contrast   Final Interpretation by Jordan Collins MD (05/08 2127)      1.  Examination compromised the lack of intravenous contrast. Extensive subcutaneous stranding, and stranding involving the supraclavicular fat pads bilaterally, extending into the lower neck, with obscuration of the fat planes in the posterior triangles    noted bilaterally, most indicative of a cellulitis related to the patient's known IV drug injections. Foci of gas also noted subcutaneously which may be related to the IV drug administration, but necrotizing fasciitis cannot be entirely excluded.         I personally discussed this study with MARVIN HOLT on 5/8/2025 9:20 p.m.               Workstation performed: [id number]         CT chest abdomen pelvis wo contrast   Final Interpretation by Jez Brink MD (05/08 2235)         1. Severe constipation. No evidence of bowel obstruction or colitis.   2. No acute cardiopulmonary process.               Workstation performed: [id number]           Cardiology:  ECG 12 lead    by Interface, Ris Results In (05/08 1854)        GI:  No orders to display           Results from last 7 days   Lab Units 05/08/25  1938 05/08/25  1918 05/07/25  0721 05/06/25  1621   WBC Thousand/uL  --  7.69 6.15 9.18   HEMOGLOBIN g/dL  --  11.5 10.6* 10.7*   I STAT HEMOGLOBIN g/dl 12.2  --   --   --   "  HEMATOCRIT %  --  33.5* 31.4* 31.7*   HEMATOCRIT, ISTAT % 36  --   --   --    PLATELETS Thousands/uL  --  289 207 269   TOTAL NEUT ABS Thousands/µL  --  3.72  --  5.31         Results from last 7 days   Lab Units 05/08/25 1938 05/08/25 1918 05/07/25 0721 05/06/25  1621   SODIUM mmol/L  --  142 140 140   POTASSIUM mmol/L  --  3.1* 3.0* 2.8*   CHLORIDE mmol/L  --  106 104 103   CO2 mmol/L  --  28 22 30   CO2, I-STAT mmol/L 28  --   --   --    ANION GAP mmol/L  --  8 14* 7   BUN mg/dL  --  5 6 7   CREATININE mg/dL  --  0.61 0.44* 0.62   EGFR ml/min/1.73sq m  --  123 137 123   CALCIUM mg/dL  --  9.2 8.3* 8.6   CALCIUM, IONIZED, ISTAT mmol/L 1.21  --   --   --      Results from last 7 days   Lab Units 05/08/25 1918 05/06/25  1621   AST U/L 24 28   ALT U/L 11 12   ALK PHOS U/L 80 75   TOTAL PROTEIN g/dL 7.1 6.4   ALBUMIN g/dL 3.9 3.4*   TOTAL BILIRUBIN mg/dL 0.54 0.41     Results from last 7 days   Lab Units 05/08/25  1900   POC GLUCOSE mg/dl 157*     Results from last 7 days   Lab Units 05/08/25 1918 05/07/25 0721 05/06/25  1621   GLUCOSE RANDOM mg/dL 154* 60* 98             No results found for: \"BETA-HYDROXYBUTYRATE\"           Results from last 7 days   Lab Units 05/08/25 1938   PH, MATEO I-STAT  7.422*   PCO2, MATEO ISTAT mm HG 41.7*   PO2, MATEO ISTAT mm HG 32.0*   HCO3, MATEO ISTAT mmol/L 27.2   I STAT BASE EXC mmol/L 2   I STAT O2 SAT % 62     Results from last 7 days   Lab Units 05/08/25 1918   CK TOTAL U/L 165     Results from last 7 days   Lab Units 05/08/25 2136 05/08/25 1918 05/06/25 2039 05/06/25  1826 05/06/25  1621   HS TNI 0HR ng/L  --  11  --   --  18   HS TNI 2HR ng/L 10  --   --  20  --    HSTNI D2 ng/L -1  --   --  2  --    HS TNI 4HR ng/L  --   --  15  --   --    HSTNI D4 ng/L  --   --  -3  --   --          Results from last 7 days   Lab Units 05/06/25  1621   PROTIME seconds 15.4*   INR  1.14   PTT seconds 33     Results from last 7 days   Lab Units 05/08/25  1918   [facility] " 2.868     Results from last 7 days   Lab Units 05/06/25  1621   PROCALCITONIN ng/ml <0.05     Results from last 7 days   Lab Units 05/06/25  1621   LACTIC ACID mmol/L 0.9                                     Results from last 7 days   Lab Units 05/08/25  1918 05/06/25  1621   CRP mg/L 11.3* 9.6*   SED RATE mm/hour  --  32*                             Results from last 7 days   Lab Units 05/08/25  1918   ETHANOL LVL mg/dL <10   ACETAMINOPHEN LVL ug/mL <2*   SALICYLATE LVL mg/dL <5*                 Results from last 7 days   Lab Units 05/08/25  2140 05/08/25  2054 05/06/25  1621   BLOOD CULTURE  Received in Microbiology Lab. Culture in Progress. Received in Microbiology Lab. Culture in Progress. No Growth at 48 hrs.                   Past Medical History:   Diagnosis Date    Anxiety     Borderline personality disorder (HCC)     Chlamydia     Depression     Endometriosis     IV drug user     IV heroin use, rehab 7 times     Liver lesion 5/7/2025    Panic attacks     Psychiatric disorder     anxiety    PTSD (post-traumatic stress disorder)     PTSD (post-traumatic stress disorder) 8/12/2023    Varicella     childhood    Visual impairment      Present on Admission:   Cellulitis of neck   Opioid use disorder, severe, dependence (HCC)   Mild protein-calorie malnutrition (HCC)   Hypokalemia   Constipation      Admitting Diagnosis: Bradycardia [R00.1]  Elevated blood pressure reading [R03.0]  Abdominal pain [R10.9]  Polysubstance abuse (HCC) [F19.10]  Subcutaneous emphysema, initial encounter (HCC) [T79.7XXA]  Opioid use disorder, severe, dependence (HCC) [F11.20]  Age/Sex: 29 y.o. female    Network Utilization Review Department  ATTENTION: Please call with any questions or concerns to [phone] and carefully listen to the prompts so that you are directed to the right person. All voicemails are confidential.   For Discharge needs, contact Care Management DC Support Team at [phone] opt. 2  Send all requests for  admission clinical reviews, approved or denied determinations and any other requests to dedicated fax number below belonging to the campus where the patient is receiving treatment. List of dedicated fax numbers for the Facilities:  FACILITY NAME UR FAX NUMBER   ADMISSION DENIALS (Administrative/Medical Necessity) [phone]   DISCHARGE SUPPORT TEAM (NETWORK) [phone]   PARENT CHILD HEALTH (Maternity/NICU/Pediatrics) [phone]   [facility] [phone]   [facility] [phone]   [facility] [phone]   [facility] [phone]   [facility] [phone]   [facility] [phone]   [facility] [phone]   [facility] [phone]   [facility] [phone]   [facility] [phone]   [facility] [phone]   [facility] [phone]

## 2025-05-09 NOTE — PROGRESS NOTES
05/09/25 1215   Clinical Opiate Withdrawal Scale   Pulse (!) 110   Resting Pulse Rate: Measured After Patient is Sitting or Lying for One Minute 2   GI Upset: Over Last Half Hour 2   Sweating: Over Past Half Hour Not Accounted for by Room Temperature of Patient Activity 0   Tremor: Observation of Outstretched Hands 2   Restlessness: Observation During Assessment 1   Yawning: Observation During Assessment 0   Pupil Size 2   Anxiety and Irritability 1   Bone or Joint Aches: If Patient was Having Pain Previously, Only the Additional Component Attributed to Opiate Withdrawal is Scored 1   Gooseflesh Skin 3   Runny Nose or Tearing: Not Accounted for by Cold Symptoms or Allergies 0   Clinical Opiate Withdrawal Scale Total Score 14     SLIM aware. Tigan ordered PRN for nausea and Med Tox ordered Phenobarbital dose.

## 2025-05-09 NOTE — ASSESSMENT & PLAN NOTE
History of polysubstance abuse previous detox admission in 2023 most recently [facility] days [facility]   Status post Ativan, Narcan, Zofran  ECG notable for sinus bradycardia no QTc prolongation    Plan  Toxicology consulted appreciate assistance  Started toxicology recommendations in note on 5/9.  Also phenobarbital 130 mg x 1.  Can increase clonidine and reglan per tox recs if necessary.  D/c loperamide due to evidence of constipation found on imaging.  COWS monitoring for now

## 2025-05-09 NOTE — ASSESSMENT & PLAN NOTE
POA heart rate in the 40s to 50s  ECG notable for sinus bradycardia no pauses no QTc blood ambulation, TX interval stable  Check TSH  Continue monitoring telemetry for the above

## 2025-05-09 NOTE — ED CARE HANDOFF
[facility] Warm Handoff Outcome Note    Patient name Elda Calderon  Location ED-32/ED-32 MRN [id number]  Age: 29 y.o.          Plan Type:  Warm Handoff                                                                                    Plan Date: 5/8/2025  Service:  ED Warm Handoff      Substance Use History:  Opiates    Warm Handoff Update:  Pt being admitted to hospital    Warm Handoff Outcome: Residential  Inpatient

## 2025-05-09 NOTE — PROGRESS NOTES
Elda Calderon is a 29 y.o. female who is currently ordered Vancomycin IV with management by the Pharmacy Consult service.  Relevant clinical data and objective / subjective history reviewed.  Vancomycin Assessment:  Indication and Goal AUC/Trough: Soft tissue (goal -600, trough >10)  Clinical Status:  new  Micro:   Blood cultures in progress.  Renal Function:  SCr: 0.61 mg/dL  CrCl: 95.3 mL/min  Renal replacement: Not on dialysis  Days of Therapy: 1  Current Dose: 1000mg loading dose  Vancomycin Plan:  New Dosinmg q8h  Estimated AUC: 515 mcg*hr/mL  Estimated Trough: 13.1 mcg/mL  Next Level: 05/10 @ 0900  Renal Function Monitoring: Daily BMP and UOP  Pharmacy will continue to follow closely for s/sx of nephrotoxicity, infusion reactions and appropriateness of therapy.  BMP and CBC will be ordered per protocol. We will continue to follow the patient’s culture results and clinical progress daily.    Christie Collins, Pharmacist

## 2025-05-09 NOTE — ASSESSMENT & PLAN NOTE
Malnutrition Findings:   Adult Malnutrition type: Acute illness  Adult Degree of Malnutrition: Other severe protein calorie malnutrition  Malnutrition Characteristics: Fat loss, Muscle loss, Weight loss      Likely due ongoing known history of polysubstance abuse severe malnutrition poor oral intake  Nutrition consulted              360 Statement: Severe protein-calorie malnutrition related to inadequate oral intake as evidenced by significant loss of fat and muscle extremities, orbitals, scapula, temples; 22.7% weight decrease x 14 months, BMI 16.75. Currently treated with oral supplementation.    BMI Findings:  Adult BMI Classifications: Underweight < 18.5        There is no height or weight on file to calculate BMI.

## 2025-05-09 NOTE — ASSESSMENT & PLAN NOTE
Likely due to poor oral intake  POA notable for potassium 3.1  Status post 40 mill equivalents IV  BMP a.m.

## 2025-05-09 NOTE — CONSULTS
Consultation - Surgery-General   Name: Elda Calderon 29 y.o. female I MRN: [id number]  Unit/Bed#: ED-32 I Date of Admission: 5/8/2025   Date of Service: 5/8/2025 I Hospital Day: 0   Consult to surgery general  Consult performed by: Varun Durham MD  Consult ordered by: Tamiko Magana MD        Physician Requesting Evaluation: Tamiko Magana MD   Reason for Evaluation / Principal Problem: r/o NSTI    Assessment & Plan  Cellulitis of neck  28 yo F with hx of PTSD, borderline personality disorder, IVDU w/ recent hx injection of fentyl/heroin to bilateral neck presents with imaging concerning to psb NSTI though clinically likely cellulitis.    5/8 CT Soft Tissue non con: extensive subcutaneous stranding involving supraclavicular fat pads bilaterally extending into lower tender, obscuration of fat planes in the post triangles which is most indicative of cellulitis given pt hx of recent and known IVDU. Foci of gas related to IVDU but nec fasc cannot be ruled out    -low clinical suspicion given physical exam findings  -LRINEC score (1)- low risk +1 for Hgb 11.5  -recommend IV Abx for underlying cellulitis   -will monitor clinical  -care per primary      History of Present Illness   Elda Calderon is a 29 y.o. female w/ hx of PTSD, Boderline personality disorder, IVDU who present with increased fatigue 2 days ago and soreness to the anterior bilateraly neck. Last IV injection to b/l neck 2 days prior. Used fentanyl. Says indurated/hardness of her neck happens occasionally after injections and is usually more severe. She denies any acute pain at this moment but does express soreness w/ palpation. She denies f/c/ns, no chest pain or sob, endorses lethargy and generalized malaise, hx of constipation.     Review of Systems  Medical History Review: I have reviewed the patient's PMH, PSH, Social History, Family History, Meds, and Allergies     Objective :  Temp:  [98.3 °F (36.8 °C)] 98.3 °F (36.8 °C)  HR:  [42-51]  51  BP: (141-194)/() 155/98  Resp:  [16-18] 16  SpO2:  [99 %] 99 %  O2 Device: None (Room air)      Physical Exam  Constitutional:       General: She is not in acute distress.     Appearance: She is ill-appearing.      Comments: somnolent   HENT:      Mouth/Throat:      Mouth: Mucous membranes are dry.      Pharynx: Oropharyngeal exudate present.   Eyes:      Pupils: Pupils are equal, round, and reactive to light.   Neck:      Comments: Indurated bilateral SCM and anterior neck, no fluctuance, no erythema, no pain out of proportion, no bullae, crepitus, tenderness on palpation   Cardiovascular:      Rate and Rhythm: Bradycardia present.   Pulmonary:      Effort: Pulmonary effort is normal.   Abdominal:      General: There is no distension.      Palpations: Abdomen is soft.   Musculoskeletal:      Cervical back: Rigidity present.   Skin:     Comments: Notable chronic appearing skin induration to the bilateral upper extremities w/ chronically appearing healed wounds    Neurological:      Mental Status: She is oriented to person, place, and time.           Lab Results: I have reviewed the following results:  Recent Labs     05/06/25  1621 05/06/25  1826 05/08/25  1918 05/08/25  1938 05/08/25  2136   WBC 9.18   < > 7.69  --   --    HGB 10.7*   < > 11.5 12.2  --    HCT 31.7*   < > 33.5* 36  --       < > 289  --   --    SODIUM 140   < > 142  --   --    K 2.8*   < > 3.1*  --   --       < > 106  --   --    CO2 30   < > 28 28  --    BUN 7   < > 5  --   --    CREATININE 0.62   < > 0.61  --   --    GLUC 98   < > 154*  --   --    CAIONIZED  --   --   --  1.21  --    AST 28  --  24  --   --    ALT 12  --  11  --   --    ALB 3.4*  --  3.9  --   --    TBILI 0.41  --  0.54  --   --    ALKPHOS 75  --  80  --   --    PTT 33  --   --   --   --    INR 1.14  --   --   --   --    HSTNI0 18  --  11  --   --    HSTNI2  --    < >  --   --  10   LACTICACID 0.9  --   --   --   --     < > = values in this interval not  displayed.       Imaging Results Review: I reviewed radiology reports from this admission including: CT abdomen/pelvis.      VTE Pharmacologic Prophylaxis: VTE covered by:    None     VTE Mechanical Prophylaxis: sequential compression device

## 2025-05-09 NOTE — ASSESSMENT & PLAN NOTE
Severe constipation CAT scan  Bowel regimen for now monitor output closely in the event of withdrawals this may change and may need antispasmodic

## 2025-05-09 NOTE — ED PROVIDER NOTES
"Time reflects when diagnosis was documented in both MDM as applicable and the Disposition within this note       Time User Action Codes Description Comment    5/8/2025  8:50 PM Megan Montalvo Add [F11.20] Opioid use disorder, severe, dependence (HCC)     5/8/2025  9:33 PM Legare, Christopher A Add [F19.10] Polysubstance abuse (HCC)     5/8/2025  9:33 PM Legare, Christopher A Modify [F11.20] Opioid use disorder, severe, dependence (HCC)     5/8/2025  9:33 PM Legare, Christopher A Modify [F19.10] Polysubstance abuse (HCC)     5/8/2025  9:33 PM Legare, Christopher A Add [R00.1] Bradycardia     5/8/2025  9:33 PM Legare, Christopher A Add [R03.0] Elevated blood pressure reading     5/8/2025 10:18 PM Megan Montalvo Add [T79.7XXA] Subcutaneous emphysema, initial encounter ([facility])           ED Disposition       ED Disposition   Admit    Condition   Stable    Date/Time   Thu May 8, 2025 10:22 PM    Comment   Case was discussed with EMERALD and the patient's admission status was agreed to be Admission Status: inpatient status to the service of Dr. Jovel .               Assessment & Plan       Medical Decision Making  29-year-old female presenting with fatigue, bradycardia, hypertension.  Admits to use of fentanyl and xylazine    2 inversions noted on EKG.  Initial troponin 11, repeat delta -1.  Low suspicion for ACS or type II troponins.  EKG changes possibly secondary to increased afterload.    Patient admits to injecting into her neck as her arms are completely sclerosed.  Therefore CT was obtained which showed Extensive subcutaneous stranding, and stranding involving the supraclavicular fat pads bilaterally, extending into the lower neck, with obscuration of the fat planes in the posterior triangles noted bilaterally, most indicative of a cellulitis related to the patient's known IV drug injections.\"    However his neck Fash not be definitively excluded General Surgery was consulted    ED course as below    Patient " expressed feeling agitated and reported that Ativan has helped in the past.  Given bradycardia, 0.5 mg Ativan IV was cautiously given which patient tolerated well.    Despite bradycardia and elevated blood pressure, patient has been mentating well, maintain normal oxygen saturation, and responding to questions appropriately throughout her stay here in the ED.    Patient is agreeable to admission for both medical management/evaluation as well as detox.  Admits that she made a mistake discharging AMA yesterday and did not realize that her symptoms would become so severe.  Is committed to completing her detox stay at this time.  All questions were answered.  Father at bedside is supportive and agreeable with plan.    Patient's upper extremities are severely sclerosed and IV access was extremely challenging.  Multiple attempts were made including ultrasound-guided attempts above the AC however given source of skin, catheter could not be threaded despite successful penetration of needle bevel into the vein.  Given patient vitals, it was necessary to obtain venous access and options of IO for central line versus peripheral IV in the lower extremities were considered and it was decided that given extensive discomfort of IO, and high risk nature of central line in setting of known IV drug user that is already high risk for skin/soft tissue infection/endocarditis/other systemic infection secondary to IV contamination, peripheral IV in lower extremity was a preferable option.  Should lower extremity IV not be possible other methods of access would be reconsidered.    Peripheral IVs were successfully established in the bilateral lower extremities.  Empiric antibiotic therapy with vancomycin and Rocephin were started.  General surgery was consulted.  Toxicology was consulted who will see patient tomorrow  Case discussed with [facility] internal medicine and patient was admitted.    Amount and/or Complexity of Data  Reviewed  Labs: ordered. Decision-making details documented in ED Course.  Radiology: ordered.    Risk  Prescription drug management.  Decision regarding hospitalization.        ED Course as of 05/08/25 2227   Thu May 08, 2025   1912 POC Glucose(!): 157   2024 PREGNANCY, SERUM: Negative   2123 Discussed case with [facility]/Admit Call for potential detox admission however notified that patient admitting that she is on xylazine would prevent her from being appropriate for admission to detox unit as she would require admission to a facility with ICU capabilities    2213 Delta 2hr hsTnI: -1   2221 ECG compared to prior May 6, 2025-sinus bradycardia, rate 41, no ectopy, new T wave inversions in V1 through V3, slightly more leftward axis, possibly secondary to increased afterload, no other ST/T wave changes indicative of acute ischemia.       Medications   naloxone (NARCAN) 0.4 mg/mL injection **ADS Override Pull** (  Not Given 5/8/25 2039)   vancomycin (VANCOCIN) IVPB (premix in dextrose) 1,000 mg 200 mL (1,000 mg Intravenous New Bag 5/8/25 2208)   diazepam (VALIUM) injection 5 mg (has no administration in time range)   ondansetron (ZOFRAN) injection 4 mg (4 mg Intravenous Given 5/8/25 2040)   naloxone (NARCAN) 0.04 mg/mL syringe 0.04 mg (0.04 mg Intravenous Given 5/8/25 2053)   ceftriaxone (ROCEPHIN) 1 g/50 mL in dextrose IVPB (0 mg Intravenous Stopped 5/8/25 2205)   LORazepam (ATIVAN) injection 0.5 mg (0.5 mg Intravenous Given 5/8/25 2157)       ED Risk Strat Scores                    No data recorded        SBIRT 22yo+      Flowsheet Row Most Recent Value   Initial Alcohol Screen: US AUDIT-C     1. How often do you have a drink containing alcohol? 0 Filed at: 05/08/2025 2054   2. How many drinks containing alcohol do you have on a typical day you are drinking?  0 Filed at: 05/08/2025 2054   3b. FEMALE Any Age, or MALE 65+: How often do you have 4 or more drinks on one occassion? 0 Filed at: 05/08/2025 2054   Audit-C  "Score 0 Filed at: 05/08/2025 2054   LUDA: How many times in the past year have you...    Used an illegal drug or used a prescription medication for non-medical reasons? Daily or Almost Daily Filed at: 05/08/2025 2054   DAST-10: In the past 12 months...    1. Have you used drugs other than those required for medical reasons? 1 Filed at: 05/08/2025 2054   2. Do you use more than one drug at a time? 0 Filed at: 05/08/2025 2054   3. Have you had medical problems as a result of your drug use (e.g., memory loss, hepatitis, convulsions, bleeding, etc.)? 0 Filed at: 05/08/2025 2054   4. Have you had \"blackouts\" or \"flashbacks\" as a result of drug use?YesNo 0 Filed at: 05/08/2025 2054   5. Do you ever feel bad or guilty about your drug use? 1 Filed at: 05/08/2025 2054   6. Does your spouse (or parent) ever complain about your involvement with drugs? 1 Filed at: 05/08/2025 2054   7. Have you neglected your family because of your use of drugs? 1 Filed at: 05/08/2025 2054   9. Have you ever experienced withdrawal symptoms (felt sick) when you stopped taking drugs? 1 Filed at: 05/08/2025 2054   10. Are you always able to stop using drugs when you want to? 1 Filed at: 05/08/2025 2054                            History of Present Illness       Chief Complaint   Patient presents with    Abdominal Pain     Pt reports abdominal pain and nausea starting yesterday       Past Medical History:   Diagnosis Date    Anxiety     Borderline personality disorder (HCC)     Chlamydia     Depression     Endometriosis     IV drug user     IV heroin use, rehab 7 times     Liver lesion 5/7/2025    Panic attacks     Psychiatric disorder     anxiety    PTSD (post-traumatic stress disorder)     PTSD (post-traumatic stress disorder) 8/12/2023    Varicella     childhood    Visual impairment       Past Surgical History:   Procedure Laterality Date    LAPAROTOMY        Family History   Problem Relation Age of Onset    Depression Mother     Drug abuse " "Brother     Drug abuse Maternal Aunt     Drug abuse Paternal Uncle     Cancer Maternal Grandmother     Cancer Paternal Grandfather       Social History     Tobacco Use    Smoking status: Never     Passive exposure: Never    Smokeless tobacco: Never   Vaping Use    Vaping status: Every Day    Substances: Nicotine, Flavoring   Substance Use Topics    Alcohol use: Not Currently    Drug use: Yes     Types: Fentanyl     Comment: 8 bags/day states benzos are mixed in      E-Cigarette/Vaping    E-Cigarette Use Current Every Day User       E-Cigarette/Vaping Substances    Nicotine Yes     THC No     CBD No     Flavoring Yes     Other No     Unknown No       I have reviewed and agree with the history as documented.     Patient is a 29-year-old female with a history of fentanyl and xylazine dependence presenting to the emergency department with her father because she was found to be altered, mildly responsive, and father suspected opioid intoxication.  Denies any opiate use this evening.  She reports that she was admitted to detox unit yesterday and was being treated for fentanyl withdrawal, she left AGAINST MEDICAL ADVICE due to how uncomfortable she was because of her symptoms and began to feel worse since then.  Denies any seizures, loss of consciousness, or illicit substance use since discharge.  Denies any fevers, chills but does note vague chest discomfort and shortness of breath.  She admits that the detox unit was not aware  of her xylazine use as she states \"I do not know how much xylazine is in there, it mixed by random people\".      Abdominal Pain  Associated symptoms: shortness of breath        Review of Systems   Respiratory:  Positive for chest tightness and shortness of breath.    Gastrointestinal:  Positive for abdominal pain.   All other systems reviewed and are negative.          Objective       ED Triage Vitals   Temperature Pulse Blood Pressure Respirations SpO2 Patient Position - Orthostatic VS   05/08/25 " 1855 05/08/25 1847 05/08/25 1847 05/08/25 1847 05/08/25 1847 05/08/25 2200   98.3 °F (36.8 °C) (!) 43 (!) 194/114 18 99 % Lying      Temp Source Heart Rate Source BP Location FiO2 (%) Pain Score    05/08/25 1855 05/08/25 1847 05/08/25 2200 -- --    Oral Monitor Right arm        Vitals      Date and Time Temp Pulse SpO2 Resp BP Pain Score FACES Pain Rating User   05/08/25 2200 -- 51 99 % 16 155/98 -- -- JA   05/08/25 2155 -- 50 99 % 16 141/99 -- -- JA   05/08/25 1900 -- 42 99 % 16 179/108 -- -- RENETTA   05/08/25 1855 98.3 °F (36.8 °C) -- -- -- -- -- -- RENETTA   05/08/25 1847 -- 43 99 % 18 194/114 -- -- RENETTA            Physical Exam  Vitals and nursing note reviewed.   Constitutional:       Appearance: Normal appearance. She is ill-appearing.      Comments: Tired appearing, uncomfortable   HENT:      Head: Normocephalic and atraumatic.   Eyes:      Extraocular Movements: Extraocular movements intact.      Conjunctiva/sclera: Conjunctivae normal.   Cardiovascular:      Rate and Rhythm: Regular rhythm. Bradycardia present.      Pulses: Normal pulses.      Heart sounds: Normal heart sounds. No murmur heard.  Pulmonary:      Effort: Pulmonary effort is normal. No respiratory distress.      Breath sounds: Normal breath sounds.   Abdominal:      General: There is no distension.      Palpations: Abdomen is soft.      Tenderness: There is no abdominal tenderness. There is no guarding or rebound.   Musculoskeletal:         General: No tenderness. Normal range of motion.      Cervical back: Normal range of motion.      Right lower leg: No edema.      Left lower leg: No edema.   Skin:     Capillary Refill: Capillary refill takes less than 2 seconds.      Comments: No crepitus appreciated. severely sclerosed skin in bilateral upper extremities with multiple healed cavitary wounds consistent with IV drug use.  No erythema, or recent injection sites noted   Neurological:      General: No focal deficit present.      Mental Status: She is  oriented to person, place, and time.      Sensory: No sensory deficit.   Psychiatric:         Mood and Affect: Mood normal.         Behavior: Behavior normal.         Results Reviewed       Procedure Component Value Units Date/Time    HS Troponin I 2hr [[id number]]  (Normal) Collected: 05/08/25 2136    Lab Status: Final result Specimen: Blood from Line Updated: 05/08/25 2214     hs TnI 2hr 10 ng/L      Delta 2hr hsTnI -1 ng/L     Blood culture #1 [[id number]] Collected: 05/08/25 2054    Lab Status: In process Specimen: Blood from Arm, Left Updated: 05/08/25 2151    Blood culture #2 [[id number]] Collected: 05/08/25 2140    Lab Status: In process Specimen: Blood Updated: 05/08/25 2140    Comprehensive metabolic panel [[id number]]  (Abnormal) Collected: 05/08/25 1918    Lab Status: Final result Specimen: Blood from Arm, Right Updated: 05/08/25 2009     Sodium 142 mmol/L      Potassium 3.1 mmol/L      Chloride 106 mmol/L      CO2 28 mmol/L      ANION GAP 8 mmol/L      BUN 5 mg/dL      Creatinine 0.61 mg/dL      Glucose 154 mg/dL      Calcium 9.2 mg/dL      AST 24 U/L      ALT 11 U/L      Alkaline Phosphatase 80 U/L      Total Protein 7.1 g/dL      Albumin 3.9 g/dL      Total Bilirubin 0.54 mg/dL      eGFR 123 ml/min/1.73sq m     Narrative:      [employer] guidelines for Chronic Kidney Disease (CKD):     Stage 1 with normal or high GFR (GFR > 90 mL/min/1.73 square meters)    Stage 2 Mild CKD (GFR = 60-89 mL/min/1.73 square meters)    Stage 3A Moderate CKD (GFR = 45-59 mL/min/1.73 square meters)    Stage 3B Moderate CKD (GFR = 30-44 mL/min/1.73 square meters)    Stage 4 Severe CKD (GFR = 15-29 mL/min/1.73 square meters)    Stage 5 End Stage CKD (GFR <15 mL/min/1.73 square meters)  Note: GFR calculation is accurate only with a steady state creatinine    Salicylate level [[id number]]  (Abnormal) Collected: 05/08/25 1918    Lab Status: Final result Specimen: Blood from Arm, Right Updated: 05/08/25 2009      Salicylate Lvl <5 mg/dL     Acetaminophen level-If concentration is detectable, please discuss with medical  on call. [[id number]]  (Abnormal) Collected: 05/08/25 1918    Lab Status: Final result Specimen: Blood from Arm, Right Updated: 05/08/25 2009     Acetaminophen Level <2 ug/mL     hCG, qualitative pregnancy [[id number]]  (Normal) Collected: 05/08/25 1918    Lab Status: Final result Specimen: Blood from Arm, Right Updated: 05/08/25 1951     Preg, Serum Negative    HS Troponin 0hr (reflex protocol) [[id number]]  (Normal) Collected: 05/08/25 1918    Lab Status: Final result Specimen: Blood from Arm, Right Updated: 05/08/25 1951     hs TnI 0hr 11 ng/L     Ethanol [[id number]]  (Normal) Collected: 05/08/25 1918    Lab Status: Final result Specimen: Blood from Arm, Right Updated: 05/08/25 1945     Ethanol Lvl <10 mg/dL     POCT Blood Gas (CG8+) [[id number]]  (Abnormal) Collected: 05/08/25 1938    Lab Status: Final result Specimen: Venous Updated: 05/08/25 1940     ph, Hector ISTAT 7.422     pCO2, Hector i-STAT 41.7 mm HG      pO2, Hector i-STAT 32.0 mm HG      BE, i-STAT 2 mmol/L      HCO3, Hector i-STAT 27.2 mmol/L      CO2, i-STAT 28 mmol/L      O2 Sat, i-STAT 62 %      SODIUM, I-STAT 144 mmol/l      Potassium, i-STAT 2.8 mmol/L      Calcium, Ionized i-STAT 1.21 mmol/L      Hct, i-STAT 36 %      Hgb, i-STAT 12.2 g/dl      Glucose, i-STAT 144 mg/dl      Specimen Type VENOUS    CBC and differential [[id number]]  (Abnormal) Collected: 05/08/25 1918    Lab Status: Final result Specimen: Blood from Arm, Right Updated: 05/08/25 1927     WBC 7.69 Thousand/uL      RBC 3.89 Million/uL      Hemoglobin 11.5 g/dL      Hematocrit 33.5 %      MCV 86 fL      MCH 29.6 pg      MCHC 34.3 g/dL      RDW 12.7 %      MPV 8.3 fL      Platelets 289 Thousands/uL      nRBC 0 /100 WBCs      Segmented % 49 %      Immature Grans % 0 %      Lymphocytes % 35 %      Monocytes % 8 %      Eosinophils Relative 7 %      Basophils Relative 1 %       Absolute Neutrophils 3.72 Thousands/µL      Absolute Immature Grans 0.01 Thousand/uL      Absolute Lymphocytes 2.72 Thousands/µL      Absolute Monocytes 0.61 Thousand/µL      Eosinophils Absolute 0.56 Thousand/µL      Basophils Absolute 0.07 Thousands/µL     Rapid drug screen, urine [[id number]]     Lab Status: No result Specimen: Urine     Fingerstick Glucose (POCT) [[id number]]  (Abnormal) Collected: 05/08/25 1900    Lab Status: Final result Specimen: Blood Updated: 05/08/25 1901     POC Glucose 157 mg/dl             CT soft tissue neck wo contrast   Final Interpretation by Jordan Collins MD (05/08 2127)      1.  Examination compromised the lack of intravenous contrast. Extensive subcutaneous stranding, and stranding involving the supraclavicular fat pads bilaterally, extending into the lower neck, with obscuration of the fat planes in the posterior triangles    noted bilaterally, most indicative of a cellulitis related to the patient's known IV drug injections. Foci of gas also noted subcutaneously which may be related to the IV drug administration, but necrotizing fasciitis cannot be entirely excluded.         I personally discussed this study with MARVIN HOLT on 5/8/2025 9:20 p.m.               Workstation performed: [id number]         CT chest abdomen pelvis wo contrast    (Results Pending)       Procedures    ED Medication and Procedure Management   Prior to Admission Medications   Prescriptions Last Dose Informant Patient Reported? Taking?   methadone (DOLOPHINE) 10 MG/5ML solution  Self Yes No   Sig: Take 136 mg by mouth every morning Take by mouth daily   ondansetron (ZOFRAN) 4 mg tablet   No No   Sig: Take 1 tablet (4 mg total) by mouth every 8 (eight) hours as needed for nausea or vomiting   ondansetron (ZOFRAN-ODT) 4 mg disintegrating tablet   No No   Sig: Take 1 tablet (4 mg total) by mouth every 6 (six) hours as needed for nausea or vomiting      Facility-Administered Medications: None     Current  Discharge Medication List        CONTINUE these medications which have NOT CHANGED    Details   methadone (DOLOPHINE) 10 MG/5ML solution Take 136 mg by mouth every morning Take by mouth daily      ondansetron (ZOFRAN) 4 mg tablet Take 1 tablet (4 mg total) by mouth every 8 (eight) hours as needed for nausea or vomiting  Qty: 2 tablet, Refills: 0    Associated Diagnoses: Anxiety; BESSY (generalized anxiety disorder)      ondansetron (ZOFRAN-ODT) 4 mg disintegrating tablet Take 1 tablet (4 mg total) by mouth every 6 (six) hours as needed for nausea or vomiting  Qty: 10 tablet, Refills: 0    Associated Diagnoses: Nausea           No discharge procedures on file.  ED SEPSIS DOCUMENTATION   Time reflects when diagnosis was documented in both MDM as applicable and the Disposition within this note       Time User Action Codes Description Comment    5/8/2025  8:50 PM Megan Montalvo Add [F11.20] Opioid use disorder, severe, dependence ([facility])     5/8/2025  9:33 PM Ignacio Aparicio Add [F19.10] Polysubstance abuse ([facility])     5/8/2025  9:33 PM Ignacio Aparicio Modify [F11.20] Opioid use disorder, severe, dependence ([facility])     5/8/2025  9:33 PM Ignacio Aparicio Modify [F19.10] Polysubstance abuse ([facility])     5/8/2025  9:33 PM Ignacio Aparicio Add [R00.1] Bradycardia     5/8/2025  9:33 PM Ignacio Aparicio Add [R03.0] Elevated blood pressure reading     5/8/2025 10:18 PM Megan Montalvo Add [T79.7XXA] Subcutaneous emphysema, initial encounter ([facility])                  Megan Montalvo MD  05/08/25 [id number]

## 2025-05-09 NOTE — ASSESSMENT & PLAN NOTE
"Recent Labs     05/06/25  1621 05/07/25  0721 05/08/25  1918   WBC 9.18 6.15 7.69     Recent Labs     05/06/25  1621 05/08/25  2054 05/08/25  2140   BLOODCX No Growth at 48 hrs. Received in Microbiology Lab. Culture in Progress. Received in Microbiology Lab. Culture in Progress.     Recent IV drug use in the neck  CT neck soft tissue without contrast with \"extensive subcutaneous stranding supraclavicular fat pads bilaterally extending into the lower neck, with obscuration of the fat planes in the posterior triangle noted bilaterally most indicative of cellulitis.  There is foci of gas subcutaneously potentially related to IV drug use however necrotizing fasciitis cannot entirely excluded\"  General surgery consulted low suspicion of necrotizing fasciitis  At the ED started on ceftriaxone vancomycin    Plan  Will continue empiric antibiotic treatment while inpatient with IV Zosyn and vancomycin given history of IV drug use this will cover gram-negative, Pseudomonas, anaerobes, MRSA, MSSA, Streptococcus fortunately nontoxic on exam no leukocytosis afebrile  Dc'ed Zosyn d/t low suspicion of anaerobic organisms.  Consider transition to p.o. doxycycline plus Augmentin to complete a total 10 days of antibiotics if ongoing low suspicious of necrotizing fasciitis  Monitor site closely  "

## 2025-05-09 NOTE — CONSULTS
Consultation - Medical Toxicology   Name: Elda Calderon 29 y.o. female I MRN: [id number]  Unit/Bed#: S -01 I Date of Admission: 5/8/2025   Date of Service: 5/9/2025 I Hospital Day: 1       Inpatient consult to Toxicology     Date/Time  5/9/2025 12:12 PM     Performed by  Kelly Gay MD   Authorized by  Ignacio Aparicio MD           Physician Requesting Evaluation: Popeye Morrell*   Reason for Evaluation / Principal Problem: Opioid Withdrawal    Assessment & Plan  Opioid use disorder, severe, dependence (HCC)  Daily IV fentanyl use, neck is current primary site of injection  Last used fentanyl on 5/6  Patient unsure if she wants to return to detox unit or if she wants to use suboxone  Required clonidine, valium and ativan this morning for nausea, anxiety, shakiness and tachycardia  Not tachycardic on evaluation, but still with anxiety and nausea    Recommendations:  Adjunctive medications administered as needed:  Clonidine 0.2-0.3 mg PO Q6 hours PRN anxiety or palpitations    Gabapentin 300mg PO Q8 hours PRN anxiety    Diazepam 5 mg p.o. or IV q2 PRN refractory anxiety  Ibuprofen 600 mg PO Q6 hours PRN pain    Acetaminophen 1000mg PO Q8 hours PRN pain    Ondansetron 4 mg PO Q6 hours PRN N/V    Reglan 5-10 mg IV q8 p.r.n. refractory nausea vomiting  Nicotine patch 7, 14, 21 mg  PRN nicotine withdrawal   Trazodone 50 mg PO QHS PRN sleep    Loperamide 4 mg PO PRN diarrhea up to 16 mg/day  Atarax 50mg PO Q6H      I have discussed the above management plan in detail with the primary service.       For further questions, please contact the medical  on call via SecureChat between 8am and 9pm. If between 9pm and 8am, please reach out to the [facility] at [phone].     History of Present Illness   Elda Calderon is a 29 y.o. year old female PMH of polysubstance abuse who presented to [facility] ED ffor generalized weakness and lower abd pain. She was recently admitted to [facility]  detox unit on 5/6 but left AMA because she felt her symptoms weren't being well controlled. She went back home where her father lives. She denies using any drugs once she got back home.   She is unsure about wanting to return back to the detox unit and is unsure about wanting to use suboxone.   This morning, she has received 5mg Valium IV, clonidine 0.1mg and Ativan 2mg IV. She states she still feels anxious, nauseous and has been intermittently vomiting this morning, though none since receiving Valium/Ativan. Denies any changes in vision, abd pain.     Review of Systems   Constitutional:  Negative for chills and fever.   HENT:  Negative for congestion and rhinorrhea.    Eyes:  Negative for visual disturbance.   Respiratory:  Negative for cough and shortness of breath.    Cardiovascular:  Negative for chest pain.   Gastrointestinal:  Positive for nausea. Negative for abdominal pain and diarrhea.   Psychiatric/Behavioral:  Negative for hallucinations. The patient is nervous/anxious.        Historical Information   Medical History Review: I have reviewed the patient's PMH, PSH, Social History, Family History, Meds, and Allergies   Social History     Tobacco Use    Smoking status: Never     Passive exposure: Never    Smokeless tobacco: Never   Vaping Use    Vaping status: Every Day    Substances: Nicotine, Flavoring   Substance and Sexual Activity    Alcohol use: Not Currently    Drug use: Yes     Types: Fentanyl     Comment: 8 bags/day states benzos are mixed in    Sexual activity: Not Currently     Partners: Male     Birth control/protection: None     Family History   Problem Relation Age of Onset    Depression Mother     Drug abuse Brother     Drug abuse Maternal Aunt     Drug abuse Paternal Uncle     Cancer Maternal Grandmother     Cancer Paternal Grandfather        Meds/Allergies   Prior to Admission medications    Medication Sig Start Date End Date Taking? Authorizing Provider   methadone (DOLOPHINE) 10 MG/5ML  solution Take 136 mg by mouth every morning Take by mouth daily    Historical MD Erwin   ondansetron (ZOFRAN) 4 mg tablet Take 1 tablet (4 mg total) by mouth every 8 (eight) hours as needed for nausea or vomiting 3/5/24   Jose Resendiz MD   ondansetron (ZOFRAN-ODT) 4 mg disintegrating tablet Take 1 tablet (4 mg total) by mouth every 6 (six) hours as needed for nausea or vomiting 4/17/25   Jing Hendricks MD     Current Facility-Administered Medications:     acetaminophen (TYLENOL) tablet 650 mg, 650 mg, Oral, Q6H PRN, Isha Washburn MD    acetaminophen (TYLENOL) tablet 975 mg, 975 mg, Oral, Q8H JERI, Isha Washburn MD    cloNIDine (CATAPRES) tablet 0.2 mg, 0.2 mg, Oral, Q8H JERI, Ruddy Olson MD    cyclobenzaprine (FLEXERIL) tablet 10 mg, 10 mg, Oral, TID, Isha Washburn MD    diazepam (VALIUM) injection 5 mg, 5 mg, Intravenous, Q2H PRN, Ruddy Olson MD, 5 mg at 05/09/25 1201    enoxaparin (LOVENOX) subcutaneous injection 40 mg, 40 mg, Subcutaneous, Daily, Isha Washburn MD    gabapentin (NEURONTIN) capsule 400 mg, 400 mg, Oral, TID, Ruddy Olson MD    hydrOXYzine HCL (ATARAX) tablet 25 mg, 25 mg, Oral, Q6H PRN, Ruddy Olson MD    melatonin tablet 3 mg, 3 mg, Oral, HS, Isha Washburn MD, 3 mg at 05/08/25 2355    multi-electrolyte (Plasmalyte-A/Isolyte-S PH 7.4/Normosol-R) IV solution, 75 mL/hr, Intravenous, Continuous, Isha Washburn MD, Last Rate: 75 mL/hr at 05/08/25 2355, 75 mL/hr at 05/08/25 2355    ondansetron (ZOFRAN) injection 4 mg, 4 mg, Intravenous, Q6H PRN, Isha Washburn MD, 4 mg at 05/09/25 0915    polyethylene glycol (MIRALAX) packet 17 g, 17 g, Oral, Daily PRN, Isha Washburn MD    senna-docusate sodium (SENOKOT S) 8.6-50 mg per tablet 2 tablet, 2 tablet, Oral, BID, Isha Washburn MD    trimethobenzamide (TIGAN) IM injection 200 mg, 200 mg, Intramuscular, Q6H PRN, Ruddy Olson MD     vancomycin (VANCOCIN) IVPB (premix in dextrose) 750 mg 150 mL, 15 mg/kg, Intravenous, Q8H, Isha Washburn MD, Last Rate: 150 mL/hr at 05/09/25 1201, 750 mg at 05/09/25 1201   No Known Allergies    Objective :  Temp:  [98.1 °F (36.7 °C)-99.3 °F (37.4 °C)] 99.3 °F (37.4 °C)  HR:  [] 110  BP: (110-194)/() 121/82  Resp:  [16-18] 16  SpO2:  [96 %-99 %] 97 %  O2 Device: None (Room air)      Intake/Output Summary (Last 24 hours) at 5/9/2025 1248  Last data filed at 5/8/2025 2355  Gross per 24 hour   Intake 253.33 ml   Output 325 ml   Net -71.67 ml       Physical Exam  Constitutional:       General: She is not in acute distress.     Comments: Uncomfortable appearing   HENT:      Head: Normocephalic and atraumatic.      Mouth/Throat:      Mouth: Mucous membranes are moist.      Pharynx: Oropharynx is clear.   Eyes:      Extraocular Movements: Extraocular movements intact.      Conjunctiva/sclera: Conjunctivae normal.      Pupils: Pupils are equal, round, and reactive to light.   Cardiovascular:      Rate and Rhythm: Normal rate and regular rhythm.      Pulses: Normal pulses.      Heart sounds: Normal heart sounds.   Pulmonary:      Effort: Pulmonary effort is normal. No respiratory distress.   Abdominal:      Palpations: Abdomen is soft.      Tenderness: There is no abdominal tenderness.   Musculoskeletal:         General: No deformity. Normal range of motion.      Cervical back: Normal range of motion. No tenderness.   Skin:     General: Skin is warm and dry.      Capillary Refill: Capillary refill takes less than 2 seconds.   Neurological:      Mental Status: She is alert.      Cranial Nerves: No cranial nerve deficit.      Sensory: No sensory deficit.      Motor: No weakness.      Deep Tendon Reflexes: Reflexes normal.      Comments: No myoclonus           Lab Results: I have reviewed the following results:  Results from last 7 days   Lab Units 05/08/25  1938 05/08/25  1918 05/07/25  0737  05/06/25  1621   WBC Thousand/uL  --  7.69   < > 9.18   HEMOGLOBIN g/dL  --  11.5   < > 10.7*   I STAT HEMOGLOBIN g/dl 12.2  --   --   --    HEMATOCRIT %  --  33.5*   < > 31.7*   HEMATOCRIT, ISTAT % 36  --   --   --    PLATELETS Thousands/uL  --  289   < > 269   SEGS PCT %  --  49  --  57   LYMPHO PCT %  --  35  --  29   MONO PCT %  --  8  --  8   EOS PCT %  --  7*  --  5    < > = values in this interval not displayed.      Results from last 7 days   Lab Units 05/08/25  1938 05/08/25 1918   POTASSIUM mmol/L  --  3.1*   CHLORIDE mmol/L  --  106   CO2 mmol/L  --  28   CO2, I-STAT mmol/L 28  --    BUN mg/dL  --  5   CREATININE mg/dL  --  0.61   CALCIUM mg/dL  --  9.2   ALBUMIN g/dL  --  3.9   ALK PHOS U/L  --  80   ALT U/L  --  11   AST U/L  --  24   GLUCOSE, ISTAT mg/dl 144*  --       Results from last 7 days   Lab Units 05/06/25  1621   INR  1.14   PTT seconds 33     Results from last 7 days   Lab Units 05/06/25  1621   LACTIC ACID mmol/L 0.9     Results from last 7 days   Lab Units 05/08/25  2136 05/08/25  1918   HS TNI 0HR ng/L  --  11   HS TNI 2HR ng/L 10  --           Results from last 7 days   Lab Units 05/08/25  1918   ACETAMINOPHEN LVL ug/mL <2*   ETHANOL LVL mg/dL <10   SALICYLATE LVL mg/dL <5*     Imaging Results Review: No pertinent imaging studies reviewed.  Other Study Results Review: EKG was personally reviewed and my interpretation is: Sinus Bradycardia. HR 47..    Administrative Statements

## 2025-05-09 NOTE — CASE MANAGEMENT
Case Management Discharge Planning Note    Patient name Elda Calderon  Location S /S -01 MRN [id number]  : 1995 Date 2025       Current Admission Date: 2025  Current Admission Diagnosis:Opioid use disorder, severe, dependence (HCC)   Patient Active Problem List    Diagnosis Date Noted Date Diagnosed    Severe protein-calorie malnutrition (HCC) 2025     Cellulitis of neck 2025     Tachycardia 2025     Constipation 2025     Hypokalemia 2025     Liver lesion 2025     SIRS (systemic inflammatory response syndrome) (HCC) 2024     Acute opioid withdrawal (HCC) 2024     Polysubstance abuse (HCC) 2023     BMI less than 19,adult 2023     Severe episode of recurrent major depressive disorder, 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     BESSY (generalized anxiety disorder) 2023     PTSD (post-traumatic stress disorder) 2023     Depression 2023     Mild protein-calorie malnutrition (HCC) 2023     Transaminitis 2023     Bilateral calf pain 2023     Confusion 2023     Opioid use disorder, severe, dependence (HCC) 2023     Prolonged Q-T interval on ECG 2023     Leukocytosis 2023     Episodic mood disorder (HCC) 2014     Endometriosis 2012       LOS (days): 1  Geometric Mean LOS (GMLOS) (days): 3.1  Days to GMLOS:2.4     OBJECTIVE:  Risk of Unplanned Readmission Score: 19.6         Current admission status: Inpatient   Preferred Pharmacy:   [facility]/pharmacy #[id number] - [place], [place] - [address]  [address] [place] [address]  Phone: [phone] Fax: [phone]    Primary Care Provider: Mike Tillman DO    Primary Insurance: AET[employer]  Secondary Insurance:     DISCHARGE DETAILS:    Other Referral/Resources/Interventions Provided:  Interventions: D&A Warm Handoff  Referral Comments: CM received consult for [facility]/OUD. CM contacted [facility]  to  refer patient and provided minimal necessary information. CRS to meet with patient and follow up with CM to provide update on plan of care following patient connection.

## 2025-05-09 NOTE — HOSPITAL COURSE
29-year-old F with a PMH significant for BPD, PTSD, polysubstance abuse, IV drug use including fentanyl/xylazine previously on methadone presenting with 2-day history of worsening fatigue, chest tightness, palpitations, AMS and found minimally responsive by staff after suspected opiate intoxication.  Patient was recently admitted to detox unit on 5/6, treated for fentanyl withdrawal, however left AMA on 5/7 after feeling that she was not receiving adequate care.  Denies any seizures.  Patient presenting now due to worsening withdrawal symptoms, and also soreness in neck and induration after IV drug use.  Imaging notable for bilateral neck cellulitis in multiple planes and foci of gas, with general surgery noting low suspicion for necrotizing fasciitis.  Patient was treated with vancomycin and PIP-Tazo, with PIP-Tazo discontinued after day 1 with low suspicion for necrotizing fasciitis and other anaerobes.  Toxicology was also consulted, with recommendations for symptomatic management while inpatient.  Patient reportedly left AGAINST MEDICAL ADVICE the evening of 5/9, with evening provider discussing risks and benefits with the patient.

## 2025-05-09 NOTE — ASSESSMENT & PLAN NOTE
History of polysubstance abuse previous detox admission in 2023 most recently 2 days ago admission to [facility] on 05/07   Status post Ativan, Narcan, Zofran  ECG notable for sinus bradycardia no QTc prolongation    Plan  Admit to [facility] with telemetry  Toxicology consulted appreciate assistance  COWS monitoring for now start  Tylenol 975 milligrams every 8 hours as needed  Clonidine 0.1 mg p.o. every 6 hours as needed for anxiety or palpitations  Gabapentin 300 mg p.o. 3 times daily  Melatonin 3 mg nightly  Flexeril 10 mg p.o. 3 times daily  Valium IV 5 mg every 6 hours as needed for agitation  Will hold off Imodium given severe constipation CAT scan  Zofran IV 4 mg every 6 hours.

## 2025-05-09 NOTE — ASSESSMENT & PLAN NOTE
Daily IV fentanyl use, neck is current primary site of injection  Last used fentanyl on 5/6  Patient unsure if she wants to return to detox unit or if she wants to use suboxone  Required clonidine, valium and ativan this morning for nausea, anxiety, shakiness and tachycardia  Not tachycardic on evaluation, but still with anxiety and nausea    Recommendations:  Adjunctive medications administered as needed:  Clonidine 0.2-0.3 mg PO Q6 hours PRN anxiety or palpitations    Gabapentin 300mg PO Q8 hours PRN anxiety    Diazepam 5 mg p.o. or IV q2 PRN refractory anxiety  Ibuprofen 600 mg PO Q6 hours PRN pain    Acetaminophen 1000mg PO Q8 hours PRN pain    Ondansetron 4 mg PO Q6 hours PRN N/V    Reglan 5-10 mg IV q8 p.r.n. refractory nausea vomiting  Nicotine patch 7, 14, 21 mg  PRN nicotine withdrawal   Trazodone 50 mg PO QHS PRN sleep    Loperamide 4 mg PO PRN diarrhea up to 16 mg/day  Atarax 50mg PO Q6H

## 2025-05-09 NOTE — PLAN OF CARE
Problem: PAIN - ADULT  Goal: Verbalizes/displays adequate comfort level or baseline comfort level  Description: Interventions:- Encourage patient to monitor pain and request assistance- Assess pain using appropriate pain scale- Administer analgesics based on type and severity of pain and evaluate response- Implement non-pharmacological measures as appropriate and evaluate response- Consider cultural and social influences on pain and pain management- Notify physician/advanced practitioner if interventions unsuccessful or patient reports new pain  Outcome: Not Progressing     Problem: INFECTION - ADULT  Goal: Absence or prevention of progression during hospitalization  Description: INTERVENTIONS:- Assess and monitor for signs and symptoms of infection- Monitor lab/diagnostic results- Monitor all insertion sites, i.e. indwelling lines, tubes, and drains- Monitor endotracheal if appropriate and nasal secretions for changes in amount and color- Beecher appropriate cooling/warming therapies per order- Administer medications as ordered- Instruct and encourage patient and family to use good hand hygiene technique- Identify and instruct in appropriate isolation precautions for identified infection/condition  Outcome: Progressing  Goal: Absence of fever/infection during neutropenic period  Description: INTERVENTIONS:- Monitor WBC  Outcome: Progressing     Problem: SAFETY ADULT  Goal: Patient will remain free of falls  Description: INTERVENTIONS:- Educate patient/family on patient safety including physical limitations- Instruct patient to call for assistance with activity - Consult OT/PT to assist with strengthening/mobility - Keep Call bell within reach- Keep bed low and locked with side rails adjusted as appropriate- Keep care items and personal belongings within reach- Initiate and maintain comfort rounds- Make Fall Risk Sign visible to staff- Offer Toileting every  Hours, in advance of need- Initiate/Maintain alarm-  Obtain necessary fall risk management equipment:- Apply yellow socks and bracelet for high fall risk patients- Consider moving patient to room near nurses station  Outcome: Progressing  Goal: Maintain or return to baseline ADL function  Description: INTERVENTIONS:-  Assess patient's ability to carry out ADLs; assess patient's baseline for ADL function and identify physical deficits which impact ability to perform ADLs (bathing, care of mouth/teeth, toileting, grooming, dressing, etc.)- Assess/evaluate cause of self-care deficits - Assess range of motion- Assess patient's mobility; develop plan if impaired- Assess patient's need for assistive devices and provide as appropriate- Encourage maximum independence but intervene and supervise when necessary- Involve family in performance of ADLs- Assess for home care needs following discharge - Consider OT consult to assist with ADL evaluation and planning for discharge- Provide patient education as appropriate  Outcome: Not Progressing  Goal: Maintains/Returns to pre admission functional level  Description: INTERVENTIONS:- Perform AM-PAC 6 Click Basic Mobility/ Daily Activity assessment daily.- Set and communicate daily mobility goal to care team and patient/family/caregiver. - Collaborate with rehabilitation services on mobility goals if consult  Problem: DISCHARGE PLANNING  Goal: Discharge to home or other facility with appropriate resources  Description: INTERVENTIONS:- Identify barriers to discharge w/patient and caregiver- Arrange for needed discharge resources and transportation as appropriate- Identify discharge learning needs (meds, wound care, etc.)- Arrange for interpretive services to assist at discharge as needed- Refer to Case Management Department for coordinating discharge planning if the patient needs post-hospital services based on physician/advanced practitioner order or complex needs related to functional status, cognitive ability, or social support  system  Outcome: Progressing     Problem: Knowledge Deficit  Goal: Patient/family/caregiver demonstrates understanding of disease process, treatment plan, medications, and discharge instructions  Description: Complete learning assessment and assess knowledge base.Interventions:- Provide teaching at level of understanding- Provide teaching via preferred learning methods  Outcome: Not Progressing   ed- Perform Range of Motion  times a day.- Reposition patient every  hours.- Dangle patient  times a day- Stand patient  times a day- Ambulate patient  times a day- Out of bed to chair  times a day - Out of bed for meals  times a day- Out of bed for toileting- Record patient progress and toleration of activity level   Outcome: Not Progressing

## 2025-05-09 NOTE — PROGRESS NOTES
Progress Note - Surgery-General   Name: Elda Calderon 29 y.o. female I MRN: [id number]  Unit/Bed#: S -01 I Date of Admission: 5/8/2025   Date of Service: 5/9/2025 I Hospital Day: 1    Assessment & Plan  Cellulitis of neck  28 yo F with hx of PTSD, borderline personality disorder, IVDU w/ recent hx injection of fentyl/heroin to bilateral neck presents with imaging concerning to psb NSTI though clinically likely cellulitis.    5/8 CT Soft Tissue non con: extensive subcutaneous stranding involving supraclavicular fat pads bilaterally extending into lower tender, obscuration of fat planes in the post triangles which is most indicative of cellulitis given pt hx of recent and known IVDU. Foci of gas related to IVDU but nec fasc cannot be ruled out    -no indication for surgical intervention  -care per primary      Surgery-General service signing off.    Subjective   C/o of withdrawal like symptoms, neck pain is similar to mildly improved from prior.    Objective :  Temp:  [98.1 °F (36.7 °C)-98.4 °F (36.9 °C)] 98.4 °F (36.9 °C)  HR:  [42-75] 65  BP: (110-194)/() 125/88  Resp:  [16-18] 16  SpO2:  [96 %-99 %] 96 %  O2 Device: None (Room air)    I/O         05/07 0701 05/08 0700 05/08 0701 05/09 0700 05/09 0701  05/10 0700    IV Piggyback  253.3     Total Intake  253.3     Urine  325     Total Output  325     Net  -71.7                    Physical Exam  Physical Exam:  General: No acute distress, alert and oriented  HEENT: no crepitus, erythema, no pain out of proportion  CV: RRR  Lungs: Normal work of breathing   Abdomen: s/nt/nd  Skin: Warm, dry      Lab Results: I have reviewed the following results:  Recent Labs     05/06/25  1621 05/06/25  1826 05/08/25  1918 05/08/25  1938 05/08/25  2136   WBC 9.18   < > 7.69  --   --    HGB 10.7*   < > 11.5 12.2  --    HCT 31.7*   < > 33.5* 36  --       < > 289  --   --    SODIUM 140   < > 142  --   --    K 2.8*   < > 3.1*  --   --       < > 106  --   --     CO2 30   < > 28 28  --    BUN 7   < > 5  --   --    CREATININE 0.62   < > 0.61  --   --    GLUC 98   < > 154*  --   --    CAIONIZED  --   --   --  1.21  --    AST 28  --  24  --   --    ALT 12  --  11  --   --    ALB 3.4*  --  3.9  --   --    TBILI 0.41  --  0.54  --   --    ALKPHOS 75  --  80  --   --    PTT 33  --   --   --   --    INR 1.14  --   --   --   --    HSTNI0 18  --  11  --   --    HSTNI2  --    < >  --   --  10   LACTICACID 0.9  --   --   --   --     < > = values in this interval not displayed.       Imaging Results Review: I reviewed radiology reports from this admission including: CT soft tissue.      VTE Pharmacologic Prophylaxis: VTE covered by:  enoxaparin, Subcutaneous     VTE Mechanical Prophylaxis: sequential compression device

## 2025-05-09 NOTE — H&P
"H&P - Hospitalist   Name: Elda Calderon 29 y.o. female I MRN: [id number]  Unit/Bed#: ED-32 I Date of Admission: 5/8/2025   Date of Service: 5/8/2025 I Hospital Day: 0     Assessment & Plan  Opioid use disorder, severe, dependence (HCC)  History of polysubstance abuse previous detox admission in 2023 most recently 2 days ago [facility]   Status post Ativan, Narcan, Zofran  ECG notable for sinus bradycardia no QTc prolongation    Plan  Admit to [facility] with telemetry  Toxicology consulted appreciate assistance  COWS monitoring for now start  Tylenol 975 milligrams every 8 hours as needed  Clonidine 0.1 mg p.o. every 6 hours as needed for anxiety or palpitations  Gabapentin 300 mg p.o. 3 times daily  Melatonin 3 mg nightly  Flexeril 10 mg p.o. 3 times daily  Valium IV 5 mg every 6 hours as needed for agitation  Will hold off Imodium given severe constipation CAT scan  Zofran IV 4 mg every 6 hours.  Cellulitis of neck  Recent IV drug use in the neck  CT neck soft tissue without contrast with \"extensive subcutaneous stranding supraclavicular fat pads bilaterally extending into the lower neck, with obscuration of the fat planes in the posterior triangle noted bilaterally most indicative of cellulitis.  There is foci of gas subcutaneously potentially related to IV drug use however necrotizing fasciitis cannot entirely excluded\"  General surgery consulted low suspicion of necrotizing fasciitis  At the ED started on ceftriaxone vancomycin    Plan  Will continue empiric antibiotic treatment while inpatient with IV Zosyn and vancomycin given history of IV drug use this will cover gram-negative, Pseudomonas, anaerobes, MRSA, MSSA, Streptococcus fortunately nontoxic on exam no leukocytosis afebrile  Follow-up blood cultures  Consider transition to p.o. doxycycline plus Augmentin to complete a total 10 days of antibiotics if ongoing low suspicious of necrotizing fasciitis  Appreciate surgery " evaluation  Monitor site closely  Mild protein-calorie malnutrition (HCC)  Malnutrition Findings:                Likely due ongoing known history of polysubstance abuse severe malnutrition poor oral intake  Nutrition consulted                 BMI Findings:           There is no height or weight on file to calculate BMI.     Hypokalemia  Likely due to poor oral intake  POA notable for potassium 3.1  Status post 40 mill equivalents IV  BMP a.m.  Bradycardia  POA heart rate in the 40s to 50s  ECG notable for sinus bradycardia no pauses no QTc blood ambulation, WI interval stable  Check TSH  Continue monitoring telemetry for the above  Constipation  Severe constipation CAT scan  Bowel regimen for now monitor output closely in the event of withdrawals this may change and may need antispasmodic      VTE Pharmacologic Prophylaxis:   Low Risk (Score 0-2) - Encourage Ambulation.  Code Status: Level 1 - Full Code patient/stepfather  Discussion with family: Updated  (stepfather) at bedside.    Anticipated Length of Stay: Patient will be admitted on an inpatient basis with an anticipated length of stay of greater than 2 midnights secondary to polysubstance abuse/need of detox/cellulitis.    History of Present Illness   Chief Complaint: Fatigue, soreness bilateral neck    Elda Calderon is a 29 y.o. female with a PMH of borderline personality disorder, PTSD, anxiety, depression, polysubstance abuse/IV drug use including fentanyl/xylazine, previously on methadone who presents with 2-day history of worsening fatigue, chest tightness, palpitations, altered mental status and mildly responded found by staff after who suspected opioid intoxication.  She reports she was recently admitted to detox unit on 05/06 at the time treated for fentanyl withdrawal however she left AGAINST MEDICAL ADVICE after being felt worsening on her symptoms.  No seizures, loss of consciousness or other illicit drug use.  Denies any fevers,  chills however does endorse chest discomfort, shortness of breath, fatigue.  Patient also endorsed soreness in the neck bilaterally however no pain.  Endorsed that usually has soreness in her neck and induration after IV drug use.    Upon evaluation in the emergency department notable for bradycardia, hypertension, mild hypokalemia.  CT abdominal pelvis negative for acute pathology.  CT neck soft tissue concerning for bilateral neck cellulitis multiple planes and a foci of gas for which given history of IV drug use General Surgery was consulted for evaluation of possible necrotizing fasciitis.  Subsequently started on IV antibiotics    Patient admitted to [facility] for further management evaluation and possible detox unit transfer.    Upon my evaluation patient somnolent however responsive to questions accompanied by step father.          Review of Systems   Constitutional:  Positive for fatigue. Negative for chills and fever.   HENT:  Negative for ear pain and sore throat.         Soreness in her neck and induration   Eyes:  Negative for pain and visual disturbance.   Respiratory:  Positive for shortness of breath. Negative for cough.    Cardiovascular:  Negative for chest pain and palpitations.        Chest tightness   Gastrointestinal:  Negative for abdominal pain and vomiting.   Genitourinary:  Positive for vaginal bleeding. Negative for dysuria and hematuria.   Musculoskeletal:  Negative for arthralgias and back pain.   Skin:  Negative for color change and rash.   Neurological:  Negative for seizures and syncope.   All other systems reviewed and are negative.      Historical Information   Past Medical History:   Diagnosis Date    Anxiety     Borderline personality disorder (HCC)     Chlamydia     Depression     Endometriosis     IV drug user     IV heroin use, rehab 7 times     Liver lesion 5/7/2025    Panic attacks     Psychiatric disorder     anxiety    PTSD (post-traumatic stress disorder)     PTSD  (post-traumatic stress disorder) 8/12/2023    Varicella     childhood    Visual impairment      Past Surgical History:   Procedure Laterality Date    LAPAROTOMY       Social History     Tobacco Use    Smoking status: Never     Passive exposure: Never    Smokeless tobacco: Never   Vaping Use    Vaping status: Every Day    Substances: Nicotine, Flavoring   Substance and Sexual Activity    Alcohol use: Not Currently    Drug use: Yes     Types: Fentanyl     Comment: 8 bags/day states benzos are mixed in    Sexual activity: Not Currently     Partners: Male     Birth control/protection: None     E-Cigarette/Vaping    E-Cigarette Use Current Every Day User      E-Cigarette/Vaping Substances    Nicotine Yes     THC No     CBD No     Flavoring Yes     Other No     Unknown No      Family history non-contributory  Social History:  Marital Status: Single   Occupation:   Patient Pre-hospital Living Situation: Alone  Patient Pre-hospital Level of Mobility: walks  Patient Pre-hospital Diet Restrictions: None    Meds/Allergies   I have reviewed home medications using recent Epic encounter.  Prior to Admission medications    Medication Sig Start Date End Date Taking? Authorizing Provider   methadone (DOLOPHINE) 10 MG/5ML solution Take 136 mg by mouth every morning Take by mouth daily    Historical Provider, MD   ondansetron (ZOFRAN) 4 mg tablet Take 1 tablet (4 mg total) by mouth every 8 (eight) hours as needed for nausea or vomiting 3/5/24   Jose Resendiz MD   ondansetron (ZOFRAN-ODT) 4 mg disintegrating tablet Take 1 tablet (4 mg total) by mouth every 6 (six) hours as needed for nausea or vomiting 4/17/25   Jing Hendricks MD     No Known Allergies    Objective :  Temp:  [98.1 °F (36.7 °C)-98.3 °F (36.8 °C)] 98.1 °F (36.7 °C)  HR:  [42-75] 75  BP: (110-194)/() 110/69  Resp:  [16-18] 16  SpO2:  [99 %] 99 %  O2 Device: None (Room air)    Physical Exam  Vitals and nursing note reviewed.   Constitutional:        General: She is not in acute distress.     Appearance: She is well-developed.      Comments: Underweight, sarcopenia with temporal wasting  Somnolent however easily arousable answering questions appropriately.   HENT:      Head: Normocephalic and atraumatic.      Right Ear: External ear normal.      Left Ear: External ear normal.      Nose: Nose normal.      Mouth/Throat:      Mouth: Mucous membranes are dry.      Pharynx: No posterior oropharyngeal erythema.   Eyes:      Extraocular Movements: Extraocular movements intact.      Conjunctiva/sclera: Conjunctivae normal.      Pupils: Pupils are equal, round, and reactive to light.   Neck:      Comments: Small abrasion close to the supraclavicular area on the right side of the neck.  Some induration bilaterally however nontender to palpation.  No fluctuance, no crepitus  Cardiovascular:      Rate and Rhythm: Regular rhythm. Bradycardia present.      Pulses: Normal pulses.      Heart sounds: Normal heart sounds. No murmur heard.  Pulmonary:      Effort: Pulmonary effort is normal. No respiratory distress.      Breath sounds: Normal breath sounds.   Abdominal:      General: Bowel sounds are normal. There is no distension.      Palpations: Abdomen is soft.      Tenderness: There is no abdominal tenderness.   Musculoskeletal:         General: No swelling.      Cervical back: Normal range of motion and neck supple.      Right lower leg: No edema.      Left lower leg: No edema.   Skin:     General: Skin is warm and dry.      Capillary Refill: Capillary refill takes less than 2 seconds.      Comments: Multiple tattoos no obvious open wounds although others bilateral upper extremity significant sclerotic skin changes possible due to every drug use    No lesions noted in between her digits in her hands or toes   Psychiatric:         Mood and Affect: Mood normal.          Lines/Drains:            Lab Results: I have reviewed the following results:  Results from last 7 days   Lab  "Units 05/08/25 1938 05/08/25 1918   WBC Thousand/uL  --  7.69   HEMOGLOBIN g/dL  --  11.5   I STAT HEMOGLOBIN g/dl 12.2  --    HEMATOCRIT %  --  33.5*   HEMATOCRIT, ISTAT % 36  --    PLATELETS Thousands/uL  --  289   SEGS PCT %  --  49   LYMPHO PCT %  --  35   MONO PCT %  --  8   EOS PCT %  --  7*     Results from last 7 days   Lab Units 05/08/25 1938 05/08/25 1918   SODIUM mmol/L  --  142   POTASSIUM mmol/L  --  3.1*   CHLORIDE mmol/L  --  106   CO2 mmol/L  --  28   CO2, I-STAT mmol/L 28  --    BUN mg/dL  --  5   CREATININE mg/dL  --  0.61   ANION GAP mmol/L  --  8   CALCIUM mg/dL  --  9.2   ALBUMIN g/dL  --  3.9   TOTAL BILIRUBIN mg/dL  --  0.54   ALK PHOS U/L  --  80   ALT U/L  --  11   AST U/L  --  24   GLUCOSE RANDOM mg/dL  --  154*     Results from last 7 days   Lab Units 05/06/25  1621   INR  1.14     Results from last 7 days   Lab Units 05/08/25  1900   POC GLUCOSE mg/dl 157*     No results found for: \"HGBA1C\"  Results from last 7 days   Lab Units 05/06/25  1621   LACTIC ACID mmol/L 0.9   PROCALCITONIN ng/ml <0.05       Imaging Results Review: I reviewed radiology reports from this admission including: CT abdomen/pelvis and CT Neck Soft tissue.  Other Study Results Review: EKG was reviewed.     Administrative Statements       ** Please Note: This note has been constructed using a voice recognition system. **    "

## 2025-05-09 NOTE — UTILIZATION REVIEW
NOTIFICATION OF ADMISSION DISCHARGE   This is a Notification of Discharge from [facility]. Please be advised that this patient has been discharge from our facility. Below you will find the admission and discharge date and time including the patient’s disposition.   UTILIZATION REVIEW CONTACT:  Utilization Review Assistants  Network Utilization Review Department  Phone: [phone] x carefully listen to the prompts. All voicemails are confidential.  Email: [email or web address]     ADMISSION INFORMATION  PRESENTATION DATE: 5/6/2025  9:49 PM  OBERVATION ADMISSION DATE: N/A  INPATIENT ADMISSION DATE: 5/6/25  9:49 PM   DISCHARGE DATE: 5/7/2025 11:53 PM   DISPOSITION:Left against medical advice or discontinued care    Network Utilization Review Department  ATTENTION: Please call with any questions or concerns to [phone] and carefully listen to the prompts so that you are directed to the right person. All voicemails are confidential.   For Discharge needs, contact Care Management DC Support Team at [phone] opt. 2  Send all requests for admission clinical reviews, approved or denied determinations and any other requests to dedicated fax number below belonging to the campus where the patient is receiving treatment. List of dedicated fax numbers for the Facilities:  FACILITY NAME UR FAX NUMBER   ADMISSION DENIALS (Administrative/Medical Necessity) [phone]   DISCHARGE SUPPORT TEAM ([facility]) [phone]   PARENT CHILD HEALTH (Maternity/NICU/Pediatrics) [phone]   [facility] [phone]   [facility] [phone]   [facility] [phone]   [facility] [phone]   [facility] [phone]   [facility] [phone]   [facility] [phone]   [facility]  [place] [phone]   [facility] [phone]   [facility] [phone]   [facility] [phone]   [facility] [phone]

## 2025-05-09 NOTE — MALNUTRITION/BMI
This medical record reflects one or more clinical indicators suggestive of malnutrition and/or morbid obesity.    Malnutrition Findings:   Adult Malnutrition type: Acute illness  Adult Degree of Malnutrition: Other severe protein calorie malnutrition  Malnutrition Characteristics: Fat loss, Muscle loss, Weight loss                360 Statement: Severe protein-calorie malnutrition related to inadequate oral intake as evidenced by significant loss of fat and muscle extremities, orbitals, scapula, temples; 22.7% weight decrease x 14 months, BMI 16.75. Currently treated with oral supplementation.    BMI Findings:  Adult BMI Classifications: Underweight < 18.5        There is no height or weight on file to calculate BMI.     See Nutrition note dated 5/9/2025 for additional details.  Completed nutrition assessment is viewable in the nutrition documentation.

## 2025-05-09 NOTE — ASSESSMENT & PLAN NOTE
Recent Labs     05/06/25  1621 05/07/25  0721 05/08/25  1918   K 2.8* 3.0* 3.1*       Likely due to poor oral intake  POA notable for potassium 3.1  Status post 40 mEq IV  F/u BMP  Patient refusing blood work today 5/9.  Will keep patient on telemetry, reattempt lab work tomorrow 5/10.  Will administer additional 40 mEq PO potassium.

## 2025-05-09 NOTE — ASSESSMENT & PLAN NOTE
"Recent IV drug use in the neck  CT neck soft tissue without contrast with \"extensive subcutaneous stranding supraclavicular fat pads bilaterally extending into the lower neck, with obscuration of the fat planes in the posterior triangle noted bilaterally most indicative of cellulitis.  There is foci of gas subcutaneously potentially related to IV drug use however necrotizing fasciitis cannot entirely excluded\"  General surgery consulted low suspicion of necrotizing fasciitis  At the ED started on ceftriaxone vancomycin    Plan  Will continue empiric antibiotic treatment while inpatient with IV Zosyn and vancomycin given history of IV drug use this will cover gram-negative, Pseudomonas, anaerobes, MRSA, MSSA, Streptococcus fortunately nontoxic on exam no leukocytosis afebrile  Follow-up blood cultures  Consider transition to p.o. doxycycline plus Augmentin to complete a total 10 days of antibiotics if ongoing low suspicious of necrotizing fasciitis  Appreciate surgery evaluation  Monitor site closely  "

## 2025-05-09 NOTE — ASSESSMENT & PLAN NOTE
28 yo F with hx of PTSD, borderline personality disorder, IVDU w/ recent hx injection of fentyl/heroin to bilateral neck presents with imaging concerning to psb NSTI though clinically likely cellulitis.    5/8 CT Soft Tissue non con: extensive subcutaneous stranding involving supraclavicular fat pads bilaterally extending into lower tender, obscuration of fat planes in the post triangles which is most indicative of cellulitis given pt hx of recent and known IVDU. Foci of gas related to IVDU but nec fasc cannot be ruled out    -no indication for surgical intervention  -care per primary

## 2025-05-09 NOTE — ASSESSMENT & PLAN NOTE
Malnutrition Findings:                Likely due ongoing known history of polysubstance abuse severe malnutrition poor oral intake  Nutrition consulted                 BMI Findings:           There is no height or weight on file to calculate BMI.

## 2025-05-09 NOTE — ASSESSMENT & PLAN NOTE
Blood Pressure: 134/93  Pulse: (!) 107  POA heart rate in the 40s to 50s  ECG notable for sinus bradycardia no pauses no QTc blood ambulation, OH interval stable  TSH 2.868  Continue monitoring telemetry for the above  Currently tachycardic after admission  Continue symptomatic management as above.

## 2025-05-09 NOTE — PROGRESS NOTES
"Progress Note - Hospitalist   Name: Elda Calderon 29 y.o. female I MRN: [id number]  Unit/Bed#: S -01 I Date of Admission: 5/8/2025   Date of Service: 5/9/2025 I Hospital Day: 1    Assessment & Plan  Opioid use disorder, severe, dependence (HCC)  History of polysubstance abuse previous detox admission in 2023 most recently 2 days ago admission to [facility] left A on 05/07   Status post Ativan, Narcan, Zofran  ECG notable for sinus bradycardia no QTc prolongation    Plan  Toxicology consulted appreciate assistance  Started toxicology recommendations in note on 5/9.  Also phenobarbital 130 mg x 1.  Can increase clonidine and reglan per tox recs if necessary.  D/c loperamide due to evidence of constipation found on imaging.  COWS monitoring for now   Cellulitis of neck  Recent Labs     05/06/25  1621 05/07/25  0721 05/08/25  1918   WBC 9.18 6.15 7.69     Recent Labs     05/06/25  1621 05/08/25  2054 05/08/25  2140   BLOODCX No Growth at 48 hrs. Received in Microbiology Lab. Culture in Progress. Received in Microbiology Lab. Culture in Progress.     Recent IV drug use in the neck  CT neck soft tissue without contrast with \"extensive subcutaneous stranding supraclavicular fat pads bilaterally extending into the lower neck, with obscuration of the fat planes in the posterior triangle noted bilaterally most indicative of cellulitis.  There is foci of gas subcutaneously potentially related to IV drug use however necrotizing fasciitis cannot entirely excluded\"  General surgery consulted low suspicion of necrotizing fasciitis  At the ED started on ceftriaxone vancomycin    Plan  Will continue empiric antibiotic treatment while inpatient with IV Zosyn and vancomycin given history of IV drug use this will cover gram-negative, Pseudomonas, anaerobes, MRSA, MSSA, Streptococcus fortunately nontoxic on exam no leukocytosis afebrile  Dc'ed Zosyn d/t low suspicion of anaerobic organisms.  Consider transition to p.o. " doxycycline plus Augmentin to complete a total 10 days of antibiotics if ongoing low suspicious of necrotizing fasciitis  Monitor site closely  Mild protein-calorie malnutrition (HCC)  Malnutrition Findings:   Adult Malnutrition type: Acute illness  Adult Degree of Malnutrition: Other severe protein calorie malnutrition  Malnutrition Characteristics: Fat loss, Muscle loss, Weight loss      Likely due ongoing known history of polysubstance abuse severe malnutrition poor oral intake  Nutrition consulted              360 Statement: Severe protein-calorie malnutrition related to inadequate oral intake as evidenced by significant loss of fat and muscle extremities, orbitals, scapula, temples; 22.7% weight decrease x 14 months, BMI 16.75. Currently treated with oral supplementation.    BMI Findings:  Adult BMI Classifications: Underweight < 18.5        There is no height or weight on file to calculate BMI.     Hypokalemia  Recent Labs     05/06/25  1621 05/07/25  0721 05/08/25  1918   K 2.8* 3.0* 3.1*       Likely due to poor oral intake  POA notable for potassium 3.1  Status post 40 mEq IV  F/u BMP  Patient refusing blood work today 5/9.  Will keep patient on telemetry, reattempt lab work tomorrow 5/10.  Will administer additional 40 mEq PO potassium.  Tachycardia  Blood Pressure: 134/93  Pulse: (!) 107  POA heart rate in the 40s to 50s  ECG notable for sinus bradycardia no pauses no QTc blood ambulation, [place] interval stable  TSH 2.868  Continue monitoring telemetry for the above  Currently tachycardic after admission  Continue symptomatic management as above.  Constipation  Severe constipation CAT scan  Bowel regimen for now monitor output closely in the event of withdrawals this may change and may need antispasmodic  Severe protein-calorie malnutrition (HCC)  Malnutrition Findings:   Adult Malnutrition type: Acute illness  Adult Degree of Malnutrition: Other severe protein calorie malnutrition  Malnutrition  Characteristics: Fat loss, Muscle loss, Weight loss                  360 Statement: Severe protein-calorie malnutrition related to inadequate oral intake as evidenced by significant loss of fat and muscle extremities, orbitals, scapula, temples; 22.7% weight decrease x 14 months, BMI 16.75. Currently treated with oral supplementation.    BMI Findings:  Adult BMI Classifications: Underweight < 18.5        There is no height or weight on file to calculate BMI.       VTE Pharmacologic Prophylaxis: VTE Score: 1 Low Risk (Score 0-2) - Encourage Ambulation.    Mobility:   JH-HLM Achieved: 6: Walk 10 steps or more  JH-HLM Goal achieved. Continue to encourage appropriate mobility.    Patient Centered Rounds: I performed bedside rounds with nursing staff today.   Discussions with Specialists or Other Care Team Provider: Medical Toxicology    Education and Discussions with Family / Patient: Updated  (mother) at bedside.    Current Length of Stay: 1 day(s)  Current Patient Status: Inpatient   Certification Statement: The patient will continue to require additional inpatient hospital stay due to opioid dependence; withdrawal.  Discharge Plan: Anticipate discharge in >72 hrs to home.    Code Status: Level 1 - Full Code    Subjective     Patient endorses anxiety, restlessness, hallucinations, mild SOB, nausea, diffuse joint and muscle aches, blurry vision, and chills. Reports felt better temporarily s/p Ativan in the ED but specifies that the Valium given later did not help. She mentions that an ED physician suggested Precedex and is wondering if that would help with her symptoms. Denies vomiting, sore throat, cough, swelling, numbness, and tingling. Has not had a bowel movement during this admission but is passing gas.     Objective :  Temp:  [98.1 °F (36.7 °C)-99.3 °F (37.4 °C)] 99.3 °F (37.4 °C)  HR:  [] 107  BP: (110-194)/() 134/93  Resp:  [16-18] 16  SpO2:  [96 %-99 %] 96 %  O2 Device: None (Room  air)    There is no height or weight on file to calculate BMI.     Input and Output Summary (last 24 hours):     Intake/Output Summary (Last 24 hours) at 5/9/2025 1411  Last data filed at 5/8/2025 2355  Gross per 24 hour   Intake 253.33 ml   Output 325 ml   Net -71.67 ml       Physical Exam  Constitutional:       General: She is in acute distress.      Appearance: She is ill-appearing. She is not diaphoretic.   HENT:      Head: Normocephalic and atraumatic.   Eyes:      General: No scleral icterus.     Extraocular Movements: Extraocular movements intact.      Pupils: Pupils are equal, round, and reactive to light.   Cardiovascular:      Rate and Rhythm: Regular rhythm. Tachycardia present.      Pulses: Normal pulses.      Heart sounds: Normal heart sounds. No murmur heard.     No friction rub. No gallop.   Pulmonary:      Effort: Pulmonary effort is normal.      Breath sounds: Normal breath sounds. No stridor. No wheezing or rales.   Abdominal:      General: Abdomen is flat. Bowel sounds are normal. There is no distension.      Palpations: Abdomen is soft.      Tenderness: There is no abdominal tenderness. There is no right CVA tenderness, left CVA tenderness, guarding or rebound.   Musculoskeletal:         General: No swelling or tenderness.      Cervical back: No rigidity or tenderness.      Right lower leg: No edema.      Left lower leg: No edema.   Skin:     General: Skin is warm.      Coloration: Skin is not jaundiced or pale.      Findings: No bruising or erythema.   Neurological:      General: No focal deficit present.      Mental Status: She is alert and oriented to person, place, and time.   Psychiatric:      Comments: Pt anxious and tearful       Lines/Drains:        Telemetry:  Telemetry Orders (From admission, onward)               24 Hour Telemetry Monitoring  Continuous x 24 Hours (Telem)        Expiring   Question:  Reason for 24 Hour Telemetry  Answer:  Drug overdose known to cause cardiac  arrhythmias (e.g. - Cocaine, TCA, Amphetamines, Phenothiazines, Digoxin, etc.) Meds known to need cardiac monitoring: Amiodarone, Dopamine, Dobutamine, Phenytoin                     Telemetry Reviewed: Sinus Tachycardia  Indication for Continued Telemetry Use: Drug overdose known to cause cardiac arrhthymias               Lab Results: I have reviewed the following results:   Results from last 7 days   Lab Units 05/08/25 1938 05/08/25 1918   WBC Thousand/uL  --  7.69   HEMOGLOBIN g/dL  --  11.5   I STAT HEMOGLOBIN g/dl 12.2  --    HEMATOCRIT %  --  33.5*   HEMATOCRIT, ISTAT % 36  --    PLATELETS Thousands/uL  --  289   SEGS PCT %  --  49   LYMPHO PCT %  --  35   MONO PCT %  --  8   EOS PCT %  --  7*     Results from last 7 days   Lab Units 05/08/25 1938 05/08/25 1918   SODIUM mmol/L  --  142   POTASSIUM mmol/L  --  3.1*   CHLORIDE mmol/L  --  106   CO2 mmol/L  --  28   CO2, I-STAT mmol/L 28  --    BUN mg/dL  --  5   CREATININE mg/dL  --  0.61   ANION GAP mmol/L  --  8   CALCIUM mg/dL  --  9.2   ALBUMIN g/dL  --  3.9   TOTAL BILIRUBIN mg/dL  --  0.54   ALK PHOS U/L  --  80   ALT U/L  --  11   AST U/L  --  24   GLUCOSE RANDOM mg/dL  --  154*     Results from last 7 days   Lab Units 05/06/25  1621   INR  1.14     Results from last 7 days   Lab Units 05/08/25  1900   POC GLUCOSE mg/dl 157*         Results from last 7 days   Lab Units 05/06/25  1621   LACTIC ACID mmol/L 0.9   PROCALCITONIN ng/ml <0.05       Recent Cultures (last 7 days):   Results from last 7 days   Lab Units 05/08/25  2140 05/08/25  2054 05/06/25  1621   BLOOD CULTURE  Received in Microbiology Lab. Culture in Progress. Received in Microbiology Lab. Culture in Progress. No Growth at 48 hrs.       CT chest abdomen pelvis wo contrast  Result Date: 5/8/2025  Impression: 1. Severe constipation. No evidence of bowel obstruction or colitis. 2. No acute cardiopulmonary process. Workstation performed: [id number]     CT soft tissue neck wo contrast  Result  Date: 5/8/2025  Impression: 1.  Examination compromised the lack of intravenous contrast. Extensive subcutaneous stranding, and stranding involving the supraclavicular fat pads bilaterally, extending into the lower neck, with obscuration of the fat planes in the posterior triangles  noted bilaterally, most indicative of a cellulitis related to the patient's known IV drug injections. Foci of gas also noted subcutaneously which may be related to the IV drug administration, but necrotizing fasciitis cannot be entirely excluded. I personally discussed this study with MARVIN HOLT on 5/8/2025 9:20 p.m. Workstation performed: [id number]       No Chest XR results available for this patient.     Other Study Results Review: EKG was reviewed.       Last 24 Hours Medication List:     Current Facility-Administered Medications:     acetaminophen (TYLENOL) tablet 650 mg, Q6H PRN    acetaminophen (TYLENOL) tablet 975 mg, Q8H JERI    cloNIDine (CATAPRES) tablet 0.2 mg, Q8H PRN    cyclobenzaprine (FLEXERIL) tablet 10 mg, TID    diazepam (VALIUM) injection 5 mg, Q2H PRN    enoxaparin (LOVENOX) subcutaneous injection 40 mg, Daily    gabapentin (NEURONTIN) capsule 300 mg, TID PRN    hydrOXYzine HCL (ATARAX) tablet 25 mg, Q6H PRN    ibuprofen (MOTRIN) tablet 600 mg, Q6H PRN    melatonin tablet 3 mg, HS    metoclopramide (REGLAN) injection 5 mg, Q8H PRN    multi-electrolyte (Plasmalyte-A/Isolyte-S PH 7.4/Normosol-R) IV solution, Continuous, Last Rate: 75 mL/hr (05/08/25 4115)    ondansetron (ZOFRAN) injection 4 mg, Q6H PRN    polyethylene glycol (MIRALAX) packet 17 g, Daily PRN    senna-docusate sodium (SENOKOT S) 8.6-50 mg per tablet 2 tablet, BID    traZODone (DESYREL) tablet 50 mg, HS PRN    vancomycin (VANCOCIN) IVPB (premix in dextrose) 750 mg 150 mL, Q8H, Last Rate: 750 mg (05/09/25 1201)    Administrative Statements   Today, Patient Was Seen By: Halle Borden  I have spent a total time of 30 minutes in caring for this patient  on the day of the visit/encounter including Documenting in the medical record, Reviewing/placing orders in the medical record (including tests, medications, and/or procedures), Obtaining or reviewing history  , and Communicating with other healthcare professionals .    **Please Note: This note may have been constructed using a voice recognition system.**

## 2025-05-09 NOTE — ASSESSMENT & PLAN NOTE
Malnutrition Findings:   Adult Malnutrition type: Acute illness  Adult Degree of Malnutrition: Other severe protein calorie malnutrition  Malnutrition Characteristics: Fat loss, Muscle loss, Weight loss                  360 Statement: Severe protein-calorie malnutrition related to inadequate oral intake as evidenced by significant loss of fat and muscle extremities, orbitals, scapula, temples; 22.7% weight decrease x 14 months, BMI 16.75. Currently treated with oral supplementation.    BMI Findings:  Adult BMI Classifications: Underweight < 18.5        There is no height or weight on file to calculate BMI.

## 2025-05-10 LAB — MRSA NOSE QL CULT: NORMAL

## 2025-05-10 NOTE — DISCHARGE SUMMARY
"Discharge Summary - Hospitalist   Name: Elda Calderon 29 y.o. female I MRN: [id number]  Unit/Bed#: S -01 I Date of Admission: 5/8/2025   Date of Service: 5/10/2025 I Hospital Day: 1     Assessment & Plan  Opioid use disorder, severe, dependence (HCC)  History of polysubstance abuse previous detox admission in 2023 most recently 2 days ago admission to [facility] left A on 05/07   Status post Ativan, Narcan, Zofran  ECG notable for sinus bradycardia no QTc prolongation    Plan  Toxicology consulted appreciate assistance  Started toxicology recommendations in note on 5/9.  Also phenobarbital 130 mg x 1.  Can increase clonidine and reglan per tox recs if necessary.  D/c loperamide due to evidence of constipation found on imaging.  COWS monitoring for now     Patient left AGAINST MEDICAL ADVICE.  Cellulitis of neck  Recent Labs     05/08/25  1918   WBC 7.69     Recent Labs     05/08/25  2054 05/08/25  2140   BLOODCX No Growth at 24 hrs. No Growth at 24 hrs.     Recent IV drug use in the neck  CT neck soft tissue without contrast with \"extensive subcutaneous stranding supraclavicular fat pads bilaterally extending into the lower neck, with obscuration of the fat planes in the posterior triangle noted bilaterally most indicative of cellulitis.  There is foci of gas subcutaneously potentially related to IV drug use however necrotizing fasciitis cannot entirely excluded\"  General surgery consulted low suspicion of necrotizing fasciitis  At the ED started on ceftriaxone vancomycin    Plan  Will continue empiric antibiotic treatment while inpatient with IV Zosyn and vancomycin given history of IV drug use this will cover gram-negative, Pseudomonas, anaerobes, MRSA, MSSA, Streptococcus fortunately nontoxic on exam no leukocytosis afebrile  Dc'ed Zosyn d/t low suspicion of anaerobic organisms.  Consider transition to p.o. doxycycline plus Augmentin to complete a total 10 days of antibiotics if ongoing low suspicious " of necrotizing fasciitis  Monitor site closely    Patient left AGAINST MEDICAL ADVICE.  Mild protein-calorie malnutrition (HCC)  Malnutrition Findings:   Adult Malnutrition type: Acute illness  Adult Degree of Malnutrition: Other severe protein calorie malnutrition  Malnutrition Characteristics: Fat loss, Muscle loss, Weight loss      Likely due ongoing known history of polysubstance abuse severe malnutrition poor oral intake  Nutrition consulted              360 Statement: Severe protein-calorie malnutrition related to inadequate oral intake as evidenced by significant loss of fat and muscle extremities, orbitals, scapula, temples; 22.7% weight decrease x 14 months, BMI 16.75. Currently treated with oral supplementation.    BMI Findings:  Adult BMI Classifications: Underweight < 18.5        There is no height or weight on file to calculate BMI.     Patient left AGAINST MEDICAL ADVICE.  Hypokalemia  Recent Labs     05/08/25 1918   K 3.1*       Likely due to poor oral intake  POA notable for potassium 3.1  Status post 40 mEq IV  F/u BMP  Patient refusing blood work today 5/9.  Will keep patient on telemetry, reattempt lab work tomorrow 5/10.  Will administer additional 40 mEq PO potassium.    Patient left AGAINST MEDICAL ADVICE.  Tachycardia  Blood Pressure: 121/89  Pulse: (!) 110  POA heart rate in the 40s to 50s  ECG notable for sinus bradycardia no pauses no QTc blood ambulation, [place] interval stable  TSH 2.868  Continue monitoring telemetry for the above  Currently tachycardic after admission  Continue symptomatic management as above.    Patient left AGAINST MEDICAL ADVICE.  Constipation  Severe constipation CAT scan  Bowel regimen for now monitor output closely in the event of withdrawals this may change and may need antispasmodic    Patient left AGAINST MEDICAL ADVICE.  Severe protein-calorie malnutrition (HCC)  Malnutrition Findings:   Adult Malnutrition type: Acute illness  Adult Degree of Malnutrition: Other  severe protein calorie malnutrition  Malnutrition Characteristics: Fat loss, Muscle loss, Weight loss                  360 Statement: Severe protein-calorie malnutrition related to inadequate oral intake as evidenced by significant loss of fat and muscle extremities, orbitals, scapula, temples; 22.7% weight decrease x 14 months, BMI 16.75. Currently treated with oral supplementation.    BMI Findings:  Adult BMI Classifications: Underweight < 18.5        There is no height or weight on file to calculate BMI.     Patient left AGAINST MEDICAL ADVICE.     Medical Problems       Resolved Problems  Date Reviewed: 5/9/2025   None       Discharging Physician / Practitioner: Ruddy Olson MD  PCP: Mike Tillman DO  Admission Date:   Admission Orders (From admission, onward)       Ordered        05/08/25 2223  INPATIENT ADMISSION  Once                          Discharge Date: 05/10/25    Consultations During Hospital Stay:  IP CONSULT TO TOXICOLOGY  IP CONSULT TO ACUTE CARE SURGERY  IP CONSULT TO NUTRITION SERVICES    Procedures Performed:   None    Significant Findings / Test Results:   Cellulitis of neck  Opioid withdrawal symptoms    Incidental Findings:   None    Test Results Pending at Discharge (will require follow up):   MRSA culture  Bcx no growth at 24 hours at time of note     Outpatient Tests Requested:  None  Patient left AGAINST MEDICAL ADVICE.    Complications:  Patient left AGAINST MEDICAL ADVICE.    Reason for Admission: Opioid withdrawal symptoms, cellulitis; Patient left AGAINST MEDICAL ADVICE.    Hospital Course:   Elda Calderon is a 29 y.o. 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  Patient was recently admitted to detox unit on 5/6, treated for fentanyl withdrawal, however left AMA on 5/7 after feeling that she was not receiving adequate care.  Denies any seizures.  Patient presenting now due to worsening withdrawal symptoms, and also soreness in neck and induration after IV drug use.  Imaging notable for bilateral neck cellulitis in multiple planes and foci of gas, with general surgery noting low suspicion for necrotizing fasciitis.  Patient was treated with vancomycin and PIP-Tazo, with PIP-Tazo discontinued after day 1 with low suspicion for necrotizing fasciitis and other anaerobes.  Toxicology was also consulted, with recommendations for symptomatic management while inpatient.  Patient reportedly left AGAINST MEDICAL ADVICE the evening of 5/9, with evening provider discussing risks and benefits with the patient.    The patient, initially admitted to the hospital as inpatient, was discharged earlier than expected given the following: Patient left AGAINST MEDICAL ADVICE..  Please see above list of diagnoses and related plan for additional information.     Condition at Discharge:  anxious    Discharge Day Visit / Exam:   * Please refer to separate progress note for these details *    Discussion with Family:  Evening provider on 5/9.     Discharge instructions/Information to patient and family:   See after visit summary for information provided to patient and family.      Provisions for Follow-Up Care:  See after visit summary for information related to follow-up care and any pertinent home health orders.      Mobility at time of Discharge:   Basic Mobility Inpatient Raw Score: 22  JH-HLM Goal: 7: Walk 25 feet or more  JH-HLM Achieved: 6: Walk 10 steps or more  HLM Goal NOT achieved. Continue to encourage mobility in post discharge setting.     Disposition:   Home    Planned Readmission: Unknown    Discharge Medications:  See after visit summary for reconciled discharge  medications provided to patient and/or family.      Administrative Statements   Discharge Statement:  ED provider on 5/9 discussed with patient.    **Please Note: This note may have been constructed using a voice recognition system**

## 2025-05-10 NOTE — ASSESSMENT & PLAN NOTE
Blood Pressure: 121/89  Pulse: (!) 110  POA heart rate in the 40s to 50s  ECG notable for sinus bradycardia no pauses no QTc blood ambulation, [place] interval stable  TSH 2.868  Continue monitoring telemetry for the above  Currently tachycardic after admission  Continue symptomatic management as above.    Patient left AGAINST MEDICAL ADVICE.

## 2025-05-10 NOTE — ASSESSMENT & PLAN NOTE
Malnutrition Findings:   Adult Malnutrition type: Acute illness  Adult Degree of Malnutrition: Other severe protein calorie malnutrition  Malnutrition Characteristics: Fat loss, Muscle loss, Weight loss      Likely due ongoing known history of polysubstance abuse severe malnutrition poor oral intake  Nutrition consulted              360 Statement: Severe protein-calorie malnutrition related to inadequate oral intake as evidenced by significant loss of fat and muscle extremities, orbitals, scapula, temples; 22.7% weight decrease x 14 months, BMI 16.75. Currently treated with oral supplementation.    BMI Findings:  Adult BMI Classifications: Underweight < 18.5        There is no height or weight on file to calculate BMI.     Patient left AGAINST MEDICAL ADVICE.

## 2025-05-10 NOTE — ASSESSMENT & PLAN NOTE
Severe constipation CAT scan  Bowel regimen for now monitor output closely in the event of withdrawals this may change and may need antispasmodic    Patient left AGAINST MEDICAL ADVICE.

## 2025-05-10 NOTE — ASSESSMENT & PLAN NOTE
History of polysubstance abuse previous detox admission in 2023 most recently 2 days ago admission to [facility] on 05/07   Status post Ativan, Narcan, Zofran  ECG notable for sinus bradycardia no QTc prolongation    Plan  Toxicology consulted appreciate assistance  Started toxicology recommendations in note on 5/9.  Also phenobarbital 130 mg x 1.  Can increase clonidine and reglan per tox recs if necessary.  D/c loperamide due to evidence of constipation found on imaging.  COWS monitoring for now     Patient left AGAINST MEDICAL ADVICE.

## 2025-05-10 NOTE — ASSESSMENT & PLAN NOTE
Recent Labs     05/08/25 1918   K 3.1*       Likely due to poor oral intake  POA notable for potassium 3.1  Status post 40 mEq IV  F/u BMP  Patient refusing blood work today 5/9.  Will keep patient on telemetry, reattempt lab work tomorrow 5/10.  Will administer additional 40 mEq PO potassium.    Patient left AGAINST MEDICAL ADVICE.

## 2025-05-10 NOTE — ASSESSMENT & PLAN NOTE
Malnutrition Findings:   Adult Malnutrition type: Acute illness  Adult Degree of Malnutrition: Other severe protein calorie malnutrition  Malnutrition Characteristics: Fat loss, Muscle loss, Weight loss                  360 Statement: Severe protein-calorie malnutrition related to inadequate oral intake as evidenced by significant loss of fat and muscle extremities, orbitals, scapula, temples; 22.7% weight decrease x 14 months, BMI 16.75. Currently treated with oral supplementation.    BMI Findings:  Adult BMI Classifications: Underweight < 18.5        There is no height or weight on file to calculate BMI.     Patient left AGAINST MEDICAL ADVICE.

## 2025-05-10 NOTE — ASSESSMENT & PLAN NOTE
"Recent Labs     05/08/25  1918   WBC 7.69     Recent Labs     05/08/25  2054 05/08/25  2140   BLOODCX No Growth at 24 hrs. No Growth at 24 hrs.     Recent IV drug use in the neck  CT neck soft tissue without contrast with \"extensive subcutaneous stranding supraclavicular fat pads bilaterally extending into the lower neck, with obscuration of the fat planes in the posterior triangle noted bilaterally most indicative of cellulitis.  There is foci of gas subcutaneously potentially related to IV drug use however necrotizing fasciitis cannot entirely excluded\"  General surgery consulted low suspicion of necrotizing fasciitis  At the ED started on ceftriaxone vancomycin    Plan  Will continue empiric antibiotic treatment while inpatient with IV Zosyn and vancomycin given history of IV drug use this will cover gram-negative, Pseudomonas, anaerobes, MRSA, MSSA, Streptococcus fortunately nontoxic on exam no leukocytosis afebrile  Dc'ed Zosyn d/t low suspicion of anaerobic organisms.  Consider transition to p.o. doxycycline plus Augmentin to complete a total 10 days of antibiotics if ongoing low suspicious of necrotizing fasciitis  Monitor site closely    Patient left AGAINST MEDICAL ADVICE.  "

## 2025-05-11 LAB
BACTERIA BLD CULT: NORMAL

## 2025-05-11 NOTE — QUICK NOTE
RN messaged via epic chat saying patient wants to leave AMA and already pulled out 1 IV.  I went and examined patient bedside and explained the risks and complications of leaving AMA.  Patient's mother was also bedside.  Patient expressed understanding of risks and reiterated wishes to leave AGAINST MEDICAL ADVICE.  Patient signed AMA and allowed nurse to safely remove other IV.

## 2025-05-12 ENCOUNTER — APPOINTMENT (EMERGENCY)
Dept: CT IMAGING | Facility: HOSPITAL | Age: 30
DRG: 603 | End: 2025-05-12
Payer: COMMERCIAL

## 2025-05-12 ENCOUNTER — HOSPITAL ENCOUNTER (INPATIENT)
Facility: HOSPITAL | Age: 30
LOS: 1 days | Discharge: LEFT BEFORE DISCHARGE | DRG: 603 | End: 2025-05-13
Attending: EMERGENCY MEDICINE | Admitting: INTERNAL MEDICINE
Payer: COMMERCIAL

## 2025-05-12 VITALS
OXYGEN SATURATION: 99 % | SYSTOLIC BLOOD PRESSURE: 131 MMHG | RESPIRATION RATE: 20 BRPM | HEART RATE: 104 BPM | DIASTOLIC BLOOD PRESSURE: 91 MMHG | TEMPERATURE: 97.8 F

## 2025-05-12 DIAGNOSIS — F11.20 OPIOID USE DISORDER, SEVERE, DEPENDENCE (HCC): ICD-10-CM

## 2025-05-12 DIAGNOSIS — F11.93 OPIOID WITHDRAWAL (HCC): ICD-10-CM

## 2025-05-12 DIAGNOSIS — E87.6 HYPOKALEMIA: ICD-10-CM

## 2025-05-12 DIAGNOSIS — I82.C12 ACUTE EMBOLISM AND THROMBOSIS OF LEFT INTERNAL JUGULAR VEIN (HCC): Primary | ICD-10-CM

## 2025-05-12 DIAGNOSIS — M60.08: ICD-10-CM

## 2025-05-12 DIAGNOSIS — L03.221 CELLULITIS OF NECK: ICD-10-CM

## 2025-05-12 PROBLEM — E87.8 ELECTROLYTE ABNORMALITY: Status: ACTIVE | Noted: 2025-05-07

## 2025-05-12 PROBLEM — M54.2 NECK PAIN: Status: ACTIVE | Noted: 2025-05-12

## 2025-05-12 LAB
ALBUMIN SERPL BCG-MCNC: 3.6 G/DL (ref 3.5–5)
ALP SERPL-CCNC: 72 U/L (ref 34–104)
ALT SERPL W P-5'-P-CCNC: 11 U/L (ref 7–52)
AMPHETAMINES SERPL QL SCN: NEGATIVE
ANION GAP SERPL CALCULATED.3IONS-SCNC: 10 MMOL/L (ref 4–13)
APAP SERPL-MCNC: <2 UG/ML (ref 10–20)
AST SERPL W P-5'-P-CCNC: 23 U/L (ref 13–39)
BACTERIA UR QL AUTO: NORMAL /HPF
BARBITURATES UR QL: POSITIVE
BASOPHILS # BLD AUTO: 0.03 THOUSANDS/ÂΜL (ref 0–0.1)
BASOPHILS NFR BLD AUTO: 0 % (ref 0–1)
BENZODIAZ UR QL: POSITIVE
BILIRUB SERPL-MCNC: 0.53 MG/DL (ref 0.2–1)
BILIRUB UR QL STRIP: NEGATIVE
BUN SERPL-MCNC: 4 MG/DL (ref 5–25)
CALCIUM SERPL-MCNC: 8.9 MG/DL (ref 8.4–10.2)
CHLORIDE SERPL-SCNC: 104 MMOL/L (ref 96–108)
CLARITY UR: CLEAR
CO2 SERPL-SCNC: 26 MMOL/L (ref 21–32)
COCAINE UR QL: NEGATIVE
COLOR UR: ABNORMAL
CREAT SERPL-MCNC: 0.4 MG/DL (ref 0.6–1.3)
EOSINOPHIL # BLD AUTO: 0.04 THOUSAND/ÂΜL (ref 0–0.61)
EOSINOPHIL NFR BLD AUTO: 0 % (ref 0–6)
ERYTHROCYTE [DISTWIDTH] IN BLOOD BY AUTOMATED COUNT: 12.3 % (ref 11.6–15.1)
ETHANOL SERPL-MCNC: <10 MG/DL
EXT PREGNANCY TEST URINE: NEGATIVE
FENTANYL UR QL SCN: POSITIVE
GFR SERPL CREATININE-BSD FRML MDRD: 142 ML/MIN/1.73SQ M
GLUCOSE SERPL-MCNC: 104 MG/DL (ref 65–140)
GLUCOSE UR STRIP-MCNC: NEGATIVE MG/DL
HCT VFR BLD AUTO: 29.9 % (ref 34.8–46.1)
HGB BLD-MCNC: 10.2 G/DL (ref 11.5–15.4)
HGB UR QL STRIP.AUTO: ABNORMAL
HYDROCODONE UR QL SCN: NEGATIVE
IMM GRANULOCYTES # BLD AUTO: 0.05 THOUSAND/UL (ref 0–0.2)
IMM GRANULOCYTES NFR BLD AUTO: 1 % (ref 0–2)
KETONES UR STRIP-MCNC: ABNORMAL MG/DL
LACTATE SERPL-SCNC: 0.8 MMOL/L (ref 0.5–2)
LEUKOCYTE ESTERASE UR QL STRIP: NEGATIVE
LYMPHOCYTES # BLD AUTO: 1.08 THOUSANDS/ÂΜL (ref 0.6–4.47)
LYMPHOCYTES NFR BLD AUTO: 11 % (ref 14–44)
MAGNESIUM SERPL-MCNC: 1.7 MG/DL (ref 1.9–2.7)
MCH RBC QN AUTO: 29.2 PG (ref 26.8–34.3)
MCHC RBC AUTO-ENTMCNC: 34.1 G/DL (ref 31.4–37.4)
MCV RBC AUTO: 86 FL (ref 82–98)
METHADONE UR QL: NEGATIVE
MONOCYTES # BLD AUTO: 0.49 THOUSAND/ÂΜL (ref 0.17–1.22)
MONOCYTES NFR BLD AUTO: 5 % (ref 4–12)
NEUTROPHILS # BLD AUTO: 8.05 THOUSANDS/ÂΜL (ref 1.85–7.62)
NEUTS SEG NFR BLD AUTO: 83 % (ref 43–75)
NITRITE UR QL STRIP: NEGATIVE
NON-SQ EPI CELLS URNS QL MICRO: NORMAL /HPF
NRBC BLD AUTO-RTO: 0 /100 WBCS
OPIATES UR QL SCN: NEGATIVE
OXYCODONE+OXYMORPHONE UR QL SCN: NEGATIVE
PCP UR QL: NEGATIVE
PH UR STRIP.AUTO: 7 [PH]
PHOSPHATE SERPL-MCNC: 2.9 MG/DL (ref 2.7–4.5)
PLATELET # BLD AUTO: 275 THOUSANDS/UL (ref 149–390)
PMV BLD AUTO: 9 FL (ref 8.9–12.7)
POTASSIUM SERPL-SCNC: 2.8 MMOL/L (ref 3.5–5.3)
PROCALCITONIN SERPL-MCNC: <0.05 NG/ML
PROT SERPL-MCNC: 6.6 G/DL (ref 6.4–8.4)
PROT UR STRIP-MCNC: NEGATIVE MG/DL
RBC # BLD AUTO: 3.49 MILLION/UL (ref 3.81–5.12)
RBC #/AREA URNS AUTO: NORMAL /HPF
SALICYLATES SERPL-MCNC: <5 MG/DL (ref 5–20)
SODIUM SERPL-SCNC: 140 MMOL/L (ref 135–147)
SP GR UR STRIP.AUTO: 1.03 (ref 1–1.03)
THC UR QL: NEGATIVE
UROBILINOGEN UR STRIP-ACNC: <2 MG/DL
WBC # BLD AUTO: 9.74 THOUSAND/UL (ref 4.31–10.16)
WBC #/AREA URNS AUTO: NORMAL /HPF

## 2025-05-12 PROCEDURE — 81025 URINE PREGNANCY TEST: CPT

## 2025-05-12 PROCEDURE — 96372 THER/PROPH/DIAG INJ SC/IM: CPT

## 2025-05-12 PROCEDURE — 80179 DRUG ASSAY SALICYLATE: CPT

## 2025-05-12 PROCEDURE — 83735 ASSAY OF MAGNESIUM: CPT

## 2025-05-12 PROCEDURE — 96367 TX/PROPH/DG ADDL SEQ IV INF: CPT

## 2025-05-12 PROCEDURE — 99223 1ST HOSP IP/OBS HIGH 75: CPT | Performed by: EMERGENCY MEDICINE

## 2025-05-12 PROCEDURE — 99222 1ST HOSP IP/OBS MODERATE 55: CPT | Performed by: SURGERY

## 2025-05-12 PROCEDURE — 80053 COMPREHEN METABOLIC PANEL: CPT

## 2025-05-12 PROCEDURE — 70491 CT SOFT TISSUE NECK W/DYE: CPT

## 2025-05-12 PROCEDURE — 93005 ELECTROCARDIOGRAM TRACING: CPT

## 2025-05-12 PROCEDURE — 36415 COLL VENOUS BLD VENIPUNCTURE: CPT

## 2025-05-12 PROCEDURE — 85025 COMPLETE CBC W/AUTO DIFF WBC: CPT

## 2025-05-12 PROCEDURE — 76937 US GUIDE VASCULAR ACCESS: CPT | Performed by: EMERGENCY MEDICINE

## 2025-05-12 PROCEDURE — 82077 ASSAY SPEC XCP UR&BREATH IA: CPT

## 2025-05-12 PROCEDURE — 83605 ASSAY OF LACTIC ACID: CPT

## 2025-05-12 PROCEDURE — 87040 BLOOD CULTURE FOR BACTERIA: CPT

## 2025-05-12 PROCEDURE — 99285 EMERGENCY DEPT VISIT HI MDM: CPT

## 2025-05-12 PROCEDURE — 81001 URINALYSIS AUTO W/SCOPE: CPT

## 2025-05-12 PROCEDURE — 06HY33Z INSERTION OF INFUSION DEVICE INTO LOWER VEIN, PERCUTANEOUS APPROACH: ICD-10-PCS | Performed by: EMERGENCY MEDICINE

## 2025-05-12 PROCEDURE — 96365 THER/PROPH/DIAG IV INF INIT: CPT

## 2025-05-12 PROCEDURE — 96361 HYDRATE IV INFUSION ADD-ON: CPT

## 2025-05-12 PROCEDURE — 80143 DRUG ASSAY ACETAMINOPHEN: CPT

## 2025-05-12 PROCEDURE — 96368 THER/DIAG CONCURRENT INF: CPT

## 2025-05-12 PROCEDURE — 36556 INSERT NON-TUNNEL CV CATH: CPT | Performed by: EMERGENCY MEDICINE

## 2025-05-12 PROCEDURE — 84145 PROCALCITONIN (PCT): CPT

## 2025-05-12 PROCEDURE — 84100 ASSAY OF PHOSPHORUS: CPT

## 2025-05-12 PROCEDURE — 96366 THER/PROPH/DIAG IV INF ADDON: CPT

## 2025-05-12 PROCEDURE — 99222 1ST HOSP IP/OBS MODERATE 55: CPT | Performed by: INTERNAL MEDICINE

## 2025-05-12 PROCEDURE — 80307 DRUG TEST PRSMV CHEM ANLYZR: CPT

## 2025-05-12 PROCEDURE — 96375 TX/PRO/DX INJ NEW DRUG ADDON: CPT

## 2025-05-12 PROCEDURE — 99285 EMERGENCY DEPT VISIT HI MDM: CPT | Performed by: EMERGENCY MEDICINE

## 2025-05-12 RX ORDER — METOCLOPRAMIDE HYDROCHLORIDE 5 MG/ML
10 INJECTION INTRAMUSCULAR; INTRAVENOUS EVERY 8 HOURS PRN
Status: DISCONTINUED | OUTPATIENT
Start: 2025-05-12 | End: 2025-05-13 | Stop reason: HOSPADM

## 2025-05-12 RX ORDER — POTASSIUM CHLORIDE 14.9 MG/ML
20 INJECTION INTRAVENOUS ONCE
Status: COMPLETED | OUTPATIENT
Start: 2025-05-12 | End: 2025-05-12

## 2025-05-12 RX ORDER — NICOTINE 21 MG/24HR
14 PATCH, TRANSDERMAL 24 HOURS TRANSDERMAL DAILY PRN
Status: DISCONTINUED | OUTPATIENT
Start: 2025-05-12 | End: 2025-05-13 | Stop reason: HOSPADM

## 2025-05-12 RX ORDER — LORAZEPAM 2 MG/ML
1 INJECTION INTRAMUSCULAR ONCE
Status: DISCONTINUED | OUTPATIENT
Start: 2025-05-12 | End: 2025-05-13 | Stop reason: HOSPADM

## 2025-05-12 RX ORDER — ENOXAPARIN SODIUM 100 MG/ML
40 INJECTION SUBCUTANEOUS DAILY
Status: DISCONTINUED | OUTPATIENT
Start: 2025-05-12 | End: 2025-05-12

## 2025-05-12 RX ORDER — LORAZEPAM 2 MG/ML
1 INJECTION INTRAMUSCULAR ONCE
Status: COMPLETED | OUTPATIENT
Start: 2025-05-12 | End: 2025-05-12

## 2025-05-12 RX ORDER — POTASSIUM CHLORIDE 1500 MG/1
40 TABLET, EXTENDED RELEASE ORAL ONCE
Status: DISCONTINUED | OUTPATIENT
Start: 2025-05-12 | End: 2025-05-12

## 2025-05-12 RX ORDER — DIAZEPAM 10 MG/2ML
10 INJECTION, SOLUTION INTRAMUSCULAR; INTRAVENOUS ONCE
Status: COMPLETED | OUTPATIENT
Start: 2025-05-12 | End: 2025-05-12

## 2025-05-12 RX ORDER — CLONIDINE HYDROCHLORIDE 0.1 MG/1
0.2 TABLET ORAL EVERY 6 HOURS PRN
Status: DISCONTINUED | OUTPATIENT
Start: 2025-05-12 | End: 2025-05-13 | Stop reason: HOSPADM

## 2025-05-12 RX ORDER — ENOXAPARIN SODIUM 100 MG/ML
40 INJECTION SUBCUTANEOUS DAILY
Status: DISCONTINUED | OUTPATIENT
Start: 2025-05-13 | End: 2025-05-12

## 2025-05-12 RX ORDER — GABAPENTIN 300 MG/1
300 CAPSULE ORAL EVERY 8 HOURS PRN
Status: DISCONTINUED | OUTPATIENT
Start: 2025-05-12 | End: 2025-05-13 | Stop reason: HOSPADM

## 2025-05-12 RX ORDER — LOPERAMIDE HYDROCHLORIDE 2 MG/1
2 CAPSULE ORAL 4 TIMES DAILY PRN
Status: DISCONTINUED | OUTPATIENT
Start: 2025-05-12 | End: 2025-05-13 | Stop reason: HOSPADM

## 2025-05-12 RX ORDER — TRAZODONE HYDROCHLORIDE 50 MG/1
50 TABLET ORAL
Status: DISCONTINUED | OUTPATIENT
Start: 2025-05-12 | End: 2025-05-13 | Stop reason: HOSPADM

## 2025-05-12 RX ORDER — ONDANSETRON 2 MG/ML
4 INJECTION INTRAMUSCULAR; INTRAVENOUS ONCE
Status: COMPLETED | OUTPATIENT
Start: 2025-05-12 | End: 2025-05-12

## 2025-05-12 RX ORDER — CLONIDINE HYDROCHLORIDE 0.1 MG/1
0.2 TABLET ORAL EVERY 8 HOURS PRN
Status: DISCONTINUED | OUTPATIENT
Start: 2025-05-12 | End: 2025-05-12

## 2025-05-12 RX ORDER — ACETAMINOPHEN 325 MG/1
975 TABLET ORAL EVERY 8 HOURS PRN
Status: DISCONTINUED | OUTPATIENT
Start: 2025-05-12 | End: 2025-05-13 | Stop reason: HOSPADM

## 2025-05-12 RX ORDER — ACETAMINOPHEN 325 MG/1
975 TABLET ORAL ONCE
Status: DISCONTINUED | OUTPATIENT
Start: 2025-05-12 | End: 2025-05-13 | Stop reason: HOSPADM

## 2025-05-12 RX ORDER — ONDANSETRON 4 MG/1
4 TABLET, ORALLY DISINTEGRATING ORAL EVERY 6 HOURS PRN
Status: DISCONTINUED | OUTPATIENT
Start: 2025-05-12 | End: 2025-05-13 | Stop reason: HOSPADM

## 2025-05-12 RX ORDER — DROPERIDOL 2.5 MG/ML
2.5 INJECTION, SOLUTION INTRAMUSCULAR; INTRAVENOUS ONCE
Status: COMPLETED | OUTPATIENT
Start: 2025-05-12 | End: 2025-05-12

## 2025-05-12 RX ORDER — IBUPROFEN 600 MG/1
600 TABLET, FILM COATED ORAL EVERY 6 HOURS PRN
Status: DISCONTINUED | OUTPATIENT
Start: 2025-05-12 | End: 2025-05-13 | Stop reason: HOSPADM

## 2025-05-12 RX ORDER — VANCOMYCIN HYDROCHLORIDE 1 G/200ML
25 INJECTION, SOLUTION INTRAVENOUS ONCE
Status: COMPLETED | OUTPATIENT
Start: 2025-05-12 | End: 2025-05-12

## 2025-05-12 RX ORDER — HYDROXYZINE HYDROCHLORIDE 25 MG/1
50 TABLET, FILM COATED ORAL EVERY 6 HOURS PRN
Status: DISCONTINUED | OUTPATIENT
Start: 2025-05-12 | End: 2025-05-13 | Stop reason: HOSPADM

## 2025-05-12 RX ORDER — POTASSIUM CHLORIDE 14.9 MG/ML
20 INJECTION INTRAVENOUS
Status: DISCONTINUED | OUTPATIENT
Start: 2025-05-12 | End: 2025-05-13 | Stop reason: HOSPADM

## 2025-05-12 RX ORDER — MAGNESIUM SULFATE HEPTAHYDRATE 40 MG/ML
2 INJECTION, SOLUTION INTRAVENOUS ONCE
Status: COMPLETED | OUTPATIENT
Start: 2025-05-12 | End: 2025-05-12

## 2025-05-12 RX ORDER — CLONIDINE HYDROCHLORIDE 0.1 MG/1
0.3 TABLET ORAL ONCE
Status: COMPLETED | OUTPATIENT
Start: 2025-05-12 | End: 2025-05-12

## 2025-05-12 RX ORDER — DIAZEPAM 10 MG/2ML
5 INJECTION, SOLUTION INTRAMUSCULAR; INTRAVENOUS EVERY 2 HOUR PRN
Status: DISCONTINUED | OUTPATIENT
Start: 2025-05-12 | End: 2025-05-13 | Stop reason: HOSPADM

## 2025-05-12 RX ADMIN — ONDANSETRON 4 MG: 4 TABLET, ORALLY DISINTEGRATING ORAL at 20:10

## 2025-05-12 RX ADMIN — PIPERACILLIN AND TAZOBACTAM 4.5 G: 36; 4.5 INJECTION, POWDER, LYOPHILIZED, FOR SOLUTION INTRAVENOUS at 16:42

## 2025-05-12 RX ADMIN — DIAZEPAM 10 MG: 10 INJECTION, SOLUTION INTRAMUSCULAR; INTRAVENOUS at 16:19

## 2025-05-12 RX ADMIN — VANCOMYCIN HYDROCHLORIDE 1000 MG: 1 INJECTION, SOLUTION INTRAVENOUS at 17:41

## 2025-05-12 RX ADMIN — MAGNESIUM SULFATE HEPTAHYDRATE 2 G: 40 INJECTION, SOLUTION INTRAVENOUS at 18:18

## 2025-05-12 RX ADMIN — HYDROXYZINE HYDROCHLORIDE 50 MG: 25 TABLET ORAL at 20:10

## 2025-05-12 RX ADMIN — CLONIDINE HYDROCHLORIDE 0.3 MG: 0.1 TABLET ORAL at 16:12

## 2025-05-12 RX ADMIN — LORAZEPAM 1 MG: 2 INJECTION INTRAMUSCULAR; INTRAVENOUS at 21:09

## 2025-05-12 RX ADMIN — APIXABAN 5 MG: 5 TABLET, FILM COATED ORAL at 20:11

## 2025-05-12 RX ADMIN — DROPERIDOL 2.5 MG: 2.5 INJECTION, SOLUTION INTRAMUSCULAR; INTRAVENOUS at 13:59

## 2025-05-12 RX ADMIN — POTASSIUM CHLORIDE 20 MEQ: 14.9 INJECTION, SOLUTION INTRAVENOUS at 16:22

## 2025-05-12 RX ADMIN — DIAZEPAM 5 MG: 5 INJECTION INTRAMUSCULAR; INTRAVENOUS at 22:06

## 2025-05-12 RX ADMIN — LORAZEPAM 1 MG: 2 INJECTION INTRAMUSCULAR; INTRAVENOUS at 12:18

## 2025-05-12 RX ADMIN — IOHEXOL 70 ML: 350 INJECTION, SOLUTION INTRAVENOUS at 15:27

## 2025-05-12 RX ADMIN — ONDANSETRON 4 MG: 2 INJECTION, SOLUTION INTRAMUSCULAR; INTRAVENOUS at 12:22

## 2025-05-12 RX ADMIN — SODIUM CHLORIDE 1000 ML: 0.9 INJECTION, SOLUTION INTRAVENOUS at 12:15

## 2025-05-12 RX ADMIN — PIPERACILLIN AND TAZOBACTAM 4.5 G: 36; 4.5 INJECTION, POWDER, LYOPHILIZED, FOR SOLUTION INTRAVENOUS at 21:19

## 2025-05-12 NOTE — ASSESSMENT & PLAN NOTE
Recent Labs     05/12/25  1227   K 2.8*   MG 1.7*        Likely due to poor oral intake  F/u BMP, Mg

## 2025-05-12 NOTE — ASSESSMENT & PLAN NOTE
CT soft tissue neck: Diffusely abnormal musculature in bilateral neck anteriorly and posteriorly, bilateral upper chest, and bilateral upper back regions with scattered areas of intramuscular fluid collections some with locules of gas and diffuse surrounding inflammatory changes, likely due to multifocal sites of intramuscular abscesses or myonecrosis with diffuse cellulitis and infectious/inflammatory myositis.   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    - discussed with vascular surgery attending on call. No acute vascular surgery intervention warranted at this time  - IJV occlusion likely compression 2/2 abscesses/inflammation in neck  - recommend 6 months total of DOAC  - f/u ENT recs for intramuscular abscesses/cellulitis/myositis    Rest of care per primary

## 2025-05-12 NOTE — ASSESSMENT & PLAN NOTE
Pulse: (!) 110  Blood Pressure: 140/98    ECG notable for sinus tachycardia  TSH on prior admission 5/8 WNL.  Continue monitoring telemetry for the above

## 2025-05-12 NOTE — UTILIZATION REVIEW
NOTIFICATION OF ADMISSION DISCHARGE   This is a Notification of Discharge from [facility]. Please be advised that this patient has been discharge from our facility. Below you will find the admission and discharge date and time including the patient’s disposition.   UTILIZATION REVIEW CONTACT:  Utilization Review Assistants  Network Utilization Review Department  Phone: [phone] x carefully listen to the prompts. All voicemails are confidential.  Email: [email or web address]     ADMISSION INFORMATION  PRESENTATION DATE: 5/8/2025  6:44 PM  OBERVATION ADMISSION DATE: N/A  INPATIENT ADMISSION DATE: 5/8/25 10:23 PM   DISCHARGE DATE: 5/9/2025  5:44 PM   DISPOSITION:Left against medical advice or discontinued care    Network Utilization Review Department  ATTENTION: Please call with any questions or concerns to [phone] and carefully listen to the prompts so that you are directed to the right person. All voicemails are confidential.   For Discharge needs, contact Care Management DC Support Team at [phone] opt. [phone]  Send all requests for admission clinical reviews, approved or denied determinations and any other requests to dedicated fax number below belonging to the campus where the patient is receiving treatment. List of dedicated fax numbers for the Facilities:  FACILITY NAME UR FAX NUMBER   ADMISSION DENIALS (Administrative/Medical Necessity) [phone]   DISCHARGE SUPPORT TEAM ([facility]) [phone]   PARENT CHILD HEALTH (Maternity/NICU/Pediatrics) [phone]   [facility] [phone]   [facility] [phone]   [facility] [phone]   [facility] [phone]   [facility] [phone]   [facility] [phone]   [facility] [phone]   [facility]  [place] [phone]   [facility] [phone]   [facility] [phone]   [facility] [phone]   [facility] [phone]

## 2025-05-12 NOTE — CONSULTS
Otorhinolaryngology - Head & Neck Surgery Consultation    Date of Service: 5/12/2025    Reason for consult: neck pain    ASSESSMENT/PLAN:    -no acute surgical interventions indicated at this time for b/l neck infection, locules of inflammation/infection  -chest/back infection per gen surg  -broad spectrum abx  -drug withdrawal care per primary  -rest of care per primary    ---------------------------------------------------------------------------------------------------------------    HPI  Elda Calderon is a 29 y.o. female w/ PMH of anxiety, depression, PTSD, IVDU w/ injection into neck (frequency QD), presented to hospital on 5/12/25 w/ neck pain, worse on L than prior admission. Pt has chills, N/V, endorsed current withdrawal, last injected illicit drug 2d/a. Denied dysphagia, dyspnea, dysphonia.    Of note, pt was also previously admitted to [facility] on 5/8-9/25 for neck pain, CT at that time revealed cellulitis, received IV abx, but left AMA.    LABORATORY  5/12/25:  WBC 9.74  Hgb 10.2  Rapid drug screen (+) for benzo, fentanyl    RADIOLOGY  CT soft tissue neck (5/12/25):  Diffusely abnormal musculature in bilateral neck anteriorly and posteriorly, bilateral upper chest, and bilateral upper back regions with scattered areas of intramuscular fluid collections some with locules of gas and diffuse surrounding inflammatory changes, likely due to multifocal sites of intramuscular abscesses or myonecrosis with diffuse cellulitis and infectious/inflammatory myositis.     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     Poor dentition with periodontal disease.    CT soft tissue neck (5/8/25):  1.  Examination compromised the lack of intravenous contrast. Extensive subcutaneous stranding, and stranding involving the supraclavicular fat pads bilaterally, extending into the lower neck, with obscuration of the fat planes in the posterior triangles noted bilaterally, most indicative of a cellulitis related to the  patient's known IV drug injections. Foci of gas also noted subcutaneously which may be related to the IV drug administration, but necrotizing fasciitis cannot be entirely excluded.    PROCEDURES  -    PHYSICAL EXAM  Vitals:    05/12/25 1612   BP: 140/98   Pulse:    Resp:    Temp:    SpO2:        General: NAD, resting in bed, yawning frequently  Ears: External appearance normal  Nose: External appearance normal  Oral cavity: External appearance normal, poor dentition, no mass/lesion/purulent dc obs  Neck: Trachea is midline, no crepitus palpated, stiff SCM and neck musculature, mild TTP, FROM  Neuro: Motor and sensory grossly intact, face symmetrical, no obvious cranial nerve palsies, motor and sensory grossly intact, no focal deficits  Lungs: Normal work of breathing, symmetrical chest expansion on RA  Vascular: Well perfused    Patient Active Problem List    Diagnosis Date Noted    Neck pain 05/12/2025    Severe protein-calorie malnutrition (HCC) 05/09/2025    Cellulitis of neck 05/08/2025    Tachycardia 05/08/2025    Constipation 05/08/2025    Hypokalemia 05/07/2025    Liver lesion 05/07/2025    SIRS (systemic inflammatory response syndrome) (HCC) 03/04/2024    Acute opioid withdrawal (HCC) 03/02/2024    Polysubstance abuse (HCC) 08/12/2023    BMI less than 19,adult 08/12/2023    Severe episode of recurrent major depressive disorder, without psychotic features (HCC) 08/12/2023    BESSY (generalized anxiety disorder) 08/12/2023    PTSD (post-traumatic stress disorder) 08/12/2023    Depression 07/21/2023    Mild protein-calorie malnutrition (HCC) 07/20/2023    Transaminitis 07/20/2023    Bilateral calf pain 07/03/2023    Confusion 07/03/2023    Opioid use disorder, severe, dependence (HCC) 06/30/2023    Prolonged Q-T interval on ECG 06/30/2023    Leukocytosis 06/30/2023    Episodic mood disorder (HCC) 01/28/2014    Endometriosis 09/11/2012       Adalberto Cortez MD  PGY-3  Otorhinolaryngology - Head & Neck Surgery

## 2025-05-12 NOTE — PROGRESS NOTES
05/12/25 1059   Other Referral/Resources/Interventions Provided:   Referrals Provided: [facility];Crisis Hotline;Other (Specify);Support Group  (IP and OP [facility] tx resources)   Discharge Communications   Discharge planning discussed with: pt   Freedom of Choice Yes   Comments - Freedom of Choice Pt met with CM for assessment. Declined IP rehab at that time. She did not make a decision as to what OP services she wanted and left AMA that night. CM left IP and [facility] treatment resources in pt's AVS.   CM contacted family/caregiver? Yes  (CM attempted to call emergenct contact and had to leave VM.)   Were Treatment Team discharge recommendations reviewed with patient/caregiver? Yes   Did patient/caregiver verbalize understanding of patient care needs? Yes   Were patient/caregiver advised of the risks associated with not following Treatment Team discharge recommendations? Yes   Contacts   Patient Contacts Kylah Calderon   Relationship to Patient: Family   Contact Method Phone   Phone Number [phone]   Reason/Outcome Emergency Contact     When CM arrived for this shift the next morning, CM noticed this pt chose to LEAVE AGAINST MEDICAL ADVICE the night before. Pt discharged 5/7/25. Pt to discharge to home. CM placed IP and OP [facility] treatment resources in pt's AVS. Medications were sent to pt's preferred pharmacy.     Discharge Address: [address] [place] [address]     Phone Number: [phone] (Mobile)   [phone] (Home Phone)

## 2025-05-12 NOTE — ASSESSMENT & PLAN NOTE
History of polysubstance abuse previous detox admission in 2023, [facility]   Status post Ativan, Narcan, Zofran  ECG notable for sinus tachycardia no QTc prolongation     Plan  Toxicology consulted appreciate assistance  COWS monitoring for now   Symptomatic management with medications.  Clonidine 0.2-0.3 mg PO Q6 hours PRN anxiety or palpitations    Gabapentin 300mg PO Q8 hours PRN anxiety    Diazepam 5 mg p.o. or IV q2 PRN refractory anxiety  Ibuprofen 600 mg PO Q6 hours PRN pain    Acetaminophen 1000mg PO Q8 hours PRN pain    Ondansetron 4 mg PO Q6 hours PRN N/V    Reglan 5-10 mg IV q8 p.r.n. refractory nausea vomiting  Nicotine patch 7, 14, 21 mg  PRN nicotine withdrawal   Trazodone 50 mg PO QHS PRN sleep    Loperamide 4 mg PO PRN diarrhea up to 16 mg/day  Atarax 50mg PO Q6H

## 2025-05-12 NOTE — ED PROVIDER NOTES
Time reflects when diagnosis was documented in both MDM as applicable and the Disposition within this note       Time User Action Codes Description Comment    5/12/2025  3:00 PM Tricia Aldana Add [F11.20] Opioid use disorder, severe, dependence (HCC)     5/12/2025  4:27 PM Tricia Aldana Modify [F11.20] Opioid use disorder, severe, dependence (HCC)     5/12/2025  4:27 PM Tricia Aldana Add [I82.C12] Acute embolism and thrombosis of left internal jugular vein (HCC)     5/12/2025  4:37 PM Tricia Aldana Add [M60.08] Abscess of muscle of neck     5/12/2025  6:11 PM Jing Hendricks Add [E87.6] Hypokalemia     5/12/2025  7:16 PM Ruddy Olson Add [L03.221] Cellulitis of neck     5/12/2025  7:33 PM Kelly Gay Add [F11.93] Opioid withdrawal (HCC)           ED Disposition       ED Disposition   Admit    Condition   Stable    Date/Time   Mon May 12, 2025  5:15 PM    Comment   --             Assessment & Plan       Medical Decision Making  Elda is a 29-year-old female with a past medical history of IV drug use presenting to the emergency department due to neck pain.    DDx includes but is not limited to: Opioid withdrawal, muscular abscesses secondary to IV drug use, sepsis/bacteremia, malnutrition.    See ED course for MDM.  Patient was reimaged to assess prior abscesses.  CT read listed in ED workflow.  Patient restarted on IV Zosyn and vancomycin.  ENT, vascular, and toxicology all consulted.  Toxicology recommended Valium and clonidine for management of patient's withdrawal symptoms.  Vascular reports that they do not currently plan to intervene on the patient's occlusion due to the suspicion that it is chronic versus potentially an abscess pushing on the vessel instead of the thrombus.  Due to this they state that the patient may remain at the [facility].  ENT also consulted, saw the patient at bedside and have determined that they will not be draining abscesses at this time.  Continue with  IV antibiotic management.  Patient admitted to  IM for further treatment. Results shared with patient.       Amount and/or Complexity of Data Reviewed  Labs: ordered.  Radiology: ordered.    Risk  OTC drugs.  Prescription drug management.  Decision regarding hospitalization.        ED Course as of 05/12/25 2156   Mon May 12, 2025   1230 Significant delay in patient care secondary to difficulty placing an IV line, peripheral IVs were attempted by both myself and Dr. WARREN, but we were unable to obtain 1.  Right femoral central line placed.  Please see procedure note for further details.       Medications   acetaminophen (TYLENOL) tablet 975 mg (975 mg Oral Not Given 5/12/25 1334)   potassium chloride (Klor-Con M20) CR tablet 40 mEq (40 mEq Oral Not Given 5/12/25 1614)   gabapentin (NEURONTIN) capsule 300 mg (has no administration in time range)   diazepam (VALIUM) injection 5 mg (has no administration in time range)   ibuprofen (MOTRIN) tablet 600 mg (has no administration in time range)   acetaminophen (TYLENOL) tablet 975 mg (has no administration in time range)   ondansetron (ZOFRAN-ODT) dispersible tablet 4 mg (4 mg Oral Given 5/12/25 2010)   metoclopramide (REGLAN) injection 10 mg (has no administration in time range)   nicotine (NICODERM CQ) 14 mg/24hr TD 24 hr patch 14 mg (has no administration in time range)   traZODone (DESYREL) tablet 50 mg (has no administration in time range)   hydrOXYzine HCL (ATARAX) tablet 50 mg (50 mg Oral Given 5/12/25 2010)   vancomycin (VANCOCIN) 1,250 mg in sodium chloride 0.9 % 250 mL IVPB (0 mg Intravenous Hold 5/12/25 2039)   piperacillin-tazobactam (ZOSYN) 4.5 g in sodium chloride 0.9 % 100 mL IVPB (EXTENDED INFUSION) (4.5 g Intravenous New Bag 5/12/25 2119)   cloNIDine (CATAPRES) tablet 0.2 mg (has no administration in time range)   loperamide (IMODIUM) capsule 2 mg (has no administration in time range)   apixaban (ELIQUIS) tablet 5 mg (5 mg Oral Given 5/12/25 2011)    LORazepam (ATIVAN) injection 1 mg (1 mg Intravenous Given 5/12/25 1218)   sodium chloride 0.9 % bolus 1,000 mL (0 mL Intravenous Stopped 5/12/25 1420)   ondansetron (ZOFRAN) injection 4 mg (4 mg Intravenous Given 5/12/25 1222)   droperidol (INAPSINE) injection 2.5 mg (2.5 mg Intramuscular Given 5/12/25 1359)   potassium chloride 20 mEq IVPB (premix) (0 mEq Intravenous Stopped 5/12/25 1819)   diazepam (VALIUM) injection 10 mg (10 mg Intravenous Given 5/12/25 1619)   cloNIDine (CATAPRES) tablet 0.3 mg (0.3 mg Oral Given 5/12/25 1612)   iohexol (OMNIPAQUE) 350 MG/ML injection (SINGLE-DOSE) 70 mL (70 mL Intravenous Given 5/12/25 1527)   piperacillin-tazobactam (ZOSYN) IVPB 4.5 g (0 g Intravenous Stopped 5/12/25 1740)   vancomycin (VANCOCIN) IVPB (premix in dextrose) 1,000 mg 200 mL (0 mg Intravenous Stopped 5/12/25 1911)   magnesium sulfate 2 g/50 mL IVPB (premix) 2 g (0 g Intravenous Stopped 5/12/25 2038)   LORazepam (ATIVAN) injection 1 mg (1 mg Intravenous Given 5/12/25 2109)       ED Risk Strat Scores                   Clinical Opiate Withdrawal Scale       Row Name 05/12/25 1340                Pulse 110  -AF        Resting Pulse Rate: Measured After Patient is Sitting or Lying for One Minute 2  -AF        GI Upset: Over Last Half Hour 2  -AF        Sweating: Over Past Half Hour Not Accounted for by Room Temperature of Patient Activity 2  -AF        Tremor: Observation of Outstretched Hands 2  -AF        Restlessness: Observation During Assessment 1  -AF        Yawning: Observation During Assessment 0  -AF        Pupil Size 1  -AF        Anxiety and Irritability 2  -AF        Bone or Joint Aches: If Patient was Having Pain Previously, Only the Additional Component Attributed to Opiate Withdrawal is Scored 2  -AF        Gooseflesh Skin 0  -AF        Runny Nose or Tearing: Not Accounted for by Cold Symptoms or Allergies 0  -AF        Clinical Opiate Withdrawal Scale Total Score 14  -AF        Heart Rate Source --         Patient Position - Orthostatic VS --                  User Key  (r) = Recorded By, (t) = Taken By, (c) = Cosigned By      Initials Name    AF Aamir Euceda RN                  No data recorded                            History of Present Illness       Chief Complaint   Patient presents with    Neck Pain     Pt reports being admitted for infection in neck but leaving hospital early due to withdrawal symptoms from fentanyl use. Reports pain increasing again in neck. Also reports feeling withdrawal symptoms now. Last used 2 days ago.        Past Medical History:   Diagnosis Date    Anxiety     Borderline personality disorder (HCC)     Chlamydia     Depression     Endometriosis     IV drug user     IV heroin use, rehab 7 times     Liver lesion 5/7/2025    Panic attacks     Psychiatric disorder     anxiety    PTSD (post-traumatic stress disorder)     PTSD (post-traumatic stress disorder) 8/12/2023    Varicella     childhood    Visual impairment       Past Surgical History:   Procedure Laterality Date    LAPAROTOMY        Family History   Problem Relation Age of Onset    Depression Mother     Drug abuse Brother     Drug abuse Maternal Aunt     Drug abuse Paternal Uncle     Cancer Maternal Grandmother     Cancer Paternal Grandfather       Social History     Tobacco Use    Smoking status: Never     Passive exposure: Never    Smokeless tobacco: Never   Vaping Use    Vaping status: Every Day    Substances: Nicotine, Flavoring   Substance Use Topics    Alcohol use: Not Currently    Drug use: Yes     Types: Fentanyl     Comment: 8 bags/day states benzos are mixed in      E-Cigarette/Vaping    E-Cigarette Use Current Every Day User       E-Cigarette/Vaping Substances    Nicotine Yes     THC No     CBD No     Flavoring Yes     Other No     Unknown No       I have reviewed and agree with the history as documented.     Elda is a 29-year-old female with a past medical history of IV drug use presenting to the emergency  department due to neck pain.  Patient has presented multiple times to the ED for this complaint, and has been admitted for IV antibiotics, but has left early due to withdrawal symptoms.    Patient reports that beginning roughly around May 5th she began experiencing neck pain.  She does inject IV drugs into her neck.  She has had CTs during her prior workups that have shown air in the neck, based on their evaluation at the time and surgical consults it is believed that this is likely due to the injection of the IV drugs, and not due to necrotizing fasciitis.  Patient was placed on Zosyn and vanc for antibiotic coverage, but left before regimen was completed. Patient reports she was taking the oral medication prescribed to her while she was out of the hospital.    Patient endorses chills, ongoing neck pain and associated muscle tightness, also endorses significant withdrawal symptoms including anxiety, nausea, vomiting, abdominal pain. Denies diarrhea, constipation, visual changes, headache.        Review of Systems   Constitutional:         As per hpi   All other systems reviewed and are negative.          Objective       ED Triage Vitals [05/12/25 0759]   Temperature Pulse Blood Pressure Respirations SpO2 Patient Position - Orthostatic VS   97.8 °F (36.6 °C) 82 130/86 18 100 % Sitting      Temp Source Heart Rate Source BP Location FiO2 (%) Pain Score    Oral Monitor Right arm -- 5      Vitals      Date and Time Temp Pulse SpO2 Resp BP Pain Score FACES Pain Rating User   05/12/25 2030 -- 122 98 % 24 153/104 -- -- JR   05/12/25 1612 -- -- -- -- 140/98 -- -- KB   05/12/25 1340 -- 110 -- -- -- -- -- AF   05/12/25 1339 -- -- -- -- 142/88 -- -- AF   05/12/25 1249 -- 119 99 % 24 shallow 103/71 -- -- LR   05/12/25 0759 97.8 °F (36.6 °C) 82 100 % 18 130/86 5 -- TB            Physical Exam  Vitals and nursing note reviewed.   Constitutional:       Comments: malnourished   HENT:      Head: Normocephalic and atraumatic.       Right Ear: External ear normal.      Left Ear: External ear normal.      Nose: Nose normal.      Mouth/Throat:      Mouth: Mucous membranes are dry.      Pharynx: Oropharynx is clear.      Comments: No evidence of pharyngeal swelling, or intraoral infection.  Eyes:      Extraocular Movements: Extraocular movements intact.      Conjunctiva/sclera: Conjunctivae normal.      Pupils: Pupils are equal, round, and reactive to light.      Comments: Pupils dilated.   Neck:      Comments: Increased muscle tightness on the patient's left side, no apparent edema or crepitus with palpation.  Patient has limited range of motion with right rotation and extension.  Cardiovascular:      Rate and Rhythm: Regular rhythm. Tachycardia present.      Heart sounds: Normal heart sounds.   Pulmonary:      Effort: Pulmonary effort is normal.      Breath sounds: Normal breath sounds.   Abdominal:      General: There is no distension.      Palpations: Abdomen is soft.      Tenderness: There is no abdominal tenderness.   Musculoskeletal:      Comments: Moderate contraction of left arm at the elbow, mild contraction of the right arm at the elbow.   Skin:     Comments: Significant scarring of both arms   Neurological:      General: No focal deficit present.      Mental Status: She is alert.      Comments: Patient is very anxious, and shivering.         Results Reviewed       Procedure Component Value Units Date/Time    Blood culture #1 [[id number]] Collected: 05/12/25 1227    Lab Status: Preliminary result Specimen: Blood from Central Venous Line Updated: 05/12/25 2036     Blood Culture Received in Microbiology Lab. Culture in Progress.    Blood culture [[id number]]     Lab Status: No result Specimen: Blood     Rapid drug screen, urine [[id number]]  (Abnormal) Collected: 05/12/25 1705    Lab Status: Final result Specimen: Urine, Clean Catch Updated: 05/12/25 1809     Amph/Meth UR Negative     Barbiturate Ur Positive     Benzodiazepine Urine Positive      Cocaine Urine Negative     Methadone Urine Negative     Opiate Urine Negative     PCP Ur Negative     THC Urine Negative     Oxycodone Urine Negative     Fentanyl Urine Positive     HYDROCODONE URINE Negative    Narrative:      Presumptive report. If requested, specimen will be sent to reference lab for confirmation.  FOR MEDICAL PURPOSES ONLY.   IF CONFIRMATION NEEDED PLEASE CONTACT THE LAB WITHIN 5 DAYS.    Drug Screen Cutoff Levels:  AMPHETAMINE/METHAMPHETAMINES  1000 ng/mL  BARBITURATES     200 ng/mL  BENZODIAZEPINES     200 ng/mL  COCAINE      300 ng/mL  METHADONE      300 ng/mL  OPIATES      300 ng/mL  PHENCYCLIDINE     25 ng/mL  THC       50 ng/mL  OXYCODONE      100 ng/mL  FENTANYL      5 ng/mL  HYDROCODONE     300 ng/mL    Urine Microscopic [[id number]]  (Normal) Collected: 05/12/25 1705    Lab Status: Final result Specimen: Urine, Clean Catch Updated: 05/12/25 1732     RBC, UA 1-2 /hpf      WBC, UA 1-2 /hpf      Epithelial Cells Occasional /hpf      Bacteria, UA Occasional /hpf     UA w Reflex to Microscopic w Reflex to Culture [[id number]]  (Abnormal) Collected: 05/12/25 1705    Lab Status: Final result Specimen: Urine, Clean Catch Updated: 05/12/25 1730     Color, UA Light Yellow     Clarity, UA Clear     Specific Gravity, UA 1.027     pH, UA 7.0     Leukocytes, UA Negative     Nitrite, UA Negative     Protein, UA Negative mg/dl      Glucose, UA Negative mg/dl      Ketones, UA 80 (3+) mg/dl      Urobilinogen, UA <2.0 mg/dl      Bilirubin, UA Negative     Occult Blood, UA Trace    POCT pregnancy, urine [[id number]]  (Normal) Collected: 05/12/25 1707    Lab Status: Final result Updated: 05/12/25 1707     EXT Preg Test, Ur Negative     Control --    Magnesium [[id number]]  (Abnormal) Collected: 05/12/25 1227    Lab Status: Final result Specimen: Blood from Central Venous Line Updated: 05/12/25 1623     Magnesium 1.7 mg/dL     Phosphorus [[id number]]  (Normal) Collected: 05/12/25 1227    Lab Status: Final  result Specimen: Blood from Central Venous Line Updated: 05/12/25 1623     Phosphorus 2.9 mg/dL     Comprehensive metabolic panel [[id number]]  (Abnormal) Collected: 05/12/25 1227    Lab Status: Final result Specimen: Blood from Central Venous Line Updated: 05/12/25 1324     Sodium 140 mmol/L      Potassium 2.8 mmol/L      Chloride 104 mmol/L      CO2 26 mmol/L      ANION GAP 10 mmol/L      BUN 4 mg/dL      Creatinine 0.40 mg/dL      Glucose 104 mg/dL      Calcium 8.9 mg/dL      AST 23 U/L      ALT 11 U/L      Alkaline Phosphatase 72 U/L      Total Protein 6.6 g/dL      Albumin 3.6 g/dL      Total Bilirubin 0.53 mg/dL      eGFR 142 ml/min/1.73sq m     Narrative:      [employer] guidelines for Chronic Kidney Disease (CKD):     Stage 1 with normal or high GFR (GFR > 90 mL/min/1.73 square meters)    Stage 2 Mild CKD (GFR = 60-89 mL/min/1.73 square meters)    Stage 3A Moderate CKD (GFR = 45-59 mL/min/1.73 square meters)    Stage 3B Moderate CKD (GFR = 30-44 mL/min/1.73 square meters)    Stage 4 Severe CKD (GFR = 15-29 mL/min/1.73 square meters)    Stage 5 End Stage CKD (GFR <15 mL/min/1.73 square meters)  Note: GFR calculation is accurate only with a steady state creatinine    Salicylate level [[id number]]  (Abnormal) Collected: 05/12/25 1227    Lab Status: Final result Specimen: Blood from Central Venous Line Updated: 05/12/25 1324     Salicylate Lvl <5 mg/dL     Acetaminophen level-If concentration is detectable, please discuss with medical  on call. [[id number]]  (Abnormal) Collected: 05/12/25 1227    Lab Status: Final result Specimen: Blood from Central Venous Line Updated: 05/12/25 1324     Acetaminophen Level <2 ug/mL     Procalcitonin [[id number]]  (Normal) Collected: 05/12/25 1227    Lab Status: Final result Specimen: Blood from Central Venous Line Updated: 05/12/25 1308     Procalcitonin <0.05 ng/ml     Lactic acid, plasma (w/reflex if result > 2.0) [[id number]]  (Normal)  Collected: 05/12/25 1227    Lab Status: Final result Specimen: Blood from Central Venous Line Updated: 05/12/25 1252     LACTIC ACID 0.8 mmol/L     Narrative:      Result may be elevated if tourniquet was used during collection.    Ethanol [[id number]]  (Normal) Collected: 05/12/25 1227    Lab Status: Final result Specimen: Blood from Central Venous Line Updated: 05/12/25 1252     Ethanol Lvl <10 mg/dL     CBC and differential [[id number]]  (Abnormal) Collected: 05/12/25 1227    Lab Status: Final result Specimen: Blood from Central Venous Line Updated: 05/12/25 1237     WBC 9.74 Thousand/uL      RBC 3.49 Million/uL      Hemoglobin 10.2 g/dL      Hematocrit 29.9 %      MCV 86 fL      MCH 29.2 pg      MCHC 34.1 g/dL      RDW 12.3 %      MPV 9.0 fL      Platelets 275 Thousands/uL      nRBC 0 /100 WBCs      Segmented % 83 %      Immature Grans % 1 %      Lymphocytes % 11 %      Monocytes % 5 %      Eosinophils Relative 0 %      Basophils Relative 0 %      Absolute Neutrophils 8.05 Thousands/µL      Absolute Immature Grans 0.05 Thousand/uL      Absolute Lymphocytes 1.08 Thousands/µL      Absolute Monocytes 0.49 Thousand/µL      Eosinophils Absolute 0.04 Thousand/µL      Basophils Absolute 0.03 Thousands/µL             CT soft tissue neck with contrast   Final Interpretation by Tom Turner MD (05/12 8493)      Diffusely abnormal musculature in bilateral neck anteriorly and posteriorly, bilateral upper chest, and bilateral upper back regions with scattered areas of intramuscular fluid collections some with locules of gas and diffuse surrounding inflammatory    changes, likely due to multifocal sites of intramuscular abscesses or myonecrosis with diffuse cellulitis and infectious/inflammatory myositis. Recommend evaluation by ENT.      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      Poor dentition with periodontal disease. Recommend follow-up with dentist.      Additional chronic/incidental findings as  detailed above.         I personally epic secure message this study with ANDERSON ELLIS on 5/12/2025 4:03 PM. The study was marked in EPIC for immediate notification.                     Workstation performed: [id number]             Central Line    Date/Time: 5/12/2025 12:00 PM    Performed by: Tricia Aldana MD  Authorized by: Tricia Aldana MD    Patient location:  ED  Consent:     Consent obtained:  Written    Consent given by:  Patient    Risks discussed:  Arterial puncture, incorrect placement, nerve damage, bleeding and infection  Universal protocol:     Patient identity confirmed:  Verbally with patient  Pre-procedure details:     Hand hygiene: Hand hygiene performed prior to insertion      Sterile barrier technique: All elements of maximal sterile technique followed      Skin preparation:  2% chlorhexidine  Indications:     Central line indications: no peripheral vascular access    Anesthesia (see MAR for exact dosages):     Anesthesia method:  Local infiltration    Local anesthetic:  Lidocaine 2% w/o epi  Procedure details:     Location:  Right femoral    Vessel type: vein      Laterality:  Right    Approach: percutaneous technique used      Patient position:  Flat    Catheter type:  Triple lumen 20cm    Catheter size:  7 Fr    Landmarks identified: yes      Ultrasound guidance: yes      Ultrasound image availability:  Not saved    Sterile ultrasound techniques: Sterile gel and sterile probe covers were used      Number of attempts:  1    Successful placement: yes      Catheter tip vessel location: iliac vein    Post-procedure details:     Post-procedure:  Line sutured and dressing applied    Assessment:  Blood return through all ports    Post-procedure complications: none      Patient tolerance of procedure:  Tolerated well, no immediate complications      ED Medication and Procedure Management   Prior to Admission Medications   Prescriptions Last Dose Informant Patient Reported?  Taking?   buprenorphine-naloxone (Suboxone) 8-2 mg Not Taking  No No   Sig: Place 1 Film (8 mg total) under the tongue 2 (two) times a day   Patient not taking: Reported on 5/12/2025   naloxone (NARCAN) 4 mg/0.1 mL nasal spray Not Taking  No No   Sig: Administer 1 spray into a nostril. If no response after 2-3 minutes, give another dose in the other nostril using a new spray.   Patient not taking: Reported on 5/12/2025   ondansetron (ZOFRAN) 4 mg tablet 5/12/2025  No Yes   Sig: Take 1 tablet (4 mg total) by mouth every 8 (eight) hours as needed for nausea or vomiting   ondansetron (ZOFRAN-ODT) 4 mg disintegrating tablet Not Taking  No No   Sig: Take 1 tablet (4 mg total) by mouth every 6 (six) hours as needed for nausea or vomiting   Patient not taking: Reported on 5/12/2025      Facility-Administered Medications: None     Patient's Medications   Discharge Prescriptions    No medications on file     No discharge procedures on file.  ED SEPSIS DOCUMENTATION   Time reflects when diagnosis was documented in both MDM as applicable and the Disposition within this note       Time User Action Codes Description Comment    5/12/2025  3:00 PM Tricia Aldana Add [F11.20] Opioid use disorder, severe, dependence (HCC)     5/12/2025  4:27 PM Tricia Aldana Modify [F11.20] Opioid use disorder, severe, dependence (HCC)     5/12/2025  4:27 PM Tricia Aldana Add [I82.C12] Acute embolism and thrombosis of left internal jugular vein (HCC)     5/12/2025  4:37 PM Tricia Aldana Add [M60.08] Abscess of muscle of neck     5/12/2025  6:11 PM Jing Hendricks Add [E87.6] Hypokalemia     5/12/2025  7:16 PM Ruddy Olson Add [L03.221] Cellulitis of neck     5/12/2025  7:33 PM Kelly Gay Add [F11.93] Opioid withdrawal (HCC)                  Tricia Aldana MD  05/12/25 2156       Tricia Aldana MD  05/12/25 2157       Tricia Aldana MD  05/13/25 0848       Tricia Aldana MD  05/13/25 5779

## 2025-05-12 NOTE — H&P
"H&P - Hospitalist   Name: Elda Calderon 29 y.o. female I MRN: [id number]  Unit/Bed#: ED-21 I Date of Admission: 5/12/2025   Date of Service: 5/12/2025 I Hospital Day: 0     Assessment & Plan  Neck pain  Recent Labs     05/12/25  1227   WBC 9.74     No results for input(s): \"BLOODCX\", \"SPUTUMCULTUR\", \"GRAMSTAIN\", \"URINECX\", \"WOUNDCULT\", \"BODYFLUIDCUL\", \"MRSACULTURE\", \"INFLUAPCR\", \"INFLUBPCR\", \"RSVPCR\", \"LEGIONELLAUR\", \"STPU\", \"CDIFFTOXINB\" in the last 72 hours.  Last used 2 days ago  CT soft tissue neck with contrast:  Diffusely abnormal musculature in bilateral neck anteriorly and posteriorly, bilateral upper chest, and bilateral upper back regions with scattered areas of intramuscular fluid collections some with locules of gas and diffuse surrounding inflammatory changes, likely due to multifocal sites of intramuscular abscesses or myonecrosis with diffuse cellulitis and infectious/inflammatory myositis. Recommend evaluation by ENT.   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   Poor dentition with periodontal disease. Recommend follow-up with dentist.   Additional chronic/incidental findings as detailed above.  Positive labs: UDS positive for barbiturates, benzos, fentanyl.  Negative labs: No leukocytosis, lactic acid, procalcitonin, UA, pregnancy test.  ED started on vancomycin and pip-tazo-Tazo, administered clonidine 0.3 mg x 1, diazepam 10 mg x 1, droperidol 2.5 mg x 1, lorazepam 1 mg x 1, ondansetron x 1, 1 L NS bolus.  Only 1 blood culture obtained due to poor vasculature.  Patient has central line placed.     Plan  Will continue empiric antibiotic treatment while inpatient with IV Zosyn and vancomycin given history of IV drug use this will cover gram-negative, Pseudomonas, anaerobes, MRSA, MSSA, Streptococcus.  Nontoxic on exam no leukocytosis afebrile.  Consider transition to p.o. doxycycline plus Augmentin to complete a total 10 days of antibiotics if ongoing low suspicious of necrotizing " fasciitis.  Monitor site closely.  F/u bcx.  Will ask nurse for additional blood culture even though s/p antibiotics through peripheral access.  Vascular  surg consulted in ED:  No acute vascular surgery intervention warranted at this time.  IBJ inclusion likely compression 2/2 abscess/inflammation in neck.  Recommend 6 months total DOAC.  Eliquis and stop DVT prophylaxis.  ENT consulted in the ED:  No acute surgical intervention indicated at this time for bilateral neck infection, locules of inflammation/infection.  Broad-spectrum antibiotics.  Opioid use disorder, severe, dependence (HCC)  History of polysubstance abuse previous detox admission in 2023, admission to [facility] on 05/07   Status post Ativan, Narcan, Zofran  ECG notable for sinus tachycardia no QTc prolongation     Plan  Toxicology consulted appreciate assistance  COWS monitoring for now   Symptomatic management with medications.  Clonidine 0.2-0.3 mg PO Q6 hours PRN anxiety or palpitations    Gabapentin 300mg PO Q8 hours PRN anxiety    Diazepam 5 mg p.o. or IV q2 PRN refractory anxiety  Ibuprofen 600 mg PO Q6 hours PRN pain    Acetaminophen 1000mg PO Q8 hours PRN pain    Ondansetron 4 mg PO Q6 hours PRN N/V    Reglan 5-10 mg IV q8 p.r.n. refractory nausea vomiting  Nicotine patch 7, 14, 21 mg  PRN nicotine withdrawal   Trazodone 50 mg PO QHS PRN sleep    Loperamide 4 mg PO PRN diarrhea up to 16 mg/day  Atarax 50mg PO Q6H  Mild protein-calorie malnutrition (HCC)  Malnutrition Findings:                                 BMI Findings:           There is no height or weight on file to calculate BMI.     Cellulitis of neck    Electrolyte abnormality  Recent Labs     05/12/25  1227   K 2.8*   MG 1.7*        Likely due to poor oral intake  F/u BMP, Mg  Tachycardia  Pulse: (!) 110  Blood Pressure: 140/98    ECG notable for sinus tachycardia  TSH on prior admission 5/8 WNL.  Continue monitoring telemetry for the above  Acute opioid withdrawal  (HCC)  See under opioid use disorder.      VTE Pharmacologic Prophylaxis: VTE Score: 4 VTE covered by:  apixaban, Oral        Code Status: Level 1 - Full Code   Discussion with family: Patient declined call to .     Anticipated Length of Stay: Patient will be admitted on an inpatient basis with an anticipated length of stay of greater than 2 midnights secondary to cellulitis, withdrawal symptoms.    History of Present Illness   Chief Complaint: Neck pain    Elda Calderon is a 29 y.o. female with a PMH of BPD, PTSD, anxiety, depression, polysubstance abuse who presents with neck pain.  Previously left AMA on 5/9 due to fentanyl withdrawal symptoms while being actively treated for neck cellulitis with IV antibiotics.  Left AMA after being admitted to detox unit on 5/6 as well due to worsening of fentanyl withdrawal symptoms.  Denies seizures.  Endorses chills, N/V.  Started on IV antibiotics.    Review of Systems   All other systems reviewed and are negative.      Historical Information   Past Medical History:   Diagnosis Date    Anxiety     Borderline personality disorder (HCC)     Chlamydia     Depression     Endometriosis     IV drug user     IV heroin use, rehab 7 times     Liver lesion 5/7/2025    Panic attacks     Psychiatric disorder     anxiety    PTSD (post-traumatic stress disorder)     PTSD (post-traumatic stress disorder) 8/12/2023    Varicella     childhood    Visual impairment      Past Surgical History:   Procedure Laterality Date    LAPAROTOMY       Social History     Tobacco Use    Smoking status: Never     Passive exposure: Never    Smokeless tobacco: Never   Vaping Use    Vaping status: Every Day    Substances: Nicotine, Flavoring   Substance and Sexual Activity    Alcohol use: Not Currently    Drug use: Yes     Types: Fentanyl     Comment: 8 bags/day states benzos are mixed in    Sexual activity: Not Currently     Partners: Male     Birth control/protection: None      E-Cigarette/Vaping    E-Cigarette Use Current Every Day User      E-Cigarette/Vaping Substances    Nicotine Yes     THC No     CBD No     Flavoring Yes     Other No     Unknown No      Social History:  Marital Status: Single   Occupation: Unknown  Patient Pre-hospital Living Situation: Home  Patient Pre-hospital Level of Mobility: walks  Patient Pre-hospital Diet Restrictions: Unknown    Meds/Allergies   I have reviewed home medications with patient personally.  Prior to Admission medications    Medication Sig Start Date End Date Taking? Authorizing Provider   buprenorphine-naloxone (Suboxone) 8-2 mg Place 1 Film (8 mg total) under the tongue 2 (two) times a day 5/9/25 6/8/25  Mague Villatoro MD   naloxone (NARCAN) 4 mg/0.1 mL nasal spray Administer 1 spray into a nostril. If no response after 2-3 minutes, give another dose in the other nostril using a new spray. 5/9/25 5/9/26  Mague Villatoro MD   ondansetron (ZOFRAN) 4 mg tablet Take 1 tablet (4 mg total) by mouth every 8 (eight) hours as needed for nausea or vomiting 3/5/24   Jose Resendiz MD   ondansetron (ZOFRAN-ODT) 4 mg disintegrating tablet Take 1 tablet (4 mg total) by mouth every 6 (six) hours as needed for nausea or vomiting 4/17/25   Jing Hendricks MD     No Known Allergies    Objective :  Temp:  [97.8 °F (36.6 °C)] 97.8 °F (36.6 °C)  HR:  [] 110  BP: (103-142)/(71-98) 140/98  Resp:  [18-24] 24  SpO2:  [99 %-100 %] 99 %  O2 Device: None (Room air)    Physical Exam  Vitals and nursing note reviewed.   Constitutional:       General: She is not in acute distress.     Appearance: She is well-developed.   HENT:      Head: Normocephalic and atraumatic.   Eyes:      Conjunctiva/sclera: Conjunctivae normal.   Cardiovascular:      Rate and Rhythm: Normal rate and regular rhythm.      Heart sounds: No murmur heard.  Pulmonary:      Effort: Pulmonary effort is normal. No respiratory distress.      Breath sounds:  "Normal breath sounds.   Abdominal:      Palpations: Abdomen is soft.      Tenderness: There is no abdominal tenderness.   Musculoskeletal:         General: No swelling.      Cervical back: Neck supple.   Skin:     General: Skin is warm and dry.      Capillary Refill: Capillary refill takes less than 2 seconds.   Neurological:      Mental Status: She is alert.   Psychiatric:         Mood and Affect: Mood normal.         Lines/Drains:  Lines/Drains/Airways       Active Status       Name Placement date Placement time Site Days    CVC Central Lines 05/12/25 Triple Right Femoral 05/12/25  1205  Femoral  less than 1                    Central Line:  Goal for removal: Will discontinue when peripheral access obtained.              Lab Results: I have reviewed the following results:  Results from last 7 days   Lab Units 05/12/25  1227   WBC Thousand/uL 9.74   HEMOGLOBIN g/dL 10.2*   HEMATOCRIT % 29.9*   PLATELETS Thousands/uL 275   SEGS PCT % 83*   LYMPHO PCT % 11*   MONO PCT % 5   EOS PCT % 0     Results from last 7 days   Lab Units 05/12/25  1227   SODIUM mmol/L 140   POTASSIUM mmol/L 2.8*   CHLORIDE mmol/L 104   CO2 mmol/L 26   BUN mg/dL 4*   CREATININE mg/dL 0.40*   ANION GAP mmol/L 10   CALCIUM mg/dL 8.9   ALBUMIN g/dL 3.6   TOTAL BILIRUBIN mg/dL 0.53   ALK PHOS U/L 72   ALT U/L 11   AST U/L 23   GLUCOSE RANDOM mg/dL 104     Results from last 7 days   Lab Units 05/06/25  1621   INR  1.14     Results from last 7 days   Lab Units 05/08/25  1900   POC GLUCOSE mg/dl 157*     No results found for: \"HGBA1C\"  Results from last 7 days   Lab Units 05/12/25  1227 05/06/25  1621   LACTIC ACID mmol/L 0.8 0.9   PROCALCITONIN ng/ml <0.05 <0.05       CT soft tissue neck with contrast  Result Date: 5/12/2025  Impression: Diffusely abnormal musculature in bilateral neck anteriorly and posteriorly, bilateral upper chest, and bilateral upper back regions with scattered areas of intramuscular fluid collections some with locules of gas and " diffuse surrounding inflammatory changes, likely due to multifocal sites of intramuscular abscesses or myonecrosis with diffuse cellulitis and infectious/inflammatory myositis. Recommend evaluation by ENT. 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 Poor dentition with periodontal disease. Recommend follow-up with dentist. Additional chronic/incidental findings as detailed above. I personally epic secure message this study with ANDERSON HOLLINGSWORTHDANYJOSE CRUZManuelJESSICA on 5/12/2025 4:03 PM. The study was marked in EPIC for immediate notification. Workstation performed: [id number]       No Chest XR results available for this patient.     Other Study Results Review: EKG was reviewed.     Administrative Statements     ** Please Note: This note has been constructed using a voice recognition system. **

## 2025-05-12 NOTE — CONSULTS
Consultation - Medical Toxicology   Name: Elda Calderon 29 y.o. female I MRN: [id number]  Unit/Bed#: ED-21 I Date of Admission: 5/12/2025   Date of Service: 5/12/2025 I Hospital Day: 0       Inpatient consult to Toxicology     Date/Time  5/12/2025 7:32 PM     Performed by  Kelly Gay MD   Authorized by  Jing Hendricks MD           Physician Requesting Evaluation: Paula Mora MD   Reason for Evaluation / Principal Problem: Opioid Withdrawal    Assessment & Plan  Opioid withdrawal (HCC)  Patient uses fentanyl via IV, last use 5/10  In active withdrawal with tachycardia, headache, nausea    Recommendations:  Adjunctive medications administered as needed:  Clonidine 0.2-0.3 mg PO Q6 hours PRN anxiety or palpitations    Gabapentin 300mg PO Q8 hours PRN anxiety    Diazepam 5 mg p.o. or IV q2 PRN refractory anxiety  Ibuprofen 600 mg PO Q6 hours PRN pain    Acetaminophen 1000mg PO Q8 hours PRN pain    Ondansetron 4 mg PO Q6 hours PRN N/V    Reglan 5-10 mg IV q8 p.r.n. refractory nausea vomiting  Nicotine patch 7, 14, 21 mg  PRN nicotine withdrawal   Trazodone 50 mg PO QHS PRN sleep    Loperamide 4 mg PO PRN diarrhea up to 16 mg/day  Atarax 50mg PO Q6H   Low threshold to initiate precedex drip when above regimen does not control withdrawal symptoms    Telemetry Monitoring  Supportive care including IV fluids as needed    Opioid use disorder, severe, dependence (HCC)  Patient with a history of fentanyl use  Reports that she has been using daily prior to first ED visit on 5/6, last use 5/10.   Route of use: IV, injects in neck  Patient has left AMA multiple times over the last week including leaving AMA from the detox unit. Because of this, patient would not be able to go to [facility] detox unit.   Patient wants to use suboxone as a taper, but we do not offer that option. Patient has also been indecisive on if she wants to take suboxone in general. Will not initiate suboxone.   Appreciate case management  "recommendations in regards to disposition planning -- wants outpatient resources                For further questions, please contact the medical  on call via SecureChat between 8am and 9pm. If between 9pm and 8am, please reach out to the [facility] at [phone].     History of Present Illness   Elda Calderon is a 29 y.o. year old female who presented to [facility] ED for opioid withdrawal. Patient uses IV fentanyl which is often laced with xylazine. Patient was initially admitted to detox service on 5/6 for suboxone. Patient changed her mind about starting suboxone and left AMA. She returned to [facility] ED on 5/8 for worsening withdrawal symptoms, but left AMA again stating her \"symptoms were not being treated\". She states she went home for a day, waiting to go to inpatient rehab, but there was an insurance issue so patient was unable to go. Patient states she last used 2 days ago. She began having withdrawal symptoms such as anxiety, palpitation, nausea and stomach pain prompting ED eval.   Patient does not want to go to detox and states she would take suboxone if she could do a taper.     Review of Systems   Constitutional:  Negative for chills and fever.   HENT:  Negative for congestion and rhinorrhea.    Eyes:  Negative for visual disturbance.   Respiratory:  Negative for cough and shortness of breath.    Cardiovascular:  Negative for chest pain.   Gastrointestinal:  Positive for nausea. Negative for abdominal pain and vomiting.   Genitourinary:  Negative for dysuria and hematuria.   Musculoskeletal:  Negative for back pain and neck pain.   Skin:  Negative for wound.   Neurological:  Positive for headaches. Negative for dizziness and light-headedness.   Psychiatric/Behavioral:  The patient is nervous/anxious.        Historical Information   Medical History Review: I have reviewed the patient's PMH, PSH, Social History, Family History, Meds, and Allergies   Historical Information   Past " Medical History:   Diagnosis Date    Anxiety     Borderline personality disorder (HCC)     Chlamydia     Depression     Endometriosis     IV drug user     IV heroin use, rehab 7 times     Liver lesion 5/7/2025    Panic attacks     Psychiatric disorder     anxiety    PTSD (post-traumatic stress disorder)     PTSD (post-traumatic stress disorder) 8/12/2023    Varicella     childhood    Visual impairment      Past Surgical History:   Procedure Laterality Date    LAPAROTOMY       Social History     Tobacco Use    Smoking status: Never     Passive exposure: Never    Smokeless tobacco: Never   Vaping Use    Vaping status: Every Day    Substances: Nicotine, Flavoring   Substance and Sexual Activity    Alcohol use: Not Currently    Drug use: Yes     Types: Fentanyl     Comment: 8 bags/day states benzos are mixed in    Sexual activity: Not Currently     Partners: Male     Birth control/protection: None     E-Cigarette/Vaping    E-Cigarette Use Current Every Day User      E-Cigarette/Vaping Substances    Nicotine Yes     THC No     CBD No     Flavoring Yes     Other No     Unknown No      Family history non-contributory  Social History     Tobacco Use    Smoking status: Never     Passive exposure: Never    Smokeless tobacco: Never   Vaping Use    Vaping status: Every Day    Substances: Nicotine, Flavoring   Substance and Sexual Activity    Alcohol use: Not Currently    Drug use: Yes     Types: Fentanyl     Comment: 8 bags/day states benzos are mixed in    Sexual activity: Not Currently     Partners: Male     Birth control/protection: None     Family History   Problem Relation Age of Onset    Depression Mother     Drug abuse Brother     Drug abuse Maternal Aunt     Drug abuse Paternal Uncle     Cancer Maternal Grandmother     Cancer Paternal Grandfather        Meds/Allergies   Prior to Admission medications    Medication Sig Start Date End Date Taking? Authorizing Provider   buprenorphine-naloxone (Suboxone) 8-2 mg Place 1  Film (8 mg total) under the tongue 2 (two) times a day 5/9/25 6/8/25  Mague Villatoro MD   naloxone (NARCAN) 4 mg/0.1 mL nasal spray Administer 1 spray into a nostril. If no response after 2-3 minutes, give another dose in the other nostril using a new spray. 5/9/25 5/9/26  Mague Villatoro MD   ondansetron (ZOFRAN) 4 mg tablet Take 1 tablet (4 mg total) by mouth every 8 (eight) hours as needed for nausea or vomiting 3/5/24   Jose Resendiz MD   ondansetron (ZOFRAN-ODT) 4 mg disintegrating tablet Take 1 tablet (4 mg total) by mouth every 6 (six) hours as needed for nausea or vomiting 4/17/25   Jing Hendricks MD     Current Facility-Administered Medications:     acetaminophen (TYLENOL) tablet 975 mg, 975 mg, Oral, Once, Ruddy Olson MD    acetaminophen (TYLENOL) tablet 975 mg, 975 mg, Oral, Q8H PRN, Ruddy Olson MD    apixaban (ELIQUIS) tablet 5 mg, 5 mg, Oral, BID, Ruddy Olson MD    cloNIDine (CATAPRES) tablet 0.2 mg, 0.2 mg, Oral, Q6H PRN, Ruddy Olson MD    diazepam (VALIUM) injection 5 mg, 5 mg, Intravenous, Q2H PRN, Ruddy Olson MD    gabapentin (NEURONTIN) capsule 300 mg, 300 mg, Oral, Q8H PRN, Ruddy Olson MD    hydrOXYzine HCL (ATARAX) tablet 50 mg, 50 mg, Oral, Q6H PRN, Ruddy Olson MD    ibuprofen (MOTRIN) tablet 600 mg, 600 mg, Oral, Q6H PRN, Ruddy Olson MD    loperamide (IMODIUM) capsule 2 mg, 2 mg, Oral, 4x Daily PRN, Ruddy Olson MD    magnesium sulfate 2 g/50 mL IVPB (premix) 2 g, 2 g, Intravenous, Once, Tricia Aldana MD, Last Rate: 25 mL/hr at 05/12/25 1818, 2 g at 05/12/25 1818    metoclopramide (REGLAN) injection 10 mg, 10 mg, Intravenous, Q8H PRN, Ruddy Olson MD    nicotine (NICODERM CQ) 14 mg/24hr TD 24 hr patch 14 mg, 14 mg, Transdermal, Daily PRN, Ruddy Olson MD    ondansetron (ZOFRAN-ODT) dispersible tablet 4 mg, 4 mg, Oral, Q6H PRN, Ruddy Olson MD    piperacillin-tazobactam (ZOSYN) 4.5 g in sodium chloride 0.9 % 100 mL IVPB (EXTENDED INFUSION), 4.5 g,  Intravenous, Q8H, Ruddy Olson MD    potassium chloride (Klor-Con M20) CR tablet 40 mEq, 40 mEq, Oral, Once, Ruddy Olson MD    traZODone (DESYREL) tablet 50 mg, 50 mg, Oral, HS PRN, Ruddy Olson MD    vancomycin (VANCOCIN) IVPB (premix in dextrose) 750 mg 150 mL, 15 mg/kg, Intravenous, Daily PRN, Ruddy Olson MD    Current Outpatient Medications:     buprenorphine-naloxone (Suboxone) 8-2 mg, Place 1 Film (8 mg total) under the tongue 2 (two) times a day, Disp: 60 Film, Rfl: 0    naloxone (NARCAN) 4 mg/0.1 mL nasal spray, Administer 1 spray into a nostril. If no response after 2-3 minutes, give another dose in the other nostril using a new spray., Disp: 1 each, Rfl: 0    ondansetron (ZOFRAN) 4 mg tablet, Take 1 tablet (4 mg total) by mouth every 8 (eight) hours as needed for nausea or vomiting, Disp: 2 tablet, Rfl: 0    ondansetron (ZOFRAN-ODT) 4 mg disintegrating tablet, Take 1 tablet (4 mg total) by mouth every 6 (six) hours as needed for nausea or vomiting, Disp: 10 tablet, Rfl: 0   No Known Allergies    Objective :  Temp:  [97.8 °F (36.6 °C)] 97.8 °F (36.6 °C)  HR:  [] 110  BP: (103-142)/(71-98) 140/98  Resp:  [18-24] 24  SpO2:  [99 %-100 %] 99 %  O2 Device: None (Room air)    No intake or output data in the 24 hours ending 05/12/25 1954  Lines/Drains/Airways       Active Status       Name Placement date Placement time Site Days    CVC Central Lines 05/12/25 Triple Right Femoral 05/12/25  1205  Femoral  less than 1                  Physical Exam  Constitutional:       General: She is not in acute distress.  HENT:      Head: Normocephalic and atraumatic.      Mouth/Throat:      Mouth: Mucous membranes are moist.      Pharynx: Oropharynx is clear.   Eyes:      Extraocular Movements: Extraocular movements intact.      Pupils: Pupils are equal, round, and reactive to light.      Comments: 3 mm bilaterally and reactive   Neck:      Comments: No erythema over neck  Cardiovascular:      Rate and Rhythm: Regular  rhythm. Tachycardia present.      Pulses: Normal pulses.      Heart sounds: Normal heart sounds.   Pulmonary:      Effort: Pulmonary effort is normal. No respiratory distress.   Abdominal:      Palpations: Abdomen is soft.      Tenderness: There is no abdominal tenderness. There is no guarding or rebound.   Musculoskeletal:         General: No deformity. Normal range of motion.      Cervical back: Normal range of motion. No tenderness.      Right lower leg: No edema.      Left lower leg: No edema.   Skin:     General: Skin is warm and dry.      Capillary Refill: Capillary refill takes less than 2 seconds.   Neurological:      General: No focal deficit present.      Mental Status: She is alert and oriented to person, place, and time. Mental status is at baseline.      Comments: Normal deep tendon reflexes, no clonus of LE           Lab Results: I have reviewed the following results:  Results from last 7 days   Lab Units 05/12/25 1227 05/08/25 1938 05/08/25 1918 05/07/25  0721 05/06/25  1621   WBC Thousand/uL 9.74  --  7.69   < > 9.18   HEMOGLOBIN g/dL 10.2*  --  11.5   < > 10.7*   I STAT HEMOGLOBIN   --    < >  --   --   --    HEMATOCRIT % 29.9*  --  33.5*   < > 31.7*   HEMATOCRIT, ISTAT   --    < >  --   --   --    PLATELETS Thousands/uL 275  --  289   < > 269   SEGS PCT % 83*  --  49  --  57   LYMPHO PCT % 11*  --  35  --  29   MONO PCT % 5  --  8  --  8   EOS PCT % 0  --  7*  --  5    < > = values in this interval not displayed.      Results from last 7 days   Lab Units 05/12/25 1227 05/08/25 1938   POTASSIUM mmol/L 2.8*  --    CHLORIDE mmol/L 104  --    CO2 mmol/L 26  --    CO2, I-STAT mmol/L  --  28   BUN mg/dL 4*  --    CREATININE mg/dL 0.40*  --    CALCIUM mg/dL 8.9  --    ALBUMIN g/dL 3.6  --    ALK PHOS U/L 72  --    ALT U/L 11  --    AST U/L 23  --    GLUCOSE, ISTAT mg/dl  --  144*   MAGNESIUM mg/dL 1.7*  --    PHOSPHORUS mg/dL 2.9  --       Results from last 7 days   Lab Units 05/06/25  1621   INR   1.14   PTT seconds 33     Results from last 7 days   Lab Units 05/12/25  1227 05/06/25  1621   LACTIC ACID mmol/L 0.8 0.9     Results from last 7 days   Lab Units 05/08/25  2136 05/08/25  1918   HS TNI 0HR ng/L  --  11   HS TNI 2HR ng/L 10  --           Results from last 7 days   Lab Units 05/12/25  1227   ACETAMINOPHEN LVL ug/mL <2*   ETHANOL LVL mg/dL <10   SALICYLATE LVL mg/dL <5*           Administrative Statements

## 2025-05-12 NOTE — DISCHARGE INSTR - OTHER ORDERS
Narcotics Anonymous 24/7 [place] Meeting  [email or web address]     SMART Recovery Meetings    Self Management and Recovery Training (SMART)  SMART Recovery is an evidenced-informed recovery method grounded in Rational Emotive Behavioral Therapy (REBT) and Cognitive Behavioral Therapy (CBT), that supports people with substance dependencies or problem behaviors to:  Build and maintain motivation  [place] with urges and cravings  Manage thoughts, feelings and behaviors  Live a balanced life    Find meetings and tools: [email or web address]    SYNC Recovery Events    [facility] organizes meaningful events for people in recovery and their families. Our main goal is to have fun by connecting with nature and other people. We can then realize that there is a world of possibilities open to us, now that we are no longer in the bondage of addiction. These events are designed to provide :  Hope for those in early recovery  Tangible proof that life gets better when we’re sober  Service opportunities for people with recovery experience  Social connectedness for everyone involved    [address]  [place] [address]  Phone: [phone]  Email: [email or web address]   Website: [email or web address]    CRISIS INFORMATION  If you are experiencing a mental health emergency, you may call the [facility] Crisis [facility]vention Office 24 hours a day, 7 days per week at [phone].    In [facility], call [phone].    Warmline is a confidential 24/7 telephone support service manned by trained mental health consumers.  Warmline provides support, a listening ear and can provide information about available services.[employer] specializes in the concerns of mental health consumers, their families and friends.  However, we are also here for anyone who has a mental health concern, is confused about or just doesn't know anything about mental health or where to get information.  To reach [facility], call  [phone].    HOW TO GET SUBSTANCE ABUSE HELP:  If you or someone you know has a drug or alcohol problem, there is help:  [facility] Alcohol Abuse Services: [phone]  [facility] Drug [facility] Alcohol Abuse Services: [phone]  An assessment is the first step.   In addition to those listed there are other programs available in the area but assessment is best to determine an appropriate level of care.  If you DO NOT have Medical Assistance (MA) or Private Insurance, an assessment can be scheduled at one of these providers:  [facility]  [address] [place] [address]  [phone]   [address] [place] [address]  [phone]   [address] [place] [address]  [phone]   [facility]  [address] [place] [address]  [phone]   [facility]  [address] [place] [address] [place] [address]  [phone]   [facility]  [address] [place] [address]  [phone]     If you HAVE Medical Assistance, an assessment can be scheduled at one of these providers:  [facility]  [address] [facility] [place] [address]  [phone]   [facility]  [address] [place] [address]  [phone]   [facility]  [address], [place] [address]  [phone]  [address] [place] [address], [address], [facility] [place] [address] [phone]   [address] [place] [address]  [phone]   [address] [place] [address]  [phone]   [facility]  [address] [place] [place] [address]  [phone]   [facility]  [address] [place] [address]  [phone]   [facility]  [address] [place] [address] [place] [address]  [phone]   [facility]  [address] [place] [address]  [phone]     If you  HAVE Private Insurance, an assessment can be scheduled at one of these providers.  Please contact these Providers to determine if they are in your network plan:  [facility]A [facility]ake [facility]  [address] 1st Floor, [place] [address]  [phone]   [facility]  [address] [facility] [place] [address]  [phone]   [facility]  [address] [facility] [place] [address]  [phone]   NET ([facility] [place])  [address] , [place] [address]  [phone]   [facility]  [address] [place] [address]  [phone]     From the [facility] website [place]guides/opioid-epidemic/#GetNaloxone    How do I get naloxone?  Family members and friends can access naloxone by:    Obtaining a prescription from their family doctor  Using the standing order issued by Acting Physician General Alma Martinez. A standing order is a prescription written for the general public, rather than specifically for an individual.  To use the standing order, print it and take it with you to the pharmacy or have the digital version on your phone. Download the standing order from the Department of Health (PDF).    If you are unable to print it or use the digital version, the standing order is kept on file at many pharmacies. If a pharmacy does not have it on file, they may have the ability to look it up.    Naloxone prescriptions can be filled at most pharmacies. Although the medication might not be available for same-day pickup, it often can be ordered and available within a day or two.

## 2025-05-12 NOTE — ED CARE HANDOFF
Emergency Department Sign Out Note        Sign out and transfer of care from Dr. Aldana (EM PGY2). See Separate Emergency Department note.     The patient, Elda Calderon, was evaluated by the previous provider for neck pain.    Workup Completed:  Labs Reviewed   CBC AND DIFFERENTIAL - Abnormal       Result Value Ref Range Status    WBC 9.74  4.31 - 10.16 Thousand/uL Final    RBC 3.49 (*) 3.81 - 5.12 Million/uL Final    Hemoglobin 10.2 (*) 11.5 - 15.4 g/dL Final    Hematocrit 29.9 (*) 34.8 - 46.1 % Final    MCV 86  82 - 98 fL Final    MCH 29.2  26.8 - 34.3 pg Final    MCHC 34.1  31.4 - 37.4 g/dL Final    RDW 12.3  11.6 - 15.1 % Final    MPV 9.0  8.9 - 12.7 fL Final    Platelets 275  149 - 390 Thousands/uL Final    nRBC 0  /100 WBCs Final    Segmented % 83 (*) 43 - 75 % Final    Immature Grans % 1  0 - 2 % Final    Lymphocytes % 11 (*) 14 - 44 % Final    Monocytes % 5  4 - 12 % Final    Eosinophils Relative 0  0 - 6 % Final    Basophils Relative 0  0 - 1 % Final    Absolute Neutrophils 8.05 (*) 1.85 - 7.62 Thousands/µL Final    Absolute Immature Grans 0.05  0.00 - 0.20 Thousand/uL Final    Absolute Lymphocytes 1.08  0.60 - 4.47 Thousands/µL Final    Absolute Monocytes 0.49  0.17 - 1.22 Thousand/µL Final    Eosinophils Absolute 0.04  0.00 - 0.61 Thousand/µL Final    Basophils Absolute 0.03  0.00 - 0.10 Thousands/µL Final   COMPREHENSIVE METABOLIC PANEL - Abnormal    Sodium 140  135 - 147 mmol/L Final    Potassium 2.8 (*) 3.5 - 5.3 mmol/L Final    Chloride 104  96 - 108 mmol/L Final    CO2 26  21 - 32 mmol/L Final    ANION GAP 10  4 - 13 mmol/L Final    BUN 4 (*) 5 - 25 mg/dL Final    Creatinine 0.40 (*) 0.60 - 1.30 mg/dL Final    Comment: Standardized to IDMS reference method    Glucose 104  65 - 140 mg/dL Final    Comment: If the patient is fasting, the ADA then defines impaired fasting glucose as > 100 mg/dL and diabetes as > or equal to 123 mg/dL.    Calcium 8.9  8.4 - 10.2 mg/dL Final    AST 23  13 - 39 U/L Final     ALT 11  7 - 52 U/L Final    Comment: Specimen collection should occur prior to Sulfasalazine administration due to the potential for falsely depressed results.     Alkaline Phosphatase 72  34 - 104 U/L Final    Total Protein 6.6  6.4 - 8.4 g/dL Final    Albumin 3.6  3.5 - 5.0 g/dL Final    Total Bilirubin 0.53  0.20 - 1.00 mg/dL Final    Comment: Use of this assay is not recommended for patients undergoing treatment with eltrombopag due to the potential for falsely elevated results.  N-acetyl-p-benzoquinone imine (metabolite of Acetaminophen) will generate erroneously low results in samples for patients that have taken an overdose of Acetaminophen.    eGFR 142  ml/min/1.73sq m Final    Narrative:     National Kidney Disease Foundation guidelines for Chronic Kidney Disease (CKD):     Stage 1 with normal or high GFR (GFR > 90 mL/min/1.73 square meters)    Stage 2 Mild CKD (GFR = 60-89 mL/min/1.73 square meters)    Stage 3A Moderate CKD (GFR = 45-59 mL/min/1.73 square meters)    Stage 3B Moderate CKD (GFR = 30-44 mL/min/1.73 square meters)    Stage 4 Severe CKD (GFR = 15-29 mL/min/1.73 square meters)    Stage 5 End Stage CKD (GFR <15 mL/min/1.73 square meters)  Note: GFR calculation is accurate only with a steady state creatinine   SALICYLATE LEVEL - Abnormal    Salicylate Lvl <5 (*) 5 - 20 mg/dL Final   ACETAMINOPHEN LEVEL - Abnormal    Acetaminophen Level <2 (*) 10 - 20 ug/mL Final   MAGNESIUM - Abnormal    Magnesium 1.7 (*) 1.9 - 2.7 mg/dL Final   LACTIC ACID, PLASMA (W/REFLEX IF RESULT > 2.0) - Normal    LACTIC ACID 0.8  0.5 - 2.0 mmol/L Final    Narrative:     Result may be elevated if tourniquet was used during collection.   PROCALCITONIN TEST - Normal    Procalcitonin <0.05  <=0.25 ng/ml Final    Comment: Suspected Lower Respiratory Tract Infection (LRTI):  - LESS than or EQUAL to 0.25 ng/mL:   low likelihood for bacterial LRTI; antibiotics DISCOURAGED.  - GREATER than 0.25 ng/mL:   increased likelihood for  bacterial LRTI; antibiotics ENCOURAGED.    Suspected Sepsis:  - Strongly consider initiating antibiotics in ALL UNSTABLE patients.  - LESS than or EQUAL to 0.5 ng/mL:   low likelihood for bacterial sepsis; antibiotics DISCOURAGED.  - GREATER than 0.5 ng/mL:   increased likelihood for bacterial sepsis; antibiotics ENCOURAGED.  - GREATER than 2 ng/mL:   high risk for severe sepsis / septic shock; antibiotics strongly ENCOURAGED.    Decisions on antibiotic use should not be based solely on Procalcitonin (PCT) levels. If PCT is low but uncertainty exists with stopping antibiotics, repeat PCT in 6-24 hours to confirm the low level. If antibiotics are administered (regardless if initial PCT was high or low), repeat PCT every 1-2 days to consider early antibiotic cessation (when GREATER than 80% decrease from the peak OR when PCT drops below designated cutoffs, whichever comes first), so long as the infection is NOT one that typically requires prolonged treatment durations (e.g., bone/joint infections, endocarditis, Staph. aureus bacteremia).    Situations of FALSE-POSITIVE Procalcitonin values:  1) Newborns < 72 hours old  2) Massive stress from severe trauma / burns, major surgery, acute pancreatitis, cardiogenic / hemorrhagic shock, sickle cell crisis, or other organ perfusion abnormalities  3) Malaria and some Candidal infections  4) Treatment with agents that stimulate cytokines (e.g., OKT3, anti-lymphocyte globulins, alemtuzumab, IL-2, granulocyte transfusion [NOT GCSFs])  5) Chronic renal disease causes elevated baseline levels (consider GREATER than 0.75 ng/mL as an abnormal cut-off); initiating HD/CRRT may cause transient decreases  6) Paraneoplastic syndromes from medullary thyroid or SCLC, some forms of vasculitis, and acute vpjqc-mc-fsaz disease    Situations of FALSE-NEGATIVE Procalcitonin values:  1) Too early in clinical course for PCT to have reached its peak (may repeat in 6-24 hours to confirm low  level)  2) Localized infection WITHOUT systemic (SIRS / sepsis) response (e.g., an abscess, osteomyelitis, cystitis)  3) Mycobacteria (e.g., Tuberculosis, MAC)  4) Cystic fibrosis exacerbations     MEDICAL ALCOHOL - Normal    Ethanol Lvl <10  <10 mg/dL Final   PHOSPHORUS - Normal    Phosphorus 2.9  2.7 - 4.5 mg/dL Final   POCT PREGNANCY, URINE - Normal    EXT Preg Test, Ur Negative   Final    Control        BLOOD CULTURE   RAPID DRUG SCREEN, URINE   UA W REFLEX TO MICROSCOPIC WITH REFLEX TO CULTURE   COMA PANEL    Narrative:     The following orders were created for panel order Coma panel.  Procedure                               Abnormality         Status                     ---------                               -----------         ------                     Ethanol[[id number]]                      Normal              Final result               Salicylate level[[id number]]             Abnormal            Final result               Acetaminophen level-If c...[[id number]]  Abnormal            Final result                 Please view results for these tests on the individual orders.     CT soft tissue neck with contrast   Final Result      Diffusely abnormal musculature in bilateral neck anteriorly and posteriorly, bilateral upper chest, and bilateral upper back regions with scattered areas of intramuscular fluid collections some with locules of gas and diffuse surrounding inflammatory    changes, likely due to multifocal sites of intramuscular abscesses or myonecrosis with diffuse cellulitis and infectious/inflammatory myositis. Recommend evaluation by ENT.      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      Poor dentition with periodontal disease. Recommend follow-up with dentist.      Additional chronic/incidental findings as detailed above.         I personally epic secure message this study with ANDERSON ELLIS on 5/12/2025 4:03 PM. The study was marked in EPIC for immediate notification.                      Workstation performed: [id number]           ED Course / Workup Pending (followup):  Pending ENT determination of appropriate team for admission.        No data recorded                          ED Course as of 05/15/25 0925   Mon May 12, 2025   1712 Sign out from Dr. Aldana (EM PGY2)  Patient with multiple abscesses and concern for IJ thrombosis.  - Previous team placed right groin Central line as vascular access was very difficult.  - Given Van, zosyn for intramuscular abscesses.  - Admitted on 5th and 9th for the same but keeps leaving AMA.  - Now with new abscesses in back and chest.  - ENT, vascular, surgery discussing  - Also with withdrawal symptoms, is not going to the tox unit, does not want to go back. Tox recs are in for withdrawal. Last used fentanyl 2 days ago per patient.    Pending [facility] admission and mutliple team recommendations.   1836 Various teams still discussing   1844 Per ENT, no indication for their team for intervention at this time. Advise IV antibiotics and admission     Procedures  Medical Decision Making  Elda Calderon is a 29 y.o. female presenting with neck pain. Vitals unremarkable. Exam remarkable for multiple painful locations concerning for abscesses.      See previous provider note for further HPI and exam.    See ED course for further updates and interpretation of results.    Based on these results and H&P, after discussion with ENT, surgery and [facility]. Admitted to [facility] for antibiotics.    Results, clinical impressions, and plan were discussed with patient. They expressed understanding and were in agreement with plan. Patient was given the opportunity to ask questions in ED. All questions and concerns were addressed in ED.    After evaluation and workup in the emergency department Discussed patient's case with [facility] regarding admission who accepted the patient for further evaluation and management.    Amount and/or Complexity of Data Reviewed  Labs:  ordered.  Radiology: ordered.    Risk  OTC drugs.  Prescription drug management.  Decision regarding hospitalization.            Disposition  Final diagnoses:   Opioid use disorder, severe, dependence (HCC)   Acute embolism and thrombosis of left internal jugular vein (HCC)   Abscess of muscle of neck     Time reflects when diagnosis was documented in both MDM as applicable and the Disposition within this note       Time User Action Codes Description Comment    5/12/2025  3:00 PM Tricia Aldana Add [F11.20] Opioid use disorder, severe, dependence (HCC)     5/12/2025  4:27 PM Tricia Aldana Modify [F11.20] Opioid use disorder, severe, dependence (HCC)     5/12/2025  4:27 PM Tricia Aldana Add [I82.C12] Acute embolism and thrombosis of left internal jugular vein (HCC)     5/12/2025  4:37 PM Tricia Aldana Add [M60.08] Abscess of muscle of neck           ED Disposition       ED Disposition   Admit    Condition   Stable    Date/Time   Mon May 12, 2025  5:15 PM    Comment   --             Follow-up Information    None       Patient's Medications   Discharge Prescriptions    No medications on file     No discharge procedures on file.       ED Provider  Electronically Signed by     Sherri Boogie MD  05/15/25 0996

## 2025-05-12 NOTE — ED NOTES
"Patient rang callbell to inform RN that she feels like \"I cannot take a deep breath.\" Reports feeling like chest is heavy, denies pain. VS obtained. Respirations appear shallow, and increased from arrival vitals. HR elevated as well. Patient does not appear to be any acute distress. Assisted patient to sit up more to facilitate breathing. Informed ELODIA Aldana, resident provider of same. No new orders at this time.      Jazlyn Ray RN  05/12/25 1314    "

## 2025-05-12 NOTE — ASSESSMENT & PLAN NOTE
"Recent Labs     05/12/25  1227   WBC 9.74     No results for input(s): \"BLOODCX\", \"SPUTUMCULTUR\", \"GRAMSTAIN\", \"URINECX\", \"WOUNDCULT\", \"BODYFLUIDCUL\", \"MRSACULTURE\", \"INFLUAPCR\", \"INFLUBPCR\", \"RSVPCR\", \"LEGIONELLAUR\", \"STPU\", \"CDIFFTOXINB\" in the last 72 hours.  Last used 2 days ago  CT soft tissue neck with contrast:  Diffusely abnormal musculature in bilateral neck anteriorly and posteriorly, bilateral upper chest, and bilateral upper back regions with scattered areas of intramuscular fluid collections some with locules of gas and diffuse surrounding inflammatory changes, likely due to multifocal sites of intramuscular abscesses or myonecrosis with diffuse cellulitis and infectious/inflammatory myositis. Recommend evaluation by ENT.   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   Poor dentition with periodontal disease. Recommend follow-up with dentist.   Additional chronic/incidental findings as detailed above.  Positive labs: UDS positive for barbiturates, benzos, fentanyl.  Negative labs: No leukocytosis, lactic acid, procalcitonin, UA, pregnancy test.  ED started on vancomycin and pip-tazo-Tazo, administered clonidine 0.3 mg x 1, diazepam 10 mg x 1, droperidol 2.5 mg x 1, lorazepam 1 mg x 1, ondansetron x 1, 1 L NS bolus.  Only 1 blood culture obtained due to poor vasculature.  Patient has central line placed.     Plan  Will continue empiric antibiotic treatment while inpatient with IV Zosyn and vancomycin given history of IV drug use this will cover gram-negative, Pseudomonas, anaerobes, MRSA, MSSA, Streptococcus.  Nontoxic on exam no leukocytosis afebrile.  Consider transition to p.o. doxycycline plus Augmentin to complete a total 10 days of antibiotics if ongoing low suspicious of necrotizing fasciitis.  Monitor site closely.  F/u bcx.  Will ask nurse for additional blood culture even though s/p antibiotics through peripheral access.  Vascular  surg consulted in ED:  No acute vascular surgery " intervention warranted at this time.  IBJ inclusion likely compression 2/2 abscess/inflammation in neck.  Recommend 6 months total DOAC.  Eliquis and stop DVT prophylaxis.  ENT consulted in the ED:  No acute surgical intervention indicated at this time for bilateral neck infection, locules of inflammation/infection.  Broad-spectrum antibiotics.

## 2025-05-12 NOTE — CONSULTS
Consultation - Vascular Surgery   Name: Elda Calderon 29 y.o. female I MRN: [id number]  Unit/Bed#: ED-21 I Date of Admission: 5/12/2025   Date of Service: 5/12/2025 I Hospital Day: 0   Inpatient consult to Vascular Surgery  Consult performed by: Miriam Singer PA-C  Consult ordered by: Jing Hendricks MD        Physician Requesting Evaluation: Jing Garvin*   Reason for Evaluation / Principal Problem: neck pain    Assessment & Plan  Neck pain  CT soft tissue neck: Diffusely abnormal musculature in bilateral neck anteriorly and posteriorly, bilateral upper chest, and bilateral upper back regions with scattered areas of intramuscular fluid collections some with locules of gas and diffuse surrounding inflammatory changes, likely due to multifocal sites of intramuscular abscesses or myonecrosis with diffuse cellulitis and infectious/inflammatory myositis.   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    - discussed with vascular surgery attending on call. No acute vascular surgery intervention warranted at this time  - IJV occlusion likely compression 2/2 abscesses/inflammation in neck  - recommend 6 months total of DOAC  - f/u ENT recs for intramuscular abscesses/cellulitis/myositis    Rest of care per primary  Please contact the SecureChat role for the Vascular Surgery service with any questions/concerns.    History of Present Illness   Elda Calderon is a 29 y.o. female PMH anxiety/depression, PTSD, IVDU who presents with neck pain. Pt was recently admitted to [facility] 5/8-5/9 for neck pain. CT at that time revealed cellulitis. She was started on IV abx. Unfortunately, she left AMA. She represents today with continued neck pain, slightly worsening, now worse on left neck. States pain is minimal/none if resting her head against something. Worse when standing/using neck muscles. Endorses chills. Endorses nausea/vomiting but states she is withdrawing currently. Denies feelings of facial  swelling. Last injected 2 days ago, unsure of which side but believes her left neck.    Review of Systems   Constitutional:  Positive for chills. Negative for fever.   Musculoskeletal:  Positive for neck pain.   Skin: Negative.    All other systems reviewed and are negative.    Medical History Review: I have reviewed the patient's PMH, PSH, Social History, Family History, Meds, and Allergies   Historical Information   Past Medical History:   Diagnosis Date    Anxiety     Borderline personality disorder (HCC)     Chlamydia     Depression     Endometriosis     IV drug user     IV heroin use, rehab 7 times     Liver lesion 5/7/2025    Panic attacks     Psychiatric disorder     anxiety    PTSD (post-traumatic stress disorder)     PTSD (post-traumatic stress disorder) 8/12/2023    Varicella     childhood    Visual impairment      Past Surgical History:   Procedure Laterality Date    LAPAROTOMY       Social History     Tobacco Use    Smoking status: Never     Passive exposure: Never    Smokeless tobacco: Never   Vaping Use    Vaping status: Every Day    Substances: Nicotine, Flavoring   Substance and Sexual Activity    Alcohol use: Not Currently    Drug use: Yes     Types: Fentanyl     Comment: 8 bags/day states benzos are mixed in    Sexual activity: Not Currently     Partners: Male     Birth control/protection: None     E-Cigarette/Vaping    E-Cigarette Use Current Every Day User      E-Cigarette/Vaping Substances    Nicotine Yes     THC No     CBD No     Flavoring Yes     Other No     Unknown No      Family History   Problem Relation Age of Onset    Depression Mother     Drug abuse Brother     Drug abuse Maternal Aunt     Drug abuse Paternal Uncle     Cancer Maternal Grandmother     Cancer Paternal Grandfather      Social History     Tobacco Use    Smoking status: Never     Passive exposure: Never    Smokeless tobacco: Never   Vaping Use    Vaping status: Every Day    Substances: Nicotine, Flavoring   Substance and  Sexual Activity    Alcohol use: Not Currently    Drug use: Yes     Types: Fentanyl     Comment: 8 bags/day states benzos are mixed in    Sexual activity: Not Currently     Partners: Male     Birth control/protection: None       Objective :  Temp:  [97.8 °F (36.6 °C)] 97.8 °F (36.6 °C)  HR:  [] 110  BP: (103-142)/(71-98) 140/98  Resp:  [18-24] 24  SpO2:  [99 %-100 %] 99 %  O2 Device: None (Room air)    Lines/Drains/Airways       Active Status       Name Placement date Placement time Site Days    CVC Central Lines 05/12/25 Triple Right Femoral 05/12/25  1205  Femoral  less than 1                  Physical Exam  Vitals and nursing note reviewed.   Constitutional:       General: She is not in acute distress.     Appearance: Normal appearance. She is normal weight. She is not ill-appearing, toxic-appearing or diaphoretic.   HENT:      Head: Normocephalic and atraumatic.   Eyes:      Extraocular Movements: Extraocular movements intact.   Neck:      Comments: B/l neck edema/induration. No crepitus. No pain out of proportion to exam. No facial edema. Minimally tender to palpation  Cardiovascular:      Rate and Rhythm: Tachycardia present.   Skin:     General: Skin is warm.      Capillary Refill: Capillary refill takes less than 2 seconds.   Neurological:      General: No focal deficit present.      Mental Status: She is alert and oriented to person, place, and time.   Psychiatric:         Mood and Affect: Mood normal.         Behavior: Behavior normal.     Radial 2+. Pink and warm      Lab Results: I have reviewed the following results:  Recent Labs     05/12/25  1227   WBC 9.74   HGB 10.2*   HCT 29.9*      SODIUM 140   K 2.8*      CO2 26   BUN 4*   CREATININE 0.40*   GLUC 104   MG 1.7*   PHOS 2.9   AST 23   ALT 11   ALB 3.6   TBILI 0.53   ALKPHOS 72   LACTICACID 0.8       Imaging Results Review: I reviewed radiology reports from this admission including: CT soft tissue neck with contrast.  Other Study  Results Review: No additional pertinent studies reviewed.    VTE Pharmacologic Prophylaxis: VTE covered by:    None     VTE Mechanical Prophylaxis: sequential compression device    Administrative Statements   I have spent a total time of 30 minutes in caring for this patient on the day of the visit/encounter including Diagnostic results, Impressions, Counseling / Coordination of care, Documenting in the medical record, Reviewing/placing orders in the medical record (including tests, medications, and/or procedures), Obtaining or reviewing history  , and Communicating with other healthcare professionals .

## 2025-05-12 NOTE — ASSESSMENT & PLAN NOTE
Patient with a history of fentanyl use  Reports that she has been using daily prior to first ED visit on 5/6, last use 5/10.   Route of use: IV, injects in neck  Patient has left AMA multiple times over the last week including leaving AMA from the detox unit. Because of this, patient would not be able to go to [facility] detox unit.   Patient wants to use suboxone as a taper, but we do not offer that option. Patient has also been indecisive on if she wants to take suboxone in general. Will not initiate suboxone.   Appreciate case management recommendations in regards to disposition planning -- wants outpatient resources

## 2025-05-13 LAB
BACTERIA BLD CULT: NORMAL
BACTERIA BLD CULT: NORMAL

## 2025-05-13 RX ORDER — DOXYCYCLINE 100 MG/1
100 CAPSULE ORAL EVERY 12 HOURS SCHEDULED
Qty: 20 CAPSULE | Refills: 0 | Status: SHIPPED | OUTPATIENT
Start: 2025-05-13 | End: 2025-05-17

## 2025-05-13 NOTE — PROGRESS NOTES
Progress Note - Surgery-General   Name: Elda Calderon 29 y.o. female I MRN: [id number]  Unit/Bed#: ED-21 I Date of Admission: 5/12/2025   Date of Service: 5/12/2025 I Hospital Day: 0  { ?Quick Links I Problem List I PORCH I Billing Tip:76107}  Assessment & Plan  Neck pain  B/L neck infection, with IVDU via neck injections  Found to have neck, chest, and back muscle infiltrative collections  Gen surg specifically consulted for chest and back collections    Plan:  No indication for acute surgical intervention or target for drainage  Continue serial exams  ENT providing recs for neck infection  Vascular providing recs for occluded jugular vein, DOAC for 6 months (started Eliquis here)  Rest of care, including abx and withdrawal management per primary  Opioid use disorder, severe, dependence (HCC)    Acute opioid withdrawal (HCC)    Electrolyte abnormality    Cellulitis of neck    Tachycardia      {MDM/Admin Statements (Optional):47057}    Subjective   ***    Objective :{?Quick Links I ICU Summary I Vitals I I/Os I LDAs I Mobility (PT/OT) I Code Status / ACP   ?Quick Links I Active Meds I Pain Meds I Antibiotics I Anticoagulants:78719}  Temp:  [97.8 °F (36.6 °C)] 97.8 °F (36.6 °C)  HR:  [] 122  BP: (103-153)/() 153/104  Resp:  [18-24] 24  SpO2:  [98 %-100 %] 98 %  O2 Device: None (Room air)    I/O         05/11 0701 05/12 0700 05/12 0701 05/13 0700    IV Piggyback  50    Total Intake  50    Net  +50                Lines/Drains/Airways       Active Status       Name Placement date Placement time Site Days    CVC Central Lines 05/12/25 Triple Right Femoral 05/12/25  1205  Femoral  less than 1                  Physical Exam  ***  {?Quick Links I Lab Review I Micro Results I Radiology I Cardiology:49028}  Lab Results: I have reviewed the following results:  Recent Labs     05/12/25  1227   WBC 9.74   HGB 10.2*   HCT 29.9*      SODIUM 140   K 2.8*      CO2 26   BUN 4*   CREATININE 0.40*   GLUC 104  "  MG 1.7*   PHOS 2.9   AST 23   ALT 11   ALB 3.6   TBILI 0.53   ALKPHOS 72   LACTICACID 0.8       {Imaging Results Review:23806::\"No pertinent imaging studies reviewed.\"}  {Other Study Results Review:32183::\"No additional pertinent studies reviewed.\"}    VTE Pharmacologic Prophylaxis: {Pharmacologic VTE Prophylaxis:096680137}  VTE Mechanical Prophylaxis: {Mechanical VTE Prophylaxis:17261}  "

## 2025-05-13 NOTE — QUICK NOTE
Prior to leaving AMA, removed R femoral CVC without issue. Held pressure for 15 minutes.    Discussed recommended flat time of 1-2 hours, especially given her initial dose of Eliquis and given her urgency to leave AMA. We talked about all options, including whatever she could tolerate prior to AMA. We talked about risks of hematoma, large vessel bleed, need for re-hospitalization, death. We also talked about return precautions. She acknowledged risks and awareness of return precautions

## 2025-05-13 NOTE — ED ATTENDING ATTESTATION
5/12/2025  I, Jing Hendricks MD, saw and evaluated the patient. I have discussed the patient with the resident/non-physician practitioner and agree with the resident's/non-physician practitioner's findings, Plan of Care, and MDM as documented in the resident's/non-physician practitioner's note, except where noted. All available labs and Radiology studies were reviewed.  I was present for key portions of any procedure(s) performed by the resident/non-physician practitioner and I was immediately available to provide assistance.       At this point I agree with the current assessment done in the Emergency Department.  I have conducted an independent evaluation of this patient a history and physical is as follows:    Elda Calderon is a 29-year-old female who presents to the emergency department with worsening neck discomfort.  This is bilateral and recently a bit worse on the left than the right.  She has been seen on a number of occasions recently for similar discomfort and left AMA from hospitalization on 5/9 for such.  CT scan on that stay revealed gas within neck tissues.  Surgery was consulted and consideration of possible necrotizing fasciitis though this was felt much less likely.  Gas introduced via needle.  Deedee has longstanding history of substance use including injection opioids-most recently fentanyl 2 days ago.  Recently she has used neck sites as injection locations.  She has not appreciated fever.  She does feel malaised overall and as though she is going through withdrawal.  Suboxone was prescribed upon discharge.  She notes that she has not taken this fearful it would precipitate a more significant withdrawal reaction.  She is interested in assistance with the substance use.  Denies having any SI/HI.  She is prepared to be hospitalized today.  Does report having been taking oral antibiotics as prescribed from last discharge.    She is pleasant, fully alert, cooperative and with clear  speech.  Blunted affect.  Mucous membranes very slightly dry.  Heart sounds regular and lungs clear to auscultation bilaterally.  She does have very thin habitus.  Arms are extensively scarred with thickened skin.  Mild diffuse circumferential neck tenderness.  No erythema crepitus, swelling or fluctuance appreciated.  Small lymphadenopathy appreciated in region of anterior cervical nodes.  Dentition poor.  No submandibular swelling appreciated.  Lower extremities nontender and nonedematous.  She does have a few bandaged puncture marks in the feet/ankles from recent hospital stays.  No surrounding erythema, increased warmth or fluctuance.    Will obtain new labs, restart IV antibiotics and obtain updated imaging.  Anticipate admission for further treatment of infection as well as substance abuse/withdrawal.    ED Course  ED Course as of 05/13/25 0825   Mon May 12, 2025   1220 Right femoral line placed by Dr. Tricia Aldana under my continuous direct supervision.  Attempts at placing peripheral lines by Dr. Aldana and myself were not successful.      Given marked challenges with phlebotomy and IV placement will complete only 1 set of cultures.  (Discussed with RN.)   1821 I have transferred care to Dr. Hernandez.  Dr. Aldana had additionally transferred care to Dr. Boogie.  Dr. Spear (Sl) aware of patient and specialty consultations.  Await response from general surgery with regard to chest abscesses/any need for procedural intervention.     Dr. Aldana contacted vascular given findings of left IJ occlusion.  No procedural intervention required at this time that would require transfer to another campus.  She additionally consulted ENT &  general surgery with regard to fluid collections in neck and upper chest musculature with regard to any possible need for drainage.Toxicology was consulted for recommendations regarding withdrawal.    Critical Care Time  Procedures

## 2025-05-13 NOTE — ASSESSMENT & PLAN NOTE
Malnutrition Findings:                                 BMI Findings:           There is no height or weight on file to calculate BMI.     Plan:  -Patient left AMA and discussed that possible risk in doing so include worsening infection, multiorgan failure and death to which she demonstrated understanding of.

## 2025-05-13 NOTE — ASSESSMENT & PLAN NOTE
History of polysubstance abuse previous detox admission in 2023, admission to [facility] left AMA on 05/07   Status post Ativan, Narcan, Zofran  ECG notable for sinus tachycardia no QTc prolongation     Plan  Patient left AMA and discussed that possible risk in doing so include worsening infection, multiorgan failure and death to which she demonstrated understanding of.

## 2025-05-13 NOTE — PROGRESS NOTES
Elda Calderon is a 29 y.o. female who is currently ordered Vancomycin IV with management by the Pharmacy Consult service.  Relevant clinical data and objective / subjective history reviewed.  Vancomycin Assessment:  Indication and Goal AUC/Trough: Soft tissue (goal -600, trough >10), -600, trough >10  Clinical Status: stable  Micro:     Renal Function:  SCr: 0.4 mg/dL  CrCl: 118.8 mL/min  Renal replacement: Not on dialysis  Days of Therapy: 1  Current Dose: 1000mg IV loading dose  Vancomycin Plan:  New Dosin mg IV q8h  Estimated AUC: 563 mcg*hr/mL  Estimated Trough: 11.3 mcg/mL  Next Level:  @ 0600  Renal Function Monitoring: Daily BMP and UOP  Pharmacy will continue to follow closely for s/sx of nephrotoxicity, infusion reactions and appropriateness of therapy.  BMP and CBC will be ordered per protocol. We will continue to follow the patient’s culture results and clinical progress daily.    Nitin Perez, Pharmacist

## 2025-05-13 NOTE — CONSULTS
Consultation - Surgery-General   Name: Elda Calderon 29 y.o. female I MRN: [id number]  Unit/Bed#: ED-21 I Date of Admission: 5/12/2025   Date of Service: 5/12/2025 I Hospital Day: 0   Inpatient consult to Acute Care Surgery  Consult performed by: Isai Lynne MD  Consult ordered by: Tej Perez MD        Physician Requesting Evaluation: Javier Thomas MD   Reason for Evaluation / Principal Problem:     Assessment & Plan  Neck pain  B/L neck infection, with IVDU via neck injections  Found to have neck, chest, and back muscle infiltrative collections  Gen surg specifically consulted for chest and back collections    Plan:  No indication for acute surgical intervention or target for drainage  Continue serial exams  ENT providing recs for neck infection  Vascular providing recs for occluded jugular vein, DOAC for 6 months (started Eliquis here)  Rest of care, including abx and withdrawal management per primary  Opioid use disorder, severe, dependence (HCC)    Acute opioid withdrawal (HCC)    Electrolyte abnormality    Cellulitis of neck    Tachycardia        History of Present Illness   Elda Calderon is a 29 y.o. female, consult for chest/back collections with neck infection after neck IVDU injections    Hx of anxiety, depression, PTSD, IVDU (fentanyl) daily neck injections. On 5/7 orginally presented with neck pain, found to have extensive subcutaneous cellulitis over the neck. Low suspicion for NSTI at that time. She was start on IV abx and no surgical intervention was indicated. She experienced withdrawal symptoms causing her to leave AMA on 5/10. She presents again today with N/V, chills, neck pain. Labs notable for normal WBC, Cr, Na. CT showed b/l neck, upper chest, and back collections as likely intramuscular abscesses or myonecrosis with diffuse cellulitis. She has been admitted to medicine, and started on zosyn and vanc. No neck intervention per ENT. Toxicology also onboard, and provided recommendations to  manage withdrawal symptoms.      Review of Systems  Medical History Review: I have reviewed the patient's PMH, PSH, Social History, Family History, Meds, and Allergies     Objective :  Temp:  [97.8 °F (36.6 °C)] 97.8 °F (36.6 °C)  HR:  [] 110  BP: (103-142)/(71-98) 140/98  Resp:  [18-24] 24  SpO2:  [99 %-100 %] 99 %  O2 Device: None (Room air)    Lines/Drains/Airways       Active Status       Name Placement date Placement time Site Days    CVC Central Lines 05/12/25 Triple Right Femoral 05/12/25  1205  Femoral  less than 1                  Physical Exam    Lab Results: I have reviewed the following results:  Recent Labs     05/12/25  1227   WBC 9.74   HGB 10.2*   HCT 29.9*      SODIUM 140   K 2.8*      CO2 26   BUN 4*   CREATININE 0.40*   GLUC 104   MG 1.7*   PHOS 2.9   AST 23   ALT 11   ALB 3.6   TBILI 0.53   ALKPHOS 72   LACTICACID 0.8             VTE Pharmacologic Prophylaxis: VTE covered by:  apixaban, Oral, 5 mg at 05/12/25 2011     VTE Mechanical Prophylaxis: sequential compression device         Antibiotics  piperacillin-tazobactam, Intravenous, Held at 05/12/25 2038  vancomycin, Intravenous, Held at 05/12/25 2039

## 2025-05-13 NOTE — ED NOTES
Pt refusing IV antibiotics and other treatments and would like to leave AMA  Provider notified.      Cliff Carrion RN  05/12/25 2040

## 2025-05-13 NOTE — ASSESSMENT & PLAN NOTE
Pulse: 104  Blood Pressure: 131/91    ECG notable for sinus tachycardia  TSH on prior admission 5/8 WNL.  Continue monitoring telemetry for the above    Plan:  Patient left AMA and discussed that possible risk in doing so include worsening infection, multiorgan failure and death to which she demonstrated understanding of.  10-day course of Augmentin and doxycycline provided at discharge.

## 2025-05-13 NOTE — ASSESSMENT & PLAN NOTE
B/L neck infection, with IVDU via neck injections  Found to have neck, chest, and back muscle infiltrative collections  Gen surg specifically consulted for chest and back collections    Plan:  No indication for acute surgical intervention or target for drainage  Continue serial exams  ENT providing recs for neck infection  Vascular providing recs for occluded jugular vein, DOAC for 6 months (started Eliquis here)  Rest of care, including abx and withdrawal management per primary

## 2025-05-13 NOTE — QUICK NOTE
Progress Note - Triage Asssessment   Elda Calderon 29 y.o. female MRN: [id number]    Time Called ( Time): 2355  Date Called: 05/13/25  Room#: ED21  Person requesting evaluation: SLIM    Situation:    Patient is having increasing agitation and is uncomfortable due to substance withdrawal requiring several anxiolytic doses.      Interventions:   No intervention at this time. Patient has decided to leave AMA. SLIM to prepare discharge and have femoral CVC removed.         Triage Assessment:     Discharge AMA    Recommendations discussed with patient were for admission and medical treatment but she declines at this time.

## 2025-05-13 NOTE — ASSESSMENT & PLAN NOTE
Recent Labs     05/12/25  1227   K 2.8*   MG 1.7*        Likely due to poor oral intake  F/u BMP, Mg    Plan:  Patient left AMA and discussed that possible risk in doing so include worsening infection, multiorgan failure and death to which she demonstrated understanding of.  10-day course of Augmentin and doxycycline provided at discharge.

## 2025-05-13 NOTE — UTILIZATION REVIEW
Initial Clinical Review    Admission: Date/Time/Statement:   Admission Orders (From admission, onward)       Ordered        05/12/25 1848  INPATIENT ADMISSION  Once                          Orders Placed This Encounter   Procedures    INPATIENT ADMISSION     Standing Status:   Standing     Number of Occurrences:   1     Level of Care:   Med Surg [16]     Estimated length of stay:   More than 2 Midnights     Certification:   I certify that inpatient services are medically necessary for this patient for a duration of greater than two midnights. See H&P and MD Progress Notes for additional information about the patient's course of treatment.     ED Arrival Information       Expected   -    Arrival   5/12/2025 07:51    Acuity   Urgent              Means of arrival   Walk-In    Escorted by   Self    Service   Hospitalist    Admission type   Emergency              Arrival complaint   TROUBLE MOVING NECK/DISCOMFORT             Chief Complaint   Patient presents with    Neck Pain     Pt reports being admitted for infection in neck but leaving hospital early due to withdrawal symptoms from fentanyl use. Reports pain increasing again in neck. Also reports feeling withdrawal symptoms now. Last used 2 days ago.        Initial Presentation: 29 y.o. female  to ED via walk in from home.    Admitted to inpatient with Dx:  Neck cellulitis with intramuscular abscesses or myonecrosis/ Occluded left mid to lower IJ -most likely due to compression secondary to abscess/inflammation in neck/Poor dentition/Opioid withdrawal/electrolyte abnormality.  Presented to ED with neck pain starting about May 5th.  Previously left AMA on 5/9 due to fentanyl withdrawal symptoms while being actively treated for neck cellulitis with IV antibiotics. Left AMA after being admitted to detox unit on 5/6 as well due to worsening of fentanyl withdrawal symptoms PMHx: BPD, PTSD, anxiety, depression, polysubstance abuse  . On exam:  malnourished.  Mucous  membranes dry.  Pupils dilated. Neck with increased muscle tightness on left side.  Limited ROM with right rotation and extension.  Tachycardia.  Moderate contraction of left arm at the elbow, mild contraction of right arm at elbow.  Significant scarring of both arms.  Anxious and shivering.   UDS + barbiturate, benzodiazepine, Fentanyl.      UA + 3 ketones.  Mg 1.7.  K 2.8.  H&H 10.2/29.9.    Imaging shows: Diffusely abnormal musculature in bilateral neck anteriorly and posteriorly, bilateral upper chest, and bilateral upper back regions with scattered areas of intramuscular fluid collections some with locules of gas and diffuse surrounding inflammatory changes, likely due to multifocal sites of intramuscular abscesses or myonecrosis with diffuse cellulitis and infectious/inflammatory myositis. 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    ED treatment:  IVF bolus of 1 liter. Multiple doses of Ativan and valium IV,  antiemetics x 2, clonidine.  Started on Zosyn and vancomycin.      Plan includes to continue Zosyn and vancomycin.  Follow cultures.  Start Eliquis.   Consult Vascular and ENT. OP dentist.  Replace electrolytes.      Per Vascular 5/12/25 - neck pain with occluded right IJ- CT showed there are multiple small areas of abscess in the neck from prior injection.   Compression of left IJ likely due to extrinsic compression however would still recommend 6 months AC     Per ENT 5/12/25 - Neck Pain.  Plan:  no acute surgical interventions indicated at this time for b/l neck infection, locules of inflammation/infection.  Chest and back infection per surgery recommendations.  Antibiotics.  Drug withdrawal care.     Per toxicology - 5/12/25:  Opioid withdrawal - Uses Fentanyl with last use 5/10.  In active withdrawal.  Recommend:  Adjunctive medications administered as needed:  Clonidine 0.2-0.3 mg PO Q6 hours PRN anxiety or palpitations    Gabapentin 300mg PO Q8 hours PRN anxiety    Diazepam 5 mg p.o. or IV  "q2 PRN refractory anxiety  Ibuprofen 600 mg PO Q6 hours PRN pain    Acetaminophen 1000mg PO Q8 hours PRN pain    Ondansetron 4 mg PO Q6 hours PRN N/V    Reglan 5-10 mg IV q8 p.r.n. refractory nausea vomiting  Nicotine patch 7, 14, 21 mg  PRN nicotine withdrawal   Trazodone 50 mg PO QHS PRN sleep    Loperamide 4 mg PO PRN diarrhea up to 16 mg/day  Atarax 50mg PO Q6H   Low threshold to initiate precedex drip when above regimen does not control withdrawal symptoms   Telemetry Monitoring  Supportive care including IV fluids as needed    Per surgery 5/12/25 - \"history of IVDU and known multiple chest and back pain subcutaneous fluid collections/abscesses with associated cellulitis who presents with nausea and vomiting, chills, back neck pain. ED workup consisted of a CT soft tissue neck which confirmed multiple areas of intramuscular fluid collections, some with areas of gas and inflammatory changes consistent with myositis and intramuscular abscesses. \"   No acute surgical intervention.  Antibiotics.  Surgery follow up.      Anticipated Length of Stay/Certification Statement: Patient will be admitted on an inpatient basis with an anticipated length of stay of greater than 2 midnights secondary to cellulitis, withdrawal symptoms.     Date: 5/13/25   Day 2:  increased agitation and uncomfortable due to substance withdrawal requiring anxiolytic doses.      5/13/25 0100 discharged AMA.      ED Treatment-Medication Administration from 05/12/2025 0751 to 05/13/2025 0802         Date/Time Order Dose Route Action     05/12/2025 1218 LORazepam (ATIVAN) injection 1 mg 1 mg Intravenous Given     05/12/2025 1215 sodium chloride 0.9 % bolus 1,000 mL 1,000 mL Intravenous New Bag     05/12/2025 1222 ondansetron (ZOFRAN) injection 4 mg 4 mg Intravenous Given     05/12/2025 1359 droperidol (INAPSINE) injection 2.5 mg 2.5 mg Intramuscular Given     05/12/2025 1622 potassium chloride 20 mEq IVPB (premix) 20 mEq Intravenous New Bag     " 05/12/2025 1619 diazepam (VALIUM) injection 10 mg 10 mg Intravenous Given     05/12/2025 1612 cloNIDine (CATAPRES) tablet 0.3 mg 0.3 mg Oral Given     05/12/2025 1527 iohexol (OMNIPAQUE) 350 MG/ML injection (SINGLE-DOSE) 70 mL 70 mL Intravenous Given     05/12/2025 1642 piperacillin-tazobactam (ZOSYN) IVPB 4.5 g 4.5 g Intravenous New Bag     05/12/2025 1741 vancomycin (VANCOCIN) IVPB (premix in dextrose) 1,000 mg 200 mL 1,000 mg Intravenous New Bag     05/12/2025 1818 magnesium sulfate 2 g/50 mL IVPB (premix) 2 g 2 g Intravenous New Bag     05/12/2025 2206 diazepam (VALIUM) injection 5 mg 5 mg Intravenous Given     05/12/2025 2010 ondansetron (ZOFRAN-ODT) dispersible tablet 4 mg 4 mg Oral Given     05/12/2025 2010 hydrOXYzine HCL (ATARAX) tablet 50 mg 50 mg Oral Given     05/12/2025 2119 piperacillin-tazobactam (ZOSYN) 4.5 g in sodium chloride 0.9 % 100 mL IVPB (EXTENDED INFUSION) 4.5 g Intravenous New Bag     05/12/2025 2011 apixaban (ELIQUIS) tablet 5 mg 5 mg Oral Given     05/12/2025 2109 LORazepam (ATIVAN) injection 1 mg 1 mg Intravenous Given          Scheduled Medications:  Medications Discontinued During This Encounter   Medication    piperacillin-tazobactam (ZOSYN) IVPB 4.5 g  Every 8 hours Route: IV     enoxaparin (LOVENOX) subcutaneous injection 40 mg  Daily Route: SC  Daily Route: SC     apixaban (ELIQUIS) tablet 5 mg  2 times daily Route: PO     vancomycin (VANCOCIN) 1,250 mg in sodium chloride 0.9 % 250 mL IVPB Every 8 hours Route: IV     hydrOXYzine HCL (ATARAX) tablet 50 mg prn- 2010 on 5/12    ondansetron (ZOFRAN-ODT) dispersible tablet 4 mg prn- at 2010 5/12/25    diazepam (VALIUM) injection 5 mg IV prn - used 2206 5/12/25      ED Triage Vitals [05/12/25 0759]   Temperature Pulse Respirations Blood Pressure SpO2 Pain Score   97.8 °F (36.6 °C) 82 18 130/86 100 % 5     Weight (last 2 days) before discharge       None          Vital Signs (last 3 days) before discharge       Date/Time Temp Pulse Resp  BP MAP (mmHg) SpO2 O2 Device Patient Position - Orthostatic VS Santhosh Coma Scale Score Clinical Opiate Withdrawal Scale Total Score Pain    05/12/25 2230 -- 104 20 131/91 108 99 % None (Room air) Lying -- -- --    05/12/25 2030 -- 122 24 153/104 122 98 % None (Room air) Lying -- -- --    05/12/25 1612 -- -- -- 140/98 -- -- -- -- -- -- --    05/12/25 1340 -- 110 -- -- -- -- -- -- -- 14 --    05/12/25 1339 -- -- -- 142/88 109 -- -- -- -- -- --    05/12/25 1249 -- 119 24 103/71 -- 99 % None (Room air) Lying -- -- --    05/12/25 1044 -- -- -- -- -- -- -- -- 15 -- --    05/12/25 0759 97.8 °F (36.6 °C) 82 18 130/86 103 100 % None (Room air) Sitting -- -- 5          Pertinent Labs/Diagnostic Test Results:   Radiology:  CT soft tissue neck with contrast   Final Interpretation by Tom Turner MD (05/12 3260)      Diffusely abnormal musculature in bilateral neck anteriorly and posteriorly, bilateral upper chest, and bilateral upper back regions with scattered areas of intramuscular fluid collections some with locules of gas and diffuse surrounding inflammatory    changes, likely due to multifocal sites of intramuscular abscesses or myonecrosis with diffuse cellulitis and infectious/inflammatory myositis. Recommend evaluation by ENT.      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      Poor dentition with periodontal disease. Recommend follow-up with dentist.      Additional chronic/incidental findings as detailed above.         I personally epic secure message this study with ANDERSON ELLIS on 5/12/2025 4:03 PM. The study was marked in EPIC for immediate notification.                     Workstation performed: [id number]           Cardiology:  ECG 12 lead    by Interface, Ris Results In (05/12 9001)        GI:  No orders to display     Results from last 7 days   Lab Units 05/12/25  1227 05/08/25  1938 05/08/25  1918 05/07/25  0721 05/06/25  1621   WBC Thousand/uL 9.74  --  7.69 6.15 9.18    HEMOGLOBIN g/dL 10.2*  --  11.5 10.6* 10.7*   I STAT HEMOGLOBIN g/dl  --  12.2  --   --   --    HEMATOCRIT % 29.9*  --  33.5* 31.4* 31.7*   HEMATOCRIT, ISTAT %  --  36  --   --   --    PLATELETS Thousands/uL 275  --  289 207 269   TOTAL NEUT ABS Thousands/µL 8.05*  --  3.72  --  5.31     Results from last 7 days   Lab Units 05/12/25 1227 05/08/25 1938 05/08/25 1918 05/07/25 0721 05/06/25  1621   SODIUM mmol/L 140  --  142 140 140   POTASSIUM mmol/L 2.8*  --  3.1* 3.0* 2.8*   CHLORIDE mmol/L 104  --  106 104 103   CO2 mmol/L 26  --  28 22 30   CO2, I-STAT mmol/L  --  28  --   --   --    ANION GAP mmol/L 10  --  8 14* 7   BUN mg/dL 4*  --  5 6 7   CREATININE mg/dL 0.40*  --  0.61 0.44* 0.62   EGFR ml/min/1.73sq m 142  --  123 137 123   CALCIUM mg/dL 8.9  --  9.2 8.3* 8.6   CALCIUM, IONIZED, ISTAT mmol/L  --  1.21  --   --   --    MAGNESIUM mg/dL 1.7*  --   --   --   --    PHOSPHORUS mg/dL 2.9  --   --   --   --      Results from last 7 days   Lab Units 05/12/25 1227 05/08/25 1918 05/06/25  1621   AST U/L 23 24 28   ALT U/L 11 11 12   ALK PHOS U/L 72 80 75   TOTAL PROTEIN g/dL 6.6 7.1 6.4   ALBUMIN g/dL 3.6 3.9 3.4*   TOTAL BILIRUBIN mg/dL 0.53 0.54 0.41     Results from last 7 days   Lab Units 05/08/25  1900   POC GLUCOSE mg/dl 157*     Results from last 7 days   Lab Units 05/12/25 1227 05/08/25 1918 05/07/25 0721 05/06/25  1621   GLUCOSE RANDOM mg/dL 104 154* 60* 98     Results from last 7 days   Lab Units 05/08/25  1938   PH, MATEO I-STAT  7.422*   PCO2, MATEO ISTAT mm HG 41.7*   PO2, MATEO ISTAT mm HG 32.0*   HCO3, MATEO ISTAT mmol/L 27.2   I STAT BASE EXC mmol/L 2   I STAT O2 SAT % 62     Results from last 7 days   Lab Units 05/08/25 1918   CK TOTAL U/L 165     Results from last 7 days   Lab Units 05/08/25  2136 05/08/25 1918 05/06/25  2039 05/06/25  1826 05/06/25  1621   HS TNI 0HR ng/L  --  11  --   --  18   HS TNI 2HR ng/L 10  --   --  20  --    HSTNI D2 ng/L -1  --   --  2  --    HS TNI 4HR ng/L  --   --   15  --   --    HSTNI D4 ng/L  --   --  -3  --   --      Results from last 7 days   Lab Units 05/06/25  1621   PROTIME seconds 15.4*   INR  1.14   PTT seconds 33     Results from last 7 days   Lab Units 05/08/25  1918   TSH 3RD GENERATON uIU/mL 2.868     Results from last 7 days   Lab Units 05/12/25  1227 05/06/25  1621   PROCALCITONIN ng/ml <0.05 <0.05     Results from last 7 days   Lab Units 05/12/25  1227 05/06/25  1621   LACTIC ACID mmol/L 0.8 0.9     Results from last 7 days   Lab Units 05/08/25  1918 05/06/25  1621   CRP mg/L 11.3* 9.6*   SED RATE mm/hour  --  32*     Results from last 7 days   Lab Units 05/12/25  1705   CLARITY UA  Clear   COLOR UA  Light Yellow   SPEC GRAV UA  1.027   PH UA  7.0   GLUCOSE UA mg/dl Negative   KETONES UA mg/dl 80 (3+)*   BLOOD UA  Trace*   PROTEIN UA mg/dl Negative   NITRITE UA  Negative   BILIRUBIN UA  Negative   UROBILINOGEN UA (BE) mg/dl <2.0   LEUKOCYTES UA  Negative   WBC UA /hpf 1-2   RBC UA /hpf 1-2   BACTERIA UA /hpf Occasional   EPITHELIAL CELLS WET PREP /hpf Occasional     Results from last 7 days   Lab Units 05/12/25  1705   AMPH/METH  Negative   BARBITURATE UR  Positive*   BENZODIAZEPINE UR  Positive*   COCAINE UR  Negative   METHADONE URINE  Negative   OPIATE UR  Negative   PCP UR  Negative   THC UR  Negative     Results from last 7 days   Lab Units 05/12/25  1227 05/08/25  1918   ETHANOL LVL mg/dL <10 <10   ACETAMINOPHEN LVL ug/mL <2* <2*   SALICYLATE LVL mg/dL <5* <5*     Results from last 7 days   Lab Units 05/12/25  2237 05/12/25  1227 05/08/25  2140 05/08/25 2054 05/06/25  1621   BLOOD CULTURE  Received in Microbiology Lab. Culture in Progress. Received in Microbiology Lab. Culture in Progress. No Growth After 4 Days. No Growth After 4 Days. No Growth After 5 Days.     Past Medical History:   Diagnosis Date    Anxiety     Borderline personality disorder (HCC)     Chlamydia     Depression     Endometriosis     IV drug user     IV heroin use, rehab 7 times      Liver lesion 5/7/2025    Panic attacks     Psychiatric disorder     anxiety    PTSD (post-traumatic stress disorder)     PTSD (post-traumatic stress disorder) 8/12/2023    Varicella     childhood    Visual impairment      Present on Admission:   Neck pain   Opioid use disorder, severe, dependence (HCC)   Mild protein-calorie malnutrition (HCC)   Cellulitis of neck   Electrolyte abnormality   Tachycardia   Acute opioid withdrawal (HCC)      Admitting Diagnosis: Neck discomfort [M54.2]  Age/Sex: 29 y.o. female    Network Utilization Review Department  ATTENTION: Please call with any questions or concerns to [phone] and carefully listen to the prompts so that you are directed to the right person. All voicemails are confidential.   For Discharge needs, contact Care Management DC Support Team at [phone] opt. 2  Send all requests for admission clinical reviews, approved or denied determinations and any other requests to dedicated fax number below belonging to the campus where the patient is receiving treatment. List of dedicated fax numbers for the Facilities:  FACILITY NAME UR FAX NUMBER   ADMISSION DENIALS (Administrative/Medical Necessity) [phone]   DISCHARGE SUPPORT TEAM (NETWORK) [phone]   PARENT CHILD HEALTH (Maternity/NICU/Pediatrics) [phone]   [facility] [phone]   [facility] [phone]   [facility] [phone]   [facility] [phone]   [facility] [phone]   [facility] [phone]   [facility] [phone]   [facility] [phone]   [facility] [phone]   [facility] [phone]   [facility] [phone]   [facility] [phone]

## 2025-05-13 NOTE — DISCHARGE INSTR - AVS FIRST PAGE
Dear Elda Calderon,     It was our pleasure to care for you here at [facility].  It is our hope that we were always able to exceed the expected standards for your care during your stay.  You were hospitalized due to neck pain.  You were cared for in the emergency department by Tej Perez MD under the service of Javier Thomas MD with the [facility]ist [facility] who covers for your primary care physician (PCP), Mike Tillman DO, while you were hospitalized.  If you have any questions or concerns related to this hospitalization, you may contact us at .  For follow up as well as any medication refills, we recommend that you follow up with your primary care physician.  A registered nurse will reach out to you by phone within a few days after your discharge to answer any additional questions that you may have after going home.  However, at this time we provide for you here, the most important instructions / recommendations at discharge:     Notable Medication Adjustments -   Please take Augmentin 1 tablet every 12 hours for 10 days.  Please take doxycycline 1 capsule every 12 hours for 10 days.  Please take apixaban (Eliquis) 1 tablet (5 mg) twice daily, this is the blood thinner recommended by vascular surgery.  Please continue other medications you were taking prior to hospitalization upon return home.  Testing Required after Discharge -   None  Important follow up information -   Please follow-up with your primary care physician within 1 week.  Other Instructions -   In the event of worsening neck pain please seek care in the emergency department.  We discussed that possible risk in leaving AGAINST MEDICAL ADVICE INCLUDE BUT ARE NOT LIMITED TO worsening infection, multiorgan failure and death   Please review this entire after visit summary as additional general instructions including medication list, appointments, activity, diet, any pertinent wound care,  and other additional recommendations from your care team that may be provided for you.      Sincerely,     Tej Perez MD

## 2025-05-13 NOTE — ASSESSMENT & PLAN NOTE
Recent Labs     05/12/25  1227   WBC 9.74     Recent Labs     05/12/25  1227   BLOODCX Received in Microbiology Lab. Culture in Progress.     Last used 2 days ago  CT soft tissue neck with contrast:  Diffusely abnormal musculature in bilateral neck anteriorly and posteriorly, bilateral upper chest, and bilateral upper back regions with scattered areas of intramuscular fluid collections some with locules of gas and diffuse surrounding inflammatory changes, likely due to multifocal sites of intramuscular abscesses or myonecrosis with diffuse cellulitis and infectious/inflammatory myositis. Recommend evaluation by ENT.   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   Poor dentition with periodontal disease. Recommend follow-up with dentist.   Additional chronic/incidental findings as detailed above.  Positive labs: UDS positive for barbiturates, benzos, fentanyl.  Negative labs: No leukocytosis, lactic acid, procalcitonin, UA, pregnancy test.  ED started on vancomycin and pip-tazo-Tazo, administered clonidine 0.3 mg x 1, diazepam 10 mg x 1, droperidol 2.5 mg x 1, lorazepam 1 mg x 1, ondansetron x 1, 1 L NS bolus.  Only 1 blood culture obtained due to poor vasculature.  Patient has central line placed.     Plan  Patient left AMA and discussed that possible risk in doing so include worsening infection, multiorgan failure and death to which she demonstrated understanding of.  10-day course of Augmentin and doxycycline provided at discharge.  Due to left IJ V occlusion vascular surgery recommend 6 months Eliquis 5 mg twice daily which is prescribed upon discharge.

## 2025-05-13 NOTE — ASSESSMENT & PLAN NOTE
Plan:  Patient left AMA and discussed that possible risk in doing so include worsening infection, multiorgan failure and death to which she demonstrated understanding of.  10-day course of Augmentin and doxycycline provided at discharge.

## 2025-05-13 NOTE — DISCHARGE SUMMARY
Discharge Summary - Hospitalist   Name: Elda Calderon 29 y.o. female I MRN: [id number]  Unit/Bed#: ED-21 I Date of Admission: 5/12/2025   Date of Service: 5/13/2025 I Hospital Day: 1     Assessment & Plan  Neck pain  Recent Labs     05/12/25  1227   WBC 9.74     Recent Labs     05/12/25  1227   BLOODCX Received in Microbiology Lab. Culture in Progress.     Last used 2 days ago  CT soft tissue neck with contrast:  Diffusely abnormal musculature in bilateral neck anteriorly and posteriorly, bilateral upper chest, and bilateral upper back regions with scattered areas of intramuscular fluid collections some with locules of gas and diffuse surrounding inflammatory changes, likely due to multifocal sites of intramuscular abscesses or myonecrosis with diffuse cellulitis and infectious/inflammatory myositis. Recommend evaluation by ENT.   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   Poor dentition with periodontal disease. Recommend follow-up with dentist.   Additional chronic/incidental findings as detailed above.  Positive labs: UDS positive for barbiturates, benzos, fentanyl.  Negative labs: No leukocytosis, lactic acid, procalcitonin, UA, pregnancy test.  ED started on vancomycin and pip-tazo-Tazo, administered clonidine 0.3 mg x 1, diazepam 10 mg x 1, droperidol 2.5 mg x 1, lorazepam 1 mg x 1, ondansetron x 1, 1 L NS bolus.  Only 1 blood culture obtained due to poor vasculature.  Patient has central line placed.     Plan  Patient left AMA and discussed that possible risk in doing so include worsening infection, multiorgan failure and death to which she demonstrated understanding of.  10-day course of Augmentin and doxycycline provided at discharge.  Due to left IJ V occlusion vascular surgery recommend 6 months Eliquis 5 mg twice daily which is prescribed upon discharge.  Opioid use disorder, severe, dependence (HCC)  History of polysubstance abuse previous detox admission in 2023, admission to [place]  left AMA on 05/07   Status post Ativan, Narcan, Zofran  ECG notable for sinus tachycardia no QTc prolongation     Plan  Patient left AMA and discussed that possible risk in doing so include worsening infection, multiorgan failure and death to which she demonstrated understanding of.  Mild protein-calorie malnutrition (HCC)  Malnutrition Findings:                                 BMI Findings:           There is no height or weight on file to calculate BMI.     Plan:  -Patient left AMA and discussed that possible risk in doing so include worsening infection, multiorgan failure and death to which she demonstrated understanding of.  Cellulitis of neck  Plan:  Patient left AMA and discussed that possible risk in doing so include worsening infection, multiorgan failure and death to which she demonstrated understanding of.  10-day course of Augmentin and doxycycline provided at discharge.  Electrolyte abnormality  Recent Labs     05/12/25  1227   K 2.8*   MG 1.7*        Likely due to poor oral intake  F/u BMP, Mg    Plan:  Patient left AMA and discussed that possible risk in doing so include worsening infection, multiorgan failure and death to which she demonstrated understanding of.  10-day course of Augmentin and doxycycline provided at discharge.  Tachycardia  Pulse: 104  Blood Pressure: 131/91    ECG notable for sinus tachycardia  TSH on prior admission 5/8 WNL.  Continue monitoring telemetry for the above    Plan:  Patient left AMA and discussed that possible risk in doing so include worsening infection, multiorgan failure and death to which she demonstrated understanding of.  10-day course of Augmentin and doxycycline provided at discharge.  Acute opioid withdrawal (HCC)  See under opioid use disorder.     Medical Problems       Resolved Problems  Date Reviewed: 5/9/2025   None       Discharging Physician / Practitioner: Tej Perez MD  PCP: Mike Tillman DO  Admission Date:   Admission Orders (From admission,  onward)       Ordered        05/12/25 1848  INPATIENT ADMISSION  Once                          Discharge Date: 05/13/25    Consultations During Hospital Stay:  Vascular surgery  General Surgery  ENT  Toxicology    Procedures Performed:   Right femoral central line placed    Significant Findings / Test Results:   CT soft tissue neck W/contrast: Diffusely abnormal musculature in bilateral neck anteriorly and posteriorly, bilateral upper chest, and bilateral upper back regions with scattered areas of intramuscular fluid collections some with locules of gas and diffuse surrounding inflammatory changes, likely due to multifocal sites of intramuscular abscesses or myonecrosis with diffuse cellulitis and infectious/inflammatory myositis. Recommend evaluation by ENT.   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  Poor dentition with periodontal disease. Recommend follow-up with dentist.  Additional chronic/incidental findings as detailed above.    Incidental Findings:   N/a    Test Results Pending at Discharge (will require follow up):   Blood cultures     Outpatient Tests Requested:  None    Complications: Left AMA    Reason for Admission: Neck pain    Hospital Course:   Elda Calderon is a 29 y.o. female patient who originally presented to the hospital on 5/12/2025 due to neck pain and has history of polysubstance abuse.  Patient was found to have diffusely abnormal musculature in the bilateral neck with scattered areas of intramuscular fluid collections and locules of gas.  Patient was started on Zosyn and vancomycin.  Patient was evaluated by ENT and general surgery who recommended no acute intervention. IV access was complicated therefore right femoral central line was placed in the emergency department. Toxicology recommendations were given and pt was given clonidine, valium, droperidol, and ativan for polysubstance withdrawal. Pt was also given dose of eliquis at recommendation of vascular surgery given IJV  occlusion observed on CT soft tissue neck. Patient left AMA and discussed that possible risk in doing so include but are not limited to worsening infection, multiorgan failure and death to which she demonstrated understanding of. She was prescribed a 10-day course of Augmentin and doxycycline along with eliquis at discharge.      Please see above list of diagnoses and related plan for additional information.     Condition at Discharge:  unstable for discharge, left AMA    Discharge Day Visit / Exam:   * Please refer to separate progress note for these details *    Discharge instructions/Information to patient and family:   See after visit summary for information provided to patient and family.      Provisions for Follow-Up Care:  See after visit summary for information related to follow-up care and any pertinent home health orders.      Mobility at time of Discharge:      HLM Goal achieved. Continue to encourage appropriate mobility.     Disposition:   Home, AMA    Planned Readmission: No    Discharge Medications:  See after visit summary for reconciled discharge medications provided to patient and/or family.      Administrative Statements   Discharge Statement:  I have spent a total time of 30 minutes in caring for this patient on the day of the visit/encounter.    **Please Note: This note may have been constructed using a voice recognition system**

## 2025-05-14 LAB
ATRIAL RATE: 101 BPM
P AXIS: 48 DEGREES
PR INTERVAL: 112 MS
QRS AXIS: 32 DEGREES
QRSD INTERVAL: 84 MS
QT INTERVAL: 362 MS
QTC INTERVAL: 469 MS
T WAVE AXIS: 34 DEGREES
VENTRICULAR RATE: 101 BPM

## 2025-05-14 PROCEDURE — 93010 ELECTROCARDIOGRAM REPORT: CPT | Performed by: INTERNAL MEDICINE

## 2025-05-16 ENCOUNTER — HOSPITAL ENCOUNTER (INPATIENT)
Facility: HOSPITAL | Age: 30
LOS: 1 days | Discharge: LEFT BEFORE DISCHARGE | DRG: 602 | End: 2025-05-17
Attending: EMERGENCY MEDICINE | Admitting: STUDENT IN AN ORGANIZED HEALTH CARE EDUCATION/TRAINING PROGRAM
Payer: COMMERCIAL

## 2025-05-16 DIAGNOSIS — E87.8 ELECTROLYTE ABNORMALITY: ICD-10-CM

## 2025-05-16 DIAGNOSIS — F11.93 WITHDRAWAL FROM OPIOIDS (HCC): Primary | ICD-10-CM

## 2025-05-16 PROBLEM — I82.C12 INTERNAL JUGULAR VEIN THROMBOSIS, LEFT (HCC): Status: ACTIVE | Noted: 2025-05-16

## 2025-05-16 LAB
ALBUMIN SERPL BCG-MCNC: 4 G/DL (ref 3.5–5)
ALP SERPL-CCNC: 67 U/L (ref 34–104)
ALT SERPL W P-5'-P-CCNC: 10 U/L (ref 7–52)
ANION GAP SERPL CALCULATED.3IONS-SCNC: 13 MMOL/L (ref 4–13)
APAP SERPL-MCNC: <2 UG/ML (ref 10–20)
AST SERPL W P-5'-P-CCNC: 17 U/L (ref 13–39)
ATRIAL RATE: 100 BPM
ATRIAL RATE: 93 BPM
BASOPHILS # BLD AUTO: 0.04 THOUSANDS/ÂΜL (ref 0–0.1)
BASOPHILS NFR BLD AUTO: 1 % (ref 0–1)
BILIRUB SERPL-MCNC: 0.38 MG/DL (ref 0.2–1)
BUN SERPL-MCNC: 5 MG/DL (ref 5–25)
CALCIUM SERPL-MCNC: 9.6 MG/DL (ref 8.4–10.2)
CHLORIDE SERPL-SCNC: 97 MMOL/L (ref 96–108)
CO2 SERPL-SCNC: 33 MMOL/L (ref 21–32)
CREAT SERPL-MCNC: 0.45 MG/DL (ref 0.6–1.3)
EOSINOPHIL # BLD AUTO: 0 THOUSAND/ÂΜL (ref 0–0.61)
EOSINOPHIL NFR BLD AUTO: 0 % (ref 0–6)
ERYTHROCYTE [DISTWIDTH] IN BLOOD BY AUTOMATED COUNT: 12.5 % (ref 11.6–15.1)
ETHANOL SERPL-MCNC: <10 MG/DL
GFR SERPL CREATININE-BSD FRML MDRD: 136 ML/MIN/1.73SQ M
GLUCOSE SERPL-MCNC: 129 MG/DL (ref 65–140)
HCT VFR BLD AUTO: 31.8 % (ref 34.8–46.1)
HGB BLD-MCNC: 10.5 G/DL (ref 11.5–15.4)
IMM GRANULOCYTES # BLD AUTO: 0.04 THOUSAND/UL (ref 0–0.2)
IMM GRANULOCYTES NFR BLD AUTO: 1 % (ref 0–2)
LACTATE SERPL-SCNC: 0.9 MMOL/L (ref 0.5–2)
LYMPHOCYTES # BLD AUTO: 0.91 THOUSANDS/ÂΜL (ref 0.6–4.47)
LYMPHOCYTES NFR BLD AUTO: 12 % (ref 14–44)
MAGNESIUM SERPL-MCNC: 1.6 MG/DL (ref 1.9–2.7)
MCH RBC QN AUTO: 29.2 PG (ref 26.8–34.3)
MCHC RBC AUTO-ENTMCNC: 33 G/DL (ref 31.4–37.4)
MCV RBC AUTO: 89 FL (ref 82–98)
MONOCYTES # BLD AUTO: 0.26 THOUSAND/ÂΜL (ref 0.17–1.22)
MONOCYTES NFR BLD AUTO: 3 % (ref 4–12)
NEUTROPHILS # BLD AUTO: 6.4 THOUSANDS/ÂΜL (ref 1.85–7.62)
NEUTS SEG NFR BLD AUTO: 83 % (ref 43–75)
NRBC BLD AUTO-RTO: 0 /100 WBCS
P AXIS: 69 DEGREES
P AXIS: 79 DEGREES
PHOSPHATE SERPL-MCNC: 3.1 MG/DL (ref 2.7–4.5)
PLATELET # BLD AUTO: 478 THOUSANDS/UL (ref 149–390)
PLATELET # BLD AUTO: 504 THOUSANDS/UL (ref 149–390)
PMV BLD AUTO: 8.6 FL (ref 8.9–12.7)
PMV BLD AUTO: 8.8 FL (ref 8.9–12.7)
POTASSIUM SERPL-SCNC: 2.8 MMOL/L (ref 3.5–5.3)
POTASSIUM SERPL-SCNC: 3 MMOL/L (ref 3.5–5.3)
PR INTERVAL: 102 MS
PR INTERVAL: 110 MS
PROCALCITONIN SERPL-MCNC: <0.05 NG/ML
PROT SERPL-MCNC: 7.4 G/DL (ref 6.4–8.4)
QRS AXIS: -20 DEGREES
QRS AXIS: 19 DEGREES
QRSD INTERVAL: 78 MS
QRSD INTERVAL: 78 MS
QT INTERVAL: 400 MS
QT INTERVAL: 405 MS
QTC INTERVAL: 498 MS
QTC INTERVAL: 523 MS
RBC # BLD AUTO: 3.59 MILLION/UL (ref 3.81–5.12)
SALICYLATES SERPL-MCNC: <5 MG/DL (ref 5–20)
SODIUM SERPL-SCNC: 143 MMOL/L (ref 135–147)
T WAVE AXIS: 49 DEGREES
T WAVE AXIS: 50 DEGREES
VENTRICULAR RATE: 100 BPM
VENTRICULAR RATE: 93 BPM
WBC # BLD AUTO: 7.65 THOUSAND/UL (ref 4.31–10.16)

## 2025-05-16 PROCEDURE — 99284 EMERGENCY DEPT VISIT MOD MDM: CPT

## 2025-05-16 PROCEDURE — 84132 ASSAY OF SERUM POTASSIUM: CPT | Performed by: STUDENT IN AN ORGANIZED HEALTH CARE EDUCATION/TRAINING PROGRAM

## 2025-05-16 PROCEDURE — 96375 TX/PRO/DX INJ NEW DRUG ADDON: CPT

## 2025-05-16 PROCEDURE — 85049 AUTOMATED PLATELET COUNT: CPT | Performed by: PHYSICIAN ASSISTANT

## 2025-05-16 PROCEDURE — 87040 BLOOD CULTURE FOR BACTERIA: CPT | Performed by: STUDENT IN AN ORGANIZED HEALTH CARE EDUCATION/TRAINING PROGRAM

## 2025-05-16 PROCEDURE — 84100 ASSAY OF PHOSPHORUS: CPT | Performed by: STUDENT IN AN ORGANIZED HEALTH CARE EDUCATION/TRAINING PROGRAM

## 2025-05-16 PROCEDURE — 82077 ASSAY SPEC XCP UR&BREATH IA: CPT | Performed by: STUDENT IN AN ORGANIZED HEALTH CARE EDUCATION/TRAINING PROGRAM

## 2025-05-16 PROCEDURE — 80143 DRUG ASSAY ACETAMINOPHEN: CPT | Performed by: STUDENT IN AN ORGANIZED HEALTH CARE EDUCATION/TRAINING PROGRAM

## 2025-05-16 PROCEDURE — 85025 COMPLETE CBC W/AUTO DIFF WBC: CPT | Performed by: STUDENT IN AN ORGANIZED HEALTH CARE EDUCATION/TRAINING PROGRAM

## 2025-05-16 PROCEDURE — 83605 ASSAY OF LACTIC ACID: CPT | Performed by: STUDENT IN AN ORGANIZED HEALTH CARE EDUCATION/TRAINING PROGRAM

## 2025-05-16 PROCEDURE — 93010 ELECTROCARDIOGRAM REPORT: CPT | Performed by: INTERNAL MEDICINE

## 2025-05-16 PROCEDURE — 96366 THER/PROPH/DIAG IV INF ADDON: CPT

## 2025-05-16 PROCEDURE — 02HV33Z INSERTION OF INFUSION DEVICE INTO SUPERIOR VENA CAVA, PERCUTANEOUS APPROACH: ICD-10-PCS | Performed by: INTERNAL MEDICINE

## 2025-05-16 PROCEDURE — 36415 COLL VENOUS BLD VENIPUNCTURE: CPT | Performed by: STUDENT IN AN ORGANIZED HEALTH CARE EDUCATION/TRAINING PROGRAM

## 2025-05-16 PROCEDURE — 96365 THER/PROPH/DIAG IV INF INIT: CPT

## 2025-05-16 PROCEDURE — 84145 PROCALCITONIN (PCT): CPT | Performed by: EMERGENCY MEDICINE

## 2025-05-16 PROCEDURE — 93005 ELECTROCARDIOGRAM TRACING: CPT

## 2025-05-16 PROCEDURE — 99223 1ST HOSP IP/OBS HIGH 75: CPT | Performed by: PHYSICIAN ASSISTANT

## 2025-05-16 PROCEDURE — 80053 COMPREHEN METABOLIC PANEL: CPT | Performed by: STUDENT IN AN ORGANIZED HEALTH CARE EDUCATION/TRAINING PROGRAM

## 2025-05-16 PROCEDURE — 83735 ASSAY OF MAGNESIUM: CPT | Performed by: STUDENT IN AN ORGANIZED HEALTH CARE EDUCATION/TRAINING PROGRAM

## 2025-05-16 PROCEDURE — 96372 THER/PROPH/DIAG INJ SC/IM: CPT

## 2025-05-16 PROCEDURE — 80179 DRUG ASSAY SALICYLATE: CPT | Performed by: STUDENT IN AN ORGANIZED HEALTH CARE EDUCATION/TRAINING PROGRAM

## 2025-05-16 RX ORDER — LANOLIN ALCOHOL/MO/W.PET/CERES
800 CREAM (GRAM) TOPICAL ONCE
Status: DISCONTINUED | OUTPATIENT
Start: 2025-05-16 | End: 2025-05-16

## 2025-05-16 RX ORDER — QUETIAPINE FUMARATE 25 MG/1
25 TABLET, FILM COATED ORAL
COMMUNITY

## 2025-05-16 RX ORDER — VANCOMYCIN HYDROCHLORIDE 1 G/200ML
25 INJECTION, SOLUTION INTRAVENOUS ONCE
Status: DISCONTINUED | OUTPATIENT
Start: 2025-05-16 | End: 2025-05-16

## 2025-05-16 RX ORDER — ENOXAPARIN SODIUM 100 MG/ML
1.5 INJECTION SUBCUTANEOUS
Status: DISCONTINUED | OUTPATIENT
Start: 2025-05-17 | End: 2025-05-17 | Stop reason: HOSPADM

## 2025-05-16 RX ORDER — BISACODYL 10 MG
10 SUPPOSITORY, RECTAL RECTAL DAILY PRN
Status: DISCONTINUED | OUTPATIENT
Start: 2025-05-16 | End: 2025-05-17 | Stop reason: HOSPADM

## 2025-05-16 RX ORDER — LIDOCAINE HYDROCHLORIDE 10 MG/ML
5 INJECTION, SOLUTION EPIDURAL; INFILTRATION; INTRACAUDAL; PERINEURAL ONCE
Status: COMPLETED | OUTPATIENT
Start: 2025-05-16 | End: 2025-05-16

## 2025-05-16 RX ORDER — CLONIDINE 0.3 MG/24H
0.3 PATCH, EXTENDED RELEASE TRANSDERMAL WEEKLY
Status: DISCONTINUED | OUTPATIENT
Start: 2025-05-16 | End: 2025-05-16

## 2025-05-16 RX ORDER — POTASSIUM CHLORIDE 1500 MG/1
40 TABLET, EXTENDED RELEASE ORAL ONCE
Status: DISCONTINUED | OUTPATIENT
Start: 2025-05-16 | End: 2025-05-16

## 2025-05-16 RX ORDER — METHADONE HYDROCHLORIDE 10 MG/1
40 TABLET ORAL ONCE
Refills: 0 | Status: DISCONTINUED | OUTPATIENT
Start: 2025-05-16 | End: 2025-05-16

## 2025-05-16 RX ORDER — MAGNESIUM SULFATE HEPTAHYDRATE 40 MG/ML
2 INJECTION, SOLUTION INTRAVENOUS ONCE
Status: COMPLETED | OUTPATIENT
Start: 2025-05-16 | End: 2025-05-16

## 2025-05-16 RX ORDER — KETOROLAC TROMETHAMINE 30 MG/ML
15 INJECTION, SOLUTION INTRAMUSCULAR; INTRAVENOUS EVERY 6 HOURS PRN
Status: DISCONTINUED | OUTPATIENT
Start: 2025-05-16 | End: 2025-05-17 | Stop reason: HOSPADM

## 2025-05-16 RX ORDER — ONDANSETRON 4 MG/1
4 TABLET, ORALLY DISINTEGRATING ORAL ONCE
Status: COMPLETED | OUTPATIENT
Start: 2025-05-16 | End: 2025-05-16

## 2025-05-16 RX ORDER — POTASSIUM CHLORIDE 14.9 MG/ML
20 INJECTION INTRAVENOUS
Status: COMPLETED | OUTPATIENT
Start: 2025-05-16 | End: 2025-05-16

## 2025-05-16 RX ORDER — DIAZEPAM 10 MG/2ML
5 INJECTION, SOLUTION INTRAMUSCULAR; INTRAVENOUS EVERY 6 HOURS PRN
Status: DISCONTINUED | OUTPATIENT
Start: 2025-05-16 | End: 2025-05-17 | Stop reason: HOSPADM

## 2025-05-16 RX ORDER — GABAPENTIN 300 MG/1
300 CAPSULE ORAL EVERY 8 HOURS PRN
Status: DISCONTINUED | OUTPATIENT
Start: 2025-05-16 | End: 2025-05-17 | Stop reason: HOSPADM

## 2025-05-16 RX ORDER — DIAZEPAM 10 MG/2ML
2.5 INJECTION, SOLUTION INTRAMUSCULAR; INTRAVENOUS ONCE
Status: COMPLETED | OUTPATIENT
Start: 2025-05-16 | End: 2025-05-16

## 2025-05-16 RX ORDER — SODIUM CHLORIDE, SODIUM GLUCONATE, SODIUM ACETATE, POTASSIUM CHLORIDE, MAGNESIUM CHLORIDE, SODIUM PHOSPHATE, DIBASIC, AND POTASSIUM PHOSPHATE .53; .5; .37; .037; .03; .012; .00082 G/100ML; G/100ML; G/100ML; G/100ML; G/100ML; G/100ML; G/100ML
125 INJECTION, SOLUTION INTRAVENOUS CONTINUOUS
Status: DISCONTINUED | OUTPATIENT
Start: 2025-05-16 | End: 2025-05-17 | Stop reason: HOSPADM

## 2025-05-16 RX ORDER — POTASSIUM CHLORIDE 29.8 MG/ML
40 INJECTION INTRAVENOUS ONCE
Status: DISCONTINUED | OUTPATIENT
Start: 2025-05-16 | End: 2025-05-17

## 2025-05-16 RX ADMIN — DIAZEPAM 5 MG: 10 INJECTION, SOLUTION INTRAMUSCULAR; INTRAVENOUS at 22:59

## 2025-05-16 RX ADMIN — LIDOCAINE HYDROCHLORIDE 5 ML: 10 INJECTION, SOLUTION EPIDURAL; INFILTRATION; INTRACAUDAL at 12:34

## 2025-05-16 RX ADMIN — TRIMETHOBENZAMIDE HYDROCHLORIDE 200 MG: 100 INJECTION INTRAMUSCULAR at 20:33

## 2025-05-16 RX ADMIN — SODIUM CHLORIDE, SODIUM GLUCONATE, SODIUM ACETATE, POTASSIUM CHLORIDE, MAGNESIUM CHLORIDE, SODIUM PHOSPHATE, DIBASIC, AND POTASSIUM PHOSPHATE 125 ML/HR: .53; .5; .37; .037; .03; .012; .00082 INJECTION, SOLUTION INTRAVENOUS at 16:59

## 2025-05-16 RX ADMIN — SODIUM CHLORIDE 4.5 G: 9 INJECTION, SOLUTION INTRAVENOUS at 15:10

## 2025-05-16 RX ADMIN — DIAZEPAM 2.5 MG: 10 INJECTION, SOLUTION INTRAMUSCULAR; INTRAVENOUS at 14:42

## 2025-05-16 RX ADMIN — ONDANSETRON 4 MG: 4 TABLET, ORALLY DISINTEGRATING ORAL at 10:35

## 2025-05-16 RX ADMIN — SODIUM CHLORIDE 4.5 G: 9 INJECTION, SOLUTION INTRAVENOUS at 20:41

## 2025-05-16 RX ADMIN — POTASSIUM CHLORIDE 20 MEQ: 14.9 INJECTION, SOLUTION INTRAVENOUS at 15:02

## 2025-05-16 RX ADMIN — KETOROLAC TROMETHAMINE 15 MG: 30 INJECTION, SOLUTION INTRAMUSCULAR; INTRAVENOUS at 21:25

## 2025-05-16 RX ADMIN — DIAZEPAM 5 MG: 10 INJECTION, SOLUTION INTRAMUSCULAR; INTRAVENOUS at 17:01

## 2025-05-16 RX ADMIN — MAGNESIUM SULFATE HEPTAHYDRATE 2 G: 40 INJECTION, SOLUTION INTRAVENOUS at 13:22

## 2025-05-16 RX ADMIN — MAGNESIUM SULFATE HEPTAHYDRATE 2 G: 40 INJECTION, SOLUTION INTRAVENOUS at 16:57

## 2025-05-16 RX ADMIN — POTASSIUM CHLORIDE 20 MEQ: 14.9 INJECTION, SOLUTION INTRAVENOUS at 16:55

## 2025-05-16 RX ADMIN — TRIMETHOBENZAMIDE HYDROCHLORIDE 200 MG: 100 INJECTION INTRAMUSCULAR at 14:42

## 2025-05-16 NOTE — ASSESSMENT & PLAN NOTE
Malnutrition Findings:                                 BMI Findings:           There is no height or weight on file to calculate BMI.   Cachexia in the setting of opioid abuse.   Advance diet as tolerated   Opioid cessation

## 2025-05-16 NOTE — ASSESSMENT & PLAN NOTE
Noted hypokalemia and hypomagnesemia due to poor oral intake and GI losses   Status post 40 mEq of KCl and 2 g Mag in ED  Give another 40 mEq of KCl and 2 g IV Mag   Monitor BMP, Mag   Further doses as indicated

## 2025-05-16 NOTE — ASSESSMENT & PLAN NOTE
Patient is actively withdrawing from opioids, had been using Fentanyl and then had 2 doses of Methadone from [facility], but could not get dose today due to prolongation of QTc  COWS Score = 14   Dicsussed with Toxicology   Tigan IM Q6 PRN nausea/vomiting  Valium 5 mg IV Q6 PRN anxiety, refractory vomiting   Gabapentin 300 mg po Q8 PRN anxiety  Ofirmev, Toradol IV PRN mild, moderate pain due to intolerance of PO at this time   Consider restarting Methadone when QT stabilized

## 2025-05-16 NOTE — ED PROVIDER NOTES
Time reflects when diagnosis was documented in both MDM as applicable and the Disposition within this note       Time User Action Codes Description Comment    5/16/2025 10:21 AM Polina Stephens Add [F11.93] Withdrawal from opioids (HCC)           ED Disposition       ED Disposition   Admit    Condition   Stable    Date/Time   Fri May 16, 2025 10:21 AM    Comment   Case was discussed with WESTLEYIM and the patient's admission status was agreed to be Admission Status: inpatient status to the service of  ** .               Assessment & Plan       Medical Decision Making  Patient presents with:  Withdrawal - Drug  DDx includes withdrawal, acute intoxication, sepsis, volume depletion, dehydration, electrolyte abnormality, DVT  Workup per ED course: Presentation consistent with active withdrawal with recent diagnosis of chronic left IJ thrombosis and soft tissue abscess.  Right femoral central line placed as peripheral access unable to be obtained.  Methadone and multiple antiemetics unable to be given as EKG demonstrates QT prolongation.  No acute intervention indicated per most recent recommendations from ENT and vascular surgery 5/12 with recommendation of IV antibiotics and admission for medical management. IV antibiotics initiated and patient admitted to [facility] for further workup and resuscitation.    Amount and/or Complexity of Data Reviewed  Labs: ordered. Decision-making details documented in ED Course.    Risk  Prescription drug management.  Decision regarding hospitalization.        ED Course as of 05/16/25 2145   Fri May 16, 2025   1035 Withdrawal symptoms and p.o. intolerance for 2 days. Chronic IVDU dependence with fentanyl (1 bundle daily), injects into her neck, most recent use 5/11.  Recent ED presentation 5/12 with left IJ thrombus and adjacent soft tissue abscess. Left AMA after symptomatic control in addition to IV antibiotics requesting admission for intervention. Patient request inpatient treatment,  antibiotics and agrees to central line placement for IV antibiotics and necessary resuscitation.       Plan blood work, sepsis workup, Central line placement, IV antibiotics, antiemetics, analgesics as needed with plan for admission      1058 Consent obtained for placement of central line and blood products.     Will attempt peripheral line placement    Patient notes symptomatic improvement with Zofran   1142 ECG findings with QTc prolongation reviewed with patient she is aware she will be unable to receive her methadone and this will limit medication that we can use to treat her symptoms.   Restless understanding and requests that we continue with central line placement for antibiotic therapy and management of her symptoms   1316 Right femoral central line placed.  All ports flushed with good return.  Patient tolerated procedure well without complaint   1413 LACTIC ACID: 0.9   1413 Phosphorus: 3.1   1414 Potassium(!): 3.0   1414 MAGNESIUM(!): 1.6   1414 Will give potassium and magnesium   1414 CBC and differential(!)  Leukocytosis, hemoglobin at baseline   1415 ETHANOL: <10   1415 SALICYLATE LEVEL(!): <5   1415 ACETAMINOPHEN LEVEL(!): <2   1415 Procalcitonin: <0.05   1436 Tigan and Valium given for persistent anxiety and nausea as patient unable to take QT prolonging medications   1439 CT soft tissue neck with contrast (5/12/25):    IMPRESSION:     Diffusely abnormal musculature in bilateral neck anteriorly and posteriorly, bilateral upper chest, and bilateral upper back regions with scattered areas of intramuscular fluid collections some with locules of gas and diffuse surrounding inflammatory   changes, likely due to multifocal sites of intramuscular abscesses or myonecrosis with diffuse cellulitis and infectious/inflammatory myositis. Recommend evaluation by ENT.     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     1443 Clonidine held for withdrawal management 2/2 risk of hypotension and bradycardia in the  setting of QT prolongation with possible conversion to torsades   1446 Per chart review 5/12 ED workup re: vascular/ENT consults:    Vascular reports that they do not currently plan to intervene on the patient's occlusion due to the suspicion that it is chronic versus potentially an abscess pushing on the vessel instead of the thrombus.  Due to this they state that the patient may remain at the [facility].    ENT also consulted, saw the patient at bedside and have determined that they will not be draining abscesses at this time.  Continue with IV antibiotic management.    1507 Vanc/Zosyn initiated per ENT recs from evaluation 5/12  Patient discussed with [facility] who accepted for inpatient admission         Medications   trimethobenzamide (TIGAN) IM injection 200 mg (200 mg Intramuscular Given 5/16/25 2033)   diazepam (VALIUM) injection 5 mg (5 mg Intravenous Given 5/16/25 1701)   potassium chloride 40 mEq IVPB (premix) (40 mEq Intravenous Not Given 5/16/25 1804)   multi-electrolyte (Plasmalyte-A/Isolyte-S PH 7.4/Normosol-R) IV solution (125 mL/hr Intravenous New Bag 5/16/25 1659)   acetaminophen (Ofirmev) IVPB 660 mg (has no administration in time range)   bisacodyl (DULCOLAX) rectal suppository 10 mg (has no administration in time range)   enoxaparin (LOVENOX) subcutaneous injection 70 mg (has no administration in time range)   gabapentin (NEURONTIN) capsule 300 mg (has no administration in time range)   ketorolac (TORADOL) injection 15 mg (15 mg Intravenous Given 5/16/25 2125)   piperacillin-tazobactam (ZOSYN) 4.5 g in sodium chloride 0.9 % 100 mL IVPB (EXTENDED INFUSION) (4.5 g Intravenous New Bag 5/16/25 2041)   ondansetron (ZOFRAN-ODT) dispersible tablet 4 mg (4 mg Oral Given 5/16/25 1035)   lidocaine (PF) (XYLOCAINE-MPF) 1 % injection 5 mL (5 mL Infiltration Given by Other 5/16/25 1234)   magnesium sulfate 2 g/50 mL IVPB (premix) 2 g (0 g Intravenous Stopped 5/16/25 1511)   trimethobenzamide (TIGAN) IM  injection 200 mg (200 mg Intramuscular Given 5/16/25 1442)   diazepam (VALIUM) injection 2.5 mg (2.5 mg Intravenous Given 5/16/25 1442)   piperacillin-tazobactam (ZOSYN) IVPB 4.5 g (0 g Intravenous Stopped 5/16/25 1547)   potassium chloride 20 mEq IVPB (premix) (0 mEq Intravenous Stopped 5/16/25 1900)   magnesium sulfate 2 g/50 mL IVPB (premix) 2 g (0 g Intravenous Stopped 5/16/25 1900)       ED Risk Strat Scores                   Clinical Opiate Withdrawal Scale       Row Name 05/16/25 1546                Pulse --        Resting Pulse Rate: Measured After Patient is Sitting or Lying for One Minute 1  -SO        GI Upset: Over Last Half Hour 3  -SO        Sweating: Over Past Half Hour Not Accounted for by Room Temperature of Patient Activity 1  -SO        Tremor: Observation of Outstretched Hands 2  -SO        Restlessness: Observation During Assessment 1  -SO        Yawning: Observation During Assessment 0  -SO        Pupil Size 0  -SO        Anxiety and Irritability 2  -SO        Bone or Joint Aches: If Patient was Having Pain Previously, Only the Additional Component Attributed to Opiate Withdrawal is Scored 1  -SO        Gooseflesh Skin 3  -SO        Runny Nose or Tearing: Not Accounted for by Cold Symptoms or Allergies 0  -SO        Clinical Opiate Withdrawal Scale Total Score 14  -SO        Heart Rate Source --        Patient Position - Orthostatic VS --                  User Key  (r) = Recorded By, (t) = Taken By, (c) = Cosigned By      Initials Name    SO Kai Alexander PA-C                  No data recorded                            History of Present Illness       Chief Complaint   Patient presents with    Withdrawal - Drug       Past Medical History:   Diagnosis Date    Anxiety     Borderline personality disorder (HCC)     Chlamydia     Depression     Endometriosis     IV drug user     IV heroin use, rehab 7 times     Liver lesion 5/7/2025    Panic attacks     Psychiatric disorder     anxiety     PTSD (post-traumatic stress disorder)     PTSD (post-traumatic stress disorder) 8/12/2023    Varicella     childhood    Visual impairment       Past Surgical History:   Procedure Laterality Date    LAPAROTOMY        Family History   Problem Relation Age of Onset    Depression Mother     Drug abuse Brother     Drug abuse Maternal Aunt     Drug abuse Paternal Uncle     Cancer Maternal Grandmother     Cancer Paternal Grandfather       Social History[1]   E-Cigarette/Vaping    E-Cigarette Use Current Every Day User       E-Cigarette/Vaping Substances    Nicotine Yes     THC No     CBD No     Flavoring Yes     Other No     Unknown No       I have reviewed and agree with the history as documented.     Elda Calderon is a 29 y.o. female p/w Withdrawal symptoms and p.o. intolerance for 2 days. Chronic IVDU dependence with fentanyl (1 bundle daily), injects into her neck, most recent use 5/11.  Recent ED presentation 5/12 with left IJ thrombus and adjacent soft tissue abscess. Left AMA after symptomatic control in addition to IV antibiotics requesting admission for intervention. Patient request inpatient treatment, antibiotics and agrees to central line placement for IV antibiotics and necessary resuscitation.      All other systems reviewed and are negative      Withdrawal - Drug      Review of Systems        Objective       ED Triage Vitals   Temperature Pulse Blood Pressure Respirations SpO2 Patient Position - Orthostatic VS   05/16/25 0856 05/16/25 0856 05/16/25 0856 05/16/25 0856 05/16/25 0856 05/16/25 0907   98.5 °F (36.9 °C) 84 (!) 169/116 16 93 % Lying      Temp src Heart Rate Source BP Location FiO2 (%) Pain Score    -- 05/16/25 0856 05/16/25 0907 -- 05/16/25 2125     Monitor Right arm  8      Vitals      Date and Time Temp Pulse SpO2 Resp BP Pain Score FACES Pain Rating User   05/16/25 2125 -- -- -- -- -- 8 -- KB   05/16/25 2000 -- 87 97 % -- 167/108 -- -- KB   05/16/25 1801 -- 91 98 % 20 165/108 -- -- GADIEL    05/16/25 1711 -- 87 96 % 22 161/103 -- -- KO   05/16/25 1516 -- 97 99 % 20 178/106 -- -- KO   05/16/25 1431 -- 78 95 % 20 163/104 -- -- KO   05/16/25 1145 -- 79 94 % 20 163/111 -- -- KO   05/16/25 0907 -- 83 94 % 16 143/100 -- -- KO   05/16/25 0856 98.5 °F (36.9 °C) 84 93 % 16 169/116 -- -- AS            Physical Exam    General appearance: Cachectic, frail female, uncomfortable with acute distress  HENT: Normocephalic, atraumatic, hearing grossly intact, and mucous membranes dry; left neck tenderness, multiple injection sites bilateral neck,   Eyes: Conjunctiva pale, PERRL, EOM intact   Lungs/Chest: Respirations even and unlabored, breath sounds normal  Cardiovascular: Normal rate, regular rhythm  Abdomen: soft, non-distended, non-tender  Musculoskeletal: extremities normal, atraumatic, no cyanosis or edema; bilateral upper extremities with diffuse healing scars  Pulses: radial / dorsalis pedis pulses palpable  Skin: warm and dry   Neurologic: Awake and alert, no apparent focal deficit  Psych: Mood consistent with affect     Results Reviewed       Procedure Component Value Units Date/Time    Platelet count [[id number]] Collected: 05/16/25 2131    Lab Status: No result Specimen: Blood from Line, Venous     Potassium [[id number]] Collected: 05/16/25 2131    Lab Status: No result Specimen: Blood from Line, Venous     Blood culture #1 [[id number]] Collected: 05/16/25 1315    Lab Status: Preliminary result Specimen: Blood from Central Venous Line Updated: 05/16/25 1804     Blood Culture Received in Microbiology Lab. Culture in Progress.    Blood culture #2 [[id number]] Collected: 05/16/25 1315    Lab Status: Preliminary result Specimen: Blood from Central Venous Line Updated: 05/16/25 1802     Blood Culture Received in Microbiology Lab. Culture in Progress.    Procalcitonin [[id number]]  (Normal) Collected: 05/16/25 1315    Lab Status: Final result Specimen: Blood from Central Venous Line Updated: 05/16/25 1413      Procalcitonin <0.05 ng/ml     Salicylate level [[id number]]  (Abnormal) Collected: 05/16/25 1315    Lab Status: Final result Specimen: Blood from Central Venous Line Updated: 05/16/25 1409     Salicylate Lvl <5 mg/dL     Comprehensive metabolic panel [[id number]]  (Abnormal) Collected: 05/16/25 1315    Lab Status: Final result Specimen: Blood from Central Venous Line Updated: 05/16/25 1409     Sodium 143 mmol/L      Potassium 3.0 mmol/L      Chloride 97 mmol/L      CO2 33 mmol/L      ANION GAP 13 mmol/L      BUN 5 mg/dL      Creatinine 0.45 mg/dL      Glucose 129 mg/dL      Calcium 9.6 mg/dL      AST 17 U/L      ALT 10 U/L      Alkaline Phosphatase 67 U/L      Total Protein 7.4 g/dL      Albumin 4.0 g/dL      Total Bilirubin 0.38 mg/dL      eGFR 136 ml/min/1.73sq m     Narrative:      National [employer] guidelines for Chronic Kidney Disease (CKD):     Stage 1 with normal or high GFR (GFR > 90 mL/min/1.73 square meters)    Stage 2 Mild CKD (GFR = 60-89 mL/min/1.73 square meters)    Stage 3A Moderate CKD (GFR = 45-59 mL/min/1.73 square meters)    Stage 3B Moderate CKD (GFR = 30-44 mL/min/1.73 square meters)    Stage 4 Severe CKD (GFR = 15-29 mL/min/1.73 square meters)    Stage 5 End Stage CKD (GFR <15 mL/min/1.73 square meters)  Note: GFR calculation is accurate only with a steady state creatinine    Phosphorus [[id number]]  (Normal) Collected: 05/16/25 1315    Lab Status: Final result Specimen: Blood from Central Venous Line Updated: 05/16/25 1409     Phosphorus 3.1 mg/dL     Magnesium [[id number]]  (Abnormal) Collected: 05/16/25 1315    Lab Status: Final result Specimen: Blood from Central Venous Line Updated: 05/16/25 1409     Magnesium 1.6 mg/dL     Acetaminophen level-If concentration is detectable, please discuss with medical  on call. [[id number]]  (Abnormal) Collected: 05/16/25 1315    Lab Status: Final result Specimen: Blood from Central Venous Line Updated: 05/16/25 1409      Acetaminophen Level <2 ug/mL     Ethanol [[id number]]  (Normal) Collected: 05/16/25 1315    Lab Status: Final result Specimen: Blood from Central Venous Line Updated: 05/16/25 1408     Ethanol Lvl <10 mg/dL     Lactic acid, plasma (w/reflex if result > 2.0) [[id number]]  (Normal) Collected: 05/16/25 1315    Lab Status: Final result Specimen: Blood from Central Venous Line Updated: 05/16/25 1403     LACTIC ACID 0.9 mmol/L     Narrative:      Result may be elevated if tourniquet was used during collection.    CBC and differential [[id number]]  (Abnormal) Collected: 05/16/25 1315    Lab Status: Final result Specimen: Blood from Central Venous Line Updated: 05/16/25 1331     WBC 7.65 Thousand/uL      RBC 3.59 Million/uL      Hemoglobin 10.5 g/dL      Hematocrit 31.8 %      MCV 89 fL      MCH 29.2 pg      MCHC 33.0 g/dL      RDW 12.5 %      MPV 8.6 fL      Platelets 478 Thousands/uL      nRBC 0 /100 WBCs      Segmented % 83 %      Immature Grans % 1 %      Lymphocytes % 12 %      Monocytes % 3 %      Eosinophils Relative 0 %      Basophils Relative 1 %      Absolute Neutrophils 6.40 Thousands/µL      Absolute Immature Grans 0.04 Thousand/uL      Absolute Lymphocytes 0.91 Thousands/µL      Absolute Monocytes 0.26 Thousand/µL      Eosinophils Absolute 0.00 Thousand/µL      Basophils Absolute 0.04 Thousands/µL             No orders to display       Central Line    Date/Time: 5/16/2025 1:18 PM    Performed by: Darek Beckwith MD  Authorized by: Darek Beckwith MD    Patient location:  ED  Other Assisting Provider: Yes (comment) (Dr. Patino, Dr. Bradford)    Consent:     Consent obtained:  Written and verbal    Consent given by:  Patient    Risks discussed:  Arterial puncture, incorrect placement, nerve damage, bleeding and infection    Alternatives discussed:  No treatment and delayed treatment  Universal protocol:     Relevant documents present and verified: yes      Immediately prior to procedure, a time out was  called: yes      Patient identity confirmed:  Verbally with patient and arm band  Pre-procedure details:     Hand hygiene: Hand hygiene performed prior to insertion      Sterile barrier technique: All elements of maximal sterile technique followed      Skin preparation:  2% chlorhexidine    Skin preparation agent: Skin preparation agent completely dried prior to procedure    Indications:     Central line indications: medications requiring central line and no peripheral vascular access    Anesthesia (see MAR for exact dosages):     Anesthesia method:  None  Procedure details:     Location:  Right femoral    Vessel type: vein      Laterality:  Right    Approach: percutaneous technique used      Patient position:  Flat    Catheter type:  Triple lumen 20cm    Catheter size:  7 Fr    Landmarks identified: yes      Ultrasound guidance: yes      Sterile ultrasound techniques: Sterile gel and sterile probe covers were used      Number of attempts:  1    Successful placement: yes    Post-procedure details:     Post-procedure:  Dressing applied and line sutured    Assessment:  Blood return through all ports and free fluid flow    Post-procedure complications: none      Patient tolerance of procedure:  Tolerated well, no immediate complications      ED Medication and Procedure Management   Prior to Admission Medications   Prescriptions Last Dose Informant Patient Reported? Taking?   amoxicillin-clavulanate (AUGMENTIN) 875-125 mg per tablet Unknown  No No   Sig: Take 1 tablet by mouth every 12 (twelve) hours for 10 days   apixaban (ELIQUIS) 5 mg Past Week  No Yes   Sig: Take 1 tablet (5 mg total) by mouth 2 (two) times a day   doxycycline hyclate (VIBRAMYCIN) 100 mg capsule Unknown  No No   Sig: Take 1 capsule (100 mg total) by mouth every 12 (twelve) hours for 10 days   Patient not taking: Reported on 5/16/2025   naloxone (NARCAN) 4 mg/0.1 mL nasal spray Unknown  No No   Sig: Administer 1 spray into a nostril. If no response  after 2-3 minutes, give another dose in the other nostril using a new spray.   ondansetron (ZOFRAN) 4 mg tablet 5/16/2025  No Yes   Sig: Take 1 tablet (4 mg total) by mouth every 8 (eight) hours as needed for nausea or vomiting      Facility-Administered Medications: None     Patient's Medications   Discharge Prescriptions    No medications on file     No discharge procedures on file.  ED SEPSIS DOCUMENTATION   Time reflects when diagnosis was documented in both MDM as applicable and the Disposition within this note       Time User Action Codes Description Comment    5/16/2025 10:21 AM Polina Stephens Add [F11.93] Withdrawal from opioids (HCC)                    [1]   Social History  Tobacco Use    Smoking status: Never     Passive exposure: Never    Smokeless tobacco: Never   Vaping Use    Vaping status: Every Day    Substances: Nicotine, Flavoring   Substance Use Topics    Alcohol use: Not Currently    Drug use: Yes     Types: Fentanyl     Comment: 8 bags/day states benzos are mixed in        Darek Isai Beckwith MD  05/16/25 7421

## 2025-05-16 NOTE — ASSESSMENT & PLAN NOTE
Secondary to extrinsic compression from neck infection  Vascular surgery evaluated prior admission and there was no surgical indication   Recommended 6 months A/C and Vascular follow up PRN  Lovenox 1.5 mg/kg Q24 given inability to tolerate PO   Restart Eliquis when able to tolerate PO

## 2025-05-16 NOTE — ASSESSMENT & PLAN NOTE
Patient with prior CT of the neck showing multifocal sites of intramuscular abscesses or myonecrosis with diffuse cellulitis and infectious/inflammatory myositis  ENT evaluated prior and deemed there to be no surgical interventions  Prior blood cultures negative thus far, MRSA screen negative   Not meeting SIRS/Sepsis   Superficial areas appear relatively normal   No signs of airway compromise   Admit patient to med/surg under inpatient status   Continue Zosyn IV  Follow up with repeat blood cultures   Monitor airway

## 2025-05-16 NOTE — H&P
H&P - Hospitalist   Name: Elda Calderon 29 y.o. female I MRN: [id number]  Unit/Bed#: ED-20 I Date of Admission: 5/16/2025   Date of Service: 5/16/2025 I Hospital Day: 0     Assessment & Plan  Cellulitis of neck  Patient with prior CT of the neck showing multifocal sites of intramuscular abscesses or myonecrosis with diffuse cellulitis and infectious/inflammatory myositis  ENT evaluated prior and deemed there to be no surgical interventions  Prior blood cultures negative thus far, MRSA screen negative   Not meeting SIRS/Sepsis   Superficial areas appear relatively normal   No signs of airway compromise   Admit patient to med/surg under inpatient status   Continue Zosyn IV  Follow up with repeat blood cultures   Monitor airway   Internal jugular vein thrombosis, left (HCC)  Secondary to extrinsic compression from neck infection  Vascular surgery evaluated prior admission and there was no surgical indication   Recommended 6 months A/C and Vascular follow up PRN  Lovenox 1.5 mg/kg Q24 given inability to tolerate PO   Restart Eliquis when able to tolerate PO   Acute opioid withdrawal (HCC)  Patient is actively withdrawing from opioids, had been using Fentanyl and then had 2 doses of Methadone from [facility], but could not get dose today due to prolongation of QTc  COWS Score = 14   Dicsussed with Toxicology   Tigan IM Q6 PRN nausea/vomiting  Valium 5 mg IV Q6 PRN anxiety, refractory vomiting   Gabapentin 300 mg po Q8 PRN anxiety  Ofirmev, Toradol IV PRN mild, moderate pain due to intolerance of PO at this time   Consider restarting Methadone when QT stabilized   Electrolyte abnormality  Noted hypokalemia and hypomagnesemia due to poor oral intake and GI losses   Status post 40 mEq of KCl and 2 g Mag in ED  Give another 40 mEq of KCl and 2 g IV Mag   Monitor BMP, Mag   Further doses as indicated    Prolonged Q-T interval on ECG  Suspect secondary to electrolyte derangements and possibly from Methadone   Hold QT  prolonging agents   Aggressively replete K and Mag  Telemetry   Repeat EKG in AM   Polysubstance abuse (HCC)  Patient admits to Fentanyl use, recently started Methadone therapy  She was to go to IP drug rehab but was turned away when her insurance was found to be out of network   Hold Methadone for now given prolonged QT  Supportive care for withdrawal   CM consult for rehab placement after discharge   Severe episode of recurrent major depressive disorder, without psychotic features (HCC)  Does not appear to be on medications   Psych referral outpatient   Severe protein-calorie malnutrition (HCC)  Malnutrition Findings:                                 BMI Findings:           There is no height or weight on file to calculate BMI.   Cachexia in the setting of opioid abuse.   Advance diet as tolerated   Opioid cessation       VTE Pharmacologic Prophylaxis: VTE Score: 6 High Risk (Score >/= 5) - Pharmacological DVT Prophylaxis Ordered: enoxaparin (Lovenox). Sequential Compression Devices Ordered.  Code Status: Full Code   Discussion with family: Updated  (mother) at bedside.    Anticipated Length of Stay: Patient will be admitted on an inpatient basis with an anticipated length of stay of greater than 2 midnights secondary to IV antibiotics for neck infection, electrolyte repletion, and QT monitoring as clearly delineated in the above assessment and plan .    History of Present Illness   Chief Complaint: Withdrawal     Elda Calderon is a 29 y.o. female with a PMH of opioid abuse who presents with withdrawal. Patient has been seen multiple times recently and was diagnosed with neck abscesses/cellulitis and left IJ thrombosis and left AMA multiple times. She has a history of Fentanyl use and recently was going to [facility] for Methadone but was unable to get her dose due to prolonged QT. She reports having withdrawal symptoms consisting of chills, nausea, vomiting/dry heaving, and aches. She reports  last BM was last Friday. Her mother reports that due to persistent nausea and vomiting she was unable to take any of the antibiotics or her Eliquis prescribed last hospitalization. Denies fevers at home. Reports chest tightness, but states that's mainly because of her having to vomiting. Denies neck pain, difficulty breathing, or difficulty swallowing. She is not drooling. Patient reports that she is agreeable to inpatient rehab for her substance use. Her mother reports that she had gotten a rehab set up and was told her insurance was in network, but when she arrived at the rehab she was turned away.     Review of Systems   Constitutional:  Positive for appetite change and chills. Negative for diaphoresis, fatigue and fever.   HENT:  Negative for congestion, rhinorrhea, sore throat and trouble swallowing.    Eyes:  Negative for visual disturbance.   Respiratory:  Positive for chest tightness. Negative for cough, shortness of breath and wheezing.    Cardiovascular:  Negative for chest pain, palpitations and leg swelling.   Gastrointestinal:  Positive for abdominal pain, nausea and vomiting. Negative for constipation and diarrhea.   Genitourinary:  Negative for dysuria.   Musculoskeletal:  Positive for myalgias. Negative for arthralgias.   Skin:  Negative for wound.   Neurological:  Negative for dizziness, syncope, weakness, light-headedness, numbness and headaches.   All other systems reviewed and are negative.      Historical Information   Past Medical History:   Diagnosis Date    Anxiety     Borderline personality disorder (HCC)     Chlamydia     Depression     Endometriosis     IV drug user     IV heroin use, rehab 7 times     Liver lesion 5/7/2025    Panic attacks     Psychiatric disorder     anxiety    PTSD (post-traumatic stress disorder)     PTSD (post-traumatic stress disorder) 8/12/2023    Varicella     childhood    Visual impairment      Past Surgical History:   Procedure Laterality Date    LAPAROTOMY        Social History     Tobacco Use    Smoking status: Never     Passive exposure: Never    Smokeless tobacco: Never   Vaping Use    Vaping status: Every Day    Substances: Nicotine, Flavoring   Substance and Sexual Activity    Alcohol use: Not Currently    Drug use: Yes     Types: Fentanyl     Comment: 8 bags/day states benzos are mixed in    Sexual activity: Not Currently     Partners: Male     Birth control/protection: None     E-Cigarette/Vaping    E-Cigarette Use Current Every Day User      E-Cigarette/Vaping Substances    Nicotine Yes     THC No     CBD No     Flavoring Yes     Other No     Unknown No      Family History   Problem Relation Age of Onset    Depression Mother     Drug abuse Brother     Drug abuse Maternal Aunt     Drug abuse Paternal Uncle     Cancer Maternal Grandmother     Cancer Paternal Grandfather      Social History:  Marital Status: Single   Occupation: Noncontributory   Patient Pre-hospital Living Situation: Home  Patient Pre-hospital Level of Mobility: unable to be assessed at time of evaluation  Patient Pre-hospital Diet Restrictions: None    Meds/Allergies   I have reviewed home medications with patient family member.  Prior to Admission medications    Medication Sig Start Date End Date Taking? Authorizing Provider   amoxicillin-clavulanate (AUGMENTIN) 875-125 mg per tablet Take 1 tablet by mouth every 12 (twelve) hours for 10 days 5/13/25 5/23/25  Tej Perez MD   apixaban (ELIQUIS) 5 mg Take 1 tablet (5 mg total) by mouth 2 (two) times a day 5/13/25   Tej Perez MD   doxycycline hyclate (VIBRAMYCIN) 100 mg capsule Take 1 capsule (100 mg total) by mouth every 12 (twelve) hours for 10 days 5/13/25 5/23/25  Tej Perez MD   naloxone (NARCAN) 4 mg/0.1 mL nasal spray Administer 1 spray into a nostril. If no response after 2-3 minutes, give another dose in the other nostril using a new spray. 5/9/25 5/9/26  Mague Villatoro MD   ondansetron (ZOFRAN) 4 mg tablet Take 1  tablet (4 mg total) by mouth every 8 (eight) hours as needed for nausea or vomiting 3/5/24   Jose Resendiz MD     No Known Allergies    Objective :  Temp:  [98.5 °F (36.9 °C)] 98.5 °F (36.9 °C)  HR:  [78-97] 97  BP: (143-178)/(100-116) 178/106  Resp:  [16-20] 20  SpO2:  [93 %-99 %] 99 %  O2 Device: None (Room air)    Physical Exam  Vitals and nursing note reviewed.   Constitutional:       General: She is in acute distress.      Appearance: Normal appearance. She is underweight. She is not ill-appearing or diaphoretic.   HENT:      Head: Normocephalic and atraumatic.      Mouth/Throat:      Mouth: Mucous membranes are moist.      Dentition: Abnormal dentition.     Eyes:      General: No scleral icterus.     Pupils: Pupils are equal, round, and reactive to light.     Neck:      Trachea: Phonation normal.     Cardiovascular:      Rate and Rhythm: Normal rate and regular rhythm.      Pulses: Normal pulses.      Heart sounds: Normal heart sounds, S1 normal and S2 normal. No murmur heard.     No systolic murmur is present.      No diastolic murmur is present.      No gallop. No S3 or S4 sounds.   Pulmonary:      Effort: Pulmonary effort is normal. No accessory muscle usage or respiratory distress.      Breath sounds: Normal breath sounds. No stridor. No decreased breath sounds, wheezing, rhonchi or rales.   Chest:      Chest wall: No tenderness.   Abdominal:      General: Bowel sounds are normal. There is no distension.      Palpations: Abdomen is soft.      Tenderness: There is no abdominal tenderness. There is no guarding.     Musculoskeletal:      Cervical back: No erythema or crepitus.      Right lower leg: No edema.      Left lower leg: No edema.     Skin:     General: Skin is warm and dry.      Coloration: Skin is not jaundiced.      Findings: Wound (Multiple old track marks noted) present.     Neurological:      General: No focal deficit present.      Mental Status: She is alert. Mental status is at baseline.       "Motor: Tremor present. No seizure activity.     Psychiatric:         Behavior: Behavior is cooperative.          Lines/Drains:  Lines/Drains/Airways       Active Status       Name Placement date Placement time Site Days    CVC Central Lines 05/16/25 Triple Right Femoral 05/16/25  1300  Femoral  less than 1                    Central Line:  Goal for removal: Will discontinue when peripheral access obtained.              Lab Results: I have reviewed the following results:  Results from last 7 days   Lab Units 05/16/25  1315   WBC Thousand/uL 7.65   HEMOGLOBIN g/dL 10.5*   HEMATOCRIT % 31.8*   PLATELETS Thousands/uL 478*   SEGS PCT % 83*   LYMPHO PCT % 12*   MONO PCT % 3*   EOS PCT % 0     Results from last 7 days   Lab Units 05/16/25  1315   SODIUM mmol/L 143   POTASSIUM mmol/L 3.0*   CHLORIDE mmol/L 97   CO2 mmol/L 33*   BUN mg/dL 5   CREATININE mg/dL 0.45*   ANION GAP mmol/L 13   CALCIUM mg/dL 9.6   ALBUMIN g/dL 4.0   TOTAL BILIRUBIN mg/dL 0.38   ALK PHOS U/L 67   ALT U/L 10   AST U/L 17   GLUCOSE RANDOM mg/dL 129             No results found for: \"HGBA1C\"  Results from last 7 days   Lab Units 05/16/25  1315 05/12/25  1227   LACTIC ACID mmol/L 0.9 0.8   PROCALCITONIN ng/ml <0.05 <0.05       Imaging Results Review: I reviewed radiology reports from this admission including: CT Neck.  Other Study Results Review: EKG was reviewed.     Administrative Statements   I have spent a total time of 75 minutes in caring for this patient on the day of the visit/encounter including Diagnostic results, Instructions for management, Impressions, Counseling / Coordination of care, Documenting in the medical record, Reviewing/placing orders in the medical record (including tests, medications, and/or procedures), Obtaining or reviewing history  , and Communicating with other healthcare professionals .    ** Please Note: This note has been constructed using a voice recognition system. **    "

## 2025-05-16 NOTE — ASSESSMENT & PLAN NOTE
Suspect secondary to electrolyte derangements and possibly from Methadone   Hold QT prolonging agents   Aggressively replete K and Mag  Telemetry   Repeat EKG in AM

## 2025-05-16 NOTE — ASSESSMENT & PLAN NOTE
Patient admits to Fentanyl use, recently started Methadone therapy  She was to go to IP drug rehab but was turned away when her insurance was found to be out of network   Hold Methadone for now given prolonged QT  Supportive care for withdrawal   CM consult for rehab placement after discharge

## 2025-05-17 VITALS
DIASTOLIC BLOOD PRESSURE: 107 MMHG | OXYGEN SATURATION: 98 % | HEART RATE: 89 BPM | RESPIRATION RATE: 20 BRPM | SYSTOLIC BLOOD PRESSURE: 156 MMHG | TEMPERATURE: 97.8 F

## 2025-05-17 LAB
ANION GAP SERPL CALCULATED.3IONS-SCNC: 10 MMOL/L (ref 4–13)
ATRIAL RATE: 115 BPM
BACTERIA BLD CULT: NORMAL
BUN SERPL-MCNC: 6 MG/DL (ref 5–25)
CALCIUM SERPL-MCNC: 8.5 MG/DL (ref 8.4–10.2)
CHLORIDE SERPL-SCNC: 95 MMOL/L (ref 96–108)
CO2 SERPL-SCNC: 33 MMOL/L (ref 21–32)
CREAT SERPL-MCNC: 0.4 MG/DL (ref 0.6–1.3)
ERYTHROCYTE [DISTWIDTH] IN BLOOD BY AUTOMATED COUNT: 12.4 % (ref 11.6–15.1)
GFR SERPL CREATININE-BSD FRML MDRD: 142 ML/MIN/1.73SQ M
GLUCOSE SERPL-MCNC: 97 MG/DL (ref 65–140)
HCT VFR BLD AUTO: 33.6 % (ref 34.8–46.1)
HGB BLD-MCNC: 11.2 G/DL (ref 11.5–15.4)
MAGNESIUM SERPL-MCNC: 2.3 MG/DL (ref 1.9–2.7)
MCH RBC QN AUTO: 29.9 PG (ref 26.8–34.3)
MCHC RBC AUTO-ENTMCNC: 33.3 G/DL (ref 31.4–37.4)
MCV RBC AUTO: 90 FL (ref 82–98)
P AXIS: -14 DEGREES
PLATELET # BLD AUTO: 490 THOUSANDS/UL (ref 149–390)
PMV BLD AUTO: 8.7 FL (ref 8.9–12.7)
POTASSIUM SERPL-SCNC: 2.7 MMOL/L (ref 3.5–5.3)
PR INTERVAL: 124 MS
QRS AXIS: 18 DEGREES
QRSD INTERVAL: 70 MS
QT INTERVAL: 402 MS
QTC INTERVAL: 557 MS
RBC # BLD AUTO: 3.75 MILLION/UL (ref 3.81–5.12)
SODIUM SERPL-SCNC: 138 MMOL/L (ref 135–147)
T WAVE AXIS: 7 DEGREES
VENTRICULAR RATE: 115 BPM
WBC # BLD AUTO: 7.52 THOUSAND/UL (ref 4.31–10.16)

## 2025-05-17 PROCEDURE — 85027 COMPLETE CBC AUTOMATED: CPT | Performed by: PHYSICIAN ASSISTANT

## 2025-05-17 PROCEDURE — 80048 BASIC METABOLIC PNL TOTAL CA: CPT | Performed by: PHYSICIAN ASSISTANT

## 2025-05-17 PROCEDURE — 93010 ELECTROCARDIOGRAM REPORT: CPT | Performed by: INTERNAL MEDICINE

## 2025-05-17 PROCEDURE — 83735 ASSAY OF MAGNESIUM: CPT | Performed by: PHYSICIAN ASSISTANT

## 2025-05-17 PROCEDURE — 99239 HOSP IP/OBS DSCHRG MGMT >30: CPT | Performed by: PHYSICIAN ASSISTANT

## 2025-05-17 PROCEDURE — 93005 ELECTROCARDIOGRAM TRACING: CPT

## 2025-05-17 RX ORDER — POTASSIUM CHLORIDE 1500 MG/1
40 TABLET, EXTENDED RELEASE ORAL DAILY
Qty: 14 TABLET | Refills: 0 | Status: SHIPPED | OUTPATIENT
Start: 2025-05-17 | End: 2025-05-24

## 2025-05-17 RX ORDER — POTASSIUM CHLORIDE 14.9 MG/ML
20 INJECTION INTRAVENOUS
Status: DISCONTINUED | OUTPATIENT
Start: 2025-05-17 | End: 2025-05-17 | Stop reason: HOSPADM

## 2025-05-17 RX ORDER — LORAZEPAM 2 MG/ML
0.5 INJECTION INTRAMUSCULAR ONCE
Status: DISCONTINUED | OUTPATIENT
Start: 2025-05-17 | End: 2025-05-17 | Stop reason: HOSPADM

## 2025-05-17 RX ADMIN — Medication 660 MG: at 00:32

## 2025-05-17 RX ADMIN — SODIUM CHLORIDE, SODIUM GLUCONATE, SODIUM ACETATE, POTASSIUM CHLORIDE, MAGNESIUM CHLORIDE, SODIUM PHOSPHATE, DIBASIC, AND POTASSIUM PHOSPHATE 125 ML/HR: .53; .5; .37; .037; .03; .012; .00082 INJECTION, SOLUTION INTRAVENOUS at 02:36

## 2025-05-17 RX ADMIN — SODIUM CHLORIDE 4.5 G: 9 INJECTION, SOLUTION INTRAVENOUS at 04:00

## 2025-05-17 RX ADMIN — MORPHINE SULFATE 2 MG: 2 INJECTION, SOLUTION INTRAMUSCULAR; INTRAVENOUS at 03:38

## 2025-05-17 RX ADMIN — DIAZEPAM 5 MG: 10 INJECTION, SOLUTION INTRAMUSCULAR; INTRAVENOUS at 05:17

## 2025-05-17 NOTE — UTILIZATION REVIEW
Initial Clinical Review    Admission: Date/Time/Statement:   Admission Orders (From admission, onward)       Ordered        05/16/25 1506  INPATIENT ADMISSION  Once                          Orders Placed This Encounter   Procedures    INPATIENT ADMISSION     Standing Status:   Standing     Number of Occurrences:   1     Level of Care:   Med Surg [16]     Estimated length of stay:   More than 2 Midnights     Certification:   I certify that inpatient services are medically necessary for this patient for a duration of greater than two midnights. See H&P and MD Progress Notes for additional information about the patient's course of treatment.     ED Arrival Information       Expected   -    Arrival   5/16/2025 08:54    Acuity   Urgent              Means of arrival   Ambulance    Escorted by   [facility]    Service   Hospitalist    Admission type   Emergency              Arrival complaint   vomiting             Chief Complaint   Patient presents with    Withdrawal - Drug       Initial Presentation: 29 y.o. female with hx of opioid abuse , seen multiple times recently and was diagnosed with neck abscesses/cellulitis and left IJ thrombosis and left AMA multiple times, Hx of Fentanyl use (1 bundle/day with most recent use 5/11/25. Pt  recently was going to [facility] for Methadone but was unable to get her dose due to prolonged QT) who presents to ED via EMS with withdrawal.  She reports having withdrawal symptoms consisting of chills, nausea, vomiting/dry heaving, and aches. She reports last BM was last Friday. Her mother reports that due to persistent nausea and vomiting she was unable to take any of the antibiotics or her Eliquis prescribed last hospitalization. Reports chest tightness, but states that's mainly because of her having to vomiting. Denies neck pain, difficulty breathing, or difficulty swallowing. She is not drooling. Patient reports that she is agreeable to inpatient rehab for her substance  use. Her mother reports that she had gotten a rehab set up and was told her insurance was in network, but when she arrived at the rehab she was turned away. On  exam, abnormal dentition , has tremor. Multiple old track marks noted . continues to have dry heaves and retching, not really much emesis. She is spitting out mostly saliva . Pt feels anxious. BP elevated . COWS 14 . Superficial areas on neck  appear relatively normal  .ENT evaluated pt on previous admission  and deemed there to be no surgical interventions   Labs K 3.0, Mag 1.6 . Telemetry- SR , prolonged QTc. Pt given IV/ IM  antiemetic, IV Valium, IV abx, mag and K repletion in ED.   Admitted as Inpatient to telemetry with  neck cellulitis , LIJ thrombosis,  acute opioid withdrawal,   Lyte abnormalities, prolonged QT . Plan- IV Zosyn , F/U repeat blood cx . Monitor airway . Lovenox while unable to tolerate po intake .Restart Eliquis when able to tolerate PO . PRN antiemetics, prn valium anxiety, refractory vomiting , Gabapentin for anxiety . Ofirmev, Toradol IV PRN mild, moderate pain due to intolerance of PO at this time  . Consider restarting Methadone when QT stabilized Give another 40 mEq of KCl and 2 g IV Mag  . Monitor BMP, Mag . Telemetry . Repeat ECG in am . CM consult for rehab placement after discharge   Anticipated Length of Stay/Certification Statement: Patient will be admitted on an inpatient basis with an anticipated length of stay of greater than 2 midnights secondary to IV antibiotics for neck infection, electrolyte repletion, and QT monitoring      Date: 5/17    Day 2:    Still having chills and sweats and reports not eating well. BP remains elevated . Pt leaving AMA, advised to start Augmentin that was previously prescribed  and to restart Eliquis . Discussed with her that she would not be able to receive Methadone at the clinic because her QT is still prolonged  . K still low at 2.7 , Mag normalized  . KCl 40 mEq QD x 7 days  . Pt is  high risk for cardiac arrhythmias, she understands this     ED Treatment-Medication Administration from 05/16/2025 0854 to 05/16/2025 2317         Date/Time Order Dose Route Action     05/16/2025 1035 ondansetron (ZOFRAN-ODT) dispersible tablet 4 mg 4 mg Oral Given     05/16/2025 1234 lidocaine (PF) (XYLOCAINE-MPF) 1 % injection 5 mL 5 mL Infiltration Given by Other     05/16/2025 1322 magnesium sulfate 2 g/50 mL IVPB (premix) 2 g 2 g Intravenous New Bag     05/16/2025 1442 trimethobenzamide (TIGAN) IM injection 200 mg 200 mg Intramuscular Given     05/16/2025 1442 diazepam (VALIUM) injection 2.5 mg 2.5 mg Intravenous Given     05/16/2025 1510 piperacillin-tazobactam (ZOSYN) IVPB 4.5 g 4.5 g Intravenous New Bag     05/16/2025 1502 potassium chloride 20 mEq IVPB (premix) 20 mEq Intravenous New Bag     05/16/2025 1655 potassium chloride 20 mEq IVPB (premix) 20 mEq Intravenous New Bag     05/16/2025 1657 magnesium sulfate 2 g/50 mL IVPB (premix) 2 g 2 g Intravenous New Bag     05/16/2025 2033 trimethobenzamide (TIGAN) IM injection 200 mg 200 mg Intramuscular Given     05/16/2025 1701 diazepam (VALIUM) injection 5 mg 5 mg Intravenous Given     05/16/2025 2259 diazepam (VALIUM) injection 5 mg 5 mg Intravenous Given     05/16/2025 1659 multi-electrolyte (Plasmalyte-A/Isolyte-S PH 7.4/Normosol-R) IV solution 125 mL/hr Intravenous New Bag     05/16/2025 2125 ketorolac (TORADOL) injection 15 mg 15 mg Intravenous Given     05/16/2025 2041 piperacillin-tazobactam (ZOSYN) 4.5 g in sodium chloride 0.9 % 100 mL IVPB (EXTENDED INFUSION) 4.5 g Intravenous New Bag            Scheduled Medications:  enoxaparin, 1.5 mg/kg, Subcutaneous, Q24H JERI  LORazepam, 0.5 mg, Intravenous, Once  piperacillin-tazobactam, 4.5 g, Intravenous, Q8H  potassium chloride, 20 mEq, Intravenous, Q2H - pt refused 5/17       morphine injection 2 mg  Dose: 2 mg  Freq: Once Route: IV  Start: 05/17/25 0345 End: 05/17/25 0338    Continuous IV  Infusions:  multi-electrolyte, 125 mL/hr, Intravenous, Continuous      PRN Meds:  acetaminophen, 15 mg/kg, Intravenous, Q6H PRN x1 5/17   bisacodyl, 10 mg, Rectal, Daily PRN  diazepam, 5 mg, Intravenous, Q6H PRN x1 5/17   gabapentin, 300 mg, Oral, Q8H PRN  ketorolac, 15 mg, Intravenous, Q6H PRN  trimethobenzamide, 200 mg, Intramuscular, Q6H PRN      ED Triage Vitals   Temperature Pulse Respirations Blood Pressure SpO2 Pain Score   05/16/25 0856 05/16/25 0856 05/16/25 0856 05/16/25 0856 05/16/25 0856 05/16/25 2028   98.5 °F (36.9 °C) 84 16 (!) 169/116 93 % 8     Weight (last 2 days)       None            Vital Signs (last 3 days)       Date/Time Temp Pulse Resp BP MAP (mmHg) SpO2 O2 Device Patient Position - Orthostatic VS Juda Coma Scale Score Clinical Opiate Withdrawal Scale Total Score Pain    05/17/25 07:42:44 97.8 °F (36.6 °C) 89 -- 156/107 123 98 % -- -- -- -- --    05/17/25 0338 -- -- -- -- -- -- -- -- -- -- 9    05/17/25 02:47:24 -- -- -- 146/102 117 -- -- -- -- -- --    05/17/25 0246 -- -- -- 146/102 117 -- -- -- -- -- --    05/16/25 2348 -- -- -- -- -- -- -- -- 15 -- --    05/16/25 2338 -- -- -- -- -- -- -- -- -- -- 6    05/16/25 23:27:04 99 °F (37.2 °C) 90 -- 163/104 124 97 % -- -- -- -- --    05/16/25 2300 -- 97 -- 169/106 131 99 % -- -- -- -- --    05/16/25 2225 -- -- -- -- -- -- -- -- 15 -- --    05/16/25 2145 -- 103 -- -- -- 98 % -- -- -- -- --    05/16/25 2125 -- -- -- -- -- -- -- -- -- -- 8    05/16/25 2028 -- -- -- -- -- -- -- -- 15 -- 8    05/16/25 2000 -- 87 -- 167/108 133 97 % -- -- -- -- --    05/16/25 1801 -- 91 20 165/108 132 98 % None (Room air) Lying -- -- --    05/16/25 1711 -- 87 22 161/103 128 96 % None (Room air) Lying -- -- --    05/16/25 1546 -- -- -- -- -- -- -- -- -- 14 --    05/16/25 1516 -- 97 20 178/106 136 99 % None (Room air) Lying -- -- --    05/16/25 1431 -- 78 20 163/104 127 95 % None (Room air) Lying -- -- --    05/16/25 1200 -- -- -- -- -- -- -- -- 15 -- --    05/16/25  1145 -- 79 20 163/111 131 94 % None (Room air) Lying -- -- --    05/16/25 0907 -- 83 16 143/100 118 94 % None (Room air) Lying -- -- --    05/16/25 0856 98.5 °F (36.9 °C) 84 16 169/116 -- 93 % None (Room air) -- -- -- --          Clinical Opiate Withdrawal Scale    Row Name 05/17/25 07:42:44 05/16/25 23:27:04 05/16/25 2300 05/16/25 2145 05/16/25 2000   Clinical Opiate Withdrawal Scale   Pulse 89  -DI 90  -DI 97  -  -KB 87  -KB   Row Name 05/16/25 1801 05/16/25 1711 05/16/25 1546 05/16/25 1516 05/16/25 1431   Clinical Opiate Withdrawal Scale   Pulse 91  -KO 87  -KO -- 97  -KO 78  -KO   Resting Pulse Rate: Measured After Patient is Sitting or Lying for One Minute -- -- 1  -SO -- --   GI Upset: Over Last Half Hour -- -- 3  -SO -- --   Sweating: Over Past Half Hour Not Accounted for by Room Temperature of Patient Activity -- -- 1  -SO -- --   Tremor: Observation of Outstretched Hands -- -- 2  -SO -- --   Restlessness: Observation During Assessment -- -- 1  -SO -- --   Yawning: Observation During Assessment -- -- 0  -SO -- --   Pupil Size -- -- 0  -SO -- --   Anxiety and Irritability -- -- 2  -SO -- --   Bone or Joint Aches: If Patient was Having Pain Previously, Only the Additional Component Attributed to Opiate Withdrawal is Scored -- -- 1  -SO -- --   Gooseflesh Skin -- -- 3  -SO -- --   Runny Nose or Tearing: Not Accounted for by Cold Symptoms or Allergies -- -- 0  -SO -- --   Clinical Opiate Withdrawal Scale Total Score -- -- 14  -SO -- --   Heart Rate Source Monitor  -KO Monitor  -KO -- Monitor  -KO Monitor  -KO   Patient Position - Orthostatic VS Lying  -KO Lying  -KO -- Lying  -KO Lying  -KO       Pertinent Labs/Diagnostic Test Results:       Cardiology:  ECG 12 lead   Final Result by Arnav Hays MD (05/16 2224)   Sinus rhythm with short SC   Borderline QT interval   Abnormal ECG   When compared with ECG of 16-May-2025 11:14, (unconfirmed)   No significant change was found   Confirmed by  Arnav Hays ([id number]) on 5/16/2025 10:24:21 PM      ECG 12 lead   Final Result by Arnav Hays MD (05/16 2223)   Sinus rhythm with sinus arrhythmia with short MS   Nonspecific T wave abnormality   Borderline QT interval   Prolonged QT   Abnormal ECG   When compared with ECG of 12-May-2025 09:47,   Nonspecific T wave abnormality no longer evident in Lateral leads   QT has lengthened   Confirmed by Arnav Hays ([id number]) on 5/16/2025 10:23:25 PM            Results from last 7 days   Lab Units 05/17/25  0643 05/16/25 2131 05/16/25 1315 05/12/25  1227   WBC Thousand/uL 7.52  --  7.65 9.74   HEMOGLOBIN g/dL 11.2*  --  10.5* 10.2*   HEMATOCRIT % 33.6*  --  31.8* 29.9*   PLATELETS Thousands/uL 490* 504* 478* 275   TOTAL NEUT ABS Thousands/µL  --   --  6.40 8.05*         Results from last 7 days   Lab Units 05/17/25  0643 05/16/25 2131 05/16/25 1315 05/12/25  1227   SODIUM mmol/L 138  --  143 140   POTASSIUM mmol/L 2.7* 2.8* 3.0* 2.8*   CHLORIDE mmol/L 95*  --  97 104   CO2 mmol/L 33*  --  33* 26   ANION GAP mmol/L 10  --  13 10   BUN mg/dL 6  --  5 4*   CREATININE mg/dL 0.40*  --  0.45* 0.40*   EGFR ml/min/1.73sq m 142  --  136 142   CALCIUM mg/dL 8.5  --  9.6 8.9   MAGNESIUM mg/dL 2.3  --  1.6* 1.7*   PHOSPHORUS mg/dL  --   --  3.1 2.9     Results from last 7 days   Lab Units 05/16/25 1315 05/12/25  1227   AST U/L 17 23   ALT U/L 10 11   ALK PHOS U/L 67 72   TOTAL PROTEIN g/dL 7.4 6.6   ALBUMIN g/dL 4.0 3.6   TOTAL BILIRUBIN mg/dL 0.38 0.53         Results from last 7 days   Lab Units 05/17/25  0643 05/16/25  1315 05/12/25  1227   GLUCOSE RANDOM mg/dL 97 129 104                   Results from last 7 days   Lab Units 05/16/25  1315 05/12/25  1227   PROCALCITONIN ng/ml <0.05 <0.05     Results from last 7 days   Lab Units 05/16/25  1315 05/12/25  1227   LACTIC ACID mmol/L 0.9 0.8                   Results from last 7 days   Lab Units 05/12/25  1705   CLARITY UA  Clear   COLOR UA  Light Yellow   SPEC  GRAV UA  1.027   PH UA  7.0   GLUCOSE UA mg/dl Negative   KETONES UA mg/dl 80 (3+)*   BLOOD UA  Trace*   PROTEIN UA mg/dl Negative   NITRITE UA  Negative   BILIRUBIN UA  Negative   UROBILINOGEN UA (BE) mg/dl <2.0   LEUKOCYTES UA  Negative   WBC UA /hpf 1-2   RBC UA /hpf 1-2   BACTERIA UA /hpf Occasional   EPITHELIAL CELLS WET PREP /hpf Occasional             Results from last 7 days   Lab Units 05/12/25  1705   AMPH/METH  Negative   BARBITURATE UR  Positive*   BENZODIAZEPINE UR  Positive*   COCAINE UR  Negative   METHADONE URINE  Negative   OPIATE UR  Negative   PCP UR  Negative   THC UR  Negative     Results from last 7 days   Lab Units 05/16/25  1315 05/12/25  1227   ETHANOL LVL mg/dL <10 <10   ACETAMINOPHEN LVL ug/mL <2* <2*   SALICYLATE LVL mg/dL <5* <5*                 Results from last 7 days   Lab Units 05/16/25  1315 05/12/25  2237 05/12/25  1227   BLOOD CULTURE  Received in Microbiology Lab. Culture in Progress.  Received in Microbiology Lab. Culture in Progress. No Growth After 4 Days. No Growth After 4 Days.                   Past Medical History:   Diagnosis Date    Anxiety     Borderline personality disorder (HCC)     Chlamydia     Depression     Endometriosis     IV drug user     IV heroin use, rehab 7 times     Liver lesion 5/7/2025    Panic attacks     Psychiatric disorder     anxiety    PTSD (post-traumatic stress disorder)     PTSD (post-traumatic stress disorder) 8/12/2023    Varicella     childhood    Visual impairment      Present on Admission:   Severe protein-calorie malnutrition (HCC)   Severe episode of recurrent major depressive disorder, without psychotic features (HCC)   Polysubstance abuse (HCC)   Acute opioid withdrawal (HCC)   Prolonged Q-T interval on ECG   Electrolyte abnormality   Internal jugular vein thrombosis, left (HCC)   Cellulitis of neck      Admitting Diagnosis: Withdrawal from opioids (HCC) [F11.93]  Withdrawal complaint [R68.89]  Age/Sex: 29 y.o. female    Network  Utilization Review Department  ATTENTION: Please call with any questions or concerns to [phone] and carefully listen to the prompts so that you are directed to the right person. All voicemails are confidential.   For Discharge needs, contact Care Management DC Support Team at [phone] opt. [phone]  Send all requests for admission clinical reviews, approved or denied determinations and any other requests to dedicated fax number below belonging to the campus where the patient is receiving treatment. List of dedicated fax numbers for the Facilities:  FACILITY NAME UR FAX NUMBER   ADMISSION DENIALS (Administrative/Medical Necessity) [phone]   DISCHARGE SUPPORT TEAM (NETWORK) [phone]   PARENT CHILD HEALTH (Maternity/NICU/Pediatrics) [phone]   [facility] [phone]   [facility] [phone]   [facility] [phone]   [facility] [phone]   [facility] [phone]   [facility] [phone]   [facility] [phone]   [facility] [phone]   [facility] [phone]   [facility] [phone]   [facility] [phone]   [facility] [phone]

## 2025-05-17 NOTE — ASSESSMENT & PLAN NOTE
Patient is actively withdrawing from opioids, had been using Fentanyl and then had 2 doses of Methadone from [facility], but could not get dose today due to prolongation of QTc  COWS Score = 14 on admission   Still with withdrawal symptoms   Dicsussed with Toxicology   Patient leaving AMA  I clearly discussed with her that she would not be able to receive Methadone at the clinic because her QT is still prolonged

## 2025-05-17 NOTE — ED NOTES
"Patient states \"I want to leave. I don't want a room upstairs. You can't make me go.\" Mother is in room attempting to persuade patient to stay.     Rosa Lua RN  05/16/25 0033    "

## 2025-05-17 NOTE — ASSESSMENT & PLAN NOTE
Patient admits to Fentanyl use, recently started Methadone therapy  She was to go to IP drug rehab but was turned away when her insurance was found to be out of network   Hold Methadone for now given prolonged QT  Left AMA  She was made aware that she will not be receiving Methadone at the clinic either because of prolonged QT

## 2025-05-17 NOTE — ED NOTES
"Patient removing cardiac monitor and pulse ox repeatedly, stating \"Take it off now, I want to leave!\"     Rosa Lua, JOSE MARIA  05/16/25 2212    "

## 2025-05-17 NOTE — ASSESSMENT & PLAN NOTE
Noted hypokalemia and hypomagnesemia due to poor oral intake and GI losses   Status post 40 mEq of KCl and 2 g Mag in ED  K still low at 2.7  Mag normalized   KCl 40 mEq QD x 7 days   Patient made aware that because of electrolytes and QT she is at high risk for cardiac arrhythmias, she understands this

## 2025-05-17 NOTE — ASSESSMENT & PLAN NOTE
Suspect secondary to electrolyte derangements and possibly from Methadone   Hold QT prolonging agents   Aggressively replete K and Mag  Left AMA  Discussed high risk of fatal cardiac arrhythmias

## 2025-05-17 NOTE — ASSESSMENT & PLAN NOTE
Secondary to extrinsic compression from neck infection  Vascular surgery evaluated prior admission and there was no surgical indication   Advised to restart Eliquis   Patient leaving AMA

## 2025-05-17 NOTE — PLAN OF CARE
Problem: PAIN - ADULT  Goal: Verbalizes/displays adequate comfort level or baseline comfort level  Description: Interventions:  - Encourage patient to monitor pain and request assistance  - Assess pain using appropriate pain scale  - Administer analgesics as ordered based on type and severity of pain and evaluate response  - Implement non-pharmacological measures as appropriate and evaluate response  - Consider cultural and social influences on pain and pain management  - Notify physician/advanced practitioner if interventions unsuccessful or patient reports new pain  - Educate patient/family on pain management process including their role and importance of  reporting pain   - Provide non-pharmacologic/complimentary pain relief interventions  Outcome: Progressing     Problem: INFECTION - ADULT  Goal: Absence or prevention of progression during hospitalization  Description: INTERVENTIONS:  - Assess and monitor for signs and symptoms of infection  - Monitor lab/diagnostic results  - Monitor all insertion sites, i.e. indwelling lines, tubes, and drains  - Monitor endotracheal if appropriate and nasal secretions for changes in amount and color  - [place] appropriate cooling/warming therapies per order  - Administer medications as ordered  - Instruct and encourage patient and family to use good hand hygiene technique  - Identify and instruct in appropriate isolation precautions for identified infection/condition  Outcome: Progressing  Goal: Absence of fever/infection during neutropenic period  Description: INTERVENTIONS:  - Monitor WBC  - Perform strict hand hygiene  - Limit to healthy visitors only  - No plants, dried, fresh or silk flowers with aguilar in patient room  Outcome: Progressing     Problem: SAFETY ADULT  Goal: Patient will remain free of falls  Description: INTERVENTIONS:  - Educate patient/family on patient safety including physical limitations  - Instruct patient to call for assistance with activity   -  Consider consulting OT/PT to assist with strengthening/mobility based on AM PAC & JH-HLM score  - Consult OT/PT to assist with strengthening/mobility   - Keep Call bell within reach  - Keep bed low and locked with side rails adjusted as appropriate  - Keep care items and personal belongings within reach  - Initiate and maintain comfort rounds  - Make Fall Risk Sign visible to staff  - Offer Toileting every  Hours, in advance of need  - Initiate/Maintain alarm  - Obtain necessary fall risk management equipment:   - Apply yellow socks and bracelet for high fall risk patients  - Consider moving patient to room near nurses station  Outcome: Progressing  Goal: Maintain or return to baseline ADL function  Description: INTERVENTIONS:  -  Assess patient's ability to carry out ADLs; assess patient's baseline for ADL function and identify physical deficits which impact ability to perform ADLs (bathing, care of mouth/teeth, toileting, grooming, dressing, etc.)  - Assess/evaluate cause of self-care deficits   - Assess range of motion  - Assess patient's mobility; develop plan if impaired  - Assess patient's need for assistive devices and provide as appropriate  - Encourage maximum independence but intervene and supervise when necessary  - Involve family in performance of ADLs  - Assess for home care needs following discharge   - Consider OT consult to assist with ADL evaluation and planning for discharge  - Provide patient education as appropriate  - Monitor functional capacity and physical performance, use of AM PAC & JH-HLM   - Monitor gait, balance and fatigue with ambulation    Outcome: Progressing  Goal: Maintains/Returns to pre admission functional level  Description: INTERVENTIONS:  - Perform AM-PAC 6 Click Basic Mobility/ Daily Activity assessment daily.  - Set and communicate daily mobility goal to care team and patient/family/caregiver.   - Collaborate with rehabilitation services on mobility goals if consulted  -  Perform Range of Motion  times a day.  - Reposition patient every  hours.  - Dangle patient  times a day  - Stand patient  times a day  - Ambulate patient  times a day  - Out of bed to chair  times a day   - Out of bed for meals  times a day  - Out of bed for toileting  - Record patient progress and toleration of activity level   Outcome: Progressing     Problem: DISCHARGE PLANNING  Goal: Discharge to home or other facility with appropriate resources  Description: INTERVENTIONS:  - Identify barriers to discharge w/patient and caregiver  - Arrange for needed discharge resources and transportation as appropriate  - Identify discharge learning needs (meds, wound care, etc.)  - Arrange for interpretive services to assist at discharge as needed  - Refer to Case Management Department for coordinating discharge planning if the patient needs post-hospital services based on physician/advanced practitioner order or complex needs related to functional status, cognitive ability, or social support system  Outcome: Progressing     Problem: Knowledge Deficit  Goal: Patient/family/caregiver demonstrates understanding of disease process, treatment plan, medications, and discharge instructions  Description: Complete learning assessment and assess knowledge base.  Interventions:  - Provide teaching at level of understanding  - Provide teaching via preferred learning methods  Outcome: Progressing     Problem: Potential for Falls  Goal: Patient will remain free of falls  Description: INTERVENTIONS:  - Educate patient/family on patient safety including physical limitations  - Instruct patient to call for assistance with activity   - Consider consulting OT/PT to assist with strengthening/mobility based on AM PAC & JH-HLM score  - Consult OT/PT to assist with strengthening/mobility   - Keep Call bell within reach  - Keep bed low and locked with side rails adjusted as appropriate  - Keep care items and personal belongings within reach  -  Initiate and maintain comfort rounds  - Make Fall Risk Sign visible to staff  - Offer Toileting every  Hours, in advance of need  - Initiate/Maintain alarm  - Obtain necessary fall risk management equipment:   - Apply yellow socks and bracelet for high fall risk patients  - Consider moving patient to room near nurses station  Outcome: Progressing     Problem: Prexisting or High Potential for Compromised Skin Integrity  Goal: Skin integrity is maintained or improved  Description: INTERVENTIONS:  - Identify patients at risk for skin breakdown  - Assess and monitor skin integrity including under and around medical devices   - Assess and monitor nutrition and hydration status  - Monitor labs  - Assess for incontinence   - Turn and reposition patient  - Assist with mobility/ambulation  - Relieve pressure over tracy prominences   - Avoid friction and shearing  - Provide appropriate hygiene as needed including keeping skin clean and dry  - Evaluate need for skin moisturizer/barrier cream  - Collaborate with interdisciplinary team  - Patient/family teaching  - Consider wound care consult    Assess:  - Review Jerry scale daily  - Clean and moisturize skin every   - Inspect skin when repositioning, toileting, and assisting with ADLS  - Assess under medical devices such as  every   - Assess extremities for adequate circulation and sensation     Bed Management:  - Have minimal linens on bed & keep smooth, unwrinkled  - Change linens as needed when moist or perspiring  - Avoid sitting or lying in one position for more than  hours while in bed?Keep HOB at degrees   - Toileting:  - Offer bedside commode  - Assess for incontinence every   - Use incontinent care products after each incontinent episode such as     Activity:  - Mobilize patient  times a day  - Encourage activity and walks on unit  - Encourage or provide ROM exercises   - Turn and reposition patient every  Hours  - Use appropriate equipment to lift or move patient in  bed  - Instruct/ Assist with weight shifting every  when out of bed in chair  - Consider limitation of chair time hour intervals    Skin Care:  - Avoid use of baby powder, tape, friction and shearing, hot water or constrictive clothing  - Relieve pressure over bony prominences using   - Do not massage red bony areas    Next Steps:  - Teach patient strategies to minimize risks such as   - Consider consults to  interdisciplinary teams such as   Outcome: Progressing

## 2025-05-17 NOTE — PLAN OF CARE
Problem: PAIN - ADULT  Goal: Verbalizes/displays adequate comfort level or baseline comfort level  Description: Interventions:  - Encourage patient to monitor pain and request assistance  - Assess pain using appropriate pain scale  - Administer analgesics as ordered based on type and severity of pain and evaluate response  - Implement non-pharmacological measures as appropriate and evaluate response  - Consider cultural and social influences on pain and pain management  - Notify physician/advanced practitioner if interventions unsuccessful or patient reports new pain  - Educate patient/family on pain management process including their role and importance of  reporting pain   - Provide non-pharmacologic/complimentary pain relief interventions  Outcome: Not Progressing     Problem: INFECTION - ADULT  Goal: Absence or prevention of progression during hospitalization  Description: INTERVENTIONS:  - Assess and monitor for signs and symptoms of infection  - Monitor lab/diagnostic results  - Monitor all insertion sites, i.e. indwelling lines, tubes, and drains  - Monitor endotracheal if appropriate and nasal secretions for changes in amount and color  - [place] appropriate cooling/warming therapies per order  - Administer medications as ordered  - Instruct and encourage patient and family to use good hand hygiene technique  - Identify and instruct in appropriate isolation precautions for identified infection/condition  Outcome: Not Progressing  Goal: Absence of fever/infection during neutropenic period  Description: INTERVENTIONS:  - Monitor WBC  - Perform strict hand hygiene  - Limit to healthy visitors only  - No plants, dried, fresh or silk flowers with aguilar in patient room  Outcome: Not Progressing     Problem: SAFETY ADULT  Goal: Patient will remain free of falls  Description: INTERVENTIONS:  - Educate patient/family on patient safety including physical limitations  - Instruct patient to call for assistance with  activity   - Consider consulting OT/PT to assist with strengthening/mobility based on AM PAC & JH-HLM score  - Consult OT/PT to assist with strengthening/mobility   - Keep Call bell within reach  - Keep bed low and locked with side rails adjusted as appropriate  - Keep care items and personal belongings within reach  - Initiate and maintain comfort rounds  - Make Fall Risk Sign visible to staff  - Offer Toileting every  Hours, in advance of need  - Initiate/Maintain alarm  - Obtain necessary fall risk management equipment:   - Apply yellow socks and bracelet for high fall risk patients  - Consider moving patient to room near nurses station  Outcome: Progressing  Goal: Maintain or return to baseline ADL function  Description: INTERVENTIONS:  -  Assess patient's ability to carry out ADLs; assess patient's baseline for ADL function and identify physical deficits which impact ability to perform ADLs (bathing, care of mouth/teeth, toileting, grooming, dressing, etc.)  - Assess/evaluate cause of self-care deficits   - Assess range of motion  - Assess patient's mobility; develop plan if impaired  - Assess patient's need for assistive devices and provide as appropriate  - Encourage maximum independence but intervene and supervise when necessary  - Involve family in performance of ADLs  - Assess for home care needs following discharge   - Consider OT consult to assist with ADL evaluation and planning for discharge  - Provide patient education as appropriate  - Monitor functional capacity and physical performance, use of AM PAC & JH-HLM   - Monitor gait, balance and fatigue with ambulation    Outcome: Progressing  Goal: Maintains/Returns to pre admission functional level  Description: INTERVENTIONS:  - Perform AM-PAC 6 Click Basic Mobility/ Daily Activity assessment daily.  - Set and communicate daily mobility goal to care team and patient/family/caregiver.   - Collaborate with rehabilitation services on mobility goals if  consulted  - Perform Range of Motion  times a day.  - Reposition patient every  hours.  - Dangle patient  times a day  - Stand patient  times a day  - Ambulate patient  times a day  - Out of bed to chair  times a day   - Out of bed for meals  times a day  - Out of bed for toileting  - Record patient progress and toleration of activity level   Outcome: Progressing     Problem: DISCHARGE PLANNING  Goal: Discharge to home or other facility with appropriate resources  Description: INTERVENTIONS:  - Identify barriers to discharge w/patient and caregiver  - Arrange for needed discharge resources and transportation as appropriate  - Identify discharge learning needs (meds, wound care, etc.)  - Arrange for interpretive services to assist at discharge as needed  - Refer to Case Management Department for coordinating discharge planning if the patient needs post-hospital services based on physician/advanced practitioner order or complex needs related to functional status, cognitive ability, or social support system  Outcome: Progressing     Problem: Knowledge Deficit  Goal: Patient/family/caregiver demonstrates understanding of disease process, treatment plan, medications, and discharge instructions  Description: Complete learning assessment and assess knowledge base.  Interventions:  - Provide teaching at level of understanding  - Provide teaching via preferred learning methods  Outcome: Progressing     Problem: Potential for Falls  Goal: Patient will remain free of falls  Description: INTERVENTIONS:  - Educate patient/family on patient safety including physical limitations  - Instruct patient to call for assistance with activity   - Consider consulting OT/PT to assist with strengthening/mobility based on AM PAC & JH-HLM score  - Consult OT/PT to assist with strengthening/mobility   - Keep Call bell within reach  - Keep bed low and locked with side rails adjusted as appropriate  - Keep care items and personal belongings  within reach  - Initiate and maintain comfort rounds  - Make Fall Risk Sign visible to staff  - Offer Toileting every  Hours, in advance of need  - Initiate/Maintain alarm  - Obtain necessary fall risk management equipment:   - Apply yellow socks and bracelet for high fall risk patients  - Consider moving patient to room near nurses station  Outcome: Progressing

## 2025-05-17 NOTE — ASSESSMENT & PLAN NOTE
Patient with prior CT of the neck showing multifocal sites of intramuscular abscesses or myonecrosis with diffuse cellulitis and infectious/inflammatory myositis  ENT evaluated prior and deemed there to be no surgical interventions  Prior blood cultures negative thus far, MRSA screen negative   Not meeting SIRS/Sepsis   Superficial areas appear relatively normal   No signs of airway compromise   Patient leaving AMA  Advised to start the Augmentin that was prescribed prior

## 2025-05-17 NOTE — NURSING NOTE
Patient removed telemetry and Masimo monitor. Stated she would like to leave and wanted no further mediations. Patient requested to have CVC removed as well. SLIM provider made aware. En route to bedside.

## 2025-05-17 NOTE — DISCHARGE SUMMARY
Discharge Summary - Hospitalist   Name: Elda Calderon 29 y.o. female I MRN: [id number]  Unit/Bed#: S -01 I Date of Admission: 5/16/2025   Date of Service: 5/17/2025 I Hospital Day: 1     Assessment & Plan  Cellulitis of neck  Patient with prior CT of the neck showing multifocal sites of intramuscular abscesses or myonecrosis with diffuse cellulitis and infectious/inflammatory myositis  ENT evaluated prior and deemed there to be no surgical interventions  Prior blood cultures negative thus far, MRSA screen negative   Not meeting SIRS/Sepsis   Superficial areas appear relatively normal   No signs of airway compromise   Patient leaving AMA  Advised to start the Augmentin that was prescribed prior     Internal jugular vein thrombosis, left (HCC)  Secondary to extrinsic compression from neck infection  Vascular surgery evaluated prior admission and there was no surgical indication   Advised to restart Eliquis   Patient leaving AMA  Acute opioid withdrawal (HCC)  Patient is actively withdrawing from opioids, had been using Fentanyl and then had 2 doses of Methadone from [facility], but could not get dose today due to prolongation of QTc  COWS Score = 14 on admission   Still with withdrawal symptoms   Dicsussed with Toxicology   Patient leaving AMA  I clearly discussed with her that she would not be able to receive Methadone at the clinic because her QT is still prolonged   Electrolyte abnormality  Noted hypokalemia and hypomagnesemia due to poor oral intake and GI losses   Status post 40 mEq of KCl and 2 g Mag in ED  K still low at 2.7  Mag normalized   KCl 40 mEq QD x 7 days   Patient made aware that because of electrolytes and QT she is at high risk for cardiac arrhythmias, she understands this     Prolonged Q-T interval on ECG  Suspect secondary to electrolyte derangements and possibly from Methadone   Hold QT prolonging agents   Aggressively replete K and Mag  Left AMA  Discussed high risk of fatal cardiac  arrhythmias   Polysubstance abuse (HCC)  Patient admits to Fentanyl use, recently started Methadone therapy  She was to go to IP drug rehab but was turned away when her insurance was found to be out of network   Hold Methadone for now given prolonged QT  Left AMA  She was made aware that she will not be receiving Methadone at the clinic either because of prolonged QT  Severe episode of recurrent major depressive disorder, without psychotic features (HCC)  Does not appear to be on medications   Psych referral outpatient   Severe protein-calorie malnutrition (HCC)  Malnutrition Findings:                                 BMI Findings:           There is no height or weight on file to calculate BMI.   Cachexia in the setting of opioid abuse.   Advance diet as tolerated   Opioid cessation      Medical Problems       Resolved Problems  Date Reviewed: 5/17/2025   None       Discharging Physician / Practitioner: Kai Alexander PA-C  PCP: Mike Tillmna DO  Admission Date:   Admission Orders (From admission, onward)       Ordered        05/16/25 1506  INPATIENT ADMISSION  Once                          Discharge Date: 05/17/25    Consultations During Hospital Stay:  Toxicology - Dr. Stephens    Procedures Performed:   None    Significant Findings / Test Results:   None    Incidental Findings:   None     Test Results Pending at Discharge (will require follow up):   Blood cultures      Outpatient Tests Requested:  None    Complications:  None    Reason for Admission: Neck cellulitis, Left IJ Occlusion, Prolonged QT, Opioid withdrawal     Hospital Course:   Elda Calderon is a 29 y.o. female patient who originally presented to the hospital on 5/16/2025 due to opioid withdrawal. She has had multiple admissions and has left AMA in the past. Patient was treated for opioid withdrawal with Valium, Gabapentin, and Tigan. Prolonged QT precluded Methadone. She was restarted on IV Zosyn for neck infection as MRSA screen was negative  from prior stay. She was given IV fluids. QD EKGs were ordered to assess QT. She was kept on telemetry. Due to intolerance of PO on admission she was put on Lovenox for IJ occlusion. Unfortunately, the next day the patient decided to leave AMA again. She was discussed the risk of worsening infection, sepsis, septic emboli/seeding of infection, electrolyte abnormality, cardiac arrhythmia, stroke, permanent disability or death. Mother was at bedside. Patient verbalized understanding. Potassium was called into pharmacy. She was advised to restart Augmentin for the neck infection and the Eliquis that she was prescribed for IJ occlusion. She assured me she has these at home. She wanted to follow up with the clinic for Methadone, but I advised her that they would not dose this because her QT is still prolonged.     No notes on file    The patient, initially admitted to the hospital as inpatient, was discharged earlier than expected given the following: patient opted to leave AMA.  Please see above list of diagnoses and related plan for additional information.     Condition at Discharge: poor    Discharge Day Visit / Exam:   Subjective:  Patient reports still not eating well. Still having chills and sweats. She wanted to leave AMA last night and now wants to leave AMA again. She understands risks of this.   Vitals: Blood Pressure: (!) 156/107 (05/17/25 0742)  Pulse: 89 (05/17/25 0742)  Temperature: 97.8 °F (36.6 °C) (05/17/25 0742)  Respirations: 20 (05/16/25 1801)  SpO2: 98 % (05/17/25 0742)  Physical Exam  Vitals and nursing note reviewed.   Constitutional:       General: She is not in acute distress.     Appearance: Normal appearance. She is cachectic. She is ill-appearing and diaphoretic.   HENT:      Head: Normocephalic and atraumatic.      Mouth/Throat:      Mouth: Mucous membranes are moist.     Eyes:      General: No scleral icterus.     Pupils: Pupils are equal, round, and reactive to light.       Cardiovascular:       Rate and Rhythm: Regular rhythm. Tachycardia present.      Pulses: Normal pulses.      Heart sounds: Normal heart sounds, S1 normal and S2 normal. No murmur heard.     No systolic murmur is present.      No diastolic murmur is present.      No gallop. No S3 or S4 sounds.   Pulmonary:      Effort: Pulmonary effort is normal. No accessory muscle usage or respiratory distress.      Breath sounds: Normal breath sounds. No stridor. No decreased breath sounds, wheezing, rhonchi or rales.   Chest:      Chest wall: No tenderness.   Abdominal:      General: Bowel sounds are normal. There is no distension.      Palpations: Abdomen is soft.      Tenderness: There is no abdominal tenderness. There is no guarding.     Musculoskeletal:      Right lower leg: No edema.      Left lower leg: No edema.     Skin:     General: Skin is warm and moist.      Coloration: Skin is not jaundiced.     Neurological:      General: No focal deficit present.      Mental Status: She is alert. Mental status is at baseline.      Motor: No tremor or seizure activity.     Psychiatric:         Behavior: Behavior is cooperative.          Discussion with Family: Updated  (mother) at bedside.    Discharge instructions/Information to patient and family:   See after visit summary for information provided to patient and family.      Provisions for Follow-Up Care:  See after visit summary for information related to follow-up care and any pertinent home health orders.      Mobility at time of Discharge:   JH-HLM Achieved: 2: Bed activities/Dependent transfer  HLM Goal NOT achieved. Continue to encourage mobility in post discharge setting.     Disposition:   Home    Planned Readmission: High likelihood for readmission     Discharge Medications:  See after visit summary for reconciled discharge medications provided to patient and/or family.      Administrative Statements   Discharge Statement:  I have spent a total time of 45 minutes in caring  for this patient on the day of the visit/encounter. >30 minutes of time was spent on: Diagnostic results, Instructions for management, Risk factor reductions, Counseling / Coordination of care, Documenting in the medical record, and Reviewing / ordering tests, medicine, procedures  .    **Please Note: This note may have been constructed using a voice recognition system**

## 2025-05-18 LAB — BACTERIA BLD CULT: NORMAL

## 2025-05-19 NOTE — ED ATTENDING ATTESTATION
5/16/2025  I, Polina Stephens MD, saw and evaluated the patient. I have discussed the patient with the resident/non-physician practitioner and agree with the resident's/non-physician practitioner's findings, Plan of Care, and MDM as documented in the resident's/non-physician practitioner's note, except where noted. All available labs and Radiology studies were reviewed.  I was present for key portions of any procedure(s) performed by the resident/non-physician practitioner and I was immediately available to provide assistance.       At this point I agree with the current assessment done in the Emergency Department.  I have conducted an independent evaluation of this patient a history and physical is as follows:    ED Course  ED Course as of 05/19/25 1619   Fri May 16, 2025   0951 I reviewed the patient's PDMP.  Patient filled a 30-day prescription for Suboxone on 05/09/20205.   1000 29 year old woman presenting to the ED for readmission for sepsis and management of her opioid withdrawal symptoms.  Left IJ thrombus + abscess, injects fentanyl into her neck, left AMA after admission for IV antibiotics.     1043 I confirmed patient's methadone dosing with [employer].  40 mg daily.  Last received yesterday.   1118 Qtc 589.  Methadone cancelled.  Clonidine patch held.  Magnesium ordered.   1209 Consent obtained.  Central line placed.  See separate procedure note.   1439 Potassium(!): 3.0  Potassium repleted   1525 Antibiotics started.  Case discussed with general medicine for admission.   1538 Adjunctive medications administered as needed:  Clonidine 0.1 mg PO Q6 hours PRN anxiety or palpitations    Gabapentin 300mg PO Q8 hours PRN anxiety    Diazepam 5 mg p.o. or IV q8 PRN refractory anxiety  Ibuprofen 600 mg PO Q6 hours PRN pain    Acetaminophen 1000mg PO Q8 hours PRN pain    Ondansetron 4 mg PO Q6 hours PRN N/V    Reglan 5-10 mg IV q8 p.r.n. refractory nausea vomiting  Nicotine patch 7, 14, 21 mg  PRN nicotine withdrawal    Trazodone 50 mg PO QHS PRN sleep    Loperamide 4 mg PO PRN diarrhea up to 16 mg/day                 Critical Care Time  Procedures

## 2025-05-19 NOTE — UTILIZATION REVIEW
NOTIFICATION OF ADMISSION DISCHARGE   This is a Notification of Discharge from [facility]. Please be advised that this patient has been discharge from our facility. Below you will find the admission and discharge date and time including the patient’s disposition.   UTILIZATION REVIEW CONTACT:  Utilization Review Assistants  Network Utilization Review Department  Phone: [phone] x carefully listen to the prompts. All voicemails are confidential.  Email: [email or web address]     ADMISSION INFORMATION  PRESENTATION DATE: 5/16/2025  8:54 AM  OBERVATION ADMISSION DATE: N/A  INPATIENT ADMISSION DATE: 5/16/25  3:06 PM   DISCHARGE DATE: 5/17/2025 11:14 AM   DISPOSITION:Left against medical advice or discontinued care    Network Utilization Review Department  ATTENTION: Please call with any questions or concerns to [phone] and carefully listen to the prompts so that you are directed to the right person. All voicemails are confidential.   For Discharge needs, contact Care Management DC Support Team at [phone] opt. [phone]  Send all requests for admission clinical reviews, approved or denied determinations and any other requests to dedicated fax number below belonging to the campus where the patient is receiving treatment. List of dedicated fax numbers for the Facilities:  FACILITY NAME UR FAX NUMBER   ADMISSION DENIALS (Administrative/Medical Necessity) [phone]   DISCHARGE SUPPORT TEAM ([facility]) [phone]   PARENT CHILD HEALTH (Maternity/NICU/Pediatrics) [phone]   [facility] [phone]   [facility] [phone]   [facility] [phone]   [facility] [phone]   [facility] [phone]   [facility] [phone]   [facility] [phone]   [facility] [address]  [place] [phone]   [facility] [phone]   [facility] [phone]   [facility] [phone]   [facility] [phone]

## 2025-05-21 LAB
BACTERIA BLD CULT: NORMAL
BACTERIA BLD CULT: NORMAL

## 2025-07-02 ENCOUNTER — APPOINTMENT (EMERGENCY)
Dept: RADIOLOGY | Facility: HOSPITAL | Age: 30
DRG: 421 | End: 2025-07-02
Payer: COMMERCIAL

## 2025-07-02 ENCOUNTER — HOSPITAL ENCOUNTER (INPATIENT)
Facility: HOSPITAL | Age: 30
LOS: 6 days | Discharge: HOME | DRG: 421 | End: 2025-07-08
Attending: EMERGENCY MEDICINE | Admitting: INTERNAL MEDICINE
Payer: COMMERCIAL

## 2025-07-02 DIAGNOSIS — R09.02 HYPOXIA: ICD-10-CM

## 2025-07-02 DIAGNOSIS — F17.290 VAPING NICOTINE DEPENDENCE, TOBACCO PRODUCT: ICD-10-CM

## 2025-07-02 DIAGNOSIS — E43 PROTEIN-CALORIE MALNUTRITION, SEVERE (HCC): ICD-10-CM

## 2025-07-02 DIAGNOSIS — Z86.718 HISTORY OF INTERNAL JUGULAR THROMBOSIS: ICD-10-CM

## 2025-07-02 DIAGNOSIS — Z45.2 EXHAUSTED VASCULAR ACCESS: ICD-10-CM

## 2025-07-02 DIAGNOSIS — F41.1 GAD (GENERALIZED ANXIETY DISORDER): ICD-10-CM

## 2025-07-02 DIAGNOSIS — F11.93 OPIOID WITHDRAWAL (HCC): ICD-10-CM

## 2025-07-02 DIAGNOSIS — R94.31 QT PROLONGATION: ICD-10-CM

## 2025-07-02 DIAGNOSIS — F41.9 ANXIETY: ICD-10-CM

## 2025-07-02 DIAGNOSIS — E87.6 HYPOKALEMIA: ICD-10-CM

## 2025-07-02 DIAGNOSIS — F11.93 ACUTE OPIOID WITHDRAWAL (HCC): ICD-10-CM

## 2025-07-02 DIAGNOSIS — F11.20 OPIOID USE DISORDER, SEVERE, DEPENDENCE (HCC): Primary | ICD-10-CM

## 2025-07-02 PROBLEM — R62.7 FAILURE TO THRIVE IN ADULT: Status: ACTIVE | Noted: 2025-07-02

## 2025-07-02 LAB
ALBUMIN SERPL BCG-MCNC: 4.3 G/DL (ref 3.5–5)
ALP SERPL-CCNC: 82 U/L (ref 34–104)
ALT SERPL W P-5'-P-CCNC: 9 U/L (ref 7–52)
AMPHETAMINES SERPL QL SCN: NEGATIVE
ANION GAP SERPL CALCULATED.3IONS-SCNC: 15 MMOL/L (ref 4–13)
APAP SERPL-MCNC: <2 UG/ML (ref 10–20)
AST SERPL W P-5'-P-CCNC: 24 U/L (ref 13–39)
BARBITURATES UR QL: NEGATIVE
BASOPHILS # BLD AUTO: 0.03 THOUSANDS/ÂΜL (ref 0–0.1)
BASOPHILS NFR BLD AUTO: 0 % (ref 0–1)
BENZODIAZ UR QL: NEGATIVE
BILIRUB SERPL-MCNC: 0.53 MG/DL (ref 0.2–1)
BUN SERPL-MCNC: 9 MG/DL (ref 5–25)
CALCIUM SERPL-MCNC: 9.6 MG/DL (ref 8.4–10.2)
CHLORIDE SERPL-SCNC: 101 MMOL/L (ref 96–108)
CO2 SERPL-SCNC: 26 MMOL/L (ref 21–32)
COCAINE UR QL: NEGATIVE
CREAT SERPL-MCNC: 0.54 MG/DL (ref 0.6–1.3)
EOSINOPHIL # BLD AUTO: 0 THOUSAND/ÂΜL (ref 0–0.61)
EOSINOPHIL NFR BLD AUTO: 0 % (ref 0–6)
ERYTHROCYTE [DISTWIDTH] IN BLOOD BY AUTOMATED COUNT: 12.6 % (ref 11.6–15.1)
ETHANOL SERPL-MCNC: <10 MG/DL
EXT PREGNANCY TEST URINE: NEGATIVE
EXT. CONTROL: NORMAL
FENTANYL UR QL SCN: POSITIVE
GFR SERPL CREATININE-BSD FRML MDRD: 128 ML/MIN/1.73SQ M
GLUCOSE SERPL-MCNC: 147 MG/DL (ref 65–140)
HCT VFR BLD AUTO: 35.2 % (ref 34.8–46.1)
HGB BLD-MCNC: 11.9 G/DL (ref 11.5–15.4)
HYDROCODONE UR QL SCN: NEGATIVE
IMM GRANULOCYTES # BLD AUTO: 0.07 THOUSAND/UL (ref 0–0.2)
IMM GRANULOCYTES NFR BLD AUTO: 1 % (ref 0–2)
LYMPHOCYTES # BLD AUTO: 1.19 THOUSANDS/ÂΜL (ref 0.6–4.47)
LYMPHOCYTES NFR BLD AUTO: 8 % (ref 14–44)
MCH RBC QN AUTO: 29 PG (ref 26.8–34.3)
MCHC RBC AUTO-ENTMCNC: 33.8 G/DL (ref 31.4–37.4)
MCV RBC AUTO: 86 FL (ref 82–98)
METHADONE UR QL: NEGATIVE
MONOCYTES # BLD AUTO: 0.74 THOUSAND/ÂΜL (ref 0.17–1.22)
MONOCYTES NFR BLD AUTO: 5 % (ref 4–12)
NEUTROPHILS # BLD AUTO: 13.2 THOUSANDS/ÂΜL (ref 1.85–7.62)
NEUTS SEG NFR BLD AUTO: 86 % (ref 43–75)
NRBC BLD AUTO-RTO: 0 /100 WBCS
OPIATES UR QL SCN: NEGATIVE
OXYCODONE+OXYMORPHONE UR QL SCN: NEGATIVE
PCP UR QL: NEGATIVE
PLATELET # BLD AUTO: 353 THOUSANDS/UL (ref 149–390)
PMV BLD AUTO: 8.9 FL (ref 8.9–12.7)
POTASSIUM SERPL-SCNC: 2.9 MMOL/L (ref 3.5–5.3)
PROT SERPL-MCNC: 8 G/DL (ref 6.4–8.4)
RBC # BLD AUTO: 4.1 MILLION/UL (ref 3.81–5.12)
SALICYLATES SERPL-MCNC: <5 MG/DL (ref 5–20)
SODIUM SERPL-SCNC: 142 MMOL/L (ref 135–147)
THC UR QL: NEGATIVE
WBC # BLD AUTO: 15.23 THOUSAND/UL (ref 4.31–10.16)

## 2025-07-02 PROCEDURE — 96372 THER/PROPH/DIAG INJ SC/IM: CPT

## 2025-07-02 PROCEDURE — 96365 THER/PROPH/DIAG IV INF INIT: CPT

## 2025-07-02 PROCEDURE — 99284 EMERGENCY DEPT VISIT MOD MDM: CPT

## 2025-07-02 PROCEDURE — 96375 TX/PRO/DX INJ NEW DRUG ADDON: CPT

## 2025-07-02 PROCEDURE — 71046 X-RAY EXAM CHEST 2 VIEWS: CPT

## 2025-07-02 PROCEDURE — 93005 ELECTROCARDIOGRAM TRACING: CPT

## 2025-07-02 PROCEDURE — 80143 DRUG ASSAY ACETAMINOPHEN: CPT

## 2025-07-02 PROCEDURE — 80179 DRUG ASSAY SALICYLATE: CPT

## 2025-07-02 PROCEDURE — 81025 URINE PREGNANCY TEST: CPT

## 2025-07-02 PROCEDURE — 96368 THER/DIAG CONCURRENT INF: CPT

## 2025-07-02 PROCEDURE — 99285 EMERGENCY DEPT VISIT HI MDM: CPT | Performed by: EMERGENCY MEDICINE

## 2025-07-02 PROCEDURE — 85025 COMPLETE CBC W/AUTO DIFF WBC: CPT

## 2025-07-02 PROCEDURE — 80053 COMPREHEN METABOLIC PANEL: CPT

## 2025-07-02 PROCEDURE — 87806 HIV AG W/HIV1&2 ANTB W/OPTIC: CPT

## 2025-07-02 PROCEDURE — 80307 DRUG TEST PRSMV CHEM ANLYZR: CPT

## 2025-07-02 PROCEDURE — 36415 COLL VENOUS BLD VENIPUNCTURE: CPT

## 2025-07-02 PROCEDURE — 82077 ASSAY SPEC XCP UR&BREATH IA: CPT

## 2025-07-02 RX ORDER — ONDANSETRON 2 MG/ML
4 INJECTION INTRAMUSCULAR; INTRAVENOUS ONCE
Status: COMPLETED | OUTPATIENT
Start: 2025-07-02 | End: 2025-07-02

## 2025-07-02 RX ORDER — BUPRENORPHINE 10 UG/H
20 PATCH TRANSDERMAL
Status: DISCONTINUED | OUTPATIENT
Start: 2025-07-02 | End: 2025-07-03

## 2025-07-02 RX ORDER — LOPERAMIDE HYDROCHLORIDE 2 MG/1
2 CAPSULE ORAL 4 TIMES DAILY PRN
Status: DISCONTINUED | OUTPATIENT
Start: 2025-07-02 | End: 2025-07-08 | Stop reason: HOSPADM

## 2025-07-02 RX ORDER — CLONIDINE HYDROCHLORIDE 0.1 MG/1
0.1 TABLET ORAL EVERY 8 HOURS PRN
Status: DISCONTINUED | OUTPATIENT
Start: 2025-07-02 | End: 2025-07-06

## 2025-07-02 RX ORDER — BUPRENORPHINE AND NALOXONE 8; 2 MG/1; MG/1
8 FILM, SOLUBLE BUCCAL; SUBLINGUAL 2 TIMES DAILY
Status: DISCONTINUED | OUTPATIENT
Start: 2025-07-03 | End: 2025-07-04

## 2025-07-02 RX ORDER — POTASSIUM CHLORIDE 14.9 MG/ML
INJECTION INTRAVENOUS
Status: COMPLETED
Start: 2025-07-02 | End: 2025-07-02

## 2025-07-02 RX ORDER — DIAZEPAM 10 MG/2ML
5 INJECTION, SOLUTION INTRAMUSCULAR; INTRAVENOUS ONCE
Status: COMPLETED | OUTPATIENT
Start: 2025-07-02 | End: 2025-07-02

## 2025-07-02 RX ORDER — MAGNESIUM SULFATE HEPTAHYDRATE 40 MG/ML
2 INJECTION, SOLUTION INTRAVENOUS ONCE
Status: COMPLETED | OUTPATIENT
Start: 2025-07-02 | End: 2025-07-02

## 2025-07-02 RX ORDER — MAGNESIUM SULFATE HEPTAHYDRATE 40 MG/ML
INJECTION, SOLUTION INTRAVENOUS
Status: COMPLETED
Start: 2025-07-02 | End: 2025-07-02

## 2025-07-02 RX ORDER — POTASSIUM CHLORIDE 14.9 MG/ML
20 INJECTION INTRAVENOUS
Status: COMPLETED | OUTPATIENT
Start: 2025-07-02 | End: 2025-07-03

## 2025-07-02 RX ORDER — BUPRENORPHINE AND NALOXONE 2; .5 MG/1; MG/1
2 FILM, SOLUBLE BUCCAL; SUBLINGUAL
Status: DISCONTINUED | OUTPATIENT
Start: 2025-07-02 | End: 2025-07-03

## 2025-07-02 RX ORDER — HYDROXYZINE HYDROCHLORIDE 25 MG/1
50 TABLET, FILM COATED ORAL EVERY 6 HOURS PRN
Status: DISCONTINUED | OUTPATIENT
Start: 2025-07-02 | End: 2025-07-08 | Stop reason: HOSPADM

## 2025-07-02 RX ORDER — POTASSIUM CHLORIDE 1500 MG/1
40 TABLET, EXTENDED RELEASE ORAL ONCE
Status: DISCONTINUED | OUTPATIENT
Start: 2025-07-02 | End: 2025-07-02

## 2025-07-02 RX ORDER — GABAPENTIN 300 MG/1
300 CAPSULE ORAL 3 TIMES DAILY PRN
Status: DISCONTINUED | OUTPATIENT
Start: 2025-07-02 | End: 2025-07-06

## 2025-07-02 RX ADMIN — BUPRENORPHINE AND NALOXONE 2 MG: 2; .5 FILM BUCCAL; SUBLINGUAL at 21:32

## 2025-07-02 RX ADMIN — POTASSIUM CHLORIDE 20 MEQ: 14.9 INJECTION, SOLUTION INTRAVENOUS at 21:13

## 2025-07-02 RX ADMIN — ONDANSETRON 4 MG: 2 INJECTION INTRAMUSCULAR; INTRAVENOUS at 21:11

## 2025-07-02 RX ADMIN — BUPRENORPHINE 20 MCG: 10 PATCH TRANSDERMAL at 21:31

## 2025-07-02 RX ADMIN — DIAZEPAM 5 MG: 10 INJECTION, SOLUTION INTRAMUSCULAR; INTRAVENOUS at 19:49

## 2025-07-02 RX ADMIN — TRIMETHOBENZAMIDE HYDROCHLORIDE 200 MG: 100 INJECTION INTRAMUSCULAR at 18:34

## 2025-07-02 RX ADMIN — MAGNESIUM SULFATE HEPTAHYDRATE 2 G: 40 INJECTION, SOLUTION INTRAVENOUS at 21:12

## 2025-07-02 RX ADMIN — SODIUM CHLORIDE, SODIUM LACTATE, POTASSIUM CHLORIDE, AND CALCIUM CHLORIDE 1000 ML: .6; .31; .03; .02 INJECTION, SOLUTION INTRAVENOUS at 21:10

## 2025-07-02 RX ADMIN — POTASSIUM CHLORIDE 20 MEQ: 14.9 INJECTION, SOLUTION INTRAVENOUS at 23:43

## 2025-07-02 NOTE — ED PROVIDER NOTES
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     7/3/2025 10:35 AM Mague Villatoro Add [F17.200] Vaping nicotine dependence, non-tobacco product     7/3/2025 10:35 AM Mague Villatoro Remove [F17.200] Vaping nicotine dependence, non-tobacco product     7/3/2025 10:35 AM Mague Villatoro Add [F17.290] Vaping nicotine dependence, tobacco product     7/3/2025 10:37 AM Mague Villatoro Add [E43] Protein-calorie malnutrition, severe (HCC)     7/3/2025 10:48 AM Jing Ricks Add [F11.93] Acute opioid withdrawal (HCC)     7/3/2025  6:05 PM Brigitte Lockett Add [Z45.2] Exhausted vascular access           ED Disposition       ED Disposition   Admit    Condition   Stable    Date/Time   Wed Jul 2, 2025 10:32 PM    Comment   Case was discussed with EMERALD and the patient's admission status was agreed to be Admission Status: inpatient status to the service of Dr. Howard .               Assessment & Plan       Medical Decision Making  Amount and/or Complexity of Data Reviewed  Labs: ordered.  Radiology: ordered.    Risk  Prescription drug management.  Decision regarding hospitalization.      29 year old female PMH opioid use disorder presenting to the ED with opioid withdrawal symptoms.   Differential includes opioid withdrawal, alpha agonist withdrawal, electrolyte abnormalities, dehydration, pregnancy.  Symptoms improved with Valium, tigan, zofran. Potassium repleated with magnesium  for hypokalemia.   Given liter of fluids.   After being given Valium, patient's O2 decreased requiring 4L O2 to maintain sat above 90%. Likely caused by hypoventilation 2/2 benzo.   Started on suboxone since patient has not had opioids for multiple days.   Admitted to medicine service for detox.     ED Course as of 07/05/25 2052 Wed Jul 02, 2025 1912 Waiting for urine Preg prior to giving diazepam       Medications   trimethobenzamide (TIGAN) IM injection 200 mg (200 mg Intramuscular Given 7/3/25 0056)   gabapentin (NEURONTIN) capsule 300 mg (has no administration in time range)   cloNIDine (CATAPRES) tablet 0.1 mg (has no administration in time range)   hydrOXYzine HCL (ATARAX) tablet 50 mg (has no administration in time range)   loperamide (IMODIUM) capsule 2 mg (has no administration in time range)   cloNIDine (CATAPRES-TTS-2) 0.2 mg/24 hr TD weekly patch (0.2 mg Transdermal Medication Applied 7/3/25 0914)   lactated ringers infusion (has no administration in time range)   chlorhexidine (PERIDEX) 0.12 % oral rinse 15 mL (15 mL Mouth/Throat Not Given 7/3/25 1126)   enoxaparin (LOVENOX) subcutaneous injection 40 mg (40 mg Subcutaneous Not Given 7/3/25 1126)   dexmedeTOMIDine (Precedex) 400 mcg in sodium chloride 0.9% 100 mL (0.2 mcg/kg/hr × 41 kg Intravenous New Bag 7/3/25 1137)   diazepam (VALIUM) injection 5 mg (5 mg Intravenous Given 7/2/25 1949)   magnesium sulfate 2 g/50 mL IVPB (premix) 2 g (0 g Intravenous Stopped 7/2/25 2315)   potassium chloride 20 mEq IVPB (premix) (0 mEq Intravenous Stopped 7/3/25 0210)   lactated ringers bolus 1,000 mL (0 mL Intravenous Stopped 7/3/25 0641)   ondansetron (ZOFRAN) injection 4 mg (4 mg Intravenous Given 7/2/25 2111)   diazepam (VALIUM) injection 2.5 mg (2.5 mg Intravenous Given 7/3/25 0032)   diazepam (VALIUM) injection 2.5 mg (2.5 mg Intravenous Given 7/3/25 0454)   ondansetron (ZOFRAN) injection 4 mg (4 mg Intravenous Given 7/3/25 4134)   diazepam (VALIUM) injection  2.5 mg (2.5 mg Intravenous Given 7/3/25 6542)   magnesium sulfate 2 g/50 mL IVPB (premix) 2 g (0 g Intravenous Hold 7/3/25 1136)   lactated ringers bolus 1,000 mL (has no administration in time range)       ED Risk Strat Scores              .      No data recorded        SBIRT 20yo+      Flowsheet Row Most Recent Value   Initial Alcohol Screen: US AUDIT-C     1. How often do you have a drink containing alcohol? 0 Filed at: 07/02/2025 1707   2. How many drinks containing alcohol do you have on a typical day you are drinking?  0 Filed at: 07/02/2025 1707   3a. Male UNDER 65: How often do you have five or more drinks on one occasion? 0 Filed at: 07/02/2025 1707   3b. FEMALE Any Age, or MALE 65+: How often do you have 4 or more drinks on one occassion? 0 Filed at: 07/02/2025 1707   Audit-C Score 0 Filed at: 07/02/2025 1707                            History of Present Illness       Chief Complaint   Patient presents with    Medical Problem     Patient arrived via EMS from home, heroine use with 3 days of being off of the substance. Nausea and vomiting, trembling.        Past Medical History[1]   Past Surgical History[2]   Family History[3]   Social History[4]   E-Cigarette/Vaping    E-Cigarette Use Current Every Day User       E-Cigarette/Vaping Substances    Nicotine Yes     THC No     CBD No     Flavoring Yes     Other No     Unknown No       I have reviewed and agree with the history as documented.     HPI    29 year old female PMH opioid use disorder presenting to the ED with opioid withdrawal symptoms. Last used fentanyl 2-3 days ago, soon after began having anxiety, nausea, vomiting, tremors. Deneis any headache, changes in vision, CP, SOB, abd pain, weakness, paresthesias. States this feels exactly like withdrawal symptoms in the past. Has required precedex gtt and ICU admission in the past for withdrawal symptoms. Denies drinking any alcohol or using drug other than fentanyl over the last couple of days.   Is  requesting detox at [place].     Review of Systems   Constitutional:  Negative for chills and fever.   HENT:  Negative for congestion and rhinorrhea.    Eyes:  Negative for visual disturbance.   Respiratory:  Negative for cough and shortness of breath.    Cardiovascular:  Negative for chest pain.   Gastrointestinal:  Positive for nausea and vomiting. Negative for abdominal pain.   Genitourinary:  Negative for dysuria and hematuria.   Musculoskeletal:  Negative for back pain and neck pain.   Skin:  Negative for wound.   Neurological:  Positive for tremors. Negative for dizziness, light-headedness and headaches.   Psychiatric/Behavioral:  The patient is nervous/anxious.            Objective       ED Triage Vitals [07/02/25 1705]   Temperature Pulse Blood Pressure Respirations SpO2 Patient Position - Orthostatic VS   99.8 °F (37.7 °C) 81 166/100 18 97 % Sitting      Temp Source Heart Rate Source BP Location FiO2 (%) Pain Score    Oral Monitor Right arm -- No Pain      Vitals      Date and Time Temp Pulse SpO2 Resp BP Pain Score FACES Pain Rating User   07/05/25 2034 98.4 °F (36.9 °C) -- -- -- -- -- --    07/05/25 1900 98 °F (36.7 °C) -- -- -- 167/109 -- --    07/05/25 1500 98.1 °F (36.7 °C) -- 97 % -- 138/96 -- --    07/05/25 1114 98.3 °F (36.8 °C) 116 98 % 18 157/106 -- --    07/05/25 0857 -- -- -- -- -- Med Not Given for Pain - for MAR use only --    07/05/25 0700 98.3 °F (36.8 °C) 91 99 % 16 137/97 -- --    07/05/25 0415 -- 77 97 % 16 174/115 -- --    07/05/25 0112 -- -- -- -- 156/111 -- --    07/04/25 2300 98.2 °F (36.8 °C) -- -- -- 166/109 -- --    07/04/25 2131 -- -- -- -- -- 6 --    07/04/25 1900 98.8 °F (37.1 °C) -- -- -- 150/107 -- -- KT   07/04/25 1745 -- -- -- -- 150/107 -- -- AV   07/04/25 1525 -- -- -- 21 166/112 -- -- AV   07/04/25 1500 99.1 °F (37.3 °C) -- -- -- 166/112 -- -- KT   07/04/25 1100 98.4 °F (36.9 °C) 100 95 % 23 169/113 -- -- MS   07/04/25 1009 -- -- -- -- 168/111 --  -- AV   07/04/25 0900 -- -- -- -- 158/111 -- -- AV   07/04/25 0802 -- -- -- 28 155/103 Med Not Given for Pain - for MAR use only -- AV   07/04/25 0800 -- -- -- 31 155/103 No Pain -- AV   07/04/25 0700 -- 115 96 % 18 162/110 -- -- AV   07/04/25 0700 98.3 °F (36.8 °C) -- -- -- -- -- -- MS   07/04/25 0600 -- 117 96 % 22 162/108 -- -- GF   07/04/25 0400 -- 127 96 % 29 170/99 -- --    07/04/25 0300 -- 132 96 % 32 157/101 -- --    07/04/25 0240 98.4 °F (36.9 °C) 128 97 % 19 171/97 -- -- RS   07/04/25 0000 -- 126 98 % 35 167/102 -- --    07/03/25 2300 -- 135 98 % 34 161/101 -- --    07/03/25 2226 -- -- -- -- -- Med Not Given for Pain - for MAR use only --    07/03/25 2219 98.2 °F (36.8 °C) 127 98 % 33 -- -- --    07/03/25 2200 -- 130 98 % 31 158/99 -- --    07/03/25 2100 -- 151 97 % 26 160/93 -- --    07/03/25 2000 -- 149 97 % 41 160/95 No Pain --    07/03/25 1923 98.8 °F (37.1 °C) -- -- -- -- -- --    07/03/25 1715 -- 131 97 % -- 164/101 -- --    07/03/25 1525 -- -- -- -- -- No Pain --    07/03/25 1400 98.1 °F (36.7 °C) 120 95 % 15 155/106 No Pain --    07/03/25 1238 -- 134 96 % 19 148/102 -- -- GC   07/03/25 1100 -- 152 95 % -- 152/99 -- -- KB   07/03/25 1000 --  144 providers aware 96 % --  165/108 providers aware -- -- MM   07/03/25 0945 --  151 provider made aware 96 % -- -- -- -- MM   07/03/25 0900 -- 153 95 % -- 166/111 -- -- GC   07/03/25 0830 -- 118 96 % -- -- -- -- MM   07/03/25 0800 --  145 SLIM aware 97 % --  157/104 SLIM aware -- -- MM   07/03/25 0754 --  135 SLIM aware -- --  161/102 SLIM aware -- -- MM   07/03/25 0754 -- -- 97 % 20 -- -- -- GC   07/03/25 0700 -- 121 96 % -- 161/105 -- -- KB   07/03/25 0630 -- 109 96 % -- -- -- -- KB   07/03/25 0615 -- 125 97 % -- 159/107 -- -- KB   07/03/25 0600 -- 137 97 % -- 156/104 -- -- KB   07/03/25 0500 -- 99 -- -- 168/97 -- -- KB   07/03/25 0459 -- 120 -- -- 163/99 -- -- FRANCISCO JAVIER   07/03/25 0430 -- 124 98 % -- -- -- -- KB   07/03/25 0300 -- 110 99  % -- -- -- -- KB   07/03/25 0230 -- 109 98 % -- -- -- -- KB   07/03/25 0215 -- 97 95 % -- -- -- -- KB   07/03/25 0059 -- 110 98 % 18 160/97 -- -- CG   07/02/25 2130 -- 86 96 % 18 171/91 -- -- LA   07/02/25 2115 -- 91 -- -- -- -- -- LA   07/02/25 2104 -- -- 92 % -- -- -- -- RENETTA   07/02/25 2100 -- 125 85 % -- -- -- -- RENETTA   07/02/25 1900 -- 85 99 % 18 173/95 -- -- LA   07/02/25 1800 -- 76 97 % 18 171/95 -- -- LA   07/02/25 1705 99.8 °F (37.7 °C) -- -- -- -- -- -- DW   07/02/25 1705 -- 81 Simultaneous filing. User may not have seen previous data. 97 % Simultaneous filing. User may not have seen previous data. 18 166/100 Simultaneous filing. User may not have seen previous data. No Pain -- LO            Physical Exam  Constitutional:       Appearance: She is ill-appearing and diaphoretic.   HENT:      Head: Normocephalic and atraumatic.      Mouth/Throat:      Mouth: Mucous membranes are moist.      Pharynx: Oropharynx is clear.     Eyes:      Extraocular Movements: Extraocular movements intact.      Conjunctiva/sclera: Conjunctivae normal.      Pupils: Pupils are equal, round, and reactive to light.       Cardiovascular:      Rate and Rhythm: Normal rate and regular rhythm.      Pulses: Normal pulses.      Heart sounds: Normal heart sounds.   Pulmonary:      Effort: Pulmonary effort is normal. No respiratory distress.      Breath sounds: Normal breath sounds.   Abdominal:      Palpations: Abdomen is soft.      Tenderness: There is no abdominal tenderness. There is no guarding or rebound.     Musculoskeletal:         General: No tenderness or deformity. Normal range of motion.      Cervical back: Normal range of motion. No rigidity.     Skin:     General: Skin is warm.      Capillary Refill: Capillary refill takes less than 2 seconds.     Neurological:      General: No focal deficit present.      Mental Status: She is alert and oriented to person, place, and time. Mental status is at baseline.     Psychiatric:          Attention and Perception: She does not perceive auditory or visual hallucinations.         Mood and Affect: Mood is anxious.         Results Reviewed       Procedure Component Value Units Date/Time    Blood culture [[id number]] Collected: 07/03/25 2235    Lab Status: Preliminary result Specimen: Blood from Central Venous Line Updated: 07/05/25 0701     Blood Culture No Growth at 24 hrs.    Magnesium [[id number]]  (Normal) Collected: 07/04/25 0541    Lab Status: Final result Specimen: Blood from Central Venous Line Updated: 07/04/25 0624     Magnesium 2.1 mg/dL     Phosphorus [[id number]]  (Normal) Collected: 07/04/25 0541    Lab Status: Final result Specimen: Blood from Central Venous Line Updated: 07/04/25 0624     Phosphorus 3.5 mg/dL     CBC and differential [[id number]]  (Abnormal) Collected: 07/04/25 0541    Lab Status: Final result Specimen: Blood from Central Venous Line Updated: 07/04/25 0616     WBC 17.95 Thousand/uL      RBC 4.17 Million/uL      Hemoglobin 12.2 g/dL      Hematocrit 36.5 %      MCV 88 fL      MCH 29.3 pg      MCHC 33.4 g/dL      RDW 13.2 %      MPV 8.7 fL      Platelets 281 Thousands/uL      nRBC 0 /100 WBCs      Segmented % 79 %      Immature Grans % 1 %      Lymphocytes % 12 %      Monocytes % 8 %      Eosinophils Relative 0 %      Basophils Relative 0 %      Absolute Neutrophils 14.16 Thousands/µL      Absolute Immature Grans 0.10 Thousand/uL      Absolute Lymphocytes 2.16 Thousands/µL      Absolute Monocytes 1.49 Thousand/µL      Eosinophils Absolute 0.01 Thousand/µL      Basophils Absolute 0.03 Thousands/µL     Procalcitonin [[id number]]  (Normal) Collected: 07/03/25 2235    Lab Status: Final result Specimen: Blood from Central Venous Line Updated: 07/03/25 2319     Procalcitonin 0.13 ng/ml     Lactic acid, plasma (w/reflex if result > 2.0) [[id number]]  (Normal) Collected: 07/03/25 2235    Lab Status: Final result Specimen: Blood from Central Venous Line Updated: 07/03/25 2309     LACTIC  ACID 1.2 mmol/L     Narrative:      Result may be elevated if tourniquet was used during collection.    Beta Hydroxybutyrate [[id number]]  (Abnormal) Collected: 07/03/25 2235    Lab Status: Final result Specimen: Blood from Central Venous Line Updated: 07/03/25 2309     Beta- Hydroxybutyrate 1.10 mmol/L     Comprehensive metabolic panel [[id number]]  (Abnormal) Collected: 07/03/25 2235    Lab Status: Final result Specimen: Blood from Central Venous Line Updated: 07/03/25 2309     Sodium 142 mmol/L      Potassium 3.2 mmol/L      Chloride 105 mmol/L      CO2 29 mmol/L      ANION GAP 8 mmol/L      BUN 13 mg/dL      Creatinine 0.48 mg/dL      Glucose 113 mg/dL      Calcium 8.8 mg/dL      AST 23 U/L      ALT 8 U/L      Alkaline Phosphatase 74 U/L      Total Protein 6.8 g/dL      Albumin 3.7 g/dL      Total Bilirubin 0.42 mg/dL      eGFR 133 ml/min/1.73sq m     Narrative:      National Kidney Disease Foundation guidelines for Chronic Kidney Disease (CKD):     Stage 1 with normal or high GFR (GFR > 90 mL/min/1.73 square meters)    Stage 2 Mild CKD (GFR = 60-89 mL/min/1.73 square meters)    Stage 3A Moderate CKD (GFR = 45-59 mL/min/1.73 square meters)    Stage 3B Moderate CKD (GFR = 30-44 mL/min/1.73 square meters)    Stage 4 Severe CKD (GFR = 15-29 mL/min/1.73 square meters)    Stage 5 End Stage CKD (GFR <15 mL/min/1.73 square meters)  Note: GFR calculation is accurate only with a steady state creatinine    Phosphorus [[id number]]  (Abnormal) Collected: 07/03/25 2235    Lab Status: Final result Specimen: Blood from Central Venous Line Updated: 07/03/25 2309     Phosphorus 2.2 mg/dL     Magnesium [[id number]]  (Normal) Collected: 07/03/25 2235    Lab Status: Final result Specimen: Blood from Central Venous Line Updated: 07/03/25 2309     Magnesium 2.1 mg/dL     CBC and differential [[id number]]  (Abnormal) Collected: 07/03/25 2235    Lab Status: Final result Specimen: Blood from Central Venous Line Updated: 07/03/25 2252     WBC  19.52 Thousand/uL      RBC 4.27 Million/uL      Hemoglobin 12.8 g/dL      Hematocrit 36.1 %      MCV 85 fL      MCH 30.0 pg      MCHC 35.5 g/dL      RDW 13.0 %      MPV 8.6 fL      Platelets 292 Thousands/uL      nRBC 0 /100 WBCs      Segmented % 80 %      Immature Grans % 1 %      Lymphocytes % 10 %      Monocytes % 9 %      Eosinophils Relative 0 %      Basophils Relative 0 %      Absolute Neutrophils 15.70 Thousands/µL      Absolute Immature Grans 0.09 Thousand/uL      Absolute Lymphocytes 1.86 Thousands/µL      Absolute Monocytes 1.84 Thousand/µL      Eosinophils Absolute 0.00 Thousand/µL      Basophils Absolute 0.03 Thousands/µL     Blood gas, venous [[id number]]  (Abnormal) Collected: 07/03/25 2235    Lab Status: Final result Specimen: Blood from Central Venous Line Updated: 07/03/25 2251     pH, Hector 7.480     pCO2, Hector 37.0 mm Hg      pO2, Hector 48.6 mm Hg      HCO3, Hector 26.9 mmol/L      Base Excess, Hector 3.5 mmol/L      O2 Content, Hector 15.6 ml/dL      O2 HGB, VENOUS 85.1 %     RAPID HIV 1/2 AB-AG COMBO for 12 years old and above [[id number]]  (Normal) Collected: 07/02/25 1849    Lab Status: Final result Specimen: Blood from Arm, Right Updated: 07/03/25 1910     Rapid HIV 1 AND 2 Non-Reactive     HIV-1 P24 Ag Screen Non-Reactive    Narrative:      Negative for HIV-1 p24 Antigen.  Negative for HIV-1 and/or HIV-2 Antibody.    Blood culture [[id number]]     Lab Status: No result Specimen: Blood     UA w Reflex to Microscopic w Reflex to Culture [[id number]]     Lab Status: No result Specimen: Urine     Fingerstick Glucose (POCT) [[id number]]  (Abnormal) Collected: 07/03/25 0816    Lab Status: Final result Specimen: Blood Updated: 07/03/25 0817     POC Glucose 157 mg/dl     Rapid drug screen, urine [[id number]]  (Abnormal) Collected: 07/02/25 1951    Lab Status: Final result Specimen: Urine, Clean Catch Updated: 07/02/25 2016     Amph/Meth UR Negative     Barbiturate Ur Negative     Benzodiazepine Urine Negative      Cocaine Urine Negative     Methadone Urine Negative     Opiate Urine Negative     PCP Ur Negative     THC Urine Negative     Oxycodone Urine Negative     Fentanyl Urine Positive     HYDROCODONE URINE Negative    Narrative:      Presumptive report. If requested, specimen will be sent to reference lab for confirmation.  FOR MEDICAL PURPOSES ONLY.   IF CONFIRMATION NEEDED PLEASE CONTACT THE LAB WITHIN 5 DAYS.    Drug Screen Cutoff Levels:  AMPHETAMINE/METHAMPHETAMINES  1000 ng/mL  BARBITURATES     200 ng/mL  BENZODIAZEPINES     200 ng/mL  COCAINE      300 ng/mL  METHADONE      300 ng/mL  OPIATES      300 ng/mL  PHENCYCLIDINE     25 ng/mL  THC       50 ng/mL  OXYCODONE      100 ng/mL  FENTANYL      5 ng/mL  HYDROCODONE     300 ng/mL    Salicylate level [[id number]]  (Abnormal) Collected: 07/02/25 1849    Lab Status: Final result Specimen: Blood from Arm, Right Updated: 07/02/25 1939     Salicylate Lvl <5 mg/dL     Acetaminophen level-If concentration is detectable, please discuss with medical  on call. [[id number]]  (Abnormal) Collected: 07/02/25 1849    Lab Status: Final result Specimen: Blood from Arm, Right Updated: 07/02/25 1939     Acetaminophen Level <2 ug/mL     POCT pregnancy, urine [[id number]]  (Normal) Collected: 07/02/25 1937    Lab Status: Final result Updated: 07/02/25 1937     EXT Preg Test, Ur Negative     Control Valid    Ethanol [[id number]]  (Normal) Collected: 07/02/25 1849    Lab Status: Final result Specimen: Blood from Arm, Right Updated: 07/02/25 1915     Ethanol Lvl <10 mg/dL     Comprehensive metabolic panel [[id number]]  (Abnormal) Collected: 07/02/25 1836    Lab Status: Final result Specimen: Blood from Arm, Right Updated: 07/02/25 1912     Sodium 142 mmol/L      Potassium 2.9 mmol/L      Chloride 101 mmol/L      CO2 26 mmol/L      ANION GAP 15 mmol/L      BUN 9 mg/dL      Creatinine 0.54 mg/dL      Glucose 147 mg/dL      Calcium 9.6 mg/dL      AST 24 U/L      ALT 9 U/L      Alkaline  Phosphatase 82 U/L      Total Protein 8.0 g/dL      Albumin 4.3 g/dL      Total Bilirubin 0.53 mg/dL      eGFR 128 ml/min/1.73sq m     Narrative:      National Kidney Disease Foundation guidelines for Chronic Kidney Disease (CKD):     Stage 1 with normal or high GFR (GFR > 90 mL/min/1.73 square meters)    Stage 2 Mild CKD (GFR = 60-89 mL/min/1.73 square meters)    Stage 3A Moderate CKD (GFR = 45-59 mL/min/1.73 square meters)    Stage 3B Moderate CKD (GFR = 30-44 mL/min/1.73 square meters)    Stage 4 Severe CKD (GFR = 15-29 mL/min/1.73 square meters)    Stage 5 End Stage CKD (GFR <15 mL/min/1.73 square meters)  Note: GFR calculation is accurate only with a steady state creatinine    CBC and differential [[id number]]  (Abnormal) Collected: 07/02/25 1836    Lab Status: Final result Specimen: Blood from Arm, Right Updated: 07/02/25 1848     WBC 15.23 Thousand/uL      RBC 4.10 Million/uL      Hemoglobin 11.9 g/dL      Hematocrit 35.2 %      MCV 86 fL      MCH 29.0 pg      MCHC 33.8 g/dL      RDW 12.6 %      MPV 8.9 fL      Platelets 353 Thousands/uL      nRBC 0 /100 WBCs      Segmented % 86 %      Immature Grans % 1 %      Lymphocytes % 8 %      Monocytes % 5 %      Eosinophils Relative 0 %      Basophils Relative 0 %      Absolute Neutrophils 13.20 Thousands/µL      Absolute Immature Grans 0.07 Thousand/uL      Absolute Lymphocytes 1.19 Thousands/µL      Absolute Monocytes 0.74 Thousand/µL      Eosinophils Absolute 0.00 Thousand/µL      Basophils Absolute 0.03 Thousands/µL             XR chest 2 views   Final Interpretation by Trey Escobar MD (07/03 1754)      No acute cardiopulmonary disease.            Workstation performed: [id number]             ECG 12 Lead Documentation Only    Date/Time: 7/2/2025 5:20 PM    Performed by: Kelly Gay MD  Authorized by: Kelly Gay MD    Indications / Diagnosis:  Withdrawl  ECG reviewed by me, the ED Provider: yes    Patient location:  ED  Interpretation:      Interpretation: abnormal    Rate:     ECG rate:  77    ECG rate assessment: normal    Rhythm:     Rhythm: sinus rhythm    Ectopy:     Ectopy: none    QRS:     QRS axis:  Normal    QRS intervals:  Normal  Conduction:     Conduction: abnormal      Abnormal conduction: incomplete RBBB    ST segments:     ST segments:  Normal  T waves:     T waves: normal        ED Medication and Procedure Management   Prior to Admission Medications   Prescriptions Last Dose Informant Patient Reported? Taking?   QUEtiapine (SEROquel) 25 mg tablet Not Taking  Yes No   Sig: Take 25 mg by mouth daily at bedtime Uncertain of dose . Unknown when last prescribed   Patient not taking: Reported on 7/2/2025   apixaban (ELIQUIS) 5 mg Not Taking  No No   Sig: Take 1 tablet (5 mg total) by mouth 2 (two) times a day   Patient not taking: Reported on 7/2/2025   naloxone (NARCAN) 4 mg/0.1 mL nasal spray Not Taking  No No   Sig: Administer 1 spray into a nostril. If no response after 2-3 minutes, give another dose in the other nostril using a new spray.   Patient not taking: Reported on 7/2/2025   ondansetron (ZOFRAN) 4 mg tablet Past Month  No Yes   Sig: Take 1 tablet (4 mg total) by mouth every 8 (eight) hours as needed for nausea or vomiting   potassium chloride (Klor-Con M20) 20 mEq tablet   No No   Sig: Take 2 tablets (40 mEq total) by mouth daily for 7 days      Facility-Administered Medications: None     Current Discharge Medication List        CONTINUE these medications which have NOT CHANGED    Details   ondansetron (ZOFRAN) 4 mg tablet Take 1 tablet (4 mg total) by mouth every 8 (eight) hours as needed for nausea or vomiting  Qty: 2 tablet, Refills: 0    Associated Diagnoses: Anxiety; BESSY (generalized anxiety disorder)      apixaban (ELIQUIS) 5 mg Take 1 tablet (5 mg total) by mouth 2 (two) times a day  Qty: 60 tablet, Refills: 0    Associated Diagnoses: Acute embolism and thrombosis of left internal jugular vein (HCC)      naloxone (NARCAN)  4 mg/0.1 mL nasal spray Administer 1 spray into a nostril. If no response after 2-3 minutes, give another dose in the other nostril using a new spray.  Qty: 1 each, Refills: 0    Associated Diagnoses: Opioid use disorder, severe, dependence (HCC)      potassium chloride (Klor-Con M20) 20 mEq tablet Take 2 tablets (40 mEq total) by mouth daily for 7 days  Qty: 14 tablet, Refills: 0    Associated Diagnoses: Electrolyte abnormality      QUEtiapine (SEROquel) 25 mg tablet Take 25 mg by mouth daily at bedtime Uncertain of dose . Unknown when last prescribed           No discharge procedures on file.  ED SEPSIS DOCUMENTATION   Time reflects when diagnosis was documented in both MDM as applicable and the Disposition within this note       Time User Action Codes Description Comment    7/2/2025  9:01 PM Polina Stephens Add [F11.20] Opioid use disorder, severe, dependence (HCC)     7/2/2025  9:40 PM Polina Stephens Add [F11.93] Opioid withdrawal (HCC)     7/2/2025  9:40 PM Polina Stephens [E87.6] Hypokalemia     7/2/2025  9:40 PM Polina Stephens Add [R09.02] Hypoxia     7/3/2025 10:24 AM Mague Villatoro Add [R94.31] QT prolongation     7/3/2025 10:24 AM Mague Villatoro Add [Z86.718] History of internal jugular thrombosis     7/3/2025 10:35 AM Mague Villatoro Add [F17.200] Vaping nicotine dependence, non-tobacco product     7/3/2025 10:35 AM Mague Villatoro Remove [F17.200] Vaping nicotine dependence, non-tobacco product     7/3/2025 10:35 AM Mague Villatoro Add [F17.290] Vaping nicotine dependence, tobacco product     7/3/2025 10:37 AM Mague Villatoro Add [E43] Protein-calorie malnutrition, severe (HCC)     7/3/2025 10:48 AM Jing Ricks Add [F11.93] Acute opioid withdrawal (HCC)     7/3/2025  6:05 PM Brigitte Lockett Add [Z45.2] Exhausted vascular access                    [1]   Past Medical History:  Diagnosis Date    Anxiety     Borderline personality disorder (HCC)     Chlamydia     Depression     Endometriosis     IV drug  user     IV heroin use, rehab 7 times     Liver lesion 5/7/2025    Panic attacks     Psychiatric disorder     anxiety    PTSD (post-traumatic stress disorder)     PTSD (post-traumatic stress disorder) 8/12/2023    Varicella     childhood    Visual impairment    [2]   Past Surgical History:  Procedure Laterality Date    LAPAROTOMY     [3]   Family History  Problem Relation Name Age of Onset    Depression Mother      Drug abuse Brother      Drug abuse Maternal Aunt      Drug abuse Paternal Uncle      Cancer Maternal Grandmother      Cancer Paternal Grandfather     [4]   Social History  Tobacco Use    Smoking status: Never     Passive exposure: Never    Smokeless tobacco: Never   Vaping Use    Vaping status: Every Day    Substances: Nicotine, Flavoring   Substance Use Topics    Alcohol use: Not Currently    Drug use: Yes     Types: Fentanyl     Comment: 8 bags/day states benzos are mixed in        Kelly Gay MD  07/05/25 2052

## 2025-07-03 LAB
ALBUMIN SERPL BCG-MCNC: 3.7 G/DL (ref 3.5–5)
ALP SERPL-CCNC: 74 U/L (ref 34–104)
ALT SERPL W P-5'-P-CCNC: 8 U/L (ref 7–52)
ANION GAP SERPL CALCULATED.3IONS-SCNC: 8 MMOL/L (ref 4–13)
AST SERPL W P-5'-P-CCNC: 23 U/L (ref 13–39)
ATRIAL RATE: 139 BPM
ATRIAL RATE: 145 BPM
ATRIAL RATE: 75 BPM
ATRIAL RATE: 77 BPM
B-OH-BUTYR SERPL-MCNC: 1.1 MMOL/L (ref 0.2–0.6)
BASE EX.OXY STD BLDV CALC-SCNC: 85.1 % (ref 60–80)
BASE EXCESS BLDV CALC-SCNC: 3.5 MMOL/L
BASOPHILS # BLD AUTO: 0.03 THOUSANDS/ÂΜL (ref 0–0.1)
BASOPHILS NFR BLD AUTO: 0 % (ref 0–1)
BILIRUB SERPL-MCNC: 0.42 MG/DL (ref 0.2–1)
BUN SERPL-MCNC: 13 MG/DL (ref 5–25)
CALCIUM SERPL-MCNC: 8.8 MG/DL (ref 8.4–10.2)
CHLORIDE SERPL-SCNC: 105 MMOL/L (ref 96–108)
CO2 SERPL-SCNC: 29 MMOL/L (ref 21–32)
CREAT SERPL-MCNC: 0.48 MG/DL (ref 0.6–1.3)
EOSINOPHIL # BLD AUTO: 0 THOUSAND/ÂΜL (ref 0–0.61)
EOSINOPHIL NFR BLD AUTO: 0 % (ref 0–6)
ERYTHROCYTE [DISTWIDTH] IN BLOOD BY AUTOMATED COUNT: 13 % (ref 11.6–15.1)
GFR SERPL CREATININE-BSD FRML MDRD: 133 ML/MIN/1.73SQ M
GLUCOSE SERPL-MCNC: 104 MG/DL (ref 65–140)
GLUCOSE SERPL-MCNC: 113 MG/DL (ref 65–140)
GLUCOSE SERPL-MCNC: 157 MG/DL (ref 65–140)
GLUCOSE SERPL-MCNC: 96 MG/DL (ref 65–140)
HCO3 BLDV-SCNC: 26.9 MMOL/L (ref 24–30)
HCT VFR BLD AUTO: 36.1 % (ref 34.8–46.1)
HGB BLD-MCNC: 12.8 G/DL (ref 11.5–15.4)
HIV 1+2 AB+HIV1 P24 AG SERPL QL IA: NORMAL
HIV1 P24 AG SER QL: NORMAL
IMM GRANULOCYTES # BLD AUTO: 0.09 THOUSAND/UL (ref 0–0.2)
IMM GRANULOCYTES NFR BLD AUTO: 1 % (ref 0–2)
LACTATE SERPL-SCNC: 1.2 MMOL/L (ref 0.5–2)
LYMPHOCYTES # BLD AUTO: 1.86 THOUSANDS/ÂΜL (ref 0.6–4.47)
LYMPHOCYTES NFR BLD AUTO: 10 % (ref 14–44)
MAGNESIUM SERPL-MCNC: 2.1 MG/DL (ref 1.9–2.7)
MCH RBC QN AUTO: 30 PG (ref 26.8–34.3)
MCHC RBC AUTO-ENTMCNC: 35.5 G/DL (ref 31.4–37.4)
MCV RBC AUTO: 85 FL (ref 82–98)
MONOCYTES # BLD AUTO: 1.84 THOUSAND/ÂΜL (ref 0.17–1.22)
MONOCYTES NFR BLD AUTO: 9 % (ref 4–12)
NEUTROPHILS # BLD AUTO: 15.7 THOUSANDS/ÂΜL (ref 1.85–7.62)
NEUTS SEG NFR BLD AUTO: 80 % (ref 43–75)
NRBC BLD AUTO-RTO: 0 /100 WBCS
O2 CT BLDV-SCNC: 15.6 ML/DL
P AXIS: 47 DEGREES
P AXIS: 75 DEGREES
P AXIS: 77 DEGREES
P AXIS: 77 DEGREES
PCO2 BLDV: 37 MM HG (ref 42–50)
PH BLDV: 7.48 [PH] (ref 7.3–7.4)
PHOSPHATE SERPL-MCNC: 2.2 MG/DL (ref 2.7–4.5)
PLATELET # BLD AUTO: 292 THOUSANDS/UL (ref 149–390)
PMV BLD AUTO: 8.6 FL (ref 8.9–12.7)
PO2 BLDV: 48.6 MM HG (ref 35–45)
POTASSIUM SERPL-SCNC: 3.2 MMOL/L (ref 3.5–5.3)
PR INTERVAL: 102 MS
PR INTERVAL: 116 MS
PR INTERVAL: 134 MS
PR INTERVAL: 86 MS
PROCALCITONIN SERPL-MCNC: 0.13 NG/ML
PROT SERPL-MCNC: 6.8 G/DL (ref 6.4–8.4)
QRS AXIS: 38 DEGREES
QRS AXIS: 39 DEGREES
QRS AXIS: 41 DEGREES
QRS AXIS: 47 DEGREES
QRSD INTERVAL: 102 MS
QRSD INTERVAL: 66 MS
QRSD INTERVAL: 70 MS
QRSD INTERVAL: 84 MS
QT INTERVAL: 312 MS
QT INTERVAL: 328 MS
QT INTERVAL: 346 MS
QT INTERVAL: 462 MS
QTC INTERVAL: 353 MS
QTC INTERVAL: 509 MS
QTC INTERVAL: 515 MS
QTC INTERVAL: 526 MS
RBC # BLD AUTO: 4.27 MILLION/UL (ref 3.81–5.12)
SODIUM SERPL-SCNC: 142 MMOL/L (ref 135–147)
T WAVE AXIS: 31 DEGREES
T WAVE AXIS: 34 DEGREES
T WAVE AXIS: 54 DEGREES
T WAVE AXIS: 70 DEGREES
VENTRICULAR RATE: 139 BPM
VENTRICULAR RATE: 145 BPM
VENTRICULAR RATE: 75 BPM
VENTRICULAR RATE: 77 BPM
WBC # BLD AUTO: 19.52 THOUSAND/UL (ref 4.31–10.16)

## 2025-07-03 PROCEDURE — 80074 ACUTE HEPATITIS PANEL: CPT

## 2025-07-03 PROCEDURE — 82805 BLOOD GASES W/O2 SATURATION: CPT

## 2025-07-03 PROCEDURE — NC001 PR NO CHARGE: Performed by: INTERNAL MEDICINE

## 2025-07-03 PROCEDURE — 82948 REAGENT STRIP/BLOOD GLUCOSE: CPT

## 2025-07-03 PROCEDURE — 99255 IP/OBS CONSLTJ NEW/EST HI 80: CPT | Performed by: EMERGENCY MEDICINE

## 2025-07-03 PROCEDURE — 83735 ASSAY OF MAGNESIUM: CPT

## 2025-07-03 PROCEDURE — 99291 CRITICAL CARE FIRST HOUR: CPT | Performed by: INTERNAL MEDICINE

## 2025-07-03 PROCEDURE — 85025 COMPLETE CBC W/AUTO DIFF WBC: CPT

## 2025-07-03 PROCEDURE — 93005 ELECTROCARDIOGRAM TRACING: CPT

## 2025-07-03 PROCEDURE — 99223 1ST HOSP IP/OBS HIGH 75: CPT | Performed by: INTERNAL MEDICINE

## 2025-07-03 PROCEDURE — 84100 ASSAY OF PHOSPHORUS: CPT

## 2025-07-03 PROCEDURE — 83605 ASSAY OF LACTIC ACID: CPT

## 2025-07-03 PROCEDURE — 93010 ELECTROCARDIOGRAM REPORT: CPT | Performed by: INTERNAL MEDICINE

## 2025-07-03 PROCEDURE — 82010 KETONE BODYS QUAN: CPT

## 2025-07-03 PROCEDURE — 80053 COMPREHEN METABOLIC PANEL: CPT

## 2025-07-03 PROCEDURE — 84145 PROCALCITONIN (PCT): CPT

## 2025-07-03 PROCEDURE — 87040 BLOOD CULTURE FOR BACTERIA: CPT

## 2025-07-03 RX ORDER — CLONIDINE 0.2 MG/24H
0.2 PATCH, EXTENDED RELEASE TRANSDERMAL WEEKLY
Status: DISCONTINUED | OUTPATIENT
Start: 2025-07-03 | End: 2025-07-08

## 2025-07-03 RX ORDER — DEXMEDETOMIDINE HYDROCHLORIDE 4 UG/ML
.1-.7 INJECTION, SOLUTION INTRAVENOUS
Status: DISCONTINUED | OUTPATIENT
Start: 2025-07-03 | End: 2025-07-03

## 2025-07-03 RX ORDER — DIAZEPAM 10 MG/2ML
2.5 INJECTION, SOLUTION INTRAMUSCULAR; INTRAVENOUS ONCE
Status: COMPLETED | OUTPATIENT
Start: 2025-07-03 | End: 2025-07-03

## 2025-07-03 RX ORDER — MAGNESIUM SULFATE HEPTAHYDRATE 40 MG/ML
2 INJECTION, SOLUTION INTRAVENOUS ONCE
Status: COMPLETED | OUTPATIENT
Start: 2025-07-03 | End: 2025-07-03

## 2025-07-03 RX ORDER — LORAZEPAM 2 MG/ML
2 INJECTION INTRAMUSCULAR
Status: DISCONTINUED | OUTPATIENT
Start: 2025-07-03 | End: 2025-07-03

## 2025-07-03 RX ORDER — SODIUM CHLORIDE, SODIUM LACTATE, POTASSIUM CHLORIDE, CALCIUM CHLORIDE 600; 310; 30; 20 MG/100ML; MG/100ML; MG/100ML; MG/100ML
75 INJECTION, SOLUTION INTRAVENOUS CONTINUOUS
Status: DISPENSED | OUTPATIENT
Start: 2025-07-03 | End: 2025-07-06

## 2025-07-03 RX ORDER — CLONIDINE HYDROCHLORIDE 0.1 MG/1
0.1 TABLET ORAL EVERY 8 HOURS PRN
Status: CANCELLED | OUTPATIENT
Start: 2025-07-03

## 2025-07-03 RX ORDER — BUPRENORPHINE AND NALOXONE 8; 2 MG/1; MG/1
FILM, SOLUBLE BUCCAL; SUBLINGUAL
Status: DISPENSED
Start: 2025-07-03 | End: 2025-07-03

## 2025-07-03 RX ORDER — DIAZEPAM 10 MG/2ML
5 INJECTION, SOLUTION INTRAMUSCULAR; INTRAVENOUS EVERY 2 HOUR PRN
Status: DISCONTINUED | OUTPATIENT
Start: 2025-07-03 | End: 2025-07-07

## 2025-07-03 RX ORDER — CHLORHEXIDINE GLUCONATE ORAL RINSE 1.2 MG/ML
15 SOLUTION DENTAL EVERY 12 HOURS SCHEDULED
Status: DISCONTINUED | OUTPATIENT
Start: 2025-07-03 | End: 2025-07-08 | Stop reason: HOSPADM

## 2025-07-03 RX ORDER — POTASSIUM CHLORIDE 29.8 MG/ML
40 INJECTION INTRAVENOUS ONCE
Status: DISCONTINUED | OUTPATIENT
Start: 2025-07-03 | End: 2025-07-03

## 2025-07-03 RX ORDER — ENOXAPARIN SODIUM 100 MG/ML
40 INJECTION SUBCUTANEOUS DAILY
Status: DISCONTINUED | OUTPATIENT
Start: 2025-07-03 | End: 2025-07-07

## 2025-07-03 RX ORDER — DEXMEDETOMIDINE HYDROCHLORIDE 4 UG/ML
.1-1 INJECTION, SOLUTION INTRAVENOUS
Status: DISCONTINUED | OUTPATIENT
Start: 2025-07-03 | End: 2025-07-06

## 2025-07-03 RX ORDER — METHADONE HYDROCHLORIDE 10 MG/ML
30 CONCENTRATE ORAL DAILY
Refills: 0 | Status: DISCONTINUED | OUTPATIENT
Start: 2025-07-03 | End: 2025-07-08 | Stop reason: HOSPADM

## 2025-07-03 RX ORDER — ONDANSETRON 2 MG/ML
4 INJECTION INTRAMUSCULAR; INTRAVENOUS ONCE
Status: COMPLETED | OUTPATIENT
Start: 2025-07-03 | End: 2025-07-03

## 2025-07-03 RX ADMIN — DIAZEPAM 2.5 MG: 5 INJECTION INTRAMUSCULAR; INTRAVENOUS at 04:54

## 2025-07-03 RX ADMIN — LORAZEPAM 2 MG: 2 INJECTION INTRAMUSCULAR; INTRAVENOUS at 18:12

## 2025-07-03 RX ADMIN — CHLORHEXIDINE GLUCONATE 15 ML: 1.2 SOLUTION ORAL at 22:26

## 2025-07-03 RX ADMIN — LORAZEPAM 2 MG: 2 INJECTION INTRAMUSCULAR; INTRAVENOUS at 16:24

## 2025-07-03 RX ADMIN — DEXMEDETOMIDINE HYDROCHLORIDE 1 MCG/KG/HR: 4 INJECTION, SOLUTION INTRAVENOUS at 21:34

## 2025-07-03 RX ADMIN — TRIMETHOBENZAMIDE HYDROCHLORIDE 200 MG: 100 INJECTION INTRAMUSCULAR at 23:43

## 2025-07-03 RX ADMIN — SODIUM CHLORIDE, SODIUM LACTATE, POTASSIUM CHLORIDE, AND CALCIUM CHLORIDE 1000 ML: .6; .31; .03; .02 INJECTION, SOLUTION INTRAVENOUS at 14:32

## 2025-07-03 RX ADMIN — TRIMETHOBENZAMIDE HYDROCHLORIDE 200 MG: 100 INJECTION INTRAMUSCULAR at 00:56

## 2025-07-03 RX ADMIN — CLONIDINE 0.2 MG: 0.2 PATCH TRANSDERMAL at 09:14

## 2025-07-03 RX ADMIN — DEXMEDETOMIDINE HYDROCHLORIDE 0.2 MCG/KG/HR: 4 INJECTION, SOLUTION INTRAVENOUS at 11:37

## 2025-07-03 RX ADMIN — MAGNESIUM SULFATE HEPTAHYDRATE 2 G: 40 INJECTION, SOLUTION INTRAVENOUS at 10:24

## 2025-07-03 RX ADMIN — DIAZEPAM 2.5 MG: 10 INJECTION, SOLUTION INTRAMUSCULAR; INTRAVENOUS at 08:26

## 2025-07-03 RX ADMIN — DIAZEPAM 2.5 MG: 10 INJECTION, SOLUTION INTRAMUSCULAR; INTRAVENOUS at 00:32

## 2025-07-03 RX ADMIN — TRIMETHOBENZAMIDE HYDROCHLORIDE 200 MG: 100 INJECTION INTRAMUSCULAR at 16:45

## 2025-07-03 RX ADMIN — LORAZEPAM 2 MG: 2 INJECTION INTRAMUSCULAR; INTRAVENOUS at 14:42

## 2025-07-03 RX ADMIN — METHADONE HYDROCHLORIDE 30 MG: 10 CONCENTRATE ORAL at 22:26

## 2025-07-03 RX ADMIN — SODIUM CHLORIDE, SODIUM LACTATE, POTASSIUM CHLORIDE, AND CALCIUM CHLORIDE 125 ML/HR: .6; .31; .03; .02 INJECTION, SOLUTION INTRAVENOUS at 14:25

## 2025-07-03 RX ADMIN — BUPRENORPHINE AND NALOXONE 2 MG: 2; .5 FILM BUCCAL; SUBLINGUAL at 01:00

## 2025-07-03 RX ADMIN — DIAZEPAM 5 MG: 10 INJECTION, SOLUTION INTRAMUSCULAR; INTRAVENOUS at 20:03

## 2025-07-03 RX ADMIN — ONDANSETRON 4 MG: 2 INJECTION INTRAMUSCULAR; INTRAVENOUS at 08:26

## 2025-07-03 NOTE — PROGRESS NOTES
Progress Note - Critical Care/ICU   Name: Elda Calderon 29 y.o. female I MRN: [id number]  Unit/Bed#: ICU 04 I Date of Admission: 7/2/2025   Date of Service: 7/3/2025 I Hospital Day: 1       Critical Care Attending Note    29 year old woman with IVDA, venous/IJ thrombosis not adherent to AC therapy, PTSD, anxiety - she presents for reported 24hrs of opioid w/d symptoms with N/V, tremors, anxiety.  Reported regular use of fentanyl/xylazine - previously on methadone maintenance.  ED course reviewed and given tigan, 12.5mg diazepam, mag x 2gm x 2, suboxone and started on precedex gtt.      VS Reviewed   Exam  GEN thin frail woman, tremulous, disheveled, but awake and oriented x 3  HEENT temporal wasting, poor dentition, dry MM  NECK  no accessory muscle use JVD not elevated  CV reg, tachy, single s1/2,. No m/r  Pulm clear, no wheeze or rales  ABD +BS soft NTND, nor eboudn  EXT  poor muscle mass, contractures, healed skin wounds, no active purulence or cellulitic changes    Laboratory and Diagnostics  Results from last 7 days   Lab Units 07/02/25  1836   WBC Thousand/uL 15.23*   HEMOGLOBIN g/dL 11.9   HEMATOCRIT % 35.2   PLATELETS Thousands/uL 353   SEGS PCT % 86*   MONO PCT % 5   EOS PCT % 0     Results from last 7 days   Lab Units 07/02/25  1836   SODIUM mmol/L 142   POTASSIUM mmol/L 2.9*   CHLORIDE mmol/L 101   CO2 mmol/L 26   ANION GAP mmol/L 15*   BUN mg/dL 9   CREATININE mg/dL 0.54*   CALCIUM mg/dL 9.6   GLUCOSE RANDOM mg/dL 147*   ALT U/L 9   AST U/L 24   ALK PHOS U/L 82   ALBUMIN g/dL 4.3   TOTAL BILIRUBIN mg/dL 0.53     UDS (+) fentanyl  APAP/ETOH/ASA neg    MICRO  pending    RADIOGRAPHS - New images and reports personally reviewed  CXR 7/3 - no focal infiltrates, no effusions    EKG ST rate 145bpm, Qtc 509ms    Assessment  Opioid withdraw  Opioid dependence with fentanyl/xylazine use  Venous thrombosis - chronic  Severe protein calorie malnutrition  Prolonged QTc  Electrolyte abnormalities  SIRS (leukocytosis,  HR, RR) secondary to #1    PLAN  NEURO - appreciate toxicology recs - utilizing ativan trial now to control symptoms as listed, continue precedex gtt, utilize APAP and non-narcotic pain regimen, hold on methadone given prolonged QTc  CARDIAC - monitor HR, continue clonidine for w/d symptoms   PULM - no active issues  GI - NPO pending resolution of GI symptoms, continue Tigan for nausea   NEPHRO - replete electrolytes, serial BMP panels, LR 125cc/hr  ID - monitor off abx, check UA/cx, obtain blood cultures, check HIV, Hep serology panels  HEME - hold on AC given prior lack of compliance and chronic thrombosis   ENDO - no active issues  MISC - SCDS, LMWH, CHG, HOB >30DEG  CODE STATUS - Full code    Upon my evaluation, this patient has a high probability of imminent or life-threatening deterioration due to opioid w/d which required my direct attention, intervention, and personal management.     I have personally provided 30 minutes of critical care time, exclusive of procedures, teaching, and any prior time recorded by providers other than myself. Time includes review of laboratory data, radiology results, discussion with consultants, and monitoring for potential decompensation.    Devendra Hollis, DO

## 2025-07-03 NOTE — H&P
"H&P - Hospitalist   Name: Elda Cadleron 29 y.o. female I MRN: [id number]  Unit/Bed#: ED-09 I Date of Admission: 7/2/2025   Date of Service: 7/2/2025 I Hospital Day: 0     Assessment & Plan  Acute opioid withdrawal (HCC)  Patient is actively withdrawing from opioids, had been using Fentanyl and then had 2 doses of Methadone from [facility], but could not get dose today due to prolongation of QTc  COWS Score = 13  Dicsussed with Toxicology   Tigan IM Q6 PRN nausea/vomiting  Atarax 50 mg q6 prn for anxiety  Gabapentin 300 mg po 3 times daily as needed anxiety  Clonidine 0.1 mg q8h prn anxiety  If patient continues to decompensate, consider initiating Precedex drip and escalating to critical care.  Opioid use disorder, severe, dependence (HCC)  History of polysubstance abuse.  This admission, UDS positive for fentanyl.  Upon chart review, previous detox admissions show that patient left AMA   In the past, patient was receiving methadone [facility] however patient has not received methadone in quite a while which was her decision.  History of IV drug use in the past, tricuspid murmur evident on auscultation.  Per chart review, no echo on file.  Consider echocardiogram.  Presenting with nausea, vomiting and anxiety  Toxicology was contacted, anxiolytics and antiemetics in place    Prolonged Q-T interval on ECG  Suspect secondary to electrolyte derangements and possibly from Methadone   Hold QT prolonging agents   Tigan for nausea  Aggressively replete K and Mag  Internal jugular vein thrombosis, left (HCC)  History of IJ vein thrombosis Secondary to extrinsic compression from neck infection from IV drug use through IJ vein  Was seen by vascular surgery in the past, no surgical indication necessary  Was prescribed Eliquis, but patient stopped on her own secondary to \"anxiety \"  Try to educate patient on importance of anticoagulation and danger of thrombosis in IJV  Failure to thrive in adult  Adult Malnutrition " type: Acute illness  Adult Degree of Malnutrition: Other severe protein calorie malnutrition  Malnutrition Characteristics: Fat loss, Muscle loss, Weight loss  Likely due ongoing known history of polysubstance abuse severe malnutrition poor oral intake  Consider nutrition consult        360 Statement: Severe protein-calorie malnutrition related to inadequate oral intake as evidenced by significant loss of fat and muscle extremities, orbitals, scapula, temples; BMI 15.5. Currently treated with oral supplementation.      VTE Pharmacologic Prophylaxis: VTE Score: 6 High Risk (Score >/= 5) - Pharmacological DVT Prophylaxis Ordered: enoxaparin (Lovenox). Sequential Compression Devices Ordered.  Code Status: Level 1 - Full Code   Discussion with family: Updated  (mother) at bedside.    Anticipated Length of Stay: Patient will be admitted on an inpatient basis with an anticipated length of stay of greater than 2 midnights secondary to acute withdrawal.    History of Present Illness   Chief Complaint: Anxiety with nausea    29-year-old female with past medical history of polysubstance abuse, major depressive disorder, internal jugular vein thrombosis secondary to IV drug use who presents to the ED with anxiety, nausea and vomiting.  She has a history of polysubstance abuse and per mom who was present during encounter, she used fentanyl 3 days ago.  She has been having nausea and vomiting for the past 2 days with intermittent anxiety.  In the past, she was getting methadone treatment through [facility] but has not gotten any methadone treatment in several months to 1 year.  She denies any syncopal episodes.  She has not eaten in several days, but mother notes that her diet only includes Jell-O and other liquid substances.  She endorses some abdominal pain, but notes that she has not had a proper bowel movement in over 1 week.    In the ED, she was hypertensive and tachycardic showing acute signs of  withdrawal.  She was given Valium as well as a micro induction of Suboxone. After the Valium, patient required oxygen to keep O2 above 90%.  She was given Tigan for nausea due to elevated Qtc.  Toxicology was contacted and anxiolytics as well as antiemetic prn's were ordered.  UDS was positive for fentanyl, remaining UDS negative.    Review of Systems   Constitutional:  Positive for chills. Negative for fever.   HENT:  Negative for ear pain and sore throat.    Eyes:  Negative for pain and visual disturbance.   Respiratory:  Negative for cough, shortness of breath and wheezing.    Cardiovascular:  Negative for chest pain and palpitations.   Gastrointestinal:  Positive for abdominal pain, nausea and vomiting.   Genitourinary:  Negative for dysuria and hematuria.   Musculoskeletal:  Negative for arthralgias and back pain.   Skin:  Negative for color change and rash.   Neurological:  Negative for seizures and syncope.   All other systems reviewed and are negative.      Historical Information   Past Medical History[1]  Past Surgical History[2]  Social History[3]  E-Cigarette/Vaping    E-Cigarette Use Current Every Day User      E-Cigarette/Vaping Substances    Nicotine Yes     THC No     CBD No     Flavoring Yes     Other No     Unknown No      Social History:  Marital Status: Single   Occupation: unemployed  Patient Pre-hospital Living Situation: Home  Patient Pre-hospital Level of Mobility: walks  Patient Pre-hospital Diet Restrictions: not restricted, but patient has not been eating, only liquids mainly    Meds/Allergies   I have reviewed home medications with patient personally.  Prior to Admission medications    Medication Sig Start Date End Date Taking? Authorizing Provider   ondansetron (ZOFRAN) 4 mg tablet Take 1 tablet (4 mg total) by mouth every 8 (eight) hours as needed for nausea or vomiting 3/5/24  Yes Jose Resendiz MD   apixaban (ELIQUIS) 5 mg Take 1 tablet (5 mg total) by mouth 2 (two) times a day  Patient  not taking: Reported on 7/2/2025 5/13/25   Tej Perez MD   naloxone (NARCAN) 4 mg/0.1 mL nasal spray Administer 1 spray into a nostril. If no response after 2-3 minutes, give another dose in the other nostril using a new spray.  Patient not taking: Reported on 7/2/2025 5/9/25 5/9/26  Mague Villatoro MD   potassium chloride (Klor-Con M20) 20 mEq tablet Take 2 tablets (40 mEq total) by mouth daily for 7 days 5/17/25 5/24/25  Kai Alexander PA-C   QUEtiapine (SEROquel) 25 mg tablet Take 25 mg by mouth daily at bedtime Uncertain of dose . Unknown when last prescribed  Patient not taking: Reported on 7/2/2025    Historical Provider, MD     No Known Allergies    Objective :  Temp:  [99.8 °F (37.7 °C)] 99.8 °F (37.7 °C)  HR:  [] 86  BP: (166-173)/() 171/91  Resp:  [18] 18  SpO2:  [85 %-99 %] 96 %  O2 Device: Nasal cannula  Nasal Cannula O2 Flow Rate (L/min):  [3 L/min] 3 L/min    Physical Exam  Vitals and nursing note reviewed.   Constitutional:       General: She is not in acute distress.     Appearance: She is well-developed. She is ill-appearing. She is not diaphoretic.   HENT:      Head: Normocephalic and atraumatic.     Eyes:      Conjunctiva/sclera: Conjunctivae normal.       Cardiovascular:      Rate and Rhythm: Normal rate and regular rhythm.      Heart sounds: Murmur (tricuspid murmur) heard.   Pulmonary:      Effort: Pulmonary effort is normal. No respiratory distress.      Breath sounds: Normal breath sounds.   Abdominal:      General: There is no distension.      Palpations: Abdomen is soft. There is no mass.      Tenderness: There is abdominal tenderness (anxiety induced). There is guarding.      Hernia: No hernia is present.     Musculoskeletal:         General: No swelling.      Cervical back: Normal range of motion and neck supple.     Skin:     General: Skin is warm and dry.      Capillary Refill: Capillary refill takes less than 2 seconds.     Neurological:      Mental  "Status: She is alert.     Psychiatric:         Mood and Affect: Mood normal.           Lab Results: I have reviewed the following results:  Results from last 7 days   Lab Units 07/02/25  1836   WBC Thousand/uL 15.23*   HEMOGLOBIN g/dL 11.9   HEMATOCRIT % 35.2   PLATELETS Thousands/uL 353   SEGS PCT % 86*   LYMPHO PCT % 8*   MONO PCT % 5   EOS PCT % 0     Results from last 7 days   Lab Units 07/02/25  1836   SODIUM mmol/L 142   POTASSIUM mmol/L 2.9*   CHLORIDE mmol/L 101   CO2 mmol/L 26   BUN mg/dL 9   CREATININE mg/dL 0.54*   ANION GAP mmol/L 15*   CALCIUM mg/dL 9.6   ALBUMIN g/dL 4.3   TOTAL BILIRUBIN mg/dL 0.53   ALK PHOS U/L 82   ALT U/L 9   AST U/L 24   GLUCOSE RANDOM mg/dL 147*             No results found for: \"HGBA1C\"        Imaging Results Review: I reviewed radiology reports from this admission including: chest xray.  Other Study Results Review: EKG was reviewed.     ** Please Note: This note has been constructed using a voice recognition system. **         [1]   Past Medical History:  Diagnosis Date    Anxiety     Borderline personality disorder (HCC)     Chlamydia     Depression     Endometriosis     IV drug user     IV heroin use, rehab 7 times     Liver lesion 5/7/2025    Panic attacks     Psychiatric disorder     anxiety    PTSD (post-traumatic stress disorder)     PTSD (post-traumatic stress disorder) 8/12/2023    Varicella     childhood    Visual impairment    [2]   Past Surgical History:  Procedure Laterality Date    LAPAROTOMY     [3]   Social History  Tobacco Use    Smoking status: Never     Passive exposure: Never    Smokeless tobacco: Never   Vaping Use    Vaping status: Every Day    Substances: Nicotine, Flavoring   Substance and Sexual Activity    Alcohol use: Not Currently    Drug use: Yes     Types: Fentanyl     Comment: 8 bags/day states benzos are mixed in    Sexual activity: Not Currently     Partners: Male     Birth control/protection: None     "

## 2025-07-03 NOTE — ASSESSMENT & PLAN NOTE
Patient is interested in methadone maintenance and was previously maintained on methadone 140 mg when she was abstinent.    Will start 30 mg daily when Qtc has improved.    Recommend screening HIV and hepatitis panel in setting of injection drug use.    Recommend CRS consult via [facility] for recovery support.    Recommend case management consult for disposition planning.    Recommend discharge with naloxone for harm reduction.

## 2025-07-03 NOTE — ASSESSMENT & PLAN NOTE
"History of IJ vein thrombosis Secondary to extrinsic compression from neck infection from IV drug use through IJ vein  Was seen by vascular surgery in the past, no surgical indication necessary  Was prescribed Eliquis, but patient stopped on her own secondary to \"anxiety \"  Try to educate patient on importance of anticoagulation and danger of thrombosis in IJV  "

## 2025-07-03 NOTE — ASSESSMENT & PLAN NOTE
History of polysubstance abuse.  This admission, UDS positive for fentanyl.  Upon chart review, previous detox admissions show that patient left AMA   In the past, patient was receiving methadone [facility] however patient has not received methadone in quite a while which was her decision.  History of IV drug use in the past, tricuspid murmur evident on auscultation.  Per chart review, no echo on file.  Consider echocardiogram.  Presenting with nausea, vomiting and anxiety  Toxicology was contacted, anxiolytics and antiemetics in place

## 2025-07-03 NOTE — PROCEDURES
Venous Access Line Insertion    Date/Time: 7/3/2025 3:51 PM    Performed by: Kylah An RN  Authorized by: Devendra Hollis DO    Patient location:  Bedside  [place] protocol:     Procedure explained and questions answered to patient or proxy's satisfaction: yes    Pre-procedure details:     Hand hygiene: Hand hygiene performed prior to insertion      Sterile barrier technique: All elements of maximal sterile technique followed      Skin preparation:  ChloraPrep    Skin preparation agent: Skin preparation agent completely dried prior to procedure    Procedure details:     Complex Venous Access Line Type: Midline      Complex Venous Access Line Indications: new site      Orientation:  Left and upper    Location:  Brachial    Catheter size:  4 Fr    Total catheter length (cm):  12    Catheter out on skin (cm):  0    Max flow rate:  999ml/hr    Arm circumference:  21cm    Patient evaluated for contraindications to access (i.e. fistula, thrombosis, etc): Yes      Approach: percutaneous technique used      Patient position:  Flat    Sterile ultrasound techniques: Sterile gel and sterile probe covers were used      Number of attempts:  1    Successful placement: yes      Landmarks identified: yes    Anesthesia (see MAR for exact dosages):     Anesthesia method:  None  Post-procedure details:     Post-procedure:  Dressing applied and securement device placed    Assessment:  Blood return through all ports    Post-procedure complications: none      Patient tolerance of procedure:  Tolerated well, no immediate complications

## 2025-07-03 NOTE — CASE MANAGEMENT
Case Management Progress Note    Patient name Elda Calderon  Location ED-09/ED-09 MRN [id number]  : 1995 Date 7/3/2025       LOS (days): 1  Geometric Mean LOS (GMLOS) (days):   Days to GMLOS:        OBJECTIVE:        Current admission status: Inpatient  Preferred Pharmacy:   [facility]/pharmacy #[id number] - [place], [place] - [address]  [address] [place] [address]  Phone: [phone] Fax: [phone]    Primary Care Provider: Mike Tillman DO    Primary Insurance: [employer]ON[employer] PENDING  Secondary Insurance:     PROGRESS NOTE:  CRS followed up with CM re: CRS consult. Per CRS, if patient discharges over the weekend, please contact On-Call CRS at [phone].

## 2025-07-03 NOTE — PROCEDURES
Arterial Line Insertion    Date/Time: 7/3/2025 5:56 PM    Performed by: Brigitte Lockett MD  Authorized by: Brigitte Lockett MD    Patient location:  Bedside and ICU  Procedure performed by consultant: Jing Vitale.    Consent:     Consent obtained:  Verbal    Consent given by:  Patient    Risks discussed:  Bleeding, ischemia, infection, pain and repeat procedure  Universal protocol:     Procedure explained and questions answered to patient or proxy's satisfaction: yes      Relevant documents present and verified: yes      Test results available and properly labeled: yes      Required blood products, implants, devices, and special equipment available: yes      Site/side marked: yes      Immediately prior to procedure a time out was called: yes      Patient identity confirmed:  Verbally with patient and arm band  Indications:     Indications: frequent labs / infusion    Pre-procedure details:     Skin preparation:  Chlorhexidine    Preparation: Patient was prepped and draped in sterile fashion    Sedation:     Sedation type:  Anxiolysis  Anesthesia (see MAR for exact dosages):     Anesthesia method:  Local infiltration    Local anesthetic:  Lidocaine 1% w/o epi  Procedure details:   Line Type: Arterial Line    Laterality:  Bilateral    Tanner's test performed: yes      Tanner's test abnormal: no      Needle gauge:  20 G    Placement technique:  Ultrasound guided    Number of attempts:  2 (2 attempts bilaterally)    Successful placement: no    Post-procedure details:     CMS:  Normal  Comments:      Experience difficulty with threading wire, multiple attempts did have blood return however tubing kinked. Overall unsuccessful

## 2025-07-03 NOTE — UTILIZATION REVIEW
Initial Clinical Review    Admission: Date/Time/Statement:   Admission Orders (From admission, onward)       Ordered        07/02/25 2234  INPATIENT ADMISSION  Once                          Orders Placed This Encounter   Procedures    INPATIENT ADMISSION     Standing Status:   Standing     Number of Occurrences:   1     Level of Care:   Med Surg [16]     Estimated length of stay:   More than 2 Midnights     Certification:   I certify that inpatient services are medically necessary for this patient for a duration of greater than two midnights. See H&P and MD Progress Notes for additional information about the patient's course of treatment.     ED Arrival Information       Expected   -    Arrival   7/2/2025 17:02    Acuity   Emergent              Means of arrival   Ambulance    Escorted by   [facility]    Service   Critical Care/ICU    Admission type   Emergency              Arrival complaint   [facility] - Withdrawal             Chief Complaint   Patient presents with    Medical Problem     Patient arrived via EMS from home, heroine use with 3 days of being off of the substance. Nausea and vomiting, trembling.        Initial Presentation: 29 y.o. female with PMH of polysubstance abuse, major depressive disorder, internal jugular vein thrombosis secondary to IV drug use who presents to the ED with anxiety, nausea and vomiting.  She has a history of polysubstance abuse and per mom who was present during encounter, she used fentanyl 3 days ago.  She has been having nausea and vomiting for the past 2 days with intermittent anxiety. In the past, she was getting methadone treatment through [facility] but has not gotten any methadone treatment in several months to 1 year. She denies any syncopal episodes. She has not eaten in several days, but mother notes that her diet only includes Jell-O and other liquid substances. She endorses some abdominal pain, but notes that she has not had a proper bowel movement in  over 1 week. In the ED, she was hypertensive and tachycardic showing acute signs of withdrawal. She was given Valium as well as a micro induction of Suboxone. After the Valium, patient required oxygen to keep O2 above 90%. She was given Tigan for nausea due to elevated Qtc. UDS positive for fentanyl. Plan: Inpatient admission for evaluation and treatment of acute opioid withdrawal, opioid use disorder, prolonged QT interval, left internal jugular vein thrombosis, failure to thrive: Toxicology consult, Tigan, gabapentin, Clonodine and Atarax prn, replete K and Mg.     Anticipated Length of Stay/Certification Statement: Patient will be admitted on an inpatient basis with an anticipated length of stay of greater than 2 midnights secondary to acute withdrawal.     Date: 7/3   Day 2:     Medical Toxicology consult: upon my evaluation, patient is exhibiting severe withdrawal likely due to xylazine, which is a central alpha-2 agonist. Therefore, in order to adequately control patient's withdrawal symptoms, recommend continuous Precedex infusion. Patient is interested in methadone; will need Qtc to be less than 500 ms prior to initiating. When Qtc is less than 500 ms, can start methadone 30 mg PO solution daily; may give an additional 10 mg prn persistent withdrawal if needed x 1 dose.     Critical care: Placed on clonidine patch. Continue Precedex. Tigan, gabapentin, Clonodine and Atarax prn. Patient appears very anxious, tremors, yawning. Also endorses abdominal discomfort. Initially refused to get blood drawn but is now agreeable. Appears to have dilated pupils, nausea, vomiting and had an episode of vomiting while I was examining the patient.     ED Treatment-Medication Administration from 07/02/2025 1702 to 07/03/2025 1234         Date/Time Order Dose Route Action     07/02/2025 1834 trimethobenzamide (TIGAN) IM injection 200 mg 200 mg Intramuscular Given     07/03/2025 0056 trimethobenzamide (TIGAN) IM injection 200  mg 200 mg Intramuscular Given     07/02/2025 1949 diazepam (VALIUM) injection 5 mg 5 mg Intravenous Given     07/02/2025 2112 magnesium sulfate 2 g/50 mL IVPB (premix) 2 g 2 g Intravenous New Bag     07/02/2025 2113 potassium chloride 20 mEq IVPB (premix) 20 mEq Intravenous New Bag     07/02/2025 2343 potassium chloride 20 mEq IVPB (premix) 20 mEq Intravenous New Bag     07/02/2025 2110 lactated ringers bolus 1,000 mL 1,000 mL Intravenous New Bag     07/02/2025 2131 transdermal buprenorphine (BUTRANS) 10 mcg/hr TD patch 20 mcg 20 mcg Transdermal Medication Applied     07/02/2025 2132 buprenorphine-naloxone (Suboxone) film 2 mg 2 mg Sublingual Given     07/03/2025 0100 buprenorphine-naloxone (Suboxone) film 2 mg 2 mg Sublingual Given     07/02/2025 2111 ondansetron (ZOFRAN) injection 4 mg 4 mg Intravenous Given     07/03/2025 0032 diazepam (VALIUM) injection 2.5 mg 2.5 mg Intravenous Given     07/03/2025 0454 diazepam (VALIUM) injection 2.5 mg 2.5 mg Intravenous Given     07/03/2025 0826 ondansetron (ZOFRAN) injection 4 mg 4 mg Intravenous Given     07/03/2025 0914 cloNIDine (CATAPRES-TTS-2) 0.2 mg/24 hr TD weekly patch 0.2 mg Transdermal Medication Applied     07/03/2025 0826 diazepam (VALIUM) injection 2.5 mg 2.5 mg Intravenous Given     07/03/2025 1024 magnesium sulfate 2 g/50 mL IVPB (premix) 2 g 2 g Intravenous New Bag     07/03/2025 1137 dexmedeTOMIDine (Precedex) 400 mcg in sodium chloride 0.9% 100 mL 0.2 mcg/kg/hr Intravenous New Bag            Scheduled Medications:  buprenorphine-naloxone, 8 mg, Sublingual, BID  chlorhexidine, 15 mL, Mouth/Throat, Q12H JERI  cloNIDine, 0.2 mg, Transdermal, Weekly  enoxaparin, 40 mg, Subcutaneous, Daily  lactated ringers, 1,000 mL, Intravenous, Once  transdermal buprenorphine, 20 mcg, Transdermal, Q7 Days      Continuous IV Infusions:  dexmedetomidine, 0.1-0.7 mcg/kg/hr, Intravenous, Titrated  lactated ringers, 125 mL/hr, Intravenous, Continuous      PRN Meds:  cloNIDine, 0.1  mg, Oral, Q8H PRN  gabapentin, 300 mg, Oral, TID PRN  hydrOXYzine HCL, 50 mg, Oral, Q6H PRN  loperamide, 2 mg, Oral, 4x Daily PRN  LORazepam, 2 mg, Intravenous, Q2H PRN Max 8/day  trimethobenzamide, 200 mg, Intramuscular, Q6H PRN      ED Triage Vitals [07/02/25 1705]   Temperature Pulse Respirations Blood Pressure SpO2 Pain Score   99.8 °F (37.7 °C) 81 18 166/100 97 % No Pain     Weight (last 2 days)       Date/Time Weight    07/03/25 1400 43 (94.8)    07/02/25 1705 41 (90.39)            Vital Signs (last 3 days)       Date/Time Temp Pulse Resp BP MAP (mmHg) SpO2 Calculated FIO2 (%) - Nasal Cannula Nasal Cannula O2 Flow Rate (L/min) O2 Device Patient Position - Orthostatic VS [place] Coma Scale Score Pain    07/03/25 1525 -- -- -- -- -- -- -- -- -- -- -- No Pain    07/03/25 1400 98.1 °F (36.7 °C) 120 15 155/106 125 95 % -- -- None (Room air) Lying 15 --    07/03/25 1238 -- 134 19 148/102 -- 96 % -- -- None (Room air) Lying -- --    07/03/25 1100 -- 152 -- 152/99 119 95 % -- -- None (Room air) Lying -- --    07/03/25 1000 -- 144  -- 165/108  129 96 % -- -- -- -- -- --    07/03/25 0945 -- 151  -- -- -- 96 % -- -- None (Room air) -- -- --    07/03/25 0900 -- 153 -- 166/111 132 95 % -- -- -- -- -- --    07/03/25 0830 -- 118 -- -- -- 96 % -- -- -- -- -- --    07/03/25 0800 -- 145  -- 157/104  125 97 % -- -- -- -- -- --    07/03/25 0754 -- 135  20 161/102  -- 97 % -- -- None (Room air) Lying -- --    07/03/25 0700 -- 121 -- 161/105 128 96 % -- -- -- -- -- --    07/03/25 0630 -- 109 -- -- -- 96 % -- -- -- -- -- --    07/03/25 0615 -- 125 -- 159/107 125 97 % -- -- -- -- -- --    07/03/25 0600 -- 137 -- 156/104 124 97 % -- -- -- -- -- --    07/03/25 0500 -- 99 -- 168/97 125 -- -- -- -- -- -- --    07/03/25 0459 -- 120 -- 163/99 123 -- -- -- -- -- -- --    07/03/25 0430 -- 124 -- -- -- 98 % -- -- -- -- -- --    07/03/25 0412 -- -- -- -- -- -- -- -- -- -- 15 --    07/03/25 0322 -- -- -- -- -- -- -- -- -- -- 15 --    07/03/25  0300 -- 110 -- -- -- 99 % -- -- -- -- -- --    07/03/25 0230 -- 109 -- -- -- 98 % -- -- -- -- -- --    07/03/25 0215 -- 97 -- -- -- 95 % -- -- -- -- -- --    07/03/25 0059 -- 110 18 160/97 124 98 % -- -- None (Room air) Lying -- --    07/02/25 2130 -- 86 18 171/91 123 96 % 32 3 L/min Nasal cannula -- -- --    07/02/25 2115 -- 91 -- -- -- -- -- -- -- -- -- --    07/02/25 2104 -- -- -- -- -- 92 % 32 3 L/min Nasal cannula -- -- --    07/02/25 2100 -- 125 -- -- -- 85 % -- -- None (Room air) -- -- --    07/02/25 1900 -- 85 18 173/95 127 99 % -- -- None (Room air) -- -- --    07/02/25 1800 -- 76 18 171/95 127 97 % -- -- None (Room air) -- -- --    07/02/25 1705 99.8 °F (37.7 °C) 81 18 166/100 126 97 % -- -- None (Room air) Sitting -- No Pain              Pertinent Labs/Diagnostic Test Results:   Radiology:  XR chest 2 views    (Results Pending)     Cardiology:  ECG 12 lead   Final Result by Yomi Ramsay MD (07/03 1533)   Sinus tachycardia   Possible Left atrial enlargement   Nonspecific ST and T wave abnormality   Abnormal ECG   When compared with ECG of 03-Jul-2025 10:04, (unconfirmed)   No significant change was found   Confirmed by Yomi Ramsay ([id number]) on 7/3/2025 3:33:40 PM      ECG 12 lead    by Interface, Ris Results In (07/03 1005)      ECG 12 lead    by Interface, Ris Results In (07/02 2109)      ECG 12 lead    by Interface, Ris Results In (07/02 1714)        GI:  No orders to display           Results from last 7 days   Lab Units 07/02/25  1836   WBC Thousand/uL 15.23*   HEMOGLOBIN g/dL 11.9   HEMATOCRIT % 35.2   PLATELETS Thousands/uL 353   TOTAL NEUT ABS Thousands/µL 13.20*         Results from last 7 days   Lab Units 07/02/25  1836   SODIUM mmol/L 142   POTASSIUM mmol/L 2.9*   CHLORIDE mmol/L 101   CO2 mmol/L 26   ANION GAP mmol/L 15*   BUN mg/dL 9   CREATININE mg/dL 0.54*   EGFR ml/min/1.73sq m 128   CALCIUM mg/dL 9.6     Results from last 7 days   Lab Units 07/02/25  1836   AST U/L 24   ALT U/L 9    ALK PHOS U/L 82   TOTAL PROTEIN g/dL 8.0   ALBUMIN g/dL 4.3   TOTAL BILIRUBIN mg/dL 0.53     Results from last 7 days   Lab Units 07/03/25  0816   POC GLUCOSE mg/dl 157*     Results from last 7 days   Lab Units 07/02/25  1836   GLUCOSE RANDOM mg/dL 147*           Results from last 7 days   Lab Units 07/02/25  1951   AMPH/METH  Negative   BARBITURATE UR  Negative   BENZODIAZEPINE UR  Negative   COCAINE UR  Negative   METHADONE URINE  Negative   OPIATE UR  Negative   PCP UR  Negative   THC UR  Negative     Results from last 7 days   Lab Units 07/02/25  1849   ETHANOL LVL mg/dL <10   ACETAMINOPHEN LVL ug/mL <2*   SALICYLATE LVL mg/dL <5*         Past Medical History[1]  Present on Admission:   Opioid use disorder, severe, dependence (HCC)   Prolonged Q-T interval on ECG   Acute opioid withdrawal (HCC)   Internal jugular vein thrombosis, left (HCC)      Admitting Diagnosis: Hypokalemia [E87.6]  Opioid withdrawal (HCC) [F11.93]  Hypoxia [R09.02]  Substance withdrawal (HCC) [F19.939]  Protein-calorie malnutrition, severe (HCC) [E43]  Acute opioid withdrawal (HCC) [F11.93]  QT prolongation [R94.31]  History of internal jugular thrombosis [Z86.718]  Opioid use disorder, severe, dependence (HCC) [F11.20]  Vaping nicotine dependence, tobacco product [F17.290]  Age/Sex: 29 y.o. female    Network Utilization Review Department  ATTENTION: Please call with any questions or concerns to [phone] and carefully listen to the prompts so that you are directed to the right person. All voicemails are confidential.   For Discharge needs, contact Care Management DC Support Team at [phone] opt. [phone]  Send all requests for admission clinical reviews, approved or denied determinations and any other requests to dedicated fax number below belonging to the campus where the patient is receiving treatment. List of dedicated fax numbers for the Facilities:  FACILITY NAME UR FAX NUMBER   ADMISSION DENIALS (Administrative/Medical Necessity)  [phone]   DISCHARGE SUPPORT TEAM (NETWORK) [phone]   PARENT CHILD HEALTH (Maternity/NICU/Pediatrics) [phone]   [facility] [phone]   [facility] [phone]   [facility] [phone]   [facility] [phone]   [facility] [phone]   [facility] [phone]   [facility] [phone]   [facility] [phone]   [facility] [phone]   [facility] [phone]   [facility] [phone]   [facility] [phone]              [1]   Past Medical History:  Diagnosis Date    Anxiety     Borderline personality disorder (HCC)     Chlamydia     Depression     Endometriosis     IV drug user     IV heroin use, rehab 7 times     Liver lesion 5/7/2025    Panic attacks     Psychiatric disorder     anxiety    PTSD (post-traumatic stress disorder)     PTSD (post-traumatic stress disorder) 8/12/2023    Varicella     childhood    Visual impairment

## 2025-07-03 NOTE — ED NOTES
SLIM at bedside to evaluate patient, patient tachycardiac, diaphoretic, presenting with tremors. No new orders from SLIM at this time. SLIM requesting to give patient apple juice. Patient is refusing PO medications and juice at this time d/t nausea.      Joy Khan RN  07/03/25 0841

## 2025-07-03 NOTE — ASSESSMENT & PLAN NOTE
History of polysubstance abuse.  This admission, UDS positive for fentanyl.  Upon chart review, previous detox admissions show that patient left AMA   In the past, patient was receiving methadone [facility] however patient has not received methadone in quite a while which was her decision.  History of IV drug use in the past, tricuspid murmur evident on auscultation.  Per chart review, no echo on file.  Consider echocardiogram.  Presenting with nausea, vomiting and anxiety  Toxicology was contacted, anxiolytics and antiemetics in place

## 2025-07-03 NOTE — ASSESSMENT & PLAN NOTE
CBC ETOH     Lab Results   Component Value Date    WBC 15.23 (H) 07/02/2025    WBC 8.0 02/08/2017    RBC 4.10 07/02/2025    RBC 3.66 (L) 02/08/2017    HGB 11.9 07/02/2025    HGB 10.9 (L) 02/08/2017    HCT 35.2 07/02/2025    HCT 33.3 (L) 02/08/2017    MCV 86 07/02/2025    MCV 91.0 02/08/2017    MCH 29.0 07/02/2025    MCH 29.8 02/08/2017    MCHC 33.8 07/02/2025    MCHC 32.8 02/08/2017    RDW 12.6 07/02/2025    RDW 13.1 02/08/2017     07/02/2025     02/08/2017    MPV 8.9 07/02/2025    MPV 7.4 (L) 02/08/2017      Lab Results   Component Value Date    LACTICACID 0.9 05/16/2025      CMP UA         Component Value Date/Time    K 2.9 (L) 07/02/2025 1836     07/02/2025 1836    CO2 26 07/02/2025 1836    CO2 28 05/08/2025 1938    BUN 9 07/02/2025 1836    CREATININE 0.54 (L) 07/02/2025 1836         Component Value Date/Time    CALCIUM 9.6 07/02/2025 1836    ALKPHOS 82 07/02/2025 1836    AST 24 07/02/2025 1836    ALT 9 07/02/2025 1836      Lab Results   Component Value Date    CLARITYU Clear 05/12/2025    COLORU Light Yellow 05/12/2025    SPECGRAV 1.027 05/12/2025    PHUR 7.0 05/12/2025    PHUR 6.0 11/30/2016    GLUCOSEU Negative 05/12/2025    KETONESU 80 (3+) (A) 05/12/2025    BLOODU Trace (A) 05/12/2025    PROTEIN UA Negative 05/12/2025    NITRITE Negative 05/12/2025    BILIRUBINUR Negative 05/12/2025    UROBILINOGEN <2.0 05/12/2025    UROBILINOGEN Negative 03/15/2025    UROBILINOGEN 0.2 11/30/2016    LEUKOCYTESUR Negative 05/12/2025    WBCUA 1-2 05/12/2025    RBCUA 1-2 05/12/2025    HYALINE 1-2 (A) 03/15/2025    HYALINE NONE SEEN 09/14/2016    HYALINE NONE SEEN 09/14/2016    BACTERIA Occasional 05/12/2025    BACTERIA FEW (A) 09/14/2016    BACTERIA FEW (A) 09/14/2016    EPIS Occasional 05/12/2025        Liver Function Test: ASA     Lab Results   Component Value Date    TBILI 0.53 07/02/2025    BILIDIR 0.00 03/02/2024    ALKPHOS 82 07/02/2025    AST 24 07/02/2025    ALT 9 07/02/2025    TP 8.0 07/02/2025     "ALB 4.3 07/02/2025      Lab Results   Component Value Date    SALICYLATE <5 (L) 07/02/2025      Troponin APAP     No results found for: \"TROPONINI\"   Lab Results   Component Value Date    ACTMNPHEN <2 (L) 07/02/2025      VBG HCG     No results found for: \"PHVEN\", \"CQG7GCU\", \"PO2VEN\", \"WXH1FFB\", \"BEVEN\", \"K6AUKMXLQ\", \"C0WMHKK\"   No results found for: \"HCGQUANT\"   ABG Urine Drug Screen     No results found for: \"PHART\", \"TFE2YKF\", \"PO2ART\", \"UTZ7XVV\", \"BEART\", \"I5MHVPGMK\", \"O2HGB\", \"SOURC\", \"RAYMOND\", \"VTAC\", \"ACRATE\", \"INSPIREDAIR\", \"PEEP\"   Lab Results   Component Value Date    AMPMETHUR Negative 07/02/2025    BARBTUR Negative 07/02/2025    BDZUR Negative 07/02/2025    COCAINEUR Negative 07/02/2025    METHADONEUR Negative 07/02/2025    OPIATEUR Negative 07/02/2025    PCPUR Negative 07/02/2025    THCUR Negative 07/02/2025    OXYCODONEUR Negative 07/02/2025      Lactate INR     Lab Results   Component Value Date    LACTICACID 0.9 05/16/2025      Lab Results   Component Value Date    INR 1.14 05/06/2025      PTT Protime     Lab Results   Component Value Date/Time    PTT 33 05/06/2025 04:21 PM        Lab Results   Component Value Date/Time    PROTIME 15.4 (H) 05/06/2025 04:21 PM        "

## 2025-07-03 NOTE — PROGRESS NOTES
"Progress Note - Critical Care/ICU   Name: Elda Calderon 29 y.o. female I MRN: [id number]  Unit/Bed#: ED-09 I Date of Admission: 7/2/2025   Date of Service: 7/3/2025 I Hospital Day: 1       Assessment & Plan  Acute opioid withdrawal (HCC)  Patient is actively withdrawing from opioids, per patient last use of fentanyl on Monday  Upon admission COWS Score = 13  Dicsussed with Toxicology   Tigan IM Q6 PRN nausea/vomiting  Atarax 50 mg q6 prn for anxiety  Gabapentin 300 mg po 3 times daily as needed anxiety  Clonidine 0.1 mg q8h prn anxiety  IV Ativan 2 mg as needed every 2 hours max dose of 8  Placed on clonidine patch  Continue Precedex  Opioid use disorder, severe, dependence (HCC)  History of polysubstance abuse.  This admission, UDS positive for fentanyl.  Upon chart review, previous detox admissions show that patient left AMA   In the past, patient was receiving methadone [facility] however patient has not received methadone in quite a while which was her decision.  History of IV drug use in the past, tricuspid murmur evident on auscultation.  Per chart review, no echo on file.  Consider echocardiogram.  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 from neck infection from IV drug use through IJ vein  Was seen by vascular surgery in the past, no surgical indication necessary  Was prescribed Eliquis, but patient stopped on her own secondary to \"anxiety \"  Try to educate patient on importance of anticoagulation and danger of thrombosis in IJV  Failure to thrive in adult  Adult Malnutrition type: Acute illness  Adult Degree of Malnutrition: Other severe " protein calorie malnutrition  Malnutrition Characteristics: Fat loss, Muscle loss, Weight loss  Likely due ongoing known history of polysubstance abuse severe malnutrition poor oral intake  Consider nutrition consult  Disposition: Stepdown Level 1    ICU Core Measures     A: Assess, Prevent, and Manage Pain Has pain been assessed? Yes  Need for changes to pain regimen? No   B: Both SAT/SAT  N/A   C: Choice of Sedation RASS Goal: 0 Alert and Calm  Need for changes to sedation or analgesia regimen? No   D: Delirium CAM-ICU: Negative   E: Early Mobility  Plan for early mobility? Yes   F: Family Engagement Plan for family engagement today? Yes         Prophylaxis:  VTE VTE covered by:  enoxaparin, Subcutaneous       Stress Ulcer  not ordered         24 Hour Events : Patient presented overnight due to significant alcohol withdrawal for the past 2 to 3 days. The patient received Valium 2.5mg x 3 today and 5 mg yesterday and Tigan due to elevated QTc.  Received 1 L fluid bolus  And was started on Suboxone treatment by toxicology      Subjective : Patient appears very anxious, tremors, yawning.  Also endorses abdominal discomfort.  Initially refused to get blood drawn but is now agreeable.  Appears to have dilated pupils, nausea, vomiting and had an episode of vomiting while I was examining the patient.       Objective :                   Vitals I/O      Most Recent Min/Max in 24hrs   Temp 99.8 °F (37.7 °C) Temp  Min: 99.8 °F (37.7 °C)  Max: 99.8 °F (37.7 °C)   Pulse (!) 134 Pulse  Min: 76  Max: 153   Resp 19 Resp  Min: 18  Max: 20   BP (!) 148/102 BP  Min: 148/102  Max: 173/95   O2 Sat 96 % SpO2  Min: 85 %  Max: 99 %    No intake or output data in the 24 hours ending 07/03/25 1339    Diet Regular; Regular House    Invasive Monitoring           Physical Exam   Physical Exam  Vitals and nursing note reviewed.   Eyes:      Conjunctiva/sclera: Conjunctivae normal.      Comments: Dilated pupils Skin:     General: Skin is warm.       Coloration: Skin is pale.   HENT:      Head: Normocephalic and atraumatic.      Nose: Rhinorrhea present.      Mouth/Throat:      Mouth: Mucous membranes are dry.   Cardiovascular:      Rate and Rhythm: Regular rhythm. Tachycardia present.      Heart sounds: No murmur heard.  Musculoskeletal:         General: No swelling.      Cervical back: Neck supple.   Abdominal:      Palpations: Abdomen is soft.      Tenderness: There is abdominal tenderness.   Constitutional:       Appearance: She is well-developed. She is ill-appearing and diaphoretic.   Pulmonary:      Effort: Pulmonary effort is normal. No respiratory distress.      Breath sounds: Normal breath sounds.   Psychiatric:         Mood and Affect: Mood normal.   Neurological:      Mental Status: She is alert.           Diagnostic Studies        Lab Results: I have reviewed the following results:     Medications:  Scheduled PRN   buprenorphine-naloxone, 8 mg, BID  chlorhexidine, 15 mL, Q12H JERI  cloNIDine, 0.2 mg, Weekly  enoxaparin, 40 mg, Daily  lactated ringers, 1,000 mL, Once  transdermal buprenorphine, 20 mcg, Q7 Days      cloNIDine, 0.1 mg, Q8H PRN  gabapentin, 300 mg, TID PRN  hydrOXYzine HCL, 50 mg, Q6H PRN  loperamide, 2 mg, 4x Daily PRN  trimethobenzamide, 200 mg, Q6H PRN       Continuous    dexmedetomidine, 0.1-0.7 mcg/kg/hr, Last Rate: 0.2 mcg/kg/hr (07/03/25 1137)  lactated ringers, 125 mL/hr         Labs:   CBC    Recent Labs     07/02/25  1836   WBC 15.23*   HGB 11.9   HCT 35.2        BMP    Recent Labs     07/02/25  1836   SODIUM 142   K 2.9*      CO2 26   AGAP 15*   BUN 9   CREATININE 0.54*   CALCIUM 9.6       Coags    No recent results     Additional Electrolytes  No recent results       Blood Gas    No recent results  No recent results LFTs  Recent Labs     07/02/25  1836   ALT 9   AST 24   ALKPHOS 82   ALB 4.3   TBILI 0.53       Infectious  No recent results  Glucose  Recent Labs     07/02/25  1836   GLUC 147*

## 2025-07-03 NOTE — ASSESSMENT & PLAN NOTE
Adult Malnutrition type: Acute illness  Adult Degree of Malnutrition: Other severe protein calorie malnutrition  Malnutrition Characteristics: Fat loss, Muscle loss, Weight loss  Likely due ongoing known history of polysubstance abuse severe malnutrition poor oral intake  Consider nutrition consult

## 2025-07-03 NOTE — ED NOTES
Reached out to [facility]IM in regards to patient being tachycardic in the 140-150's and diaphoretic. SLIM with no new orders at this time. They are aware of patient's vital signs.       Joy Khan RN  07/03/25 0879

## 2025-07-03 NOTE — ASSESSMENT & PLAN NOTE
Adult Malnutrition type: Acute illness  Adult Degree of Malnutrition: Other severe protein calorie malnutrition  Malnutrition Characteristics: Fat loss, Muscle loss, Weight loss  Likely due ongoing known history of polysubstance abuse severe malnutrition poor oral intake  Consider nutrition consult        360 Statement: Severe protein-calorie malnutrition related to inadequate oral intake as evidenced by significant loss of fat and muscle extremities, orbitals, scapula, temples; BMI 15.5. Currently treated with oral supplementation.

## 2025-07-03 NOTE — ASSESSMENT & PLAN NOTE
Suspect secondary to electrolyte derangements and possibly from Methadone   Hold QT prolonging agents   Tigan for nausea  Aggressively replete K and Mag

## 2025-07-03 NOTE — CONSULTS
Consultation - Medical Toxicology   Name: Elda Calderon 29 y.o. female I MRN: [id number]  Unit/Bed#: ED-09 I Date of Admission: 7/2/2025   Date of Service: 7/3/2025 I Hospital Day: 1   Inpatient consult to Toxicology  Consult performed by: Mague Villatoro MD  Consult ordered by: Polina Stephens MD        Physician Requesting Evaluation: Virginia Howard MD   Reason for Evaluation / Principal Problem: Opioid withdrawal, opioid use disorder    Assessment & Plan  Opioid withdrawal (HCC)  Continue supportive care measures, including airway monitoring, head of bed elevation, aspiration precautions, cardiac telemetry, and continuous pulse oximetry.    Patient is currently exhibiting moderate to severe opioid withdrawal symptoms in the setting of injection fentanyl and xylazine use. She has received several doses of IV diazepam without improvement; she remains tachycardic, hypertensive, diaphoretic, ill-appearing with continued vomiting and inability to tolerate PO intake.    Clonidine patch is in place which can be continued.     Continue supportive medications as needed for opioid withdrawal symptoms: acetaminophen and ibuprofen/naproxen as needed for pain; loperamide as needed for diarrhea; antiemetics as needed for nausea/vomiting; trazodone and melatonin as needed for sleep; and anxiolytics such as clonidine 0.1-0.2 mg every 8 hours prn, gabapentin 300 mg three times daily prn, hydroxyzine 25-50 mg every 6 hours prn, benzodiazepines as needed for severe symptoms.    As patient cannot tolerate PO medications, can utilize IV ketorolac or IV acetaminophen and IV benzodiazepines (lorazepam 2 mg IV every 2 hours as needed for anxiety/agitation; diazepam 5 mg IV every 2 hours as needed for anxiety/agitation).    However, upon my evaluation, patient is exhibiting severe withdrawal likely due to xylazine, which is a central alpha-2 agonist. Therefore, in order to adequately control patient's withdrawal symptoms,  recommend continuous Precedex infusion.    While on Precedex, can optimize electrolytes and hemodynamics. Recommend keeping HR above 100 bpm in setting of QT prolongation to prevent dysrhythmias.    Patient is interested in methadone; will need Qtc to be less than 500 ms prior to initiating.    When Qtc is less than 500 ms, can start methadone 30 mg PO solution daily; may give an additional 10 mg prn persistent withdrawal if needed x 1 dose.    Remove transdermal buprenorphine patch; patient desires methadone over buprenorphine.  Opioid use disorder, severe, dependence (HCC)  Patient is interested in methadone maintenance and was previously maintained on methadone 140 mg when she was abstinent.    Will start 30 mg daily when Qtc has improved.    Recommend screening HIV and hepatitis panel in setting of injection drug use.    Recommend CRS consult via [facility] for recovery support.    Recommend case management consult for disposition planning.    Recommend discharge with naloxone for harm reduction.  Hypokalemia  Recent Labs     07/02/25  1836   K 2.9*     Patient has received 2 doses of IV potassium replacement without repeat labs; continue to optimize.    Recommend to check magnesium level  QT prolongation  QTC persistently above 500 ms: 557 ms, 526 this morning.    Recommend 2 grams of IV magnesium sulfate to prevent early after depolarizations.    Continue to optimize magnesium, potassium, and calcium  History of internal jugular thrombosis  Patient with history of internal jugular thrombosis secondary to injection drug use but has been non-adherent to her anticoagulation.  Vaping nicotine dependence, tobacco product  Nicotine replacement therapy recommended  Protein-calorie malnutrition, severe (HCC)  Malnutrition Findings:        BMI Findings:    Body mass index is 15.52 kg/m².     Patient with significant fat loss in the temporal, orbital, supra-clavicular regions in setting of decreased PO intake and poor  nutrition complicated by active IV opioid use.    Nutrition consult recommended.    I have discussed with primary medicine service and ED nursing the above plan to manage withdrawal including plan for methadone and recommendation for Precedex infusion. Dr. Choi agrees with the plan.  Medical Toxicology service will follow.    Please see additional teaching note below (if available):  Discussion: Opiates and Opioids   Medical Toxicology   [facility]     Common agents: buprenorphine, codeine, fentanyl, heroin, hydrocodone, hydromorphone, loperamide, meperidine, methadone, morphine, nalbuphine, oxycodone, oxymorphone, tramadol     Background and mechanism of toxicity: Opiates are naturally occurring compounds derived from the poppy plant Papaver somniferum and include codeine and morphine. Opioids include the opiates plus semi-synthetic (heroin, hydrocodone) and fully synthetic (fentanyl, meperidine, methadone) analogs. Opioids are used primarily for analgesia but are also frequently misused. Opioids stimulate several specific opioid receptors in the CNS resulting in analgesia, sedation, and respiratory depression. Death can occur from respiratory failure, usually as a result of apnea or aspiration. Acute, non-cardiogenic pulmonary edema can also occur. Toxic dose varies widely depending on the specific opioid, formulation, route of administration, and underlying tolerance. Peak effects are observed 2-3 hours after ingestion but can be delayed by gastric dysmotility or extended-release preparations. Smoking or ingestion of fentanyl patches can result in rapid toxicity. Rate of elimination is variable. Meperidine and tramadol also inhibit serotonin re-uptake.     Clinical presentation: Mild to moderate overdose can result in lethargy, pinpoint pupils, hypotension, bradycardia, and decreased bowel sounds. Higher doses can result in coma, respiratory depression, apnea, and sudden death.  Non-cardiogenic pulmonary edema can occur even after naloxone and resuscitation. Seizures are more likely to occur with codeine, kratom, meperidine, methadone, and tramadol. Methadone can cause QTc prolongation with resultant torsade de pointes.     Diagnosis: Diagnosis of opioid toxicity is clinical but can be confirmed if the patient rapidly improves after naloxone administration. The urine drug screen for opiates detects codeine and morphine but is unable to detect synthetic opioids, so the UDS in adults is limited. Specific immunoassays exist for certain opioids, including oxycodone, methadone, and buprenorphine.      Treatment: Airway establishment and appropriate ventilation are paramount to avoid morbidity and mortality secondary to opioid toxicity. Naloxone (Narcan) is a specific opioid antagonist that can be used to reverse opioid effects; 0.2-0.4 mg IV or IM is usually effective for heroin overdose. Repeat doses every 2-3 minutes can be given if no response up to 10-20 mg total of naloxone if opioid toxicity is strongly suspected. Intranasal naloxone is effective but not as effective as IM in the pre-hospital setting. Naloxone dosing can be titrated to desired effect, and a continuous infusion can be started if the patient is requiring frequent doses to reverse persistent opioid toxicity. Caution is advised in opioid-dependent patients, as large doses of naloxone can precipitate withdrawal. The duration of effect for naloxone is 1-2 hours and shorter than the half-life of most opioids; therefore, patients should be observed and monitored closely for recurrent opioid toxicity (sedation, respiratory depression/hypoventilation, or apnea) for at least 4 hours since the last naloxone dose. Other supportive measures are encouraged if needed. Activated charcoal can be used in acute ingestions, but the risk of aspiration limits its use in the non-intubated patient.     All patients presenting with acute opioid  toxicity or who are at risk for opioid toxicity and overdose should be given or prescribed naloxone upon discharge for harm reduction. All patients should be offered and referred to, as appropriate, for opioid agonist therapy and rehabilitation.     Opioid withdrawal: Opioid withdrawal is characterized by anxiety, piloerection, rhinorrhea, yawning, abdominal cramping, nausea, vomiting, diarrhea, myalgias/arthralgias, insomnia. Withdrawal symptoms are uncomfortable but not fatal.  Buprenorphine is a partial opioid agonist with strong binding affinity that can precipitate withdrawal in opioid-dependent patients. Treatment is supportive with antiemetics, antidiarrheals, analgesics, and medications for anxiety and insomnia with consideration of opioid agonist therapy when appropriate.     References:   FELISA Garces. Opiates and Opioids. In: GM [employer]ley. Poisoning & Drug Overdose. 7th ed. [facility]; 2018: 350-352.     Treatment of prolonged QT interval primarily includes optimization of electrolytes (magnesium, potassium, calcium). A concerning corrected QT interval in the toxicological setting is > 500ms. This may increase risk of torsades de pointes. Bradycardia further increases the risk while tachycardia is generally protective. Should torsades de pointes occur, electrical cardioversion should occur if hemodynamically stable, whereas stable patients may be treated with magnesium, lidocaine and overdrive pacing if stable. Recommended magnesium administration is 2 grams IV over 5 minutes, may repeat if necessary, and followed by 0.5-1 gram/hour infusion. Overdrive pacing is started at 130-150 beats per minute and then decreased as tolerated toward 100-120 beats per minute. Pharmacological alternatives to electrical pacing may include epinephrine, dobutamine or isoproterenol. Avoid class 1a and 3 antidysrhythmics.     For further questions, please contact the medical  on call via SecureChat between  8am and 9pm. If between 9pm and 8am, please reach out to the [facility]ison [facility] at [phone].     History of Present Illness   Elda Calderon is a 29 y.o. year old female who presents with intractable nausea and vomiting in setting of opioid withdrawal. Patient states she has been injecting up to a bundle of fentanyl and xylazine daily with last use Tuesday evening. Patient states yesterday she began to experience nausea and vomiting, prompting her to seek evaluation. Patient has been using fentanyl daily for months; states she was previously on methadone 140 mg for maintenance. Currently living in her apartment but lost her job recently. Patient has not been on methadone for several months; after her admissions in May, patient did not follow up. Denies other substance use including alcohol and benzodiazepines. Patient vapes daily. She was given buprenorphine in the ED last night but states she feels worse and does not want to continue. Patient would like to restart methadone and states she can follow up with [facility] in [place] as she did previously. Patient is endorsing diaphoresis, chills, anxiety, vomiting and has been persistently tachycardic to the 140s and hypertensive.    Review of Systems   Constitutional:  Positive for chills and diaphoresis. Negative for fever.   Gastrointestinal:  Positive for nausea and vomiting. Negative for abdominal pain and diarrhea.   Musculoskeletal:  Positive for arthralgias and myalgias.   Neurological:  Negative for tremors and seizures.   Psychiatric/Behavioral:  Positive for agitation. The patient is nervous/anxious.        Historical Information   Medical History Review: I have reviewed the patient's PMH, PSH, Social History, Family History, Meds, and Allergies   Social History[1]  Family History[2]    Meds/Allergies   Prior to Admission medications    Medication Sig Start Date End Date Taking? Authorizing Provider   ondansetron (ZOFRAN) 4 mg tablet Take 1 tablet (4 mg total)  by mouth every 8 (eight) hours as needed for nausea or vomiting 3/5/24  Yes Jose Resendiz MD   apixaban (ELIQUIS) 5 mg Take 1 tablet (5 mg total) by mouth 2 (two) times a day  Patient not taking: Reported on 7/2/2025 5/13/25   Tej Perez MD   naloxone (NARCAN) 4 mg/0.1 mL nasal spray Administer 1 spray into a nostril. If no response after 2-3 minutes, give another dose in the other nostril using a new spray.  Patient not taking: Reported on 7/2/2025 5/9/25 5/9/26  Mague Villatoro MD   potassium chloride (Klor-Con M20) 20 mEq tablet Take 2 tablets (40 mEq total) by mouth daily for 7 days 5/17/25 5/24/25  Kai Alexander PA-C   QUEtiapine (SEROquel) 25 mg tablet Take 25 mg by mouth daily at bedtime Uncertain of dose . Unknown when last prescribed  Patient not taking: Reported on 7/2/2025    Historical Provider, MD   Current Medications[3]   Allergies[4]    Objective :  Temp:  [99.8 °F (37.7 °C)] 99.8 °F (37.7 °C)  HR:  [] 144  BP: (156-173)/() 165/108  Resp:  [18-20] 20  SpO2:  [85 %-99 %] 96 %  O2 Device: None (Room air)  Nasal Cannula O2 Flow Rate (L/min):  [3 L/min] 3 L/min    No intake or output data in the 24 hours ending 07/03/25 1040    Physical Exam  Vitals and nursing note reviewed.   Constitutional:       Appearance: She is ill-appearing, toxic-appearing and diaphoretic.   HENT:      Head: Normocephalic.      Nose: Rhinorrhea present.      Mouth/Throat:      Mouth: Mucous membranes are dry.     Eyes:      General: No scleral icterus.      Cardiovascular:      Rate and Rhythm: Regular rhythm. Tachycardia present.   Pulmonary:      Effort: Pulmonary effort is normal. No respiratory distress.     Musculoskeletal:         General: No deformity.      Comments: Severe muscle wasting in temporal, upper extremities, supra-clavicular regions     Skin:     Coloration: Skin is pale.     Neurological:      General: No focal deficit present.      Mental Status: She is alert and oriented to  person, place, and time.     Psychiatric:      Comments: Restless, anxious           Lab Results: I have reviewed the following results:  Results from last 7 days   Lab Units 07/02/25  1836   WBC Thousand/uL 15.23*   HEMOGLOBIN g/dL 11.9   HEMATOCRIT % 35.2   PLATELETS Thousands/uL 353   SEGS PCT % 86*   LYMPHO PCT % 8*   MONO PCT % 5   EOS PCT % 0      Results from last 7 days   Lab Units 07/02/25  1836   POTASSIUM mmol/L 2.9*   CHLORIDE mmol/L 101   CO2 mmol/L 26   BUN mg/dL 9   CREATININE mg/dL 0.54*   CALCIUM mg/dL 9.6   ALBUMIN g/dL 4.3   ALK PHOS U/L 82   ALT U/L 9   AST U/L 24             Results from last 7 days   Lab Units 07/02/25  1849   ACETAMINOPHEN LVL ug/mL <2*   ETHANOL LVL mg/dL <10   SALICYLATE LVL mg/dL <5*     Imaging Results Review: No pertinent imaging studies reviewed.  Other Study Results Review: EKG was personally reviewed and my interpretation is: NSR. HR 75, , QRS 84, Qtc 515 ms, prolonged Qtc compared to previous on same date 7/2..    Administrative Statements   I have spent a total time of 45 minutes in caring for this patient on the day of the visit/encounter including Risks and benefits of tx options, Instructions for management, Patient and family education, Importance of tx compliance, Risk factor reductions, Impressions, Counseling / Coordination of care, Documenting in the medical record, Obtaining or reviewing history  , and Communicating with other healthcare professionals .         [1]   Social History  Tobacco Use    Smoking status: Never     Passive exposure: Never    Smokeless tobacco: Never   Vaping Use    Vaping status: Every Day    Substances: Nicotine, Flavoring   Substance and Sexual Activity    Alcohol use: Not Currently    Drug use: Yes     Types: Fentanyl     Comment: 8 bags/day states benzos are mixed in    Sexual activity: Not Currently     Partners: Male     Birth control/protection: None   [2]   Family History  Problem Relation Name Age of Onset     Depression Mother      Drug abuse Brother      Drug abuse Maternal Aunt      Drug abuse Paternal Uncle      Cancer Maternal Grandmother      Cancer Paternal Grandfather     [3]   Current Facility-Administered Medications:     [] buprenorphine-naloxone (Suboxone) film 2 mg, 2 mg, Sublingual, Q2H, 2 mg at 25 0100 **FOLLOWED BY** buprenorphine-naloxone (Suboxone) film 8 mg, 8 mg, Sublingual, BID, Polina Stephens MD    cloNIDine (CATAPRES) tablet 0.1 mg, 0.1 mg, Oral, Q8H PRN, Polina Stephens MD    cloNIDine (CATAPRES-TTS-2) 0.2 mg/24 hr TD weekly patch, 0.2 mg, Transdermal, Weekly, Yunior Ulrich MD, 0.2 mg at 25 0914    gabapentin (NEURONTIN) capsule 300 mg, 300 mg, Oral, TID PRN, Polina Stephens MD    hydrOXYzine HCL (ATARAX) tablet 50 mg, 50 mg, Oral, Q6H PRN, Polina Stephens MD    loperamide (IMODIUM) capsule 2 mg, 2 mg, Oral, 4x Daily PRN, Polina Stephens MD    magnesium sulfate 2 g/50 mL IVPB (premix) 2 g, 2 g, Intravenous, Once, Daphne Choi MD, Last Rate: 25 mL/hr at 25 1024, 2 g at 25 1024    transdermal buprenorphine (BUTRANS) 10 mcg/hr TD patch 20 mcg, 20 mcg, Transdermal, Q7 Days, Polina Stephens MD, 20 mcg at 25 2131    trimethobenzamide (TIGAN) IM injection 200 mg, 200 mg, Intramuscular, Q6H PRN, Kelly Gay MD, 200 mg at 25 0056    Current Outpatient Medications:     ondansetron (ZOFRAN) 4 mg tablet, Take 1 tablet (4 mg total) by mouth every 8 (eight) hours as needed for nausea or vomiting, Disp: 2 tablet, Rfl: 0    apixaban (ELIQUIS) 5 mg, Take 1 tablet (5 mg total) by mouth 2 (two) times a day (Patient not taking: Reported on 2025), Disp: 60 tablet, Rfl: 0    naloxone (NARCAN) 4 mg/0.1 mL nasal spray, Administer 1 spray into a nostril. If no response after 2-3 minutes, give another dose in the other nostril using a new spray. (Patient not taking: Reported on 2025), Disp: 1 each, Rfl: 0    potassium chloride (Klor-Con M20) 20 mEq tablet, Take 2 tablets (40 mEq total) by  mouth daily for 7 days, Disp: 14 tablet, Rfl: 0    QUEtiapine (SEROquel) 25 mg tablet, Take 25 mg by mouth daily at bedtime Uncertain of dose . Unknown when last prescribed (Patient not taking: Reported on 7/2/2025), Disp: , Rfl:   [4] No Known Allergies

## 2025-07-03 NOTE — ASSESSMENT & PLAN NOTE
Patient is actively withdrawing from opioids, per patient last use of fentanyl on Monday  Upon admission COWS Score = 13  Dicsussed with Toxicology   Tigan IM Q6 PRN nausea/vomiting  Atarax 50 mg q6 prn for anxiety  Gabapentin 300 mg po 3 times daily as needed anxiety  Clonidine 0.1 mg q8h prn anxiety  IV Ativan 2 mg as needed every 2 hours max dose of 8  Placed on clonidine patch  Continue Precedex

## 2025-07-03 NOTE — ASSESSMENT & PLAN NOTE
Patient is actively withdrawing from opioids, had been using Fentanyl and then had 2 doses of Methadone from [facility], but could not get dose today due to prolongation of QTc  COWS Score = 13  Dicsussed with Toxicology   Tigan IM Q6 PRN nausea/vomiting  Atarax 50 mg q6 prn for anxiety  Gabapentin 300 mg po 3 times daily as needed anxiety  Clonidine 0.1 mg q8h prn anxiety  If patient continues to decompensate, consider initiating Precedex drip and escalating to critical care.

## 2025-07-03 NOTE — QUICK NOTE
Patient with difficult IV access, after multiple attempts including the vascular team, we will try to get an A-line

## 2025-07-03 NOTE — NURSING NOTE
Patient is a difficult stick, and unable to obtain bloodwork. VAS consulted midline placed but unable to get bloodwork off midline. Patient stuck multiple times without success. CC team aware.

## 2025-07-04 LAB
ANION GAP SERPL CALCULATED.3IONS-SCNC: 9 MMOL/L (ref 4–13)
BASOPHILS # BLD AUTO: 0.03 THOUSANDS/ÂΜL (ref 0–0.1)
BASOPHILS NFR BLD AUTO: 0 % (ref 0–1)
BUN SERPL-MCNC: 13 MG/DL (ref 5–25)
CALCIUM SERPL-MCNC: 8.5 MG/DL (ref 8.4–10.2)
CHLORIDE SERPL-SCNC: 106 MMOL/L (ref 96–108)
CO2 SERPL-SCNC: 28 MMOL/L (ref 21–32)
CREAT SERPL-MCNC: 0.48 MG/DL (ref 0.6–1.3)
EOSINOPHIL # BLD AUTO: 0.01 THOUSAND/ÂΜL (ref 0–0.61)
EOSINOPHIL NFR BLD AUTO: 0 % (ref 0–6)
ERYTHROCYTE [DISTWIDTH] IN BLOOD BY AUTOMATED COUNT: 13.2 % (ref 11.6–15.1)
GFR SERPL CREATININE-BSD FRML MDRD: 133 ML/MIN/1.73SQ M
GLUCOSE SERPL-MCNC: 99 MG/DL (ref 65–140)
HAV IGM SER QL: NORMAL
HBV CORE IGM SER QL: NORMAL
HBV SURFACE AG SER QL: NORMAL
HCT VFR BLD AUTO: 36.5 % (ref 34.8–46.1)
HCV AB SER QL: NORMAL
HGB BLD-MCNC: 12.2 G/DL (ref 11.5–15.4)
IMM GRANULOCYTES # BLD AUTO: 0.1 THOUSAND/UL (ref 0–0.2)
IMM GRANULOCYTES NFR BLD AUTO: 1 % (ref 0–2)
LYMPHOCYTES # BLD AUTO: 2.16 THOUSANDS/ÂΜL (ref 0.6–4.47)
LYMPHOCYTES NFR BLD AUTO: 12 % (ref 14–44)
MAGNESIUM SERPL-MCNC: 2.1 MG/DL (ref 1.9–2.7)
MCH RBC QN AUTO: 29.3 PG (ref 26.8–34.3)
MCHC RBC AUTO-ENTMCNC: 33.4 G/DL (ref 31.4–37.4)
MCV RBC AUTO: 88 FL (ref 82–98)
MONOCYTES # BLD AUTO: 1.49 THOUSAND/ÂΜL (ref 0.17–1.22)
MONOCYTES NFR BLD AUTO: 8 % (ref 4–12)
NEUTROPHILS # BLD AUTO: 14.16 THOUSANDS/ÂΜL (ref 1.85–7.62)
NEUTS SEG NFR BLD AUTO: 79 % (ref 43–75)
NRBC BLD AUTO-RTO: 0 /100 WBCS
PHOSPHATE SERPL-MCNC: 3.5 MG/DL (ref 2.7–4.5)
PLATELET # BLD AUTO: 281 THOUSANDS/UL (ref 149–390)
PMV BLD AUTO: 8.7 FL (ref 8.9–12.7)
POTASSIUM SERPL-SCNC: 3.4 MMOL/L (ref 3.5–5.3)
RBC # BLD AUTO: 4.17 MILLION/UL (ref 3.81–5.12)
SODIUM SERPL-SCNC: 143 MMOL/L (ref 135–147)
WBC # BLD AUTO: 17.95 THOUSAND/UL (ref 4.31–10.16)

## 2025-07-04 PROCEDURE — NC001 PR NO CHARGE: Performed by: INTERNAL MEDICINE

## 2025-07-04 PROCEDURE — 80048 BASIC METABOLIC PNL TOTAL CA: CPT

## 2025-07-04 PROCEDURE — 83735 ASSAY OF MAGNESIUM: CPT

## 2025-07-04 PROCEDURE — 85025 COMPLETE CBC W/AUTO DIFF WBC: CPT

## 2025-07-04 PROCEDURE — 84100 ASSAY OF PHOSPHORUS: CPT

## 2025-07-04 PROCEDURE — 99291 CRITICAL CARE FIRST HOUR: CPT | Performed by: INTERNAL MEDICINE

## 2025-07-04 RX ORDER — LABETALOL HYDROCHLORIDE 5 MG/ML
10 INJECTION, SOLUTION INTRAVENOUS EVERY 4 HOURS PRN
Status: DISCONTINUED | OUTPATIENT
Start: 2025-07-04 | End: 2025-07-05

## 2025-07-04 RX ORDER — POTASSIUM CHLORIDE 29.8 MG/ML
40 INJECTION INTRAVENOUS ONCE
Status: DISCONTINUED | OUTPATIENT
Start: 2025-07-04 | End: 2025-07-04

## 2025-07-04 RX ORDER — POTASSIUM CHLORIDE 14.9 MG/ML
20 INJECTION INTRAVENOUS
Status: COMPLETED | OUTPATIENT
Start: 2025-07-04 | End: 2025-07-04

## 2025-07-04 RX ORDER — ACETAMINOPHEN 325 MG/1
650 TABLET ORAL EVERY 6 HOURS PRN
Status: DISCONTINUED | OUTPATIENT
Start: 2025-07-04 | End: 2025-07-08 | Stop reason: HOSPADM

## 2025-07-04 RX ADMIN — METHADONE HYDROCHLORIDE 30 MG: 10 CONCENTRATE ORAL at 08:02

## 2025-07-04 RX ADMIN — TRIMETHOBENZAMIDE HYDROCHLORIDE 200 MG: 100 INJECTION INTRAMUSCULAR at 14:48

## 2025-07-04 RX ADMIN — ACETAMINOPHEN 650 MG: 325 TABLET ORAL at 21:31

## 2025-07-04 RX ADMIN — LABETALOL HYDROCHLORIDE 10 MG: 5 INJECTION, SOLUTION INTRAVENOUS at 07:11

## 2025-07-04 RX ADMIN — DIAZEPAM 5 MG: 10 INJECTION, SOLUTION INTRAMUSCULAR; INTRAVENOUS at 07:11

## 2025-07-04 RX ADMIN — POTASSIUM CHLORIDE 20 MEQ: 14.9 INJECTION, SOLUTION INTRAVENOUS at 12:26

## 2025-07-04 RX ADMIN — DIAZEPAM 5 MG: 10 INJECTION, SOLUTION INTRAMUSCULAR; INTRAVENOUS at 19:34

## 2025-07-04 RX ADMIN — SODIUM CHLORIDE, SODIUM LACTATE, POTASSIUM CHLORIDE, AND CALCIUM CHLORIDE 125 ML/HR: .6; .31; .03; .02 INJECTION, SOLUTION INTRAVENOUS at 03:16

## 2025-07-04 RX ADMIN — DIAZEPAM 5 MG: 10 INJECTION, SOLUTION INTRAMUSCULAR; INTRAVENOUS at 10:56

## 2025-07-04 RX ADMIN — POTASSIUM PHOSPHATE, MONOBASIC AND POTASSIUM PHOSPHATE, DIBASIC 30 MMOL: 224; 236 INJECTION, SOLUTION, CONCENTRATE INTRAVENOUS at 03:15

## 2025-07-04 RX ADMIN — CHLORHEXIDINE GLUCONATE 15 ML: 1.2 SOLUTION ORAL at 21:31

## 2025-07-04 RX ADMIN — DEXMEDETOMIDINE HYDROCHLORIDE 1 MCG/KG/HR: 4 INJECTION, SOLUTION INTRAVENOUS at 06:15

## 2025-07-04 RX ADMIN — POTASSIUM CHLORIDE 20 MEQ: 14.9 INJECTION, SOLUTION INTRAVENOUS at 08:42

## 2025-07-04 RX ADMIN — ENOXAPARIN SODIUM 40 MG: 40 INJECTION SUBCUTANEOUS at 08:02

## 2025-07-04 RX ADMIN — DIAZEPAM 5 MG: 10 INJECTION, SOLUTION INTRAMUSCULAR; INTRAVENOUS at 22:29

## 2025-07-04 RX ADMIN — DIAZEPAM 5 MG: 10 INJECTION, SOLUTION INTRAMUSCULAR; INTRAVENOUS at 14:48

## 2025-07-04 RX ADMIN — SODIUM CHLORIDE, SODIUM LACTATE, POTASSIUM CHLORIDE, AND CALCIUM CHLORIDE 125 ML/HR: .6; .31; .03; .02 INJECTION, SOLUTION INTRAVENOUS at 10:44

## 2025-07-04 RX ADMIN — DEXMEDETOMIDINE HYDROCHLORIDE 1 MCG/KG/HR: 4 INJECTION, SOLUTION INTRAVENOUS at 15:52

## 2025-07-04 RX ADMIN — TRIMETHOBENZAMIDE HYDROCHLORIDE 200 MG: 100 INJECTION INTRAMUSCULAR at 21:32

## 2025-07-04 RX ADMIN — POTASSIUM CHLORIDE 20 MEQ: 14.9 INJECTION, SOLUTION INTRAVENOUS at 10:42

## 2025-07-04 NOTE — PROGRESS NOTES
Progress Note - Critical Care/ICU   Name: Elda Calderon 29 y.o. female I MRN: [id number]  Unit/Bed#: ICU 04 I Date of Admission: 7/2/2025   Date of Service: 7/4/2025 I Hospital Day: 2       Critical Care Attending Note    29 year old woman with IVDA, venous/IJ thrombosis not adherent to AC therapy, PTSD, anxiety - she presents for reported 24hrs of opioid w/d symptoms with N/V, tremors, anxiety. Reported regular use of fentanyl/xylazine - previously on methadone maintenance. ED course reviewed and given tigan, 12.5mg diazepam, mag x 2gm x 2, suboxone and started on precedex gtt.     24hr events - lack of adequate venous access -  required CVC - placed femoral due to IJ thrombus history, started methadone, given diazepam 5mg x 3 overnight, labatolol 10mg x 1 this am, notes some improvements but continues with nausea.      VS AF, -150's, -170's/'s, SpO2 96% RA, I/Os +2.8L  Exam  GEN young woman, frail, cachetic, significantly less tremulous, oriented x 3  HEENT temporal wasting, poor dentition, no thrush  NECK no accessory muscle use  CV slightly tachy, single s1/2, no m/r  Pulm clear, no wheeze or rales  ABD +BS soft NTND, no rebound  EXT right femoral TLC in place w/o purulence, poor muscle mass, no edema     Laboratory and Diagnostics  Results from last 7 days   Lab Units 07/04/25 0541 07/03/25 2235 07/02/25  1836   WBC Thousand/uL 17.95* 19.52* 15.23*   HEMOGLOBIN g/dL 12.2 12.8 11.9   HEMATOCRIT % 36.5 36.1 35.2   PLATELETS Thousands/uL 281 292 353   SEGS PCT % 79* 80* 86*   MONO PCT % 8 9 5   EOS PCT % 0 0 0     Results from last 7 days   Lab Units 07/04/25  0541 07/03/25 2235 07/02/25  1836   SODIUM mmol/L 143 142 142   POTASSIUM mmol/L 3.4* 3.2* 2.9*   CHLORIDE mmol/L 106 105 101   CO2 mmol/L 28 29 26   ANION GAP mmol/L 9 8 15*   BUN mg/dL 13 13 9   CREATININE mg/dL 0.48* 0.48* 0.54*   CALCIUM mg/dL 8.5 8.8 9.6   GLUCOSE RANDOM mg/dL 99 113 147*   ALT U/L  --  8 9   AST U/L  --  23 24    ALK PHOS U/L  --  74 82   ALBUMIN g/dL  --  3.7 4.3   TOTAL BILIRUBIN mg/dL  --  0.42 0.53     Results from last 7 days   Lab Units 07/04/25  0541 07/03/25  2235   MAGNESIUM mg/dL 2.1 2.1   PHOSPHORUS mg/dL 3.5 2.2*               Results from last 7 days   Lab Units 07/03/25  2235   LACTIC ACID mmol/L 1.2                     Results from last 7 days   Lab Units 07/03/25  2235   PROCALCITONIN ng/ml 0.13       UDS (+) fentanyl  APAP/ETOH/ASA neg    BHB 1.1     MICRO  Bld Cx - 7/2 pending  Rapid HIV neg  Hep panel pending     RADIOGRAPHS - New images and reports personally reviewed  CXR 7/3 - no focal infiltrates, no effusions     7/4/2025 - , QTc 434ms     Assessment  Opioid withdraw  Opioid dependence with fentanyl/xylazine use  Venous thrombosis - chronic  Severe protein calorie malnutrition  Prolonged QTc - improved  Electrolyte abnormalities  SIRS (leukocytosis, HR, RR) secondary to #1  Mild starvation ketosis - now improved     PLAN  NEURO - appreciate toxicology recs - resumed methadone, daily EKG for now, continue diazepam for w/d symptoms and non-narcotic pain regimen, once improved will plan for precedex wean  CARDIAC - monitor HR, continue clonidine for w/d symptoms - add PO dosing PRN, hold further labetolol and focus in w/d regimen as above   PULM - no active issues  GI - ADAT continue Tigan for nausea   NEPHRO - replete electrolytes, serial BMP panels, LR 125cc/hr  ID - monitor off abx, check UA/cx, f/u blood cultures and hep serologies  HEME - hold on AC given prior lack of compliance and chronic thrombosis   ENDO - no active issues  MISC - SCDS, LMWH, CHG, HOB >30DEG  CODE STATUS - Full code  Needs long term rehab plans and options - will discuss with CRS prior to d/c  I updated her mother at bedside and all questions answered       Upon my evaluation, this patient has a high probability of imminent or life-threatening deterioration due to opioid w/d which required my direct attention,  intervention, and personal management.     I have personally provided 32 minutes of critical care time, exclusive of procedures, teaching, and any prior time recorded by providers other than myself. Time includes review of laboratory data, radiology results, discussion with consultants, and monitoring for potential decompensation.    Devendra Hollis, DO

## 2025-07-04 NOTE — PLAN OF CARE
Problem: PAIN - ADULT  Goal: Verbalizes/displays adequate comfort level or baseline comfort level  Description: Interventions:  - Encourage patient to monitor pain and request assistance  - Assess pain using appropriate pain scale  - Administer analgesics as ordered based on type and severity of pain and evaluate response  - Implement non-pharmacological measures as appropriate and evaluate response  - Consider cultural and social influences on pain and pain management  - Notify physician/advanced practitioner if interventions unsuccessful or patient reports new pain  - Educate patient/family on pain management process including their role and importance of  reporting pain   - Provide non-pharmacologic/complimentary pain relief interventions  Outcome: Progressing       Problem: Prexisting or High Potential for Compromised Skin Integrity  Goal: Skin integrity is maintained or improved  Description: INTERVENTIONS:  - Identify patients at risk for skin breakdown  - Assess and monitor skin integrity including under and around medical devices   - Assess and monitor nutrition and hydration status  - Monitor labs  - Assess for incontinence   - Turn and reposition patient  - Assist with mobility/ambulation  - Relieve pressure over tracy prominences   - Avoid friction and shearing  - Provide appropriate hygiene as needed including keeping skin clean and dry  - Evaluate need for skin moisturizer/barrier cream  - Collaborate with interdisciplinary team  - Patient/family teaching  - Consider wound care consult

## 2025-07-04 NOTE — ASSESSMENT & PLAN NOTE
Patient is actively withdrawing from opioids, per patient last use of fentanyl on Monday  Upon admission COWS Score = 13  Dicsussed with Toxicology -continue symptom management  Tigan IM Q6 PRN nausea/vomiting  Atarax 50 mg q6 prn for anxiety  Tylenol as needed for pain management  Gabapentin 300 mg po 3 times daily as needed anxiety  Clonidine 0.1 mg q8h prn anxiety  IV Valium 5 mg as needed every 2 hours  Placed on clonidine patch  Continue Precedex-wean off as able  7/3 started on methadone 30 mg

## 2025-07-04 NOTE — ASSESSMENT & PLAN NOTE
History of polysubstance abuse.  This admission, UDS positive for fentanyl.  Upon chart review, previous detox admissions show that patient left AMA   In the past, patient was receiving methadone [facility] however patient has not received methadone in quite a while which was her decision.  History of IV drug use in the past, tricuspid murmur evident on auscultation.  Per chart review, no echo on file.  Consider echocardiogram.  Presenting with nausea, vomiting and anxiety  Toxicology was contacted, anxiolytics and antiemetics in place

## 2025-07-04 NOTE — PROCEDURES
Central Line Insertion    Date/Time: 7/3/2025 10:17 PM    Performed by: Cali Martinez MD  Authorized by: Cali Martinez MD    Patient location:  ED and ICU  Consent:     Consent obtained:  Verbal    Consent given by:  Patient    Risks discussed:  Arterial puncture, incorrect placement, nerve damage, bleeding and infection    Alternatives discussed:  Delayed treatment, observation and no treatment  Universal protocol:     Patient identity confirmed:  Verbally with patient, arm band and hospital-assigned identification number  Pre-procedure details:     Hand hygiene: Hand hygiene performed prior to insertion      Sterile barrier technique: All elements of maximal sterile technique followed      Skin preparation:  2% chlorhexidine  Indications:     Central line indications: other (comment)      Central line indications comment:  Peripheral access obtained but unable to collect blood from access    Site selection rationale:  Prior hx of IJ thrombosis in the left IJ, hx of opiod injection use in neck  Sedation:     Sedation type:  Anxiolysis  Anesthesia (see MAR for exact dosages):     Anesthesia method:  Local infiltration  Procedure details:     Location:  Right femoral    Vessel type: vein      Laterality:  Right    Patient position:  Flat    Catheter type:  Triple lumen 16cm    Catheter size:  7 Fr    Landmarks identified: yes      Ultrasound guidance: yes      Sterile ultrasound techniques: Sterile gel and sterile probe covers were used      Manometry confirmation: no      Number of attempts:  1    Successful placement: yes    Post-procedure details:     Post-procedure:  Dressing applied and line sutured    Assessment:  Blood return through all ports and free fluid flow    Post-procedure complications: none      Patient tolerance of procedure:  Tolerated well, no immediate complications    Observer: Yes      Observer name:  Susan Wong PA-C

## 2025-07-04 NOTE — ED CARE HANDOFF
[facility] Warm Handoff Outcome Note    Patient name Elda Calderon  Location ICU 04/ICU 04 MRN [id number]  Age: 29 y.o.          Plan Type:  Warm Handoff                                                                                    Plan Date: 7/2/2025  Service:  ED Warm Handoff      Substance Use History:  ETOH    Warm Handoff Update:  Pt admitted to hospital    Warm Handoff Outcome: Residential  Inpatient

## 2025-07-04 NOTE — QUICK NOTE
We assessed the patient in the ED, and she was extremely nauseous unable to keep liquids, food, or p.o. meds down.  Reports that she uses IV fentanyl 1 bundle per day that she thinks is mixed with other things. Toxicology team prescribed p.o. clonidine for her severe hypertension and tachycardia, but she had not received it due to the nausea and vomiting. We switched the p.o. clonidine to 0.2 mg clonidine patch. Also made ICU team aware of this patient as we suspected possible xylazine withdrawal requiring Precedex drip due to the nature of the patient's symptoms and lack of response to Valium, Suboxone, and clonidine.    We thank you for allowing us to take part in this patient's care.

## 2025-07-04 NOTE — ED ATTENDING ATTESTATION
7/2/2025  I, Polina Stephens MD, saw and evaluated the patient. I have discussed the patient with the resident/non-physician practitioner and agree with the resident's/non-physician practitioner's findings, Plan of Care, and MDM as documented in the resident's/non-physician practitioner's note, except where noted. All available labs and Radiology studies were reviewed.  I was present for key portions of any procedure(s) performed by the resident/non-physician practitioner and I was immediately available to provide assistance.       At this point I agree with the current assessment done in the Emergency Department.  I have conducted an independent evaluation of this patient a history and physical is as follows:    ED Course  ED Course as of 07/04/25 1110   Wed Jul 02, 2025   1911 29-year-old woman, well-known to this provider, presenting to the emergency department with opioid withdrawal symptoms.  History of IVDA.  Patient using fentanyl, last use was approximately 2-3 days ago.  Now with nausea, tremors, anxiety, palpitations, restlessness, sweats.  Patient is requesting detoxification, however due to patient's previous multiple AMA's, repeated discontinuation of Suboxone initiated by detox providers at [facility], patient is not a candidate for the [facility] detox unit.  Lab work and imaging was ordered.  Tigan given for symptom control.  Warm handoff referral ordered.   2014 Potassium(!): 2.9   2100 Patient states that she has not had any opioids in the past 48 hours.  Will attempt Suboxone microinduction per patient request.   2106 Pulse(!): 125   2106 SpO2(!): 85 %   2139 WBC(!): 15.23   2139 Patient had episode of hypoxia, placed on 4L O2  Overall, patient's clinical presentation remains the same.    I ordered further adjunctive medications administered as needed:  Clonidine 0.1 mg PO Q6 hours PRN anxiety or palpitations    Gabapentin 300mg PO Q8 hours PRN anxiety    I Atarax 50 mg p.o. every 8 hours as needed  anxiety  Ondansetron 4 mg PO Q6 hours PRN N/V    Loperamide 4 mg PO PRN diarrhea up to 16 mg/day     2230 patient received first dose of Suboxone.  Reports neither improvement nor worsening of her withdrawal symptoms.    Case was discussed with general medicine for admission.  Toxicology consult order was placed.    Critical Care Time  Procedures

## 2025-07-04 NOTE — PLAN OF CARE
Problem: Potential for Falls  Goal: Patient will remain free of falls  Description: INTERVENTIONS:  - Educate patient/family on patient safety including physical limitations  - Instruct patient to call for assistance with activity   - Consider consulting OT/PT to assist with strengthening/mobility based on AM PAC & -HLM score  - Consult OT/PT to assist with strengthening/mobility   - Keep Call bell within reach  - Keep bed low and locked with side rails adjusted as appropriate  - Keep care items and personal belongings within reach  - Initiate and maintain comfort rounds  - Make Fall Risk Sign visible to staff  - Offer Toileting every 2 Hours, in advance of need  - Initiate/Maintain bed alarm  - Obtain necessary fall risk management equipment:  - Apply yellow socks and bracelet for high fall risk patients  - Consider moving patient to room near nurses station  Outcome: Progressing     Problem: Prexisting or High Potential for Compromised Skin Integrity  Goal: Skin integrity is maintained or improved  Description: INTERVENTIONS:  - Identify patients at risk for skin breakdown  - Assess and monitor skin integrity including under and around medical devices   - Assess and monitor nutrition and hydration status  - Monitor labs  - Assess for incontinence   - Turn and reposition patient  - Assist with mobility/ambulation  - Relieve pressure over tracy prominences   - Avoid friction and shearing  - Provide appropriate hygiene as needed including keeping skin clean and dry  - Evaluate need for skin moisturizer/barrier cream  - Collaborate with interdisciplinary team  - Patient/family teaching  - Consider wound care consult    Assess:  - Review Jerry scale daily  - Clean and moisturize skin  - Inspect skin when repositioning, toileting, and assisting with ADLS  - Assess under medical devices  - Assess extremities for adequate circulation and sensation     Bed Management:  - Have minimal linens on bed & keep smooth,  unwrinkled  - Change linens as needed when moist or perspiring  - Avoid sitting or lying in one position for more than 2 hours while in bed?Keep HOB at 30 degrees   - Toileting:  - Offer bedside commode  - Assess for incontinence  - Use incontinent care products after each incontinent episode    Activity:  - Mobilize patient 3 times a day  - Encourage activity and walks on unit  - Encourage or provide ROM exercises   - Turn and reposition patient every 2 Hours  - Use appropriate equipment to lift or move patient in bed  - Instruct/ Assist with weight shifting every 2 hours when out of bed in chair  - Consider limitation of chair time 2 hour intervals    Skin Care:  - Avoid use of baby powder, tape, friction and shearing, hot water or constrictive clothing  - Relieve pressure over bony prominences  - Do not massage red bony areas    Next Steps:  - Teach patient strategies to minimize risks  - Consider consults to  interdisciplinary teams  Outcome: Progressing     Problem: PAIN - ADULT  Goal: Verbalizes/displays adequate comfort level or baseline comfort level  Description: Interventions:  - Encourage patient to monitor pain and request assistance  - Assess pain using appropriate pain scale  - Administer analgesics as ordered based on type and severity of pain and evaluate response  - Implement non-pharmacological measures as appropriate and evaluate response  - Consider cultural and social influences on pain and pain management  - Notify physician/advanced practitioner if interventions unsuccessful or patient reports new pain  - Educate patient/family on pain management process including their role and importance of  reporting pain   - Provide non-pharmacologic/complimentary pain relief interventions  Outcome: Progressing     Problem: INFECTION - ADULT  Goal: Absence or prevention of progression during hospitalization  Description: INTERVENTIONS:  - Assess and monitor for signs and symptoms of infection  - Monitor  lab/diagnostic results  - Monitor all insertion sites, i.e. indwelling lines, tubes, and drains  - Monitor endotracheal if appropriate and nasal secretions for changes in amount and color  - [place] appropriate cooling/warming therapies per order  - Administer medications as ordered  - Instruct and encourage patient and family to use good hand hygiene technique  - Identify and instruct in appropriate isolation precautions for identified infection/condition  Outcome: Progressing  Goal: Absence of fever/infection during neutropenic period  Description: INTERVENTIONS:  - Monitor WBC  - Perform strict hand hygiene  - Limit to healthy visitors only  - No plants, dried, fresh or silk flowers with aguilar in patient room  Outcome: Progressing     Problem: SAFETY ADULT  Goal: Patient will remain free of falls  Description: INTERVENTIONS:  - Educate patient/family on patient safety including physical limitations  - Instruct patient to call for assistance with activity   - Consider consulting OT/PT to assist with strengthening/mobility based on AM PAC & JH-HLM score  - Consult OT/PT to assist with strengthening/mobility   - Keep Call bell within reach  - Keep bed low and locked with side rails adjusted as appropriate  - Keep care items and personal belongings within reach  - Initiate and maintain comfort rounds  - Make Fall Risk Sign visible to staff  - Offer Toileting every 2 Hours, in advance of need  - Initiate/Maintain bed alarm  - Obtain necessary fall risk management equipment  - Apply yellow socks and bracelet for high fall risk patients  - Consider moving patient to room near nurses station  Outcome: Progressing  Goal: Maintain or return to baseline ADL function  Description: INTERVENTIONS:  -  Assess patient's ability to carry out ADLs; assess patient's baseline for ADL function and identify physical deficits which impact ability to perform ADLs (bathing, care of mouth/teeth, toileting, grooming, dressing, etc.)  -  Assess/evaluate cause of self-care deficits   - Assess range of motion  - Assess patient's mobility; develop plan if impaired  - Assess patient's need for assistive devices and provide as appropriate  - Encourage maximum independence but intervene and supervise when necessary  - Involve family in performance of ADLs  - Assess for home care needs following discharge   - Consider OT consult to assist with ADL evaluation and planning for discharge  - Provide patient education as appropriate  - Monitor functional capacity and physical performance, use of AM PAC & JH-HLM   - Monitor gait, balance and fatigue with ambulation    Outcome: Progressing  Goal: Maintains/Returns to pre admission functional level  Description: INTERVENTIONS:  - Perform AM-PAC 6 Click Basic Mobility/ Daily Activity assessment daily.  - Set and communicate daily mobility goal to care team and patient/family/caregiver.   - Collaborate with rehabilitation services on mobility goals if consulted  - Perform Range of Motion 3 times a day.  - Reposition patient every 2 hours.  - Dangle patient 3 times a day  - Stand patient 3 times a day  - Ambulate patient 3 times a day  - Out of bed to chair 3 times a day   - Out of bed for meals 3 times a day  - Out of bed for toileting  - Record patient progress and toleration of activity level   Outcome: Progressing     Problem: DISCHARGE PLANNING  Goal: Discharge to home or other facility with appropriate resources  Description: INTERVENTIONS:  - Identify barriers to discharge w/patient and caregiver  - Arrange for needed discharge resources and transportation as appropriate  - Identify discharge learning needs (meds, wound care, etc.)  - Arrange for interpretive services to assist at discharge as needed  - Refer to Case Management Department for coordinating discharge planning if the patient needs post-hospital services based on physician/advanced practitioner order or complex needs related to functional status,  cognitive ability, or social support system  Outcome: Progressing     Problem: Knowledge Deficit  Goal: Patient/family/caregiver demonstrates understanding of disease process, treatment plan, medications, and discharge instructions  Description: Complete learning assessment and assess knowledge base.  Interventions:  - Provide teaching at level of understanding  - Provide teaching via preferred learning methods  Outcome: Progressing

## 2025-07-05 LAB
ALBUMIN SERPL BCG-MCNC: 3.8 G/DL (ref 3.5–5)
ALP SERPL-CCNC: 63 U/L (ref 34–104)
ALT SERPL W P-5'-P-CCNC: 7 U/L (ref 7–52)
ANION GAP SERPL CALCULATED.3IONS-SCNC: 6 MMOL/L (ref 4–13)
AST SERPL W P-5'-P-CCNC: 24 U/L (ref 13–39)
BASOPHILS # BLD AUTO: 0.04 THOUSANDS/ÂΜL (ref 0–0.1)
BASOPHILS NFR BLD AUTO: 0 % (ref 0–1)
BILIRUB SERPL-MCNC: 0.42 MG/DL (ref 0.2–1)
BUN SERPL-MCNC: 8 MG/DL (ref 5–25)
CALCIUM SERPL-MCNC: 8.8 MG/DL (ref 8.4–10.2)
CHLORIDE SERPL-SCNC: 104 MMOL/L (ref 96–108)
CO2 SERPL-SCNC: 29 MMOL/L (ref 21–32)
CREAT SERPL-MCNC: 0.44 MG/DL (ref 0.6–1.3)
EOSINOPHIL # BLD AUTO: 0.15 THOUSAND/ÂΜL (ref 0–0.61)
EOSINOPHIL NFR BLD AUTO: 1 % (ref 0–6)
ERYTHROCYTE [DISTWIDTH] IN BLOOD BY AUTOMATED COUNT: 12.9 % (ref 11.6–15.1)
GFR SERPL CREATININE-BSD FRML MDRD: 137 ML/MIN/1.73SQ M
GLUCOSE SERPL-MCNC: 103 MG/DL (ref 65–140)
HCT VFR BLD AUTO: 36.6 % (ref 34.8–46.1)
HGB BLD-MCNC: 12.2 G/DL (ref 11.5–15.4)
IMM GRANULOCYTES # BLD AUTO: 0.06 THOUSAND/UL (ref 0–0.2)
IMM GRANULOCYTES NFR BLD AUTO: 0 % (ref 0–2)
LYMPHOCYTES # BLD AUTO: 2.52 THOUSANDS/ÂΜL (ref 0.6–4.47)
LYMPHOCYTES NFR BLD AUTO: 16 % (ref 14–44)
MAGNESIUM SERPL-MCNC: 2 MG/DL (ref 1.9–2.7)
MCH RBC QN AUTO: 29.8 PG (ref 26.8–34.3)
MCHC RBC AUTO-ENTMCNC: 33.3 G/DL (ref 31.4–37.4)
MCV RBC AUTO: 90 FL (ref 82–98)
MONOCYTES # BLD AUTO: 1.1 THOUSAND/ÂΜL (ref 0.17–1.22)
MONOCYTES NFR BLD AUTO: 7 % (ref 4–12)
NEUTROPHILS # BLD AUTO: 11.91 THOUSANDS/ÂΜL (ref 1.85–7.62)
NEUTS SEG NFR BLD AUTO: 76 % (ref 43–75)
NRBC BLD AUTO-RTO: 0 /100 WBCS
PHOSPHATE SERPL-MCNC: 2.7 MG/DL (ref 2.7–4.5)
PLATELET # BLD AUTO: 257 THOUSANDS/UL (ref 149–390)
PMV BLD AUTO: 8.7 FL (ref 8.9–12.7)
POTASSIUM SERPL-SCNC: 3.5 MMOL/L (ref 3.5–5.3)
PROT SERPL-MCNC: 6.6 G/DL (ref 6.4–8.4)
RBC # BLD AUTO: 4.09 MILLION/UL (ref 3.81–5.12)
SODIUM SERPL-SCNC: 139 MMOL/L (ref 135–147)
WBC # BLD AUTO: 15.78 THOUSAND/UL (ref 4.31–10.16)

## 2025-07-05 PROCEDURE — 84100 ASSAY OF PHOSPHORUS: CPT

## 2025-07-05 PROCEDURE — 80053 COMPREHEN METABOLIC PANEL: CPT

## 2025-07-05 PROCEDURE — 85025 COMPLETE CBC W/AUTO DIFF WBC: CPT

## 2025-07-05 PROCEDURE — 99291 CRITICAL CARE FIRST HOUR: CPT | Performed by: INTERNAL MEDICINE

## 2025-07-05 PROCEDURE — 83735 ASSAY OF MAGNESIUM: CPT

## 2025-07-05 PROCEDURE — NC001 PR NO CHARGE: Performed by: INTERNAL MEDICINE

## 2025-07-05 RX ORDER — DIAZEPAM 5 MG/1
5 TABLET ORAL 2 TIMES DAILY
Status: DISCONTINUED | OUTPATIENT
Start: 2025-07-05 | End: 2025-07-05

## 2025-07-05 RX ORDER — LABETALOL HYDROCHLORIDE 5 MG/ML
10 INJECTION, SOLUTION INTRAVENOUS EVERY 4 HOURS PRN
Status: DISCONTINUED | OUTPATIENT
Start: 2025-07-05 | End: 2025-07-05

## 2025-07-05 RX ORDER — DIAZEPAM 10 MG/2ML
5 INJECTION, SOLUTION INTRAMUSCULAR; INTRAVENOUS 2 TIMES DAILY
Status: DISCONTINUED | OUTPATIENT
Start: 2025-07-05 | End: 2025-07-06

## 2025-07-05 RX ORDER — POTASSIUM CHLORIDE 29.8 MG/ML
40 INJECTION INTRAVENOUS ONCE
Status: COMPLETED | OUTPATIENT
Start: 2025-07-05 | End: 2025-07-05

## 2025-07-05 RX ADMIN — SODIUM CHLORIDE, SODIUM LACTATE, POTASSIUM CHLORIDE, AND CALCIUM CHLORIDE 125 ML/HR: .6; .31; .03; .02 INJECTION, SOLUTION INTRAVENOUS at 04:03

## 2025-07-05 RX ADMIN — CHLORHEXIDINE GLUCONATE 15 ML: 1.2 SOLUTION ORAL at 08:57

## 2025-07-05 RX ADMIN — ENOXAPARIN SODIUM 40 MG: 40 INJECTION SUBCUTANEOUS at 08:57

## 2025-07-05 RX ADMIN — DIAZEPAM 5 MG: 10 INJECTION, SOLUTION INTRAMUSCULAR; INTRAVENOUS at 05:36

## 2025-07-05 RX ADMIN — DIAZEPAM 5 MG: 10 INJECTION, SOLUTION INTRAMUSCULAR; INTRAVENOUS at 12:49

## 2025-07-05 RX ADMIN — METHADONE HYDROCHLORIDE 30 MG: 10 CONCENTRATE ORAL at 08:57

## 2025-07-05 RX ADMIN — DIAZEPAM 5 MG: 10 INJECTION, SOLUTION INTRAMUSCULAR; INTRAVENOUS at 20:23

## 2025-07-05 RX ADMIN — POTASSIUM CHLORIDE 40 MEQ: 29.8 INJECTION, SOLUTION INTRAVENOUS at 07:49

## 2025-07-05 RX ADMIN — LABETALOL HYDROCHLORIDE 10 MG: 5 INJECTION, SOLUTION INTRAVENOUS at 04:24

## 2025-07-05 RX ADMIN — DEXMEDETOMIDINE HYDROCHLORIDE 1 MCG/KG/HR: 4 INJECTION, SOLUTION INTRAVENOUS at 21:25

## 2025-07-05 RX ADMIN — DEXMEDETOMIDINE HYDROCHLORIDE 1 MCG/KG/HR: 4 INJECTION, SOLUTION INTRAVENOUS at 02:23

## 2025-07-05 RX ADMIN — DEXMEDETOMIDINE HYDROCHLORIDE 1 MCG/KG/HR: 4 INJECTION, SOLUTION INTRAVENOUS at 12:49

## 2025-07-05 RX ADMIN — DIAZEPAM 5 MG: 10 INJECTION, SOLUTION INTRAMUSCULAR; INTRAVENOUS at 08:57

## 2025-07-05 RX ADMIN — LABETALOL HYDROCHLORIDE 10 MG: 5 INJECTION, SOLUTION INTRAVENOUS at 00:29

## 2025-07-05 RX ADMIN — TRIMETHOBENZAMIDE HYDROCHLORIDE 200 MG: 100 INJECTION INTRAMUSCULAR at 18:09

## 2025-07-05 RX ADMIN — POTASSIUM PHOSPHATE, MONOBASIC AND POTASSIUM PHOSPHATE, DIBASIC 30 MMOL: 224; 236 INJECTION, SOLUTION, CONCENTRATE INTRAVENOUS at 05:36

## 2025-07-05 NOTE — ASSESSMENT & PLAN NOTE
History of polysubstance abuse.  This admission, UDS positive for fentanyl.  Upon chart review, previous detox admissions show that patient left AMA   In the past, patient was receiving methadone [facility] however patient has not received methadone in quite a while which was her decision.  History of IV drug use in the past, tricuspid murmur evident on auscultation.  Per chart review, no echo on file.  Consider echocardiogram.  Presenting with nausea, vomiting and anxiety  Toxicology was contacted, anxiolytics and antiemetics in place

## 2025-07-05 NOTE — PROGRESS NOTES
Progress Note - Critical Care/ICU   Name: Elda Calderon 29 y.o. female I MRN: [id number]  Unit/Bed#: ICU 04 I Date of Admission: 7/2/2025   Date of Service: 7/5/2025 I Hospital Day: 3       Critical Care Attending Note    29 year old woman with IVDA, venous/IJ thrombosis not adherent to AC therapy, PTSD, anxiety - she presents for reported 24hrs of opioid w/d symptoms with N/V, tremors, anxiety. Reported regular use of fentanyl/xylazine - previously on methadone maintenance. ED course reviewed and given tigan, 12.5mg diazepam, mag x 2gm x 2, suboxone and started on precedex gtt. QTc improved and initiated on methadone.    24hr events - remained on precedex gtt, diazepam 5mg IV x 6, labetolol 10mg x 2, she notes tolerating some PO intake - clears this am. She is feeling improved, did not sleep much overnight.     VSAF, HR 's this am, -170's/100's, MAPS 110-130's, SpO2 99% RA, I/Os +1.8L  Exam  GEN young woman, comfortable in bed, nontoxic  HEENT temporal wasting, poor dentition, MMM  NECK no accessory muscle use  CV reg, single s1/2, no m/r  Pulm clear, no wheeze or rales, no rhonchi   ABD +BS soft NTND, no rebound  EXT poor muscle mass, some contractures, warm, no edema, Right Fem TLC in place w/o bleeding or purulence     Laboratory and Diagnostics  Results from last 7 days   Lab Units 07/05/25 0429 07/04/25 0541 07/03/25 2235 07/02/25  1836   WBC Thousand/uL 15.78* 17.95* 19.52* 15.23*   HEMOGLOBIN g/dL 12.2 12.2 12.8 11.9   HEMATOCRIT % 36.6 36.5 36.1 35.2   PLATELETS Thousands/uL 257 281 292 353   SEGS PCT % 76* 79* 80* 86*   MONO PCT % 7 8 9 5   EOS PCT % 1 0 0 0     Results from last 7 days   Lab Units 07/05/25 0429 07/04/25 0541 07/03/25 2235 07/02/25  1836   SODIUM mmol/L 139 143 142 142   POTASSIUM mmol/L 3.5 3.4* 3.2* 2.9*   CHLORIDE mmol/L 104 106 105 101   CO2 mmol/L 29 28 29 26   ANION GAP mmol/L 6 9 8 15*   BUN mg/dL 8 13 13 9   CREATININE mg/dL 0.44* 0.48* 0.48* 0.54*   CALCIUM  mg/dL 8.8 8.5 8.8 9.6   GLUCOSE RANDOM mg/dL 103 99 113 147*   ALT U/L 7  --  8 9   AST U/L 24  --  23 24   ALK PHOS U/L 63  --  74 82   ALBUMIN g/dL 3.8  --  3.7 4.3   TOTAL BILIRUBIN mg/dL 0.42  --  0.42 0.53     Results from last 7 days   Lab Units 07/05/25  0429 07/04/25  0541 07/03/25  2235   MAGNESIUM mg/dL 2.0 2.1 2.1   PHOSPHORUS mg/dL 2.7 3.5 2.2*               Results from last 7 days   Lab Units 07/03/25  2235   LACTIC ACID mmol/L 1.2                     Results from last 7 days   Lab Units 07/03/25  2235   PROCALCITONIN ng/ml 0.13       UDS (+) fentanyl  APAP/ETOH/ASA neg     BHB 1.1     MICRO  Bld Cx - 7/2 NGTD  Rapid HIV neg  Hep panel NR     RADIOGRAPHS - New images and reports personally reviewed  CXR 7/3 - no focal infiltrates, no effusions     7/4/2025 - EKG - , QTc 434ms  7/5/2025 - EKG - SR 89 bpm, QTc 445ms     Assessment  Opioid withdraw  Opioid dependence with fentanyl/xylazine use  Venous thrombosis - chronic  Severe protein calorie malnutrition  Prolonged QTc - improved  Electrolyte abnormalities  SIRS (leukocytosis, HR, RR) secondary to #1  Mild starvation ketosis - now improved     PLAN  NEURO - appreciate toxicology recs - cont methadone, daily EKG for now, add diazepam 5mg BID PO for now, prn IV diazepam, utilize clonidine and plan for precedex wean in AM.   CARDIAC - monitor HR, continue clonidine for w/d symptoms - encourage PO dosing PRN, hold further labetolol and focus in w/d regimen as above   PULM - no active issues  GI - ADAT continue Tigan for nausea   NEPHRO - replete electrolytes, serial BMP panels, LR 75cc/hr  ID - monitor off abx, trend LFTs  HEME - hold on AC given prior lack of compliance and chronic thrombosis   ENDO - no active issues  MISC - SCDS, LMWH, CHG, HOB >30DEG  CODE STATUS - Full code  Needs long term rehab plans and options - will discuss with CRS prior to d/c  I updated her mother at bedside and all questions answered    Upon my evaluation, this patient  has a high probability of imminent or life-threatening deterioration due to opioid withdraw which required my direct attention, intervention, and personal management.     I have personally provided 30 minutes of critical care time, exclusive of procedures, teaching, and any prior time recorded by providers other than myself. Time includes review of laboratory data, radiology results, discussion with consultants, and monitoring for potential decompensation.    Devendra Hollis, DO

## 2025-07-05 NOTE — PROGRESS NOTES
"Progress Note - Critical Care/ICU   Name: Elda Calderon 29 y.o. female I MRN: [id number]  Unit/Bed#: ICU 04 I Date of Admission: 7/2/2025   Date of Service: 7/5/2025 I Hospital Day: 3       Assessment & Plan  Acute opioid withdrawal (HCC)  Patient is actively withdrawing from opioids, per patient last use of fentanyl on Monday  Upon admission COWS Score = 13  Dicsussed with Toxicology -continue symptom management  Tigan IM Q6 PRN nausea/vomiting  Atarax 50 mg q6 prn for anxiety  Tylenol as needed for pain management  Gabapentin 300 mg po 3 times daily as needed anxiety  Clonidine 0.1 mg q8h prn anxiety  IV Valium 5 mg as needed every 2 hours  Placed on clonidine patch  Continue Precedex-wean off as able  7/3 started on methadone 30 mg  Opioid use disorder, severe, dependence ([facility])  History of polysubstance abuse.  This admission, UDS positive for fentanyl.  Upon chart review, previous detox admissions show that patient left AMA   In the past, patient was receiving methadone [facility] however patient has not received methadone in quite a while which was her decision.  History of IV drug use in the past, tricuspid murmur evident on auscultation.  Per chart review, no echo on file.  Consider echocardiogram.  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 from neck infection from IV drug use through IJ vein  Was seen by vascular surgery in the past, no surgical indication necessary  Was prescribed Eliquis, but patient stopped on her own secondary to \"anxiety \"  Try to educate patient on importance of anticoagulation and danger of thrombosis in IJV  Failure to " thrive in adult  Adult Malnutrition type: Acute illness  Adult Degree of Malnutrition: Other severe protein calorie malnutrition  Malnutrition Characteristics: Fat loss, Muscle loss, Weight loss  Likely due ongoing known history of polysubstance abuse severe malnutrition poor oral intake  Consider nutrition consult  Disposition: Stepdown Level 1    ICU Core Measures     A: Assess, Prevent, and Manage Pain Has pain been assessed? Yes  Need for changes to pain regimen? No   B: Both SAT/SAT  N/A   C: Choice of Sedation RASS Goal: 0 Alert and Calm  Need for changes to sedation or analgesia regimen? No   D: Delirium CAM-ICU: Negative   E: Early Mobility  Plan for early mobility? Yes   F: Family Engagement Plan for family engagement today? Yes       Review of Invasive Devices:      Central access plan: No peripheral access able to be obtained.  Plan        Prophylaxis:  VTE VTE covered by:  enoxaparin, Subcutaneous, 40 mg at 07/04/25 0802       Stress Ulcer  not ordered         24 Hour Events : Received 2 doses of 5 mg Valium, Tylenol and Tigan    Subjective patient endorses that she overall feels the same, still anxious, ongoing nausea/vomiting, unable to take p.o..  Appears less tremulous.           Objective :                   Vitals I/O      Most Recent Min/Max in 24hrs   Temp 98.2 °F (36.8 °C) Temp  Min: 98.2 °F (36.8 °C)  Max: 99.1 °F (37.3 °C)   Pulse 77 Pulse  Min: 77  Max: 115   Resp 16 Resp  Min: 16  Max: 31   BP (!) 174/115 BP  Min: 150/107  Max: 174/115   O2 Sat 97 % SpO2  Min: 95 %  Max: 97 %      Intake/Output Summary (Last 24 hours) at 7/5/2025 0653  Last data filed at 7/5/2025 0600  Gross per 24 hour   Intake 3797.2 ml   Output 1965 ml   Net 1832.2 ml       Diet Regular; Regular House    Invasive Monitoring           Physical Exam   Physical Exam  Vitals and nursing note reviewed.   Eyes:      Conjunctiva/sclera: Conjunctivae normal.      Comments: Dilated pupils Skin:     General: Skin is warm.       Coloration: Skin is pale.   HENT:      Head: Normocephalic and atraumatic.      Mouth/Throat:      Mouth: Mucous membranes are dry.   Cardiovascular:      Rate and Rhythm: Regular rhythm. Tachycardia present.      Heart sounds: No murmur heard.  Musculoskeletal:         General: No swelling.      Cervical back: Neck supple.   Abdominal:      Palpations: Abdomen is soft.      Tenderness: There is abdominal tenderness.   Constitutional:       Appearance: She is well-developed. She is ill-appearing and diaphoretic.   Pulmonary:      Effort: Pulmonary effort is normal. No respiratory distress.      Breath sounds: Normal breath sounds.   Psychiatric:         Mood and Affect: Mood normal.   Neurological:      Mental Status: She is alert.          Diagnostic Studies        Lab Results: I have reviewed the following results:     Medications:  Scheduled PRN   chlorhexidine, 15 mL, Q12H JERI  cloNIDine, 0.2 mg, Weekly  enoxaparin, 40 mg, Daily  methadone, 30 mg, Daily  potassium chloride, 40 mEq, Once  potassium phosphate, 30 mmol, Once      acetaminophen, 650 mg, Q6H PRN  cloNIDine, 0.1 mg, Q8H PRN  diazepam, 5 mg, Q2H PRN  gabapentin, 300 mg, TID PRN  hydrOXYzine HCL, 50 mg, Q6H PRN  labetalol, 10 mg, Q4H PRN  loperamide, 2 mg, 4x Daily PRN  trimethobenzamide, 200 mg, Q6H PRN       Continuous    dexmedetomidine, 0.1-1 mcg/kg/hr, Last Rate: 1 mcg/kg/hr (07/05/25 0223)  lactated ringers, 125 mL/hr, Last Rate: 125 mL/hr (07/05/25 0403)         Labs:   CBC    Recent Labs     07/04/25 0541 07/05/25 0429   WBC 17.95* 15.78*   HGB 12.2 12.2   HCT 36.5 36.6    257     BMP    Recent Labs     07/04/25 0541 07/05/25  0429   SODIUM 143 139   K 3.4* 3.5    104   CO2 28 29   AGAP 9 6   BUN 13 8   CREATININE 0.48* 0.44*   CALCIUM 8.5 8.8       Coags    No recent results     Additional Electrolytes  Recent Labs     07/04/25  0541 07/05/25  0429   MG 2.1 2.0   PHOS 3.5 2.7          Blood Gas    No recent results  Recent Labs      07/03/25 2235   PHVEN 7.480*   CYT7WKN 37.0*   PO2VEN 48.6*   [id number] 26.9   BEVEN 3.5   [id number] 85.1*    LFTs  Recent Labs     07/03/25 2235 07/05/25 0429   ALT 8 7   AST 23 24   ALKPHOS 74 63   ALB 3.7 3.8   TBILI 0.42 0.42       Infectious  Recent Labs     07/03/25 2235   PROCALCITONI 0.13     Glucose  Recent Labs     07/03/25 2235 07/04/25  0541 07/05/25 0429   GLUC 113 99 103

## 2025-07-05 NOTE — PLAN OF CARE
Problem: Potential for Falls  Goal: Patient will remain free of falls  Description: INTERVENTIONS:  - Educate patient/family on patient safety including physical limitations  - Instruct patient to call for assistance with activity   - Consider consulting OT/PT to assist with strengthening/mobility based on AM PAC & JH-HLM score  - Consult OT/PT to assist with strengthening/mobility   - Keep Call bell within reach  - Keep bed low and locked with side rails adjusted as appropriate  - Keep care items and personal belongings within reach  - Initiate and maintain comfort rounds  - Make Fall Risk Sign visible to staff  - Apply yellow socks and bracelet for high fall risk patients  - Consider moving patient to room near nurses station  Outcome: Progressing     Problem: Prexisting or High Potential for Compromised Skin Integrity  Goal: Skin integrity is maintained or improved  Description: INTERVENTIONS:  - Identify patients at risk for skin breakdown  - Assess and monitor skin integrity including under and around medical devices   - Assess and monitor nutrition and hydration status  - Monitor labs  - Assess for incontinence   - Turn and reposition patient  - Assist with mobility/ambulation  - Relieve pressure over tracy prominences   - Avoid friction and shearing  - Provide appropriate hygiene as needed including keeping skin clean and dry  - Evaluate need for skin moisturizer/barrier cream  - Collaborate with interdisciplinary team  - Patient/family teaching  - Consider wound care consult    Assess:  - Review Jerry scale daily  - Inspect skin when repositioning, toileting, and assisting with ADLS  - Assess extremities for adequate circulation and sensation     Bed Management:  - Have minimal linens on bed & keep smooth, unwrinkled  - Change linens as needed when moist or perspiring  - Toileting:  - Offer bedside commode    Activity:  - Encourage activity and walks on unit  - Encourage or provide ROM exercises   - Use  Inform that her BP cuff is accurate. No change in blood pressure medication. Continue on the amlodipine. Thanks.    appropriate equipment to lift or move patient in bed    Skin Care:  - Avoid use of baby powder, tape, friction and shearing, hot water or constrictive clothing  - Do not massage red bony areas    Outcome: Progressing     Problem: PAIN - ADULT  Goal: Verbalizes/displays adequate comfort level or baseline comfort level  Description: Interventions:  - Encourage patient to monitor pain and request assistance  - Assess pain using appropriate pain scale  - Administer analgesics as ordered based on type and severity of pain and evaluate response  - Implement non-pharmacological measures as appropriate and evaluate response  - Consider cultural and social influences on pain and pain management  - Notify physician/advanced practitioner if interventions unsuccessful or patient reports new pain  - Educate patient/family on pain management process including their role and importance of  reporting pain   - Provide non-pharmacologic/complimentary pain relief interventions  Outcome: Progressing     Problem: INFECTION - ADULT  Goal: Absence or prevention of progression during hospitalization  Description: INTERVENTIONS:  - Assess and monitor for signs and symptoms of infection  - Monitor lab/diagnostic results  - Monitor all insertion sites, i.e. indwelling lines, tubes, and drains  - Monitor endotracheal if appropriate and nasal secretions for changes in amount and color  - [place] appropriate cooling/warming therapies per order  - Administer medications as ordered  - Instruct and encourage patient and family to use good hand hygiene technique  - Identify and instruct in appropriate isolation precautions for identified infection/condition  Outcome: Progressing  Goal: Absence of fever/infection during neutropenic period  Description: INTERVENTIONS:  - Monitor WBC  - Perform strict hand hygiene  - Limit to healthy visitors only  - No plants, dried, fresh or silk flowers with aguilar in patient room  Outcome: Progressing     Problem:  SAFETY ADULT  Goal: Patient will remain free of falls  Description: INTERVENTIONS:  - Educate patient/family on patient safety including physical limitations  - Instruct patient to call for assistance with activity   - Consider consulting OT/PT to assist with strengthening/mobility based on AM PAC & JH-HLM score  - Consult OT/PT to assist with strengthening/mobility   - Keep Call bell within reach  - Keep bed low and locked with side rails adjusted as appropriate  - Keep care items and personal belongings within reach  - Initiate and maintain comfort rounds  - Make Fall Risk Sign visible to staff  - Apply yellow socks and bracelet for high fall risk patients  - Consider moving patient to room near nurses station  Outcome: Progressing  Goal: Maintain or return to baseline ADL function  Description: INTERVENTIONS:  -  Assess patient's ability to carry out ADLs; assess patient's baseline for ADL function and identify physical deficits which impact ability to perform ADLs (bathing, care of mouth/teeth, toileting, grooming, dressing, etc.)  - Assess/evaluate cause of self-care deficits   - Assess range of motion  - Assess patient's mobility; develop plan if impaired  - Assess patient's need for assistive devices and provide as appropriate  - Encourage maximum independence but intervene and supervise when necessary  - Involve family in performance of ADLs  - Assess for home care needs following discharge   - Consider OT consult to assist with ADL evaluation and planning for discharge  - Provide patient education as appropriate  - Monitor functional capacity and physical performance, use of AM PAC & JH-HLM   - Monitor gait, balance and fatigue with ambulation    Outcome: Progressing  Goal: Maintains/Returns to pre admission functional level  Description: INTERVENTIONS:  - Perform AM-PAC 6 Click Basic Mobility/ Daily Activity assessment daily.  - Set and communicate daily mobility goal to care team and  patient/family/caregiver.   - Collaborate with rehabilitation services on mobility goals if consulted  - Out of bed for toileting  - Record patient progress and toleration of activity level   Outcome: Progressing     Problem: DISCHARGE PLANNING  Goal: Discharge to home or other facility with appropriate resources  Description: INTERVENTIONS:  - Identify barriers to discharge w/patient and caregiver  - Arrange for needed discharge resources and transportation as appropriate  - Identify discharge learning needs (meds, wound care, etc.)  - Arrange for interpretive services to assist at discharge as needed  - Refer to Case Management Department for coordinating discharge planning if the patient needs post-hospital services based on physician/advanced practitioner order or complex needs related to functional status, cognitive ability, or social support system  Outcome: Progressing     Problem: Knowledge Deficit  Goal: Patient/family/caregiver demonstrates understanding of disease process, treatment plan, medications, and discharge instructions  Description: Complete learning assessment and assess knowledge base.  Interventions:  - Provide teaching at level of understanding  - Provide teaching via preferred learning methods  Outcome: Progressing     Problem: Nutrition/Hydration-ADULT  Goal: Nutrient/Hydration intake appropriate for improving, restoring or maintaining nutritional needs  Description: Monitor and assess patient's nutrition/hydration status for malnutrition. Collaborate with interdisciplinary team and initiate plan and interventions as ordered.  Monitor patient's weight and dietary intake as ordered or per policy. Utilize nutrition screening tool and intervene as necessary. Determine patient's food preferences and provide high-protein, high-caloric foods as appropriate.     INTERVENTIONS:  - Monitor oral intake, urinary output, labs, and treatment plans  - Assess nutrition and hydration status and recommend  course of action  - Evaluate amount of meals eaten  - Assist patient with eating if necessary   - Allow adequate time for meals  - Recommend/ encourage appropriate diets, oral nutritional supplements, and vitamin/mineral supplements  - Order, calculate, and assess calorie counts as needed  - Recommend, monitor, and adjust tube feedings and TPN/PPN based on assessed needs  - Assess need for intravenous fluids  - Provide specific nutrition/hydration education as appropriate  - Include patient/family/caregiver in decisions related to nutrition  Outcome: Progressing

## 2025-07-06 LAB
ANION GAP SERPL CALCULATED.3IONS-SCNC: 5 MMOL/L (ref 4–13)
ANION GAP SERPL CALCULATED.3IONS-SCNC: 9 MMOL/L (ref 4–13)
BASOPHILS # BLD AUTO: 0.03 THOUSANDS/ÂΜL (ref 0–0.1)
BASOPHILS # BLD AUTO: 0.06 THOUSANDS/ÂΜL (ref 0–0.1)
BASOPHILS NFR BLD AUTO: 0 % (ref 0–1)
BASOPHILS NFR BLD AUTO: 1 % (ref 0–1)
BUN SERPL-MCNC: 5 MG/DL (ref 5–25)
BUN SERPL-MCNC: 5 MG/DL (ref 5–25)
CA-I BLD-SCNC: 1.17 MMOL/L (ref 1.12–1.32)
CALCIUM SERPL-MCNC: 8.8 MG/DL (ref 8.4–10.2)
CALCIUM SERPL-MCNC: 9 MG/DL (ref 8.4–10.2)
CHLORIDE SERPL-SCNC: 105 MMOL/L (ref 96–108)
CHLORIDE SERPL-SCNC: 106 MMOL/L (ref 96–108)
CO2 SERPL-SCNC: 25 MMOL/L (ref 21–32)
CO2 SERPL-SCNC: 30 MMOL/L (ref 21–32)
CREAT SERPL-MCNC: 0.46 MG/DL (ref 0.6–1.3)
CREAT SERPL-MCNC: 0.46 MG/DL (ref 0.6–1.3)
EOSINOPHIL # BLD AUTO: 0.08 THOUSAND/ÂΜL (ref 0–0.61)
EOSINOPHIL # BLD AUTO: 0.31 THOUSAND/ÂΜL (ref 0–0.61)
EOSINOPHIL NFR BLD AUTO: 1 % (ref 0–6)
EOSINOPHIL NFR BLD AUTO: 3 % (ref 0–6)
ERYTHROCYTE [DISTWIDTH] IN BLOOD BY AUTOMATED COUNT: 12.6 % (ref 11.6–15.1)
ERYTHROCYTE [DISTWIDTH] IN BLOOD BY AUTOMATED COUNT: 12.8 % (ref 11.6–15.1)
GFR SERPL CREATININE-BSD FRML MDRD: 135 ML/MIN/1.73SQ M
GFR SERPL CREATININE-BSD FRML MDRD: 135 ML/MIN/1.73SQ M
GLUCOSE SERPL-MCNC: 102 MG/DL (ref 65–140)
GLUCOSE SERPL-MCNC: 99 MG/DL (ref 65–140)
HCT VFR BLD AUTO: 34.9 % (ref 34.8–46.1)
HCT VFR BLD AUTO: 35.5 % (ref 34.8–46.1)
HGB BLD-MCNC: 11.5 G/DL (ref 11.5–15.4)
HGB BLD-MCNC: 11.7 G/DL (ref 11.5–15.4)
IMM GRANULOCYTES # BLD AUTO: 0.03 THOUSAND/UL (ref 0–0.2)
IMM GRANULOCYTES # BLD AUTO: 0.08 THOUSAND/UL (ref 0–0.2)
IMM GRANULOCYTES NFR BLD AUTO: 0 % (ref 0–2)
IMM GRANULOCYTES NFR BLD AUTO: 1 % (ref 0–2)
LACTATE SERPL-SCNC: 2 MMOL/L (ref 0.5–2)
LYMPHOCYTES # BLD AUTO: 1.4 THOUSANDS/ÂΜL (ref 0.6–4.47)
LYMPHOCYTES # BLD AUTO: 2.08 THOUSANDS/ÂΜL (ref 0.6–4.47)
LYMPHOCYTES NFR BLD AUTO: 18 % (ref 14–44)
LYMPHOCYTES NFR BLD AUTO: 20 % (ref 14–44)
MAGNESIUM SERPL-MCNC: 1.9 MG/DL (ref 1.9–2.7)
MAGNESIUM SERPL-MCNC: 2 MG/DL (ref 1.9–2.7)
MCH RBC QN AUTO: 29.3 PG (ref 26.8–34.3)
MCH RBC QN AUTO: 29.6 PG (ref 26.8–34.3)
MCHC RBC AUTO-ENTMCNC: 33 G/DL (ref 31.4–37.4)
MCHC RBC AUTO-ENTMCNC: 33 G/DL (ref 31.4–37.4)
MCV RBC AUTO: 89 FL (ref 82–98)
MCV RBC AUTO: 90 FL (ref 82–98)
MONOCYTES # BLD AUTO: 0.32 THOUSAND/ÂΜL (ref 0.17–1.22)
MONOCYTES # BLD AUTO: 0.94 THOUSAND/ÂΜL (ref 0.17–1.22)
MONOCYTES NFR BLD AUTO: 4 % (ref 4–12)
MONOCYTES NFR BLD AUTO: 9 % (ref 4–12)
NEUTROPHILS # BLD AUTO: 6.03 THOUSANDS/ÂΜL (ref 1.85–7.62)
NEUTROPHILS # BLD AUTO: 6.95 THOUSANDS/ÂΜL (ref 1.85–7.62)
NEUTS SEG NFR BLD AUTO: 67 % (ref 43–75)
NEUTS SEG NFR BLD AUTO: 76 % (ref 43–75)
NRBC BLD AUTO-RTO: 0 /100 WBCS
NRBC BLD AUTO-RTO: 0 /100 WBCS
PHOSPHATE SERPL-MCNC: 2 MG/DL (ref 2.7–4.5)
PLATELET # BLD AUTO: 192 THOUSANDS/UL (ref 149–390)
PLATELET # BLD AUTO: 211 THOUSANDS/UL (ref 149–390)
PMV BLD AUTO: 8.6 FL (ref 8.9–12.7)
PMV BLD AUTO: 8.9 FL (ref 8.9–12.7)
POTASSIUM SERPL-SCNC: 3.3 MMOL/L (ref 3.5–5.3)
POTASSIUM SERPL-SCNC: 3.5 MMOL/L (ref 3.5–5.3)
RBC # BLD AUTO: 3.89 MILLION/UL (ref 3.81–5.12)
RBC # BLD AUTO: 4 MILLION/UL (ref 3.81–5.12)
SODIUM SERPL-SCNC: 140 MMOL/L (ref 135–147)
SODIUM SERPL-SCNC: 140 MMOL/L (ref 135–147)
WBC # BLD AUTO: 10.37 THOUSAND/UL (ref 4.31–10.16)
WBC # BLD AUTO: 7.94 THOUSAND/UL (ref 4.31–10.16)

## 2025-07-06 PROCEDURE — 99291 CRITICAL CARE FIRST HOUR: CPT | Performed by: INTERNAL MEDICINE

## 2025-07-06 PROCEDURE — 82330 ASSAY OF CALCIUM: CPT | Performed by: PHYSICIAN ASSISTANT

## 2025-07-06 PROCEDURE — 85025 COMPLETE CBC W/AUTO DIFF WBC: CPT | Performed by: PHYSICIAN ASSISTANT

## 2025-07-06 PROCEDURE — 85025 COMPLETE CBC W/AUTO DIFF WBC: CPT

## 2025-07-06 PROCEDURE — 83605 ASSAY OF LACTIC ACID: CPT | Performed by: PHYSICIAN ASSISTANT

## 2025-07-06 PROCEDURE — 80048 BASIC METABOLIC PNL TOTAL CA: CPT | Performed by: PHYSICIAN ASSISTANT

## 2025-07-06 PROCEDURE — NC001 PR NO CHARGE: Performed by: INTERNAL MEDICINE

## 2025-07-06 PROCEDURE — 83735 ASSAY OF MAGNESIUM: CPT | Performed by: PHYSICIAN ASSISTANT

## 2025-07-06 PROCEDURE — 83735 ASSAY OF MAGNESIUM: CPT

## 2025-07-06 PROCEDURE — 84100 ASSAY OF PHOSPHORUS: CPT | Performed by: PHYSICIAN ASSISTANT

## 2025-07-06 PROCEDURE — 80048 BASIC METABOLIC PNL TOTAL CA: CPT

## 2025-07-06 RX ORDER — POTASSIUM CHLORIDE 29.8 MG/ML
40 INJECTION INTRAVENOUS ONCE
Status: COMPLETED | OUTPATIENT
Start: 2025-07-06 | End: 2025-07-06

## 2025-07-06 RX ORDER — METOPROLOL TARTRATE 1 MG/ML
5 INJECTION, SOLUTION INTRAVENOUS ONCE
Status: COMPLETED | OUTPATIENT
Start: 2025-07-06 | End: 2025-07-06

## 2025-07-06 RX ORDER — MIDAZOLAM HYDROCHLORIDE 2 MG/2ML
1 INJECTION, SOLUTION INTRAMUSCULAR; INTRAVENOUS ONCE
Status: COMPLETED | OUTPATIENT
Start: 2025-07-06 | End: 2025-07-06

## 2025-07-06 RX ORDER — DEXMEDETOMIDINE HYDROCHLORIDE 4 UG/ML
.1-.7 INJECTION, SOLUTION INTRAVENOUS
Status: DISCONTINUED | OUTPATIENT
Start: 2025-07-06 | End: 2025-07-08

## 2025-07-06 RX ORDER — POTASSIUM CHLORIDE 14.9 MG/ML
20 INJECTION INTRAVENOUS ONCE
Status: COMPLETED | OUTPATIENT
Start: 2025-07-06 | End: 2025-07-06

## 2025-07-06 RX ORDER — CLONIDINE HYDROCHLORIDE 0.1 MG/1
0.1 TABLET ORAL EVERY 8 HOURS PRN
Status: DISCONTINUED | OUTPATIENT
Start: 2025-07-06 | End: 2025-07-06

## 2025-07-06 RX ORDER — GABAPENTIN 300 MG/1
300 CAPSULE ORAL 3 TIMES DAILY
Status: DISCONTINUED | OUTPATIENT
Start: 2025-07-06 | End: 2025-07-08 | Stop reason: HOSPADM

## 2025-07-06 RX ORDER — DIAZEPAM 5 MG/1
5 TABLET ORAL EVERY 12 HOURS
Status: DISCONTINUED | OUTPATIENT
Start: 2025-07-06 | End: 2025-07-08

## 2025-07-06 RX ORDER — CLONIDINE HYDROCHLORIDE 0.1 MG/1
0.2 TABLET ORAL EVERY 8 HOURS SCHEDULED
Status: DISCONTINUED | OUTPATIENT
Start: 2025-07-06 | End: 2025-07-08

## 2025-07-06 RX ORDER — MIDAZOLAM HYDROCHLORIDE 2 MG/2ML
INJECTION, SOLUTION INTRAMUSCULAR; INTRAVENOUS
Status: COMPLETED
Start: 2025-07-06 | End: 2025-07-06

## 2025-07-06 RX ORDER — ONDANSETRON 2 MG/ML
4 INJECTION INTRAMUSCULAR; INTRAVENOUS ONCE
Status: COMPLETED | OUTPATIENT
Start: 2025-07-06 | End: 2025-07-06

## 2025-07-06 RX ORDER — SODIUM CHLORIDE, SODIUM GLUCONATE, SODIUM ACETATE, POTASSIUM CHLORIDE, MAGNESIUM CHLORIDE, SODIUM PHOSPHATE, DIBASIC, AND POTASSIUM PHOSPHATE .53; .5; .37; .037; .03; .012; .00082 G/100ML; G/100ML; G/100ML; G/100ML; G/100ML; G/100ML; G/100ML
1000 INJECTION, SOLUTION INTRAVENOUS ONCE
Status: DISCONTINUED | OUTPATIENT
Start: 2025-07-06 | End: 2025-07-08

## 2025-07-06 RX ADMIN — DIAZEPAM 5 MG: 10 INJECTION, SOLUTION INTRAMUSCULAR; INTRAVENOUS at 13:08

## 2025-07-06 RX ADMIN — TRIMETHOBENZAMIDE HYDROCHLORIDE 200 MG: 100 INJECTION INTRAMUSCULAR at 17:10

## 2025-07-06 RX ADMIN — POTASSIUM CHLORIDE 20 MEQ: 14.9 INJECTION, SOLUTION INTRAVENOUS at 13:08

## 2025-07-06 RX ADMIN — HYDROXYZINE HYDROCHLORIDE 50 MG: 25 TABLET, FILM COATED ORAL at 14:24

## 2025-07-06 RX ADMIN — CLONIDINE HYDROCHLORIDE 0.1 MG: 0.1 TABLET ORAL at 08:11

## 2025-07-06 RX ADMIN — METOROPROLOL TARTRATE 5 MG: 5 INJECTION, SOLUTION INTRAVENOUS at 20:38

## 2025-07-06 RX ADMIN — CLONIDINE HYDROCHLORIDE 0.2 MG: 0.1 TABLET ORAL at 23:01

## 2025-07-06 RX ADMIN — MIDAZOLAM HYDROCHLORIDE 1 MG: 1 INJECTION, SOLUTION INTRAMUSCULAR; INTRAVENOUS at 20:21

## 2025-07-06 RX ADMIN — DIAZEPAM 5 MG: 10 INJECTION, SOLUTION INTRAMUSCULAR; INTRAVENOUS at 18:40

## 2025-07-06 RX ADMIN — CHLORHEXIDINE GLUCONATE 15 ML: 1.2 SOLUTION ORAL at 20:28

## 2025-07-06 RX ADMIN — DEXMEDETOMIDINE HYDROCHLORIDE 1 MCG/KG/HR: 4 INJECTION, SOLUTION INTRAVENOUS at 06:34

## 2025-07-06 RX ADMIN — POTASSIUM CHLORIDE 40 MEQ: 29.8 INJECTION, SOLUTION INTRAVENOUS at 08:55

## 2025-07-06 RX ADMIN — DIAZEPAM 5 MG: 10 INJECTION, SOLUTION INTRAMUSCULAR; INTRAVENOUS at 01:15

## 2025-07-06 RX ADMIN — METHADONE HYDROCHLORIDE 30 MG: 10 CONCENTRATE ORAL at 08:12

## 2025-07-06 RX ADMIN — MIDAZOLAM HYDROCHLORIDE 1 MG: 2 INJECTION, SOLUTION INTRAMUSCULAR; INTRAVENOUS at 20:21

## 2025-07-06 RX ADMIN — CHLORHEXIDINE GLUCONATE 15 ML: 1.2 SOLUTION ORAL at 08:11

## 2025-07-06 RX ADMIN — DIAZEPAM 5 MG: 10 INJECTION, SOLUTION INTRAMUSCULAR; INTRAVENOUS at 22:15

## 2025-07-06 RX ADMIN — ONDANSETRON 4 MG: 2 INJECTION INTRAMUSCULAR; INTRAVENOUS at 20:39

## 2025-07-06 RX ADMIN — DIAZEPAM 5 MG: 10 INJECTION, SOLUTION INTRAMUSCULAR; INTRAVENOUS at 08:12

## 2025-07-06 RX ADMIN — GABAPENTIN 300 MG: 300 CAPSULE ORAL at 23:02

## 2025-07-06 RX ADMIN — DIAZEPAM 5 MG: 5 TABLET ORAL at 19:21

## 2025-07-06 NOTE — PLAN OF CARE
Problem: Potential for Falls  Goal: Patient will remain free of falls  Description: INTERVENTIONS:  - Educate patient/family on patient safety including physical limitations  - Instruct patient to call for assistance with activity   - Consider consulting OT/PT to assist with strengthening/mobility based on AM PAC & JH-HLM score  - Consult OT/PT to assist with strengthening/mobility   - Keep Call bell within reach  - Keep bed low and locked with side rails adjusted as appropriate  - Keep care items and personal belongings within reach  - Initiate and maintain comfort rounds  - Make Fall Risk Sign visible to staff  - Offer Toileting every 2 Hours, in advance of need  - Initiate/Maintain bed alarm  - Apply yellow socks and bracelet for high fall risk patients  - Consider moving patient to room near nurses station  Outcome: Progressing     Problem: Prexisting or High Potential for Compromised Skin Integrity  Goal: Skin integrity is maintained or improved  Description: INTERVENTIONS:  - Identify patients at risk for skin breakdown  - Assess and monitor skin integrity including under and around medical devices   - Assess and monitor nutrition and hydration status  - Monitor labs  - Assess for incontinence   - Turn and reposition patient  - Assist with mobility/ambulation  - Relieve pressure over tracy prominences   - Avoid friction and shearing  - Provide appropriate hygiene as needed including keeping skin clean and dry  - Evaluate need for skin moisturizer/barrier cream  - Collaborate with interdisciplinary team  - Patient/family teaching  - Consider wound care consult  Outcome: Progressing    Problem: PAIN - ADULT  Goal: Verbalizes/displays adequate comfort level or baseline comfort level  Description: Interventions:  - Encourage patient to monitor pain and request assistance  - Assess pain using appropriate pain scale  - Administer analgesics as ordered based on type and severity of pain and evaluate response  -  Implement non-pharmacological measures as appropriate and evaluate response  - Consider cultural and social influences on pain and pain management  - Notify physician/advanced practitioner if interventions unsuccessful or patient reports new pain  - Educate patient/family on pain management process including their role and importance of  reporting pain   - Provide non-pharmacologic/complimentary pain relief interventions  Outcome: Progressing     Problem: INFECTION - ADULT  Goal: Absence or prevention of progression during hospitalization  Description: INTERVENTIONS:  - Assess and monitor for signs and symptoms of infection  - Monitor lab/diagnostic results  - Monitor all insertion sites, i.e. indwelling lines, tubes, and drains  - Monitor endotracheal if appropriate and nasal secretions for changes in amount and color  - [place] appropriate cooling/warming therapies per order  - Administer medications as ordered  - Instruct and encourage patient and family to use good hand hygiene technique  - Identify and instruct in appropriate isolation precautions for identified infection/condition  Outcome: Progressing     Problem: Knowledge Deficit  Goal: Patient/family/caregiver demonstrates understanding of disease process, treatment plan, medications, and discharge instructions  Description: Complete learning assessment and assess knowledge base.  Interventions:  - Provide teaching at level of understanding  - Provide teaching via preferred learning methods  Outcome: Progressing     Problem: DISCHARGE PLANNING  Goal: Discharge to home or other facility with appropriate resources  Description: INTERVENTIONS:  - Identify barriers to discharge w/patient and caregiver  - Arrange for needed discharge resources and transportation as appropriate  - Identify discharge learning needs (meds, wound care, etc.)  - Arrange for interpretive services to assist at discharge as needed  - Refer to Case Management Department for  coordinating discharge planning if the patient needs post-hospital services based on physician/advanced practitioner order or complex needs related to functional status, cognitive ability, or social support system  Outcome: Progressing

## 2025-07-06 NOTE — PROGRESS NOTES
Progress Note - Critical Care/ICU   Name: Elda Calderon 29 y.o. female I MRN: [id number]  Unit/Bed#: ICU 04 I Date of Admission: 7/2/2025   Date of Service: 7/6/2025 I Hospital Day: 4       Critical Care Attending Note    29 year old woman with IVDA, venous/IJ thrombosis not adherent to AC therapy, PTSD, anxiety - she presents for reported 24hrs of opioid w/d symptoms with N/V, tremors, anxiety. Reported regular use of fentanyl/xylazine - previously on methadone maintenance. ED course reviewed and given tigan, 12.5mg diazepam, mag x 2gm x 2, suboxone and started on precedex gtt. QTc improved and initiated on methadone.     24hr events - 3 prn doses diazepam given, on 5mg BID standing, no further labetolol given. Starting to wean precedex. Notes feeling improved, tolerated clears for breakfast, interested in lunch and dinner    VS AF, HR 's, -160's/100's, SpO2 97%RA, I/Os -500cc  Exam  GEN young woman, thin frail, in chair, conversant not tremulous   HEENT MMM, no thrush, poor dentition   NECK no accessory muscle use  CV reg, slightly tachy, single s1/2, no m/r  Pulm clear, no wheeze, no rales  ABD +BS soft NTND  EXT poor muscle mass, no edema, right fem TLC in place    Laboratory and Diagnostics  Results from last 7 days   Lab Units 07/06/25 0528 07/05/25 0429 07/04/25 0541 07/03/25 2235 07/02/25  1836   WBC Thousand/uL 10.37* 15.78* 17.95* 19.52* 15.23*   HEMOGLOBIN g/dL 11.5 12.2 12.2 12.8 11.9   HEMATOCRIT % 34.9 36.6 36.5 36.1 35.2   PLATELETS Thousands/uL 211 257 281 292 353   SEGS PCT % 67 76* 79* 80* 86*   MONO PCT % 9 7 8 9 5   EOS PCT % 3 1 0 0 0     Results from last 7 days   Lab Units 07/06/25 0528 07/05/25 0429 07/04/25  0541 07/03/25 2235 07/02/25  1836   SODIUM mmol/L 140 139 143 142 142   POTASSIUM mmol/L 3.3* 3.5 3.4* 3.2* 2.9*   CHLORIDE mmol/L 105 104 106 105 101   CO2 mmol/L 30 29 28 29 26   ANION GAP mmol/L 5 6 9 8 15*   BUN mg/dL 5 8 13 13 9   CREATININE mg/dL 0.46* 0.44*  0.48* 0.48* 0.54*   CALCIUM mg/dL 8.8 8.8 8.5 8.8 9.6   GLUCOSE RANDOM mg/dL 102 103 99 113 147*   ALT U/L  --  7  --  8 9   AST U/L  --  24  --  23 24   ALK PHOS U/L  --  63  --  74 82   ALBUMIN g/dL  --  3.8  --  3.7 4.3   TOTAL BILIRUBIN mg/dL  --  0.42  --  0.42 0.53     Results from last 7 days   Lab Units 07/06/25  0528 07/05/25  0429 07/04/25  0541 07/03/25  2235   MAGNESIUM mg/dL 2.0 2.0 2.1 2.1   PHOSPHORUS mg/dL  --  2.7 3.5 2.2*               Results from last 7 days   Lab Units 07/03/25  2235   LACTIC ACID mmol/L 1.2                     Results from last 7 days   Lab Units 07/03/25  2235   PROCALCITONIN ng/ml 0.13       UDS (+) fentanyl  APAP/ETOH/ASA neg     BHB 1.1     MICRO  Bld Cx - 7/2 NGTD  Rapid HIV neg  Hep panel NR     RADIOGRAPHS - New images and reports personally reviewed  CXR 7/3 - no focal infiltrates, no effusions     7/4/2025 - EKG - , QTc 434ms  7/5/2025 - EKG - SR 89 bpm, QTc 445ms  7/6/2025 - EKG - SR 99bpm, QTc 444ms      Assessment  Opioid withdraw  Opioid dependence with fentanyl/xylazine use  Venous thrombosis - chronic  Severe protein calorie malnutrition  Prolonged QTc - improved  Electrolyte abnormalities  SIRS (leukocytosis, HR, RR) secondary to #1  Mild starvation ketosis - now improved     PLAN  NEURO - appreciate toxicology recs - cont methadone, daily EKG for now, cont diazepam 5mg BID change to PO for now, prn IV diazepam, utilize clonidine continue precedex wean hopefully off today  CARDIAC - monitor HR, continue clonidine for w/d symptoms - encourage PO dosing PRN, hold further labetolol and focus in w/d regimen as above   PULM - no active issues  GI - ADAT continue Tigan for nausea   NEPHRO - replete electrolytes, serial BMP panels, stop LR now and monitor   ID - monitor off abx  HEME - hold on AC given prior lack of compliance and chronic thrombosis   ENDO - no active issues  MISC - SCDS, LMWH, CHG, HOB >30DEG  CODE STATUS - Full code  Needs long term rehab plans  and options - will discuss with CRS prior to d/c  I updated her mother at bedside and all questions answered  Cont fem TLC given lack of other IV access, may ask vascular team tomorrow to reattempt midline catheter    Upon my evaluation, this patient has a high probability of imminent or life-threatening deterioration due to opioid w/d which required my direct attention, intervention, and personal management.     I have personally provided 30 minutes of critical care time, exclusive of procedures, teaching, and any prior time recorded by providers other than myself. Time includes review of laboratory data, radiology results, discussion with consultants, and monitoring for potential decompensation.    Devendra Hollis, DO

## 2025-07-06 NOTE — PROGRESS NOTES
"Progress Note - Critical Care/ICU   Name: Elda Calderon 29 y.o. female I MRN: [id number]  Unit/Bed#: ICU 04 I Date of Admission: 7/2/2025   Date of Service: 7/6/2025 I Hospital Day: 4       Assessment & Plan  Acute opioid withdrawal (HCC)  Patient is actively withdrawing from opioids, per patient last use of fentanyl on Monday  Upon admission COWS Score = 13  Dicsussed with Toxicology -continue symptom management  Tigan IM Q6 PRN nausea/vomiting  Atarax 50 mg q6 prn for anxiety  Tylenol as needed for pain management  Gabapentin 300 mg po 3 times daily as needed anxiety  Clonidine 0.1 mg q8h prn anxiety  IV Valium 5 mg as needed every 2 hours  Placed on clonidine patch  Continue Precedex-wean off as able  7/3 started on methadone 30 mg  Opioid use disorder, severe, dependence ([facility])  History of polysubstance abuse.  This admission, UDS positive for fentanyl.  Upon chart review, previous detox admissions show that patient left AMA   In the past, patient was receiving methadone [facility] however patient has not received methadone in quite a while which was her decision.  History of IV drug use in the past, tricuspid murmur evident on auscultation.  Per chart review, no echo on file.  Consider echocardiogram.  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 from neck infection from IV drug use through IJ vein  Was seen by vascular surgery in the past, no surgical indication necessary  Was prescribed Eliquis, but patient stopped on her own secondary to \"anxiety \"  Try to educate patient on importance of anticoagulation and danger of thrombosis in IJV  Failure to " thrive in adult  Adult Malnutrition type: Acute illness  Adult Degree of Malnutrition: Other severe protein calorie malnutrition  Malnutrition Characteristics: Fat loss, Muscle loss, Weight loss  Likely due ongoing known history of polysubstance abuse severe malnutrition poor oral intake  Consider nutrition consult  Disposition: Med Surg    ICU Core Measures     A: Assess, Prevent, and Manage Pain Has pain been assessed? Yes  Need for changes to pain regimen? No   B: Both SAT/SAT  N/A   C: Choice of Sedation RASS Goal: 0 Alert and Calm  Need for changes to sedation or analgesia regimen? No   D: Delirium CAM-ICU: Negative   E: Early Mobility  Plan for early mobility? Yes   F: Family Engagement Plan for family engagement today? Yes       Review of Invasive Devices:      Central access plan: No peripheral access able to be obtained.  Plan   Patient requires PICC secondary to difficult stick requiring blood draws      Prophylaxis:  VTE VTE covered by:  enoxaparin, Subcutaneous, 40 mg at 07/05/25 0857       Stress Ulcer  not ordered         24 Hour Events : No overnight events patient    Subjective patient received a dose of Valium around 1 AM, otherwise appears to be more comfortable tolerating p.o. intake.  No other questions or concerns      Objective :                   Vitals I/O      Most Recent Min/Max in 24hrs   Temp 98.3 °F (36.8 °C) Temp  Min: 98 °F (36.7 °C)  Max: 98.4 °F (36.9 °C)   Pulse 91 Pulse  Min: 89  Max: 116   Resp 18 Resp  Min: 12  Max: 27   BP (!) 144/104 BP  Min: 137/97  Max: 167/109   O2 Sat 97 % SpO2  Min: 97 %  Max: 99 %      Intake/Output Summary (Last 24 hours) at 7/6/2025 0607  Last data filed at 7/6/2025 0225  Gross per 24 hour   Intake 1573 ml   Output 2075 ml   Net -502 ml       Diet Regular; Regular House    Invasive Monitoring           Physical Exam   Physical Exam  Vitals and nursing note reviewed.   Eyes:      Conjunctiva/sclera: Conjunctivae normal.      Comments: Dilated pupils  Skin:     General: Skin is warm.      Coloration: Skin is pale.   HENT:      Head: Normocephalic and atraumatic.      Mouth/Throat:      Mouth: Mucous membranes are dry.   Cardiovascular:      Rate and Rhythm: Regular rhythm. Tachycardia present.      Heart sounds: No murmur heard.  Musculoskeletal:         General: No swelling.      Cervical back: Neck supple.   Abdominal:      Palpations: Abdomen is soft.      Tenderness: There is no abdominal tenderness.   Constitutional:       Appearance: She is well-developed. She is diaphoretic. She is not ill-appearing.   Pulmonary:      Effort: Pulmonary effort is normal. No respiratory distress.      Breath sounds: Normal breath sounds.   Psychiatric:         Mood and Affect: Mood normal.   Neurological:      Mental Status: She is alert.          Diagnostic Studies        Lab Results: I have reviewed the following results:     Medications:  Scheduled PRN   chlorhexidine, 15 mL, Q12H JERI  cloNIDine, 0.2 mg, Weekly  diazepam, 5 mg, BID  enoxaparin, 40 mg, Daily  methadone, 30 mg, Daily      acetaminophen, 650 mg, Q6H PRN  cloNIDine, 0.1 mg, Q8H PRN  diazepam, 5 mg, Q2H PRN  gabapentin, 300 mg, TID PRN  hydrOXYzine HCL, 50 mg, Q6H PRN  loperamide, 2 mg, 4x Daily PRN  trimethobenzamide, 200 mg, Q6H PRN       Continuous    dexmedetomidine, 0.1-1 mcg/kg/hr, Last Rate: 1 mcg/kg/hr (07/05/25 2125)  lactated ringers, 75 mL/hr, Last Rate: 75 mL/hr (07/05/25 1050)         Labs:   CBC    Recent Labs     07/05/25 0429 07/06/25 0528   WBC 15.78* 10.37*   HGB 12.2 11.5   HCT 36.6 34.9    211     BMP    Recent Labs     07/05/25 0429   SODIUM 139   K 3.5      CO2 29   AGAP 6   BUN 8   CREATININE 0.44*   CALCIUM 8.8       Coags    No recent results     Additional Electrolytes  Recent Labs     07/05/25 0429 07/06/25 0528   MG 2.0 2.0   PHOS 2.7  --           Blood Gas    No recent results  No recent results LFTs  Recent Labs     07/05/25 0429   ALT 7   AST 24   ALKPHOS 63    ALB 3.8   TBILI 0.42       Infectious  No recent results  Glucose  Recent Labs     07/05/25  0429   GLUC 103

## 2025-07-06 NOTE — ASSESSMENT & PLAN NOTE
History of polysubstance abuse.  This admission, UDS positive for fentanyl.  Upon chart review, previous detox admissions show that patient left AMA   In the past, patient was receiving methadone [facility] however patient has not received methadone in quite a while which was her decision.  History of IV drug use in the past, tricuspid murmur evident on auscultation.  Per chart review, no echo on file.  Consider echocardiogram.  Presenting with nausea, vomiting and anxiety  Toxicology was contacted, anxiolytics and antiemetics in place

## 2025-07-07 LAB
ANION GAP SERPL CALCULATED.3IONS-SCNC: 6 MMOL/L (ref 4–13)
ANISOCYTOSIS BLD QL SMEAR: PRESENT
BASOPHILS # BLD MANUAL: 0 THOUSAND/UL (ref 0–0.1)
BASOPHILS NFR MAR MANUAL: 0 % (ref 0–1)
BUN SERPL-MCNC: 5 MG/DL (ref 5–25)
CALCIUM SERPL-MCNC: 9.1 MG/DL (ref 8.4–10.2)
CHLORIDE SERPL-SCNC: 105 MMOL/L (ref 96–108)
CO2 SERPL-SCNC: 28 MMOL/L (ref 21–32)
CREAT SERPL-MCNC: 0.57 MG/DL (ref 0.6–1.3)
EOSINOPHIL # BLD MANUAL: 0 THOUSAND/UL (ref 0–0.4)
EOSINOPHIL NFR BLD MANUAL: 0 % (ref 0–6)
ERYTHROCYTE [DISTWIDTH] IN BLOOD BY AUTOMATED COUNT: 12.9 % (ref 11.6–15.1)
ERYTHROCYTE [DISTWIDTH] IN BLOOD BY AUTOMATED COUNT: 13.2 % (ref 11.6–15.1)
GFR SERPL CREATININE-BSD FRML MDRD: 126 ML/MIN/1.73SQ M
GLUCOSE SERPL-MCNC: 104 MG/DL (ref 65–140)
HCT VFR BLD AUTO: 36 % (ref 34.8–46.1)
HCT VFR BLD AUTO: 36.1 % (ref 34.8–46.1)
HGB BLD-MCNC: 11.6 G/DL (ref 11.5–15.4)
HGB BLD-MCNC: 11.7 G/DL (ref 11.5–15.4)
LYMPHOCYTES # BLD AUTO: 1.5 THOUSAND/UL (ref 0.6–4.47)
LYMPHOCYTES # BLD AUTO: 6 % (ref 14–44)
MAGNESIUM SERPL-MCNC: 3 MG/DL (ref 1.9–2.7)
MCH RBC QN AUTO: 29 PG (ref 26.8–34.3)
MCH RBC QN AUTO: 29.3 PG (ref 26.8–34.3)
MCHC RBC AUTO-ENTMCNC: 32.2 G/DL (ref 31.4–37.4)
MCHC RBC AUTO-ENTMCNC: 32.4 G/DL (ref 31.4–37.4)
MCV RBC AUTO: 90 FL (ref 82–98)
MCV RBC AUTO: 90 FL (ref 82–98)
MONOCYTES # BLD AUTO: 0.75 THOUSAND/UL (ref 0–1.22)
MONOCYTES NFR BLD: 3 % (ref 4–12)
NEUTROPHILS # BLD MANUAL: 22.72 THOUSAND/UL (ref 1.85–7.62)
NEUTS BAND NFR BLD MANUAL: 1 % (ref 0–8)
NEUTS SEG NFR BLD AUTO: 90 % (ref 43–75)
PHOSPHATE SERPL-MCNC: 4.7 MG/DL (ref 2.7–4.5)
PLATELET # BLD AUTO: 109 THOUSANDS/UL (ref 149–390)
PLATELET # BLD AUTO: 176 THOUSANDS/UL (ref 149–390)
PLATELET BLD QL SMEAR: ABNORMAL
PMV BLD AUTO: 9.7 FL (ref 8.9–12.7)
PMV BLD AUTO: 9.9 FL (ref 8.9–12.7)
POTASSIUM SERPL-SCNC: 4.5 MMOL/L (ref 3.5–5.3)
RBC # BLD AUTO: 4 MILLION/UL (ref 3.81–5.12)
RBC # BLD AUTO: 4 MILLION/UL (ref 3.81–5.12)
RBC MORPH BLD: PRESENT
SODIUM SERPL-SCNC: 139 MMOL/L (ref 135–147)
WBC # BLD AUTO: 18.18 THOUSAND/UL (ref 4.31–10.16)
WBC # BLD AUTO: 24.97 THOUSAND/UL (ref 4.31–10.16)

## 2025-07-07 PROCEDURE — 85007 BL SMEAR W/DIFF WBC COUNT: CPT | Performed by: PHYSICIAN ASSISTANT

## 2025-07-07 PROCEDURE — 84100 ASSAY OF PHOSPHORUS: CPT

## 2025-07-07 PROCEDURE — 85027 COMPLETE CBC AUTOMATED: CPT | Performed by: PHYSICIAN ASSISTANT

## 2025-07-07 PROCEDURE — 80048 BASIC METABOLIC PNL TOTAL CA: CPT | Performed by: PHYSICIAN ASSISTANT

## 2025-07-07 PROCEDURE — 83735 ASSAY OF MAGNESIUM: CPT | Performed by: PHYSICIAN ASSISTANT

## 2025-07-07 PROCEDURE — 99232 SBSQ HOSP IP/OBS MODERATE 35: CPT | Performed by: INTERNAL MEDICINE

## 2025-07-07 PROCEDURE — 85027 COMPLETE CBC AUTOMATED: CPT

## 2025-07-07 RX ORDER — ENOXAPARIN SODIUM 100 MG/ML
1 INJECTION SUBCUTANEOUS EVERY 12 HOURS SCHEDULED
Status: CANCELLED | OUTPATIENT
Start: 2025-07-07

## 2025-07-07 RX ORDER — DIAZEPAM 5 MG/1
5 TABLET ORAL EVERY 6 HOURS PRN
Status: DISCONTINUED | OUTPATIENT
Start: 2025-07-07 | End: 2025-07-08 | Stop reason: HOSPADM

## 2025-07-07 RX ORDER — POTASSIUM CHLORIDE 14.9 MG/ML
20 INJECTION INTRAVENOUS ONCE
Status: COMPLETED | OUTPATIENT
Start: 2025-07-07 | End: 2025-07-07

## 2025-07-07 RX ORDER — MAGNESIUM SULFATE HEPTAHYDRATE 40 MG/ML
2 INJECTION, SOLUTION INTRAVENOUS ONCE
Status: COMPLETED | OUTPATIENT
Start: 2025-07-07 | End: 2025-07-07

## 2025-07-07 RX ORDER — ONDANSETRON 2 MG/ML
4 INJECTION INTRAMUSCULAR; INTRAVENOUS EVERY 6 HOURS PRN
Status: DISCONTINUED | OUTPATIENT
Start: 2025-07-07 | End: 2025-07-08 | Stop reason: HOSPADM

## 2025-07-07 RX ADMIN — CHLORHEXIDINE GLUCONATE 15 ML: 1.2 SOLUTION ORAL at 21:00

## 2025-07-07 RX ADMIN — METHADONE HYDROCHLORIDE 30 MG: 10 CONCENTRATE ORAL at 08:07

## 2025-07-07 RX ADMIN — DEXMEDETOMIDINE HYDROCHLORIDE 0.1 MCG/KG/HR: 400 INJECTION INTRAVENOUS at 06:13

## 2025-07-07 RX ADMIN — APIXABAN 5 MG: 5 TABLET, FILM COATED ORAL at 08:06

## 2025-07-07 RX ADMIN — GABAPENTIN 300 MG: 300 CAPSULE ORAL at 17:11

## 2025-07-07 RX ADMIN — GABAPENTIN 300 MG: 300 CAPSULE ORAL at 21:00

## 2025-07-07 RX ADMIN — SODIUM PHOSPHATE, MONOBASIC, MONOHYDRATE AND SODIUM PHOSPHATE, DIBASIC, ANHYDROUS 12 MMOL: 142; 276 INJECTION, SOLUTION INTRAVENOUS at 01:52

## 2025-07-07 RX ADMIN — CHLORHEXIDINE GLUCONATE 15 ML: 1.2 SOLUTION ORAL at 08:07

## 2025-07-07 RX ADMIN — DIAZEPAM 5 MG: 5 TABLET ORAL at 22:18

## 2025-07-07 RX ADMIN — DIAZEPAM 5 MG: 5 TABLET ORAL at 21:00

## 2025-07-07 RX ADMIN — DIAZEPAM 5 MG: 10 INJECTION, SOLUTION INTRAMUSCULAR; INTRAVENOUS at 04:05

## 2025-07-07 RX ADMIN — CLONIDINE HYDROCHLORIDE 0.2 MG: 0.1 TABLET ORAL at 21:00

## 2025-07-07 RX ADMIN — APIXABAN 5 MG: 5 TABLET, FILM COATED ORAL at 17:11

## 2025-07-07 RX ADMIN — POTASSIUM CHLORIDE 20 MEQ: 14.9 INJECTION, SOLUTION INTRAVENOUS at 01:44

## 2025-07-07 RX ADMIN — CLONIDINE HYDROCHLORIDE 0.2 MG: 0.1 TABLET ORAL at 14:22

## 2025-07-07 RX ADMIN — DIAZEPAM 5 MG: 5 TABLET ORAL at 08:06

## 2025-07-07 RX ADMIN — GABAPENTIN 300 MG: 300 CAPSULE ORAL at 06:19

## 2025-07-07 RX ADMIN — MAGNESIUM SULFATE HEPTAHYDRATE 2 G: 40 INJECTION, SOLUTION INTRAVENOUS at 01:45

## 2025-07-07 RX ADMIN — CLONIDINE HYDROCHLORIDE 0.2 MG: 0.1 TABLET ORAL at 06:19

## 2025-07-07 NOTE — PLAN OF CARE
Problem: Potential for Falls  Goal: Patient will remain free of falls  Description: INTERVENTIONS:  - Educate patient/family on patient safety including physical limitations  - Instruct patient to call for assistance with activity   - Consider consulting OT/PT to assist with strengthening/mobility based on AM PAC & JH-HLM score  - Consult OT/PT to assist with strengthening/mobility   - Keep Call bell within reach  - Keep bed low and locked with side rails adjusted as appropriate  - Keep care items and personal belongings within reach  - Initiate and maintain comfort rounds  - Make Fall Risk Sign visible to staff  - Apply yellow socks and bracelet for high fall risk patients  - Consider moving patient to room near nurses station  Outcome: Progressing     Problem: Prexisting or High Potential for Compromised Skin Integrity  Goal: Skin integrity is maintained or improved  Description: INTERVENTIONS:  - Identify patients at risk for skin breakdown  - Assess and monitor skin integrity including under and around medical devices   - Assess and monitor nutrition and hydration status  - Monitor labs  - Assess for incontinence   - Turn and reposition patient  - Assist with mobility/ambulation  - Relieve pressure over tracy prominences   - Avoid friction and shearing  - Provide appropriate hygiene as needed including keeping skin clean and dry  - Evaluate need for skin moisturizer/barrier cream  - Collaborate with interdisciplinary team  - Patient/family teaching  - Consider wound care consult    Assess:  - Review Jerry scale daily  - Inspect skin when repositioning, toileting, and assisting with ADLS  - Assess extremities for adequate circulation and sensation     Bed Management:  - Have minimal linens on bed & keep smooth, unwrinkled  - Change linens as needed when moist or perspiringdegrees   - Toileting:  - Offer bedside commode    Activity:  - Encourage activity and walks on unit  - Encourage or provide ROM exercises    - Use appropriate equipment to lift or move patient in bed    Skin Care:  - Avoid use of baby powder, tape, friction and shearing, hot water or constrictive clothing  - Do not massage red bony areas    Outcome: Progressing     Problem: PAIN - ADULT  Goal: Verbalizes/displays adequate comfort level or baseline comfort level  Description: Interventions:  - Encourage patient to monitor pain and request assistance  - Assess pain using appropriate pain scale  - Administer analgesics as ordered based on type and severity of pain and evaluate response  - Implement non-pharmacological measures as appropriate and evaluate response  - Consider cultural and social influences on pain and pain management  - Notify physician/advanced practitioner if interventions unsuccessful or patient reports new pain  - Educate patient/family on pain management process including their role and importance of  reporting pain   - Provide non-pharmacologic/complimentary pain relief interventions  Outcome: Progressing     Problem: INFECTION - ADULT  Goal: Absence or prevention of progression during hospitalization  Description: INTERVENTIONS:  - Assess and monitor for signs and symptoms of infection  - Monitor lab/diagnostic results  - Monitor all insertion sites, i.e. indwelling lines, tubes, and drains  - Monitor endotracheal if appropriate and nasal secretions for changes in amount and color  - Coleman appropriate cooling/warming therapies per order  - Administer medications as ordered  - Instruct and encourage patient and family to use good hand hygiene technique  - Identify and instruct in appropriate isolation precautions for identified infection/condition  Outcome: Progressing  Goal: Absence of fever/infection during neutropenic period  Description: INTERVENTIONS:  - Monitor WBC  - Perform strict hand hygiene  - Limit to healthy visitors only  - No plants, dried, fresh or silk flowers with aguilar in patient room  Outcome: Progressing      Problem: SAFETY ADULT  Goal: Patient will remain free of falls  Description: INTERVENTIONS:  - Educate patient/family on patient safety including physical limitations  - Instruct patient to call for assistance with activity   - Consider consulting OT/PT to assist with strengthening/mobility based on AM PAC & JH-HLM score  - Consult OT/PT to assist with strengthening/mobility   - Keep Call bell within reach  - Keep bed low and locked with side rails adjusted as appropriate  - Keep care items and personal belongings within reach  - Initiate and maintain comfort rounds  - Make Fall Risk Sign visible to staff  - Apply yellow socks and bracelet for high fall risk patients  - Consider moving patient to room near nurses station  Outcome: Progressing  Goal: Maintain or return to baseline ADL function  Description: INTERVENTIONS:  -  Assess patient's ability to carry out ADLs; assess patient's baseline for ADL function and identify physical deficits which impact ability to perform ADLs (bathing, care of mouth/teeth, toileting, grooming, dressing, etc.)  - Assess/evaluate cause of self-care deficits   - Assess range of motion  - Assess patient's mobility; develop plan if impaired  - Assess patient's need for assistive devices and provide as appropriate  - Encourage maximum independence but intervene and supervise when necessary  - Involve family in performance of ADLs  - Assess for home care needs following discharge   - Consider OT consult to assist with ADL evaluation and planning for discharge  - Provide patient education as appropriate  - Monitor functional capacity and physical performance, use of AM PAC & JH-HLM   - Monitor gait, balance and fatigue with ambulation    Outcome: Progressing  Goal: Maintains/Returns to pre admission functional level  Description: INTERVENTIONS:  - Perform AM-PAC 6 Click Basic Mobility/ Daily Activity assessment daily.  - Set and communicate daily mobility goal to care team and  patient/family/caregiver.   - Collaborate with rehabilitation services on mobility goals if consulted  - Out of bed for toileting  - Record patient progress and toleration of activity level   Outcome: Progressing     Problem: DISCHARGE PLANNING  Goal: Discharge to home or other facility with appropriate resources  Description: INTERVENTIONS:  - Identify barriers to discharge w/patient and caregiver  - Arrange for needed discharge resources and transportation as appropriate  - Identify discharge learning needs (meds, wound care, etc.)  - Arrange for interpretive services to assist at discharge as needed  - Refer to Case Management Department for coordinating discharge planning if the patient needs post-hospital services based on physician/advanced practitioner order or complex needs related to functional status, cognitive ability, or social support system  Outcome: Progressing     Problem: Knowledge Deficit  Goal: Patient/family/caregiver demonstrates understanding of disease process, treatment plan, medications, and discharge instructions  Description: Complete learning assessment and assess knowledge base.  Interventions:  - Provide teaching at level of understanding  - Provide teaching via preferred learning methods  Outcome: Progressing     Problem: Nutrition/Hydration-ADULT  Goal: Nutrient/Hydration intake appropriate for improving, restoring or maintaining nutritional needs  Description: Monitor and assess patient's nutrition/hydration status for malnutrition. Collaborate with interdisciplinary team and initiate plan and interventions as ordered.  Monitor patient's weight and dietary intake as ordered or per policy. Utilize nutrition screening tool and intervene as necessary. Determine patient's food preferences and provide high-protein, high-caloric foods as appropriate.     INTERVENTIONS:  - Monitor oral intake, urinary output, labs, and treatment plans  - Assess nutrition and hydration status and recommend  course of action  - Evaluate amount of meals eaten  - Assist patient with eating if necessary   - Allow adequate time for meals  - Recommend/ encourage appropriate diets, oral nutritional supplements, and vitamin/mineral supplements  - Order, calculate, and assess calorie counts as needed  - Recommend, monitor, and adjust tube feedings and TPN/PPN based on assessed needs  - Assess need for intravenous fluids  - Provide specific nutrition/hydration education as appropriate  - Include patient/family/caregiver in decisions related to nutrition  Outcome: Progressing

## 2025-07-07 NOTE — PROGRESS NOTES
"Progress Note - Critical Care/ICU   Name: Elda Calderon 29 y.o. female I MRN: [id number]  Unit/Bed#: ICU 04 I Date of Admission: 7/2/2025   Date of Service: 7/7/2025 I Hospital Day: 5      Assessment & Plan  Acute opioid withdrawal (HCC)  Patient is actively withdrawing from opioids, per patient last use of fentanyl on Monday  Upon admission COWS Score = 13  Dicsussed with Toxicology -continue symptom management  Tigan IM Q6 PRN nausea/vomiting  Atarax 50 mg q6 prn for anxiety  Tylenol as needed for pain management  Gabapentin 300 mg po 3 times daily as needed anxiety  Clonidine 0.1 mg q8h prn anxiety  IV Valium 5 mg as needed every 2 hours  Placed on clonidine patch  Continue Precedex-wean off as able  7/3 started on methadone 30 mg  Opioid use disorder, severe, dependence (HCC)  History of polysubstance abuse.  This admission, UDS positive for fentanyl.  Upon chart review, previous detox admissions show that patient left AMA   In the past, patient was receiving methadone [facility] however patient has not received methadone in quite a while which was her decision.  History of IV drug use in the past, tricuspid murmur evident on auscultation.  Per chart review, no echo on file.  Consider echocardiogram.  Presenting with nausea, vomiting and anxiety  Toxicology was contacted, anxiolytics and antiemetics in place  Prolonged Q-T interval on ECG  Suspect secondary to electrolyte derangements and possibly from Methadone   Aggressively replete K and Mag  7/7: Qtc 471.  Internal jugular vein thrombosis, left (HCC)  History of IJ vein thrombosis Secondary to extrinsic compression from neck infection from IV drug use through IJ vein  Was seen by vascular surgery in the past, no surgical indication necessary  Was prescribed Eliquis, but patient stopped on her own secondary to \"anxiety \"  Patient is agreeable to start taking Eliquis 5mg BID on 7/7  Failure to thrive in adult  Adult Malnutrition type: Acute illness  Adult " Degree of Malnutrition: Other severe protein calorie malnutrition  Malnutrition Characteristics: Fat loss, Muscle loss, Weight loss  Likely due ongoing known history of polysubstance abuse severe malnutrition poor oral intake  Consider nutrition consult  Disposition: Stepdown Level 1- can likely downgrade today    ICU Core Measures     A: Assess, Prevent, and Manage Pain Has pain been assessed? Yes  Need for changes to pain regimen? No   B: Both SAT/SAT  N/A   C: Choice of Sedation RASS Goal: 0 Alert and Calm  Need for changes to sedation or analgesia regimen? No   D: Delirium CAM-ICU: Negative   E: Early Mobility  Plan for early mobility? Yes   F: Family Engagement Plan for family engagement today? Yes       Review of Invasive Devices:      Central access plan: No peripheral access able to be obtained.  Plan reconsult vascular team today to get peripheral access       Prophylaxis:  VTE VTE covered by:  apixaban, Oral       Stress Ulcer  not ordered         24 Hour Events :   Wean down precedex to 0.1mg. Received Versed x1.    Subjective   OAA x4, reported improving but had a tough night since was wean off precedex. +N but controlled with tigan.    Objective :                   Vitals I/O      Most Recent Min/Max in 24hrs   Temp 99 °F (37.2 °C) Temp  Min: 98.5 °F (36.9 °C)  Max: 99.5 °F (37.5 °C)   Pulse 100 Pulse  Min: 97  Max: 163   Resp (!) 27 Resp  Min: 15  Max: 42   /83 BP  Min: 112/78  Max: 178/113   O2 Sat 97 % SpO2  Min: 95 %  Max: 99 %      Intake/Output Summary (Last 24 hours) at 7/7/2025 0715  Last data filed at 7/7/2025 0404  Gross per 24 hour   Intake 2358.38 ml   Output 3500 ml   Net -1141.62 ml       Diet Regular; Regular House    Invasive Monitoring           Physical Exam   Physical Exam  Vitals and nursing note reviewed.   Eyes:      General:         Right eye: No discharge.         Left eye: No discharge.      Extraocular Movements: Extraocular movements intact.      Conjunctiva/sclera:  Conjunctivae normal.   Skin:     General: Skin is warm.      Capillary Refill: Capillary refill takes less than 2 seconds.      Coloration: Skin is not pale.   HENT:      Mouth/Throat:      Mouth: Mucous membranes are moist.   Neck:      Vascular: No JVD.   Cardiovascular:      Rate and Rhythm: Regular rhythm. Tachycardia present.      Pulses: Normal pulses.      Heart sounds: Normal heart sounds.   Musculoskeletal:         General: No swelling.      Right lower leg: No edema.      Left lower leg: No edema.   Abdominal: General: There is no distension.      Palpations: Abdomen is soft.      Tenderness: There is no abdominal tenderness. There is no guarding.   Constitutional:       General: She is not in acute distress.     Appearance: She is diaphoretic.   Pulmonary:      Effort: Pulmonary effort is normal.      Breath sounds: No wheezing, rhonchi or rales.   Chest:      Chest wall: No tenderness.   Psychiatric:         Mood and Affect:  mood and affect abnormal.  Neurological:      General: No focal deficit present.      Mental Status: She is alert and oriented to person, place and time. She is CAM ICU negative.      Cranial Nerves: No facial asymmetry.          Diagnostic Studies        Lab Results: I have reviewed the following results:     Medications:  Scheduled PRN   apixaban, 5 mg, BID  chlorhexidine, 15 mL, Q12H JERI  cloNIDine, 0.2 mg, Q8H JERI  cloNIDine, 0.2 mg, Weekly  diazepam, 5 mg, Q12H  gabapentin, 300 mg, TID  methadone, 30 mg, Daily  multi-electrolyte, 1,000 mL, Once      acetaminophen, 650 mg, Q6H PRN  diazepam, 5 mg, Q2H PRN  hydrOXYzine HCL, 50 mg, Q6H PRN  loperamide, 2 mg, 4x Daily PRN  ondansetron, 4 mg, Q6H PRN       Continuous    dexmedetomidine, 0.1-0.7 mcg/kg/hr, Last Rate: 0.1 mcg/kg/hr (07/07/25 0613)         Labs:   CBC    Recent Labs     07/06/25  2045 07/07/25  0404   WBC 7.94 24.97*   HGB 11.7 11.6   HCT 35.5 36.0    109*   BANDSPCT  --  1     BMP    Recent Labs      07/06/25 2045 07/07/25  0404   SODIUM 140 139   K 3.5 4.5    105   CO2 25 28   AGAP 9 6   BUN 5 5   CREATININE 0.46* 0.57*   CALCIUM 9.0 9.1       Coags    No recent results     Additional Electrolytes  Recent Labs     07/06/25 2045 07/07/25  0404   MG 1.9 3.0*   PHOS 2.0* 4.7*   CAIONIZED 1.17  --           Blood Gas    No recent results  No recent results LFTs  No recent results    Infectious  No recent results  Glucose  Recent Labs     07/06/25  0528 07/06/25 2045 07/07/25  0404   GLUC 102 99 104

## 2025-07-07 NOTE — ASSESSMENT & PLAN NOTE
Suspect secondary to electrolyte derangements and possibly from Methadone   Aggressively replete K and Mag  7/7: Qtc 471.

## 2025-07-07 NOTE — ASSESSMENT & PLAN NOTE
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

## 2025-07-07 NOTE — PLAN OF CARE
Problem: Potential for Falls  Goal: Patient will remain free of falls  Description: INTERVENTIONS:  - Educate patient/family on patient safety including physical limitations  - Instruct patient to call for assistance with activity   - Consider consulting OT/PT to assist with strengthening/mobility based on AM PAC & JH-HLM score  - Consult OT/PT to assist with strengthening/mobility   - Keep Call bell within reach  - Keep bed low and locked with side rails adjusted as appropriate  - Keep care items and personal belongings within reach  - Initiate and maintain comfort rounds  - Make Fall Risk Sign visible to staff  - Apply yellow socks and bracelet for high fall risk patients  - Consider moving patient to room near nurses station  Outcome: Progressing     Problem: Prexisting or High Potential for Compromised Skin Integrity  Goal: Skin integrity is maintained or improved  Description: INTERVENTIONS:  - Identify patients at risk for skin breakdown  - Assess and monitor skin integrity including under and around medical devices   - Assess and monitor nutrition and hydration status  - Monitor labs  - Assess for incontinence   - Turn and reposition patient  - Assist with mobility/ambulation  - Relieve pressure over tracy prominences   - Avoid friction and shearing  - Provide appropriate hygiene as needed including keeping skin clean and dry  - Evaluate need for skin moisturizer/barrier cream  - Collaborate with interdisciplinary team  - Patient/family teaching  - Consider wound care consult    Assess:  - Review Jerry scale daily  - Inspect skin when repositioning, toileting, and assisting with ADLS  - Assess extremities for adequate circulation and sensation     Bed Management:  - Have minimal linens on bed & keep smooth, unwrinkled degrees   - Toileting:  - Offer bedside commode    Activity:  - Encourage activity and walks on unit  - Encourage or provide ROM exercises   - Use appropriate equipment to lift or move  patient in bed    Skin Care:  - Avoid use of baby powder, tape, friction and shearing, hot water or constrictive clothing  - Do not massage red bony areas  Outcome: Progressing     Problem: PAIN - ADULT  Goal: Verbalizes/displays adequate comfort level or baseline comfort level  Description: Interventions:  - Encourage patient to monitor pain and request assistance  - Assess pain using appropriate pain scale  - Administer analgesics as ordered based on type and severity of pain and evaluate response  - Implement non-pharmacological measures as appropriate and evaluate response  - Consider cultural and social influences on pain and pain management  - Notify physician/advanced practitioner if interventions unsuccessful or patient reports new pain  - Educate patient/family on pain management process including their role and importance of  reporting pain   - Provide non-pharmacologic/complimentary pain relief interventions  Outcome: Progressing     Problem: INFECTION - ADULT  Goal: Absence or prevention of progression during hospitalization  Description: INTERVENTIONS:  - Assess and monitor for signs and symptoms of infection  - Monitor lab/diagnostic results  - Monitor all insertion sites, i.e. indwelling lines, tubes, and drains  - Monitor endotracheal if appropriate and nasal secretions for changes in amount and color  - [place] appropriate cooling/warming therapies per order  - Administer medications as ordered  - Instruct and encourage patient and family to use good hand hygiene technique  - Identify and instruct in appropriate isolation precautions for identified infection/condition  Outcome: Progressing  Goal: Absence of fever/infection during neutropenic period  Description: INTERVENTIONS:  - Monitor WBC  - Perform strict hand hygiene  - Limit to healthy visitors only  - No plants, dried, fresh or silk flowers with aguilar in patient room  Outcome: Progressing     Problem: SAFETY ADULT  Goal: Patient will remain  free of falls  Description: INTERVENTIONS:  - Educate patient/family on patient safety including physical limitations  - Instruct patient to call for assistance with activity   - Consider consulting OT/PT to assist with strengthening/mobility based on AM PAC & JH-HLM score  - Consult OT/PT to assist with strengthening/mobility   - Keep Call bell within reach  - Keep bed low and locked with side rails adjusted as appropriate  - Keep care items and personal belongings within reach  - Initiate and maintain comfort rounds  - Make Fall Risk Sign visible to staff  - Apply yellow socks and bracelet for high fall risk patients  - Consider moving patient to room near nurses station  Outcome: Progressing  Goal: Maintain or return to baseline ADL function  Description: INTERVENTIONS:  -  Assess patient's ability to carry out ADLs; assess patient's baseline for ADL function and identify physical deficits which impact ability to perform ADLs (bathing, care of mouth/teeth, toileting, grooming, dressing, etc.)  - Assess/evaluate cause of self-care deficits   - Assess range of motion  - Assess patient's mobility; develop plan if impaired  - Assess patient's need for assistive devices and provide as appropriate  - Encourage maximum independence but intervene and supervise when necessary  - Involve family in performance of ADLs  - Assess for home care needs following discharge   - Consider OT consult to assist with ADL evaluation and planning for discharge  - Provide patient education as appropriate  - Monitor functional capacity and physical performance, use of AM PAC & JH-HLM   - Monitor gait, balance and fatigue with ambulation    Outcome: Progressing  Goal: Maintains/Returns to pre admission functional level  Description: INTERVENTIONS:  - Perform AM-PAC 6 Click Basic Mobility/ Daily Activity assessment daily.  - Set and communicate daily mobility goal to care team and patient/family/caregiver.   - Collaborate with rehabilitation  services on mobility goals if consulted  - Out of bed for toileting  - Record patient progress and toleration of activity level   Outcome: Progressing     Problem: DISCHARGE PLANNING  Goal: Discharge to home or other facility with appropriate resources  Description: INTERVENTIONS:  - Identify barriers to discharge w/patient and caregiver  - Arrange for needed discharge resources and transportation as appropriate  - Identify discharge learning needs (meds, wound care, etc.)  - Arrange for interpretive services to assist at discharge as needed  - Refer to Case Management Department for coordinating discharge planning if the patient needs post-hospital services based on physician/advanced practitioner order or complex needs related to functional status, cognitive ability, or social support system  Outcome: Progressing     Problem: Knowledge Deficit  Goal: Patient/family/caregiver demonstrates understanding of disease process, treatment plan, medications, and discharge instructions  Description: Complete learning assessment and assess knowledge base.  Interventions:  - Provide teaching at level of understanding  - Provide teaching via preferred learning methods  Outcome: Progressing     Problem: Nutrition/Hydration-ADULT  Goal: Nutrient/Hydration intake appropriate for improving, restoring or maintaining nutritional needs  Description: Monitor and assess patient's nutrition/hydration status for malnutrition. Collaborate with interdisciplinary team and initiate plan and interventions as ordered.  Monitor patient's weight and dietary intake as ordered or per policy. Utilize nutrition screening tool and intervene as necessary. Determine patient's food preferences and provide high-protein, high-caloric foods as appropriate.     INTERVENTIONS:  - Monitor oral intake, urinary output, labs, and treatment plans  - Assess nutrition and hydration status and recommend course of action  - Evaluate amount of meals eaten  - Assist  patient with eating if necessary   - Allow adequate time for meals  - Recommend/ encourage appropriate diets, oral nutritional supplements, and vitamin/mineral supplements  - Order, calculate, and assess calorie counts as needed  - Recommend, monitor, and adjust tube feedings and TPN/PPN based on assessed needs  - Assess need for intravenous fluids  - Provide specific nutrition/hydration education as appropriate  - Include patient/family/caregiver in decisions related to nutrition  Outcome: Progressing

## 2025-07-07 NOTE — ASSESSMENT & PLAN NOTE
History of polysubstance abuse.  This admission, UDS positive for fentanyl.  Upon chart review, previous detox admissions show that patient left AMA   In the past, patient was receiving methadone [facility] however patient has not received methadone in quite a while which was her decision.  History of IV drug use in the past, tricuspid murmur evident on auscultation.  Per chart review, no echo on file.  Consider echocardiogram.  Presenting with nausea, vomiting and anxiety  Toxicology was contacted, anxiolytics and antiemetics in place

## 2025-07-07 NOTE — ED CARE HANDOFF
[facility] Warm Handoff Outcome Note    Patient name Elda Calderon  Location ICU 04/ICU 04 MRN [id number]  Age: 29 y.o.          Plan Type:  Warm Handoff                                                                                    Plan Date: 7/7/2025  Service:  ED Warm Handoff      Substance Use History:  Fentanyl    Warm Handoff Update:  Patient admitted to hospital     Warm Handoff Outcome: Non-Treatment Related Resources

## 2025-07-08 VITALS
SYSTOLIC BLOOD PRESSURE: 123 MMHG | BODY MASS INDEX: 17.28 KG/M2 | RESPIRATION RATE: 17 BRPM | HEIGHT: 64 IN | OXYGEN SATURATION: 100 % | DIASTOLIC BLOOD PRESSURE: 88 MMHG | TEMPERATURE: 98.2 F | HEART RATE: 119 BPM | WEIGHT: 101.19 LBS

## 2025-07-08 LAB
ALBUMIN SERPL BCG-MCNC: 3.3 G/DL (ref 3.5–5)
ALP SERPL-CCNC: 50 U/L (ref 34–104)
ALT SERPL W P-5'-P-CCNC: 10 U/L (ref 7–52)
ANION GAP SERPL CALCULATED.3IONS-SCNC: 5 MMOL/L (ref 4–13)
AST SERPL W P-5'-P-CCNC: 22 U/L (ref 13–39)
BASOPHILS # BLD AUTO: 0.05 THOUSANDS/ÂΜL (ref 0–0.1)
BASOPHILS NFR BLD AUTO: 1 % (ref 0–1)
BILIRUB SERPL-MCNC: 0.28 MG/DL (ref 0.2–1)
BUN SERPL-MCNC: 8 MG/DL (ref 5–25)
CALCIUM ALBUM COR SERPL-MCNC: 9.1 MG/DL (ref 8.3–10.1)
CALCIUM SERPL-MCNC: 8.5 MG/DL (ref 8.4–10.2)
CHLORIDE SERPL-SCNC: 107 MMOL/L (ref 96–108)
CO2 SERPL-SCNC: 28 MMOL/L (ref 21–32)
CREAT SERPL-MCNC: 0.54 MG/DL (ref 0.6–1.3)
EOSINOPHIL # BLD AUTO: 0.84 THOUSAND/ÂΜL (ref 0–0.61)
EOSINOPHIL NFR BLD AUTO: 9 % (ref 0–6)
ERYTHROCYTE [DISTWIDTH] IN BLOOD BY AUTOMATED COUNT: 13.1 % (ref 11.6–15.1)
GFR SERPL CREATININE-BSD FRML MDRD: 128 ML/MIN/1.73SQ M
GLUCOSE SERPL-MCNC: 139 MG/DL (ref 65–140)
HCT VFR BLD AUTO: 30.5 % (ref 34.8–46.1)
HGB BLD-MCNC: 10.3 G/DL (ref 11.5–15.4)
IMM GRANULOCYTES # BLD AUTO: 0.05 THOUSAND/UL (ref 0–0.2)
IMM GRANULOCYTES NFR BLD AUTO: 1 % (ref 0–2)
LYMPHOCYTES # BLD AUTO: 2.97 THOUSANDS/ÂΜL (ref 0.6–4.47)
LYMPHOCYTES NFR BLD AUTO: 30 % (ref 14–44)
MAGNESIUM SERPL-MCNC: 2 MG/DL (ref 1.9–2.7)
MCH RBC QN AUTO: 30 PG (ref 26.8–34.3)
MCHC RBC AUTO-ENTMCNC: 33.8 G/DL (ref 31.4–37.4)
MCV RBC AUTO: 89 FL (ref 82–98)
MONOCYTES # BLD AUTO: 1.21 THOUSAND/ÂΜL (ref 0.17–1.22)
MONOCYTES NFR BLD AUTO: 12 % (ref 4–12)
NEUTROPHILS # BLD AUTO: 4.7 THOUSANDS/ÂΜL (ref 1.85–7.62)
NEUTS SEG NFR BLD AUTO: 47 % (ref 43–75)
NRBC BLD AUTO-RTO: 0 /100 WBCS
PHOSPHATE SERPL-MCNC: 3.2 MG/DL (ref 2.7–4.5)
PLATELET # BLD AUTO: 166 THOUSANDS/UL (ref 149–390)
PMV BLD AUTO: 10.2 FL (ref 8.9–12.7)
POTASSIUM SERPL-SCNC: 3.2 MMOL/L (ref 3.5–5.3)
PROT SERPL-MCNC: 5.8 G/DL (ref 6.4–8.4)
RBC # BLD AUTO: 3.43 MILLION/UL (ref 3.81–5.12)
SODIUM SERPL-SCNC: 140 MMOL/L (ref 135–147)
WBC # BLD AUTO: 9.82 THOUSAND/UL (ref 4.31–10.16)

## 2025-07-08 PROCEDURE — 84100 ASSAY OF PHOSPHORUS: CPT

## 2025-07-08 PROCEDURE — 83735 ASSAY OF MAGNESIUM: CPT

## 2025-07-08 PROCEDURE — 99232 SBSQ HOSP IP/OBS MODERATE 35: CPT | Performed by: INTERNAL MEDICINE

## 2025-07-08 PROCEDURE — 80053 COMPREHEN METABOLIC PANEL: CPT

## 2025-07-08 PROCEDURE — NC001 PR NO CHARGE: Performed by: INTERNAL MEDICINE

## 2025-07-08 PROCEDURE — 85025 COMPLETE CBC W/AUTO DIFF WBC: CPT

## 2025-07-08 RX ORDER — GABAPENTIN 300 MG/1
300 CAPSULE ORAL 3 TIMES DAILY
Qty: 42 CAPSULE | Refills: 0 | Status: SHIPPED | OUTPATIENT
Start: 2025-07-08 | End: 2025-07-22

## 2025-07-08 RX ORDER — CLONIDINE HYDROCHLORIDE 0.1 MG/1
0.1 TABLET ORAL EVERY 8 HOURS SCHEDULED
Status: DISCONTINUED | OUTPATIENT
Start: 2025-07-08 | End: 2025-07-08 | Stop reason: HOSPADM

## 2025-07-08 RX ORDER — POTASSIUM CHLORIDE 1500 MG/1
40 TABLET, EXTENDED RELEASE ORAL ONCE
Status: DISCONTINUED | OUTPATIENT
Start: 2025-07-08 | End: 2025-07-08 | Stop reason: HOSPADM

## 2025-07-08 RX ORDER — CLONIDINE HYDROCHLORIDE 0.1 MG/1
0.1 TABLET ORAL EVERY 8 HOURS SCHEDULED
Qty: 42 TABLET | Refills: 0 | Status: SHIPPED | OUTPATIENT
Start: 2025-07-08 | End: 2025-07-22

## 2025-07-08 RX ORDER — POTASSIUM CHLORIDE 1500 MG/1
40 TABLET, EXTENDED RELEASE ORAL ONCE
Status: COMPLETED | OUTPATIENT
Start: 2025-07-08 | End: 2025-07-08

## 2025-07-08 RX ORDER — ONDANSETRON 4 MG/1
4 TABLET, FILM COATED ORAL EVERY 8 HOURS PRN
Qty: 20 TABLET | Refills: 0 | Status: SHIPPED | OUTPATIENT
Start: 2025-07-08

## 2025-07-08 RX ORDER — POTASSIUM CHLORIDE 20MEQ/15ML
40 LIQUID (ML) ORAL
Status: DISCONTINUED | OUTPATIENT
Start: 2025-07-08 | End: 2025-07-08

## 2025-07-08 RX ADMIN — GABAPENTIN 300 MG: 300 CAPSULE ORAL at 08:26

## 2025-07-08 RX ADMIN — CHLORHEXIDINE GLUCONATE 15 ML: 1.2 SOLUTION ORAL at 08:26

## 2025-07-08 RX ADMIN — METHADONE HYDROCHLORIDE 30 MG: 10 CONCENTRATE ORAL at 08:27

## 2025-07-08 RX ADMIN — APIXABAN 5 MG: 5 TABLET, FILM COATED ORAL at 08:26

## 2025-07-08 RX ADMIN — POTASSIUM CHLORIDE 40 MEQ: 1500 TABLET, EXTENDED RELEASE ORAL at 08:26

## 2025-07-08 RX ADMIN — DIAZEPAM 5 MG: 5 TABLET ORAL at 08:26

## 2025-07-08 RX ADMIN — CLONIDINE HYDROCHLORIDE 0.2 MG: 0.1 TABLET ORAL at 05:06

## 2025-07-08 RX ADMIN — DIAZEPAM 5 MG: 5 TABLET ORAL at 04:39

## 2025-07-08 NOTE — CASE MANAGEMENT
Case Management Assessment & Discharge Planning Note    Patient name Elda Calderon  Location ICU 04/ICU 04 MRN [id number]  : 1995 Date 2025       Current Admission Date: 2025  Current Admission Diagnosis:Acute opioid withdrawal (HCC)   Patient Active Problem List    Diagnosis Date Noted    Failure to thrive in adult 2025    Internal jugular vein thrombosis, left (HCC) 2025    Neck pain 2025    Severe protein-calorie malnutrition (HCC) 2025    Cellulitis of neck 2025    Tachycardia 2025    Constipation 2025    Electrolyte abnormality 2025    Liver lesion 2025    SIRS (systemic inflammatory response syndrome) (HCC) 2024    Acute opioid withdrawal (HCC) 2024    Polysubstance abuse (HCC) 2023    BMI less than 19,adult 2023    Severe episode of recurrent major depressive disorder, 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    BESSY (generalized anxiety disorder) 2023    PTSD (post-traumatic stress disorder) 2023    Depression 2023    Mild protein-calorie malnutrition (HCC) 2023    Transaminitis 2023    Bilateral calf pain 2023    Confusion 2023    Opioid use disorder, severe, dependence (HCC) 2023    Prolonged Q-T interval on ECG 2023    Leukocytosis 2023    Episodic mood disorder (HCC) 2014    Endometriosis 2012      LOS (days): 6  Geometric Mean LOS (GMLOS) (days):   Days to GMLOS:     OBJECTIVE:    Risk of Unplanned Readmission Score: 38.99         Current admission status: Inpatient       Preferred Pharmacy:   [id number] - [place], [place] - [address]  [address] [place] [address]  Phone: [phone] Fax: [phone]    Primary Care Provider: Mike Tillman DO    Primary Insurance: ZEB ARREDONDO PENDING  Secondary Insurance:     ASSESSMENT:  Active Health Care Proxies    There are no active Health Care Proxies on file.                  Readmission Root Cause  30 Day Readmission: No    Patient Information  Admitted from:: Home  Mental Status: Alert  During Assessment patient was accompanied by: Not accompanied during assessment  Assessment information provided by:: Patient  Primary Caregiver: Self  Support Systems: Parent  County of Residence: [place]  What city do you live in?: [place]  Living Arrangements: Lives w/ Parent(s)  Is patient a ?: No    Activities of Daily Living Prior to Admission  Functional Status: Independent  Completes ADLs independently?: Yes  Ambulates independently?: Yes  Does patient use assisted devices?: No  Does patient currently own DME?: No  Does patient have a history of Outpatient Therapy (PT/OT)?: No  Does the patient have a history of Short-Term Rehab?: No  Does patient have a history of HHC?: No  Does patient currently have HHC?: No         Patient Information Continued  Income Source: Unemployed  Does patient have prescription coverage?: No  Can the patient afford their medications and any related supplies (such as glucometers or test strips)?: N/A  Does patient receive dialysis treatments?: No  Does patient have a history of substance abuse?: Yes  Historical substance use preference: Fentanyl  History of Withdrawal Symptoms: Other withdrawal symptoms (specify in comment)  Is patient currently in treatment for substance abuse?: Patient provided treatment options.  Does patient have a history of Mental Health Diagnosis?: Yes         Means of Transportation  Means of Transport to \[facility]\"":: Other (Comment)          DISCHARGE DETAILS:       Freedom of Choice: Yes  Comments - Freedom of Choice: Pt was seen by RAMONITA.                     Requested Home Health Care         Is the patient interested in HHC at discharge?: No    DME Referral Provided  Referral made for DME?: No    Other Referral/Resources/Interventions Provided:  Interventions: Substance Abuse Treatment  Referral Comments: Referral made to  [facility].         Treatment Team Recommendation: Substance Abuse Treatment  Expected Discharge Disposition:  (Pt undecided)

## 2025-07-08 NOTE — ASSESSMENT & PLAN NOTE
Patient is actively withdrawing from opioids, per patient last use of fentanyl on Monday  Upon admission COWS Score = 13  Dicsussed with Toxicology -continue symptom management  As needed Zofran for nausea/vomiting  Atarax 50 mg q6 prn for anxiety  Tylenol as needed for pain management  Gabapentin 300 mg po 3 times daily  Clonidine 0.1 mg q8h prn anxiety  Clonidine 0.2 mg every 8 hours scheduled  PO Valium 5 mg as needed every 6 hours  Placed on clonidine patch  7/3 started on methadone 30 mg  Off Precedex

## 2025-07-08 NOTE — NURSING NOTE
AVS reviewed with patient. All questions answered. Patient discharged walking accompanied by PCA. All belongings accounted for.

## 2025-07-08 NOTE — ASSESSMENT & PLAN NOTE
History of polysubstance abuse.  This admission, UDS positive for fentanyl.  Upon chart review, previous detox admissions show that patient left AMA   In the past, patient was receiving methadone [facility] however patient has not received methadone in quite a while which was her decision.  History of IV drug use in the past, tricuspid murmur evident on auscultation.  Per chart review, no echo on file.  Consider echocardiogram.  Presenting with nausea, vomiting and anxiety  Toxicology was contacted, anxiolytics and antiemetics in place

## 2025-07-08 NOTE — DISCHARGE INSTR - AVS FIRST PAGE
Dear Elda Calderon,     It was our pleasure to care for you here at [facility].  It is our hope that we were always able to exceed the expected standards for your care during your stay.  You were hospitalized due to Solazine/fentanyl withdrawal.  You were cared for on the ICU floor by Jose Resendiz MD under the service of Stoney Arredondo MD with the [facility] who covers for your primary care physician (PCP), Mike Tillman DO, while you were hospitalized.  If you have any questions or concerns related to this hospitalization, you may contact us at .  For follow up as well as any medication refills, we recommend that you follow up with your primary care physician.  A registered nurse will reach out to you by phone within a few days after your discharge to answer any additional questions that you may have after going home.  However, at this time we provide for you here, the most important instructions / recommendations at discharge:     Notable Medication Adjustments -   Start clonidine  0.1 mg 3 times daily and follow-up with your primary care if need for continuation  Continue gabapentin 300 mg 3 times daily   Resume taking your Eliquis 5 mg twice daily for the next 3 months  Continue methadone 30 mg daily and follow-up with your outpatient methadone clinic  Resume all other home medications as prescribed  Testing Required after Discharge -   none    Important follow up information -   Please follow-up with your primary care provider, methadone clinic and intensive outpatient rehab  Other Instructions -   none  Please review this entire after visit summary as additional general instructions including medication list, appointments, activity, diet, any pertinent wound care, and other additional recommendations from your care team that may be provided for you.      Sincerely,     Jose Resendiz MD

## 2025-07-08 NOTE — PLAN OF CARE
Problem: Potential for Falls  Goal: Patient will remain free of falls  Description: INTERVENTIONS:  - Educate patient/family on patient safety including physical limitations  - Instruct patient to call for assistance with activity   - Consider consulting OT/PT to assist with strengthening/mobility based on AM PAC & JH-HLM score  - Consult OT/PT to assist with strengthening/mobility   - Keep Call bell within reach  - Keep bed low and locked with side rails adjusted as appropriate  - Keep care items and personal belongings within reach  - Initiate and maintain comfort rounds  - Make Fall Risk Sign visible to staff  - Apply yellow socks and bracelet for high fall risk patients  - Consider moving patient to room near nurses station  Outcome: Progressing     Problem: Prexisting or High Potential for Compromised Skin Integrity  Goal: Skin integrity is maintained or improved  Description: INTERVENTIONS:  - Identify patients at risk for skin breakdown  - Assess and monitor skin integrity including under and around medical devices   - Assess and monitor nutrition and hydration status  - Monitor labs  - Assess for incontinence   - Turn and reposition patient  - Assist with mobility/ambulation  - Relieve pressure over tracy prominences   - Avoid friction and shearing  - Provide appropriate hygiene as needed including keeping skin clean and dry  - Evaluate need for skin moisturizer/barrier cream  - Collaborate with interdisciplinary team  - Patient/family teaching  - Consider wound care consult    Assess:  - Review Jerry scale daily  - Inspect skin when repositioning, toileting, and assisting with ADLS  - Assess extremities for adequate circulation and sensation     Bed Management:  - Have minimal linens on bed & keep smooth, unwrinkled  - Change linens as needed when moist or perspiring  - Toileting:  - Offer bedside commode    Activity:  - Encourage activity and walks on unit  - Encourage or provide ROM exercises   - Use  appropriate equipment to lift or move patient in bed    Skin Care:  - Avoid use of baby powder, tape, friction and shearing, hot water or constrictive clothing  - Do not massage red bony areas    Outcome: Progressing     Problem: PAIN - ADULT  Goal: Verbalizes/displays adequate comfort level or baseline comfort level  Description: Interventions:  - Encourage patient to monitor pain and request assistance  - Assess pain using appropriate pain scale  - Administer analgesics as ordered based on type and severity of pain and evaluate response  - Implement non-pharmacological measures as appropriate and evaluate response  - Consider cultural and social influences on pain and pain management  - Notify physician/advanced practitioner if interventions unsuccessful or patient reports new pain  - Educate patient/family on pain management process including their role and importance of  reporting pain   - Provide non-pharmacologic/complimentary pain relief interventions  Outcome: Progressing     Problem: INFECTION - ADULT  Goal: Absence or prevention of progression during hospitalization  Description: INTERVENTIONS:  - Assess and monitor for signs and symptoms of infection  - Monitor lab/diagnostic results  - Monitor all insertion sites, i.e. indwelling lines, tubes, and drains  - Monitor endotracheal if appropriate and nasal secretions for changes in amount and color  - [place] appropriate cooling/warming therapies per order  - Administer medications as ordered  - Instruct and encourage patient and family to use good hand hygiene technique  - Identify and instruct in appropriate isolation precautions for identified infection/condition  Outcome: Progressing  Goal: Absence of fever/infection during neutropenic period  Description: INTERVENTIONS:  - Monitor WBC  - Perform strict hand hygiene  - Limit to healthy visitors only  - No plants, dried, fresh or silk flowers with aguilar in patient room  Outcome: Progressing     Problem:  SAFETY ADULT  Goal: Patient will remain free of falls  Description: INTERVENTIONS:  - Educate patient/family on patient safety including physical limitations  - Instruct patient to call for assistance with activity   - Consider consulting OT/PT to assist with strengthening/mobility based on AM PAC & JH-HLM score  - Consult OT/PT to assist with strengthening/mobility   - Keep Call bell within reach  - Keep bed low and locked with side rails adjusted as appropriate  - Keep care items and personal belongings within reach  - Initiate and maintain comfort rounds  - Make Fall Risk Sign visible to staff  - Apply yellow socks and bracelet for high fall risk patients  - Consider moving patient to room near nurses station  Outcome: Progressing  Goal: Maintain or return to baseline ADL function  Description: INTERVENTIONS:  -  Assess patient's ability to carry out ADLs; assess patient's baseline for ADL function and identify physical deficits which impact ability to perform ADLs (bathing, care of mouth/teeth, toileting, grooming, dressing, etc.)  - Assess/evaluate cause of self-care deficits   - Assess range of motion  - Assess patient's mobility; develop plan if impaired  - Assess patient's need for assistive devices and provide as appropriate  - Encourage maximum independence but intervene and supervise when necessary  - Involve family in performance of ADLs  - Assess for home care needs following discharge   - Consider OT consult to assist with ADL evaluation and planning for discharge  - Provide patient education as appropriate  - Monitor functional capacity and physical performance, use of AM PAC & JH-HLM   - Monitor gait, balance and fatigue with ambulation    Outcome: Progressing  Goal: Maintains/Returns to pre admission functional level  Description: INTERVENTIONS:  - Perform AM-PAC 6 Click Basic Mobility/ Daily Activity assessment daily.  - Set and communicate daily mobility goal to care team and  patient/family/caregiver.   - Collaborate with rehabilitation services on mobility goals if consulted  - Out of bed for toileting  - Record patient progress and toleration of activity level   Outcome: Progressing     Problem: DISCHARGE PLANNING  Goal: Discharge to home or other facility with appropriate resources  Description: INTERVENTIONS:  - Identify barriers to discharge w/patient and caregiver  - Arrange for needed discharge resources and transportation as appropriate  - Identify discharge learning needs (meds, wound care, etc.)  - Arrange for interpretive services to assist at discharge as needed  - Refer to Case Management Department for coordinating discharge planning if the patient needs post-hospital services based on physician/advanced practitioner order or complex needs related to functional status, cognitive ability, or social support system  Outcome: Progressing     Problem: Knowledge Deficit  Goal: Patient/family/caregiver demonstrates understanding of disease process, treatment plan, medications, and discharge instructions  Description: Complete learning assessment and assess knowledge base.  Interventions:  - Provide teaching at level of understanding  - Provide teaching via preferred learning methods  Outcome: Progressing

## 2025-07-08 NOTE — DISCHARGE SUMMARY
"Discharge Summary - Critical Care/ICU   Name: Elda Calderon 29 y.o. female I MRN: [id number]  Unit/Bed#: ICU 04 I Date of Admission: 7/2/2025   Date of Service: 7/8/2025 I Hospital Day: 6     Assessment & Plan  Acute opioid withdrawal (HCC)  7/6 overall patient doing well from the withdrawal standpoint.  Stable on methadone 30 mg daily  Started on clonidine 0.1 mg every 8 hours  On gabapentin 300 mg 3 times daily  Follow-up with outpatient methadone clinic and intensive outpatient rehab  Opioid use disorder, severe, dependence ([facility])  History of polysubstance abuse.  This admission, UDS positive for fentanyl.  Upon chart review, previous detox admissions show that patient left AMA   In the past, patient was receiving methadone [facility] however patient has not received methadone in quite a while which was her decision.  History of IV drug use in the past, tricuspid murmur evident on auscultation.  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 intake       Medical Problems       Resolved Problems  Date Reviewed: 5/17/2025   None       Discharging Physician / Practitioner: Jose Resendiz MD  PCP: Mike Tillman DO  Admission Date:   Admission Orders (From admission, onward)       Ordered        07/02/25 2234  INPATIENT ADMISSION  Once                          Discharge Date: " 07/08/25    Next Steps for Physician/AP Assuming Care:  Follow up for the need for clonidine currently on 0.1 mg 3 times daily  Recommend continuing Eliquis 5 mg twice daily for 3 months    Test Results Pending at Discharge (will require follow up):  none    Consultations During Hospital Stay:  IP CONSULT TO TOXICOLOGY  IP CONSULT TO CASE MANAGEMENT  CONSULT TO CERTIFIED      Procedures Performed:   none    Significant Findings / Test Results:   XR chest 2 views   Final Result by Trey Escobar MD (07/03 1754)      No acute cardiopulmonary disease.            Workstation performed: [id number]              Incidental Findings:          Hospital Course:   Elda Calderon is a 29 y.o. female patient who originally presented to the hospital on 7/2/2025 history significant for left IJ thrombus noncompliant with Eliquis, PTSD, anxiety who presented with nausea/vomiting and opioid withdrawal and xylazine withdrawal requiring Precedex and was transferred to ICU.  Patient was started on standing benzodiazepines and was weaned off Precedex, patient was also started on gabapentin, clonidine for symptom control.  In the ICU patient got a femoral line placed for blood draws due to difficult stick, over the course of the ICU- patient's diet was advanced, patient tolerating p.o. intake, tremors and symptoms controlled and out of the withdrawal.   7/8 patient was overall hemodynamically stable for discharge to be followed up with outpatient intensive rehab and methadone clinic for continued of care.       Please see above list of diagnoses and related plan for additional information.     Discharge Day Visit / Exam:   Physical Exam  Vitals and nursing note reviewed.   Constitutional:       General: She is not in acute distress.     Appearance: She is well-developed.   HENT:      Head: Normocephalic and atraumatic.     Eyes:      Conjunctiva/sclera: Conjunctivae normal.       Cardiovascular:      Rate  and Rhythm: Regular rhythm. Tachycardia present.      Heart sounds: No murmur heard.  Pulmonary:      Effort: Pulmonary effort is normal. No respiratory distress.      Breath sounds: Normal breath sounds.   Abdominal:      Palpations: Abdomen is soft.      Tenderness: There is no abdominal tenderness.     Musculoskeletal:         General: No swelling.      Cervical back: Neck supple.     Skin:     General: Skin is warm and dry.     Neurological:      Mental Status: She is alert. Mental status is at baseline.     Psychiatric:         Mood and Affect: Mood normal.          Discussion with Family: Updated  (mother) at bedside.    Discharge instructions/Information to patient and family:   See after visit summary for information provided to patient and family.      Provisions for Follow-Up Care:  See after visit summary for information related to follow-up care and any pertinent home health orders.      Mobility at time of Discharge:   Basic Mobility Inpatient Raw Score: 24  JH-HLM Goal: 8: Walk 250 feet or more  JH-HLM Achieved: 7: Walk 25 feet or more       Disposition:   Home    Planned Readmission: no    Administrative Statements   Discharge Statement:  I have spent a total time of  minutes in caring for this patient on the day of the visit/encounter. .    **Please Note: This note may have been constructed using a voice recognition system**

## 2025-07-08 NOTE — PROGRESS NOTES
"Progress Note - Critical Care/ICU   Name: Elda Calderon 29 y.o. female I MRN: [id number]  Unit/Bed#: ICU 04 I Date of Admission: 7/2/2025   Date of Service: 7/8/2025 I Hospital Day: 6       Assessment & Plan  Acute opioid withdrawal (HCC)  Patient is actively withdrawing from opioids, per patient last use of fentanyl on Monday  Upon admission COWS Score = 13  Dicsussed with Toxicology -continue symptom management  As needed Zofran for nausea/vomiting  Atarax 50 mg q6 prn for anxiety  Tylenol as needed for pain management  Gabapentin 300 mg po 3 times daily  Clonidine 0.1 mg q8h prn anxiety  Clonidine 0.2 mg every 8 hours scheduled  PO Valium 5 mg as needed every 6 hours  Placed on clonidine patch  7/3 started on methadone 30 mg  Off Precedex  Opioid use disorder, severe, dependence (HCC)  History of polysubstance abuse.  This admission, UDS positive for fentanyl.  Upon chart review, previous detox admissions show that patient left AMA   In the past, patient was receiving methadone [facility] however patient has not received methadone in quite a while which was her decision.  History of IV drug use in the past, tricuspid murmur evident on auscultation.  Per chart review, no echo on file.  Consider echocardiogram.  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 from neck infection from IV drug use through IJ vein  Was seen by vascular surgery in the past, no surgical indication necessary  Was prescribed Eliquis, but patient stopped on her own secondary to \"anxiety \"  Patient is agreeable to start taking Eliquis 5mg BID on 7/7  Failure to thrive in adult  Adult Malnutrition type: Acute illness  Adult Degree " of Malnutrition: Other severe protein calorie malnutrition  Malnutrition Characteristics: Fat loss, Muscle loss, Weight loss  Likely due ongoing known history of polysubstance abuse severe malnutrition poor oral intake  Consider nutrition consult  Disposition: Stepdown Level 2    ICU Core Measures     A: Assess, Prevent, and Manage Pain Has pain been assessed? Yes  Need for changes to pain regimen? No   B: Both SAT/SAT  N/A   C: Choice of Sedation RASS Goal: 0 Alert and Calm  Need for changes to sedation or analgesia regimen? No   D: Delirium CAM-ICU: Negative   E: Early Mobility  Plan for early mobility? Yes   F: Family Engagement Plan for family engagement today? Yes       Review of Invasive Devices:      Central access plan: No peripheral access able to be obtained.  Plan DC once stable      Prophylaxis:  VTE VTE covered by:  apixaban, Oral, 5 mg at 07/07/25 1711       Stress Ulcer  not ordered         24 Hour Events : No acute overnight events  Subjective patient comfortably sitting in the couch not in any acute distress      Objective :                   Vitals I/O      Most Recent Min/Max in 24hrs   Temp 98.1 °F (36.7 °C) Temp  Min: 97.7 °F (36.5 °C)  Max: 98.9 °F (37.2 °C)   Pulse (!) 107 Pulse  Min: 107  Max: 144   Resp 18 Resp  Min: 16  Max: 20   /65 BP  Min: 109/65  Max: 130/99   O2 Sat 100 % SpO2  Min: 98 %  Max: 100 %      Intake/Output Summary (Last 24 hours) at 7/8/2025 0735  Last data filed at 7/8/2025 0400  Gross per 24 hour   Intake 1030.94 ml   Output --   Net 1030.94 ml       Diet Regular; Regular House    Invasive Monitoring           Physical Exam   Physical Exam  Vitals and nursing note reviewed.   Eyes:      General:         Right eye: No discharge.         Left eye: No discharge.      Extraocular Movements: Extraocular movements intact.      Conjunctiva/sclera: Conjunctivae normal.   Skin:     General: Skin is warm.      Capillary Refill: Capillary refill takes less than 2 seconds.       Coloration: Skin is not pale.   HENT:      Mouth/Throat:      Mouth: Mucous membranes are moist.   Neck:      Vascular: No JVD.   Cardiovascular:      Rate and Rhythm: Regular rhythm. Tachycardia present.      Pulses: Normal pulses.      Heart sounds: Normal heart sounds.   Musculoskeletal:         General: No swelling.      Right lower leg: No edema.      Left lower leg: No edema.   Abdominal: General: There is no distension.      Palpations: Abdomen is soft.      Tenderness: There is no abdominal tenderness. There is no guarding.   Constitutional:       General: She is not in acute distress.  Pulmonary:      Effort: Pulmonary effort is normal.      Breath sounds: No wheezing, rhonchi or rales.   Chest:      Chest wall: No tenderness.   Psychiatric:         Mood and Affect:  mood and affect abnormal.  Neurological:      General: No focal deficit present.      Mental Status: She is alert and oriented to person, place and time. She is CAM ICU negative.      Cranial Nerves: No facial asymmetry.          Diagnostic Studies        Lab Results: I have reviewed the following results:     Medications:  Scheduled PRN   apixaban, 5 mg, BID  chlorhexidine, 15 mL, Q12H JERI  cloNIDine, 0.2 mg, Q8H JERI  cloNIDine, 0.2 mg, Weekly  diazepam, 5 mg, Q12H  gabapentin, 300 mg, TID  methadone, 30 mg, Daily  multi-electrolyte, 1,000 mL, Once  potassium chloride, 40 mEq, Q2H      acetaminophen, 650 mg, Q6H PRN  diazepam, 5 mg, Q6H PRN  hydrOXYzine HCL, 50 mg, Q6H PRN  loperamide, 2 mg, 4x Daily PRN  ondansetron, 4 mg, Q6H PRN       Continuous    dexmedetomidine, 0.1-0.7 mcg/kg/hr, Last Rate: Stopped (07/07/25 0721)         Labs:   CBC    Recent Labs     07/07/25  0404 07/07/25  1109 07/08/25  0457   WBC 24.97* 18.18* 9.82   HGB 11.6 11.7 10.3*   HCT 36.0 36.1 30.5*   * 176 166   BANDSPCT 1  --   --      BMP    Recent Labs     07/07/25  0404 07/08/25  0457   SODIUM 139 140   K 4.5 3.2*    107   CO2 28 28   AGAP 6 5   BUN 5 8    CREATININE 0.57* 0.54*   CALCIUM 9.1 8.5       Coags    No recent results     Additional Electrolytes  Recent Labs     07/06/25 2045 07/07/25 0404 07/08/25  0457   MG 1.9 3.0* 2.0   PHOS 2.0* 4.7* 3.2   CAIONIZED 1.17  --   --           Blood Gas    No recent results  No recent results LFTs  Recent Labs     07/08/25  0457   ALT 10   AST 22   ALKPHOS 50   ALB 3.3*   TBILI 0.28       Infectious  No recent results  Glucose  Recent Labs     07/06/25 2045 07/07/25 0404 07/08/25  0457   GLUC 99 104 139

## 2025-07-08 NOTE — ASSESSMENT & PLAN NOTE
Suspect secondary to electrolyte derangements and possibly from Methadone   Aggressively replete K and Mag   normal...

## 2025-07-08 NOTE — PLAN OF CARE
Problem: Potential for Falls  Goal: Patient will remain free of falls  Description: INTERVENTIONS:  - Educate patient/family on patient safety including physical limitations  - Instruct patient to call for assistance with activity   - Consider consulting OT/PT to assist with strengthening/mobility based on AM PAC & JH-HLM score  - Consult OT/PT to assist with strengthening/mobility   - Keep Call bell within reach  - Keep bed low and locked with side rails adjusted as appropriate  - Keep care items and personal belongings within reach  - Initiate and maintain comfort rounds  - Make Fall Risk Sign visible to staff  Problem: PAIN - ADULT  Goal: Verbalizes/displays adequate comfort level or baseline comfort level  Description: Interventions:  - Encourage patient to monitor pain and request assistance  - Assess pain using appropriate pain scale  - Administer analgesics as ordered based on type and severity of pain and evaluate response  - Implement non-pharmacological measures as appropriate and evaluate response  - Consider cultural and social influences on pain and pain management  - Notify physician/advanced practitioner if interventions unsuccessful or patient reports new pain  - Educate patient/family on pain management process including their role and importance of  reporting pain   - Provide non-pharmacologic/complimentary pain relief interventions  Outcome: Progressing     Problem: INFECTION - ADULT  Goal: Absence or prevention of progression during hospitalization  Description: INTERVENTIONS:  - Assess and monitor for signs and symptoms of infection  - Monitor lab/diagnostic results  - Monitor all insertion sites, i.e. indwelling lines, tubes, and drains  - Monitor endotracheal if appropriate and nasal secretions for changes in amount and color  - [place] appropriate cooling/warming therapies per order  - Administer medications as ordered  - Instruct and encourage patient and family to use good hand hygiene  technique  - Identify and instruct in appropriate isolation precautions for identified infection/condition  Outcome: Progressing  Goal: Absence of fever/infection during neutropenic period  Description: INTERVENTIONS:  - Monitor WBC  - Perform strict hand hygiene  - Limit to healthy visitors only  - No plants, dried, fresh or silk flowers with aguilar in patient room  Outcome: Progressing     - Apply yellow socks and bracelet for high fall risk patients  - Consider moving patient to room near nurses station  Outcome: Progressing

## 2025-07-08 NOTE — ASSESSMENT & PLAN NOTE
"History of IJ vein thrombosis Secondary to extrinsic compression from neck infection from IV drug use through IJ vein  Was seen by vascular surgery in the past, no surgical indication necessary  Was prescribed Eliquis, but patient stopped on her own secondary to \"anxiety \"  Patient is agreeable to start taking Eliquis 5mg BID on 7/7  Continue Eliquis upon discharge for 3 months  "

## 2025-07-08 NOTE — ASSESSMENT & PLAN NOTE
7/6 overall patient doing well from the withdrawal standpoint.  Stable on methadone 30 mg daily  Started on clonidine 0.1 mg every 8 hours  On gabapentin 300 mg 3 times daily  Follow-up with outpatient methadone clinic and intensive outpatient rehab

## 2025-07-09 LAB — BACTERIA BLD CULT: NORMAL

## 2025-08-22 ENCOUNTER — TELEPHONE (OUTPATIENT)
Dept: CASE MANAGEMENT | Facility: HOSPITAL | Age: 30
End: 2025-08-22